# Patient Record
Sex: MALE | Race: WHITE | Employment: OTHER | URBAN - METROPOLITAN AREA
[De-identification: names, ages, dates, MRNs, and addresses within clinical notes are randomized per-mention and may not be internally consistent; named-entity substitution may affect disease eponyms.]

---

## 2017-02-08 RX ORDER — TRAMADOL HYDROCHLORIDE 50 MG/1
TABLET ORAL
Qty: 120 TABLET | Refills: 1 | OUTPATIENT
Start: 2017-02-08 | End: 2017-06-13

## 2017-02-08 NOTE — TELEPHONE ENCOUNTER
LOV:12-7  Last Filled:10-10, #120 with 1 refill  Labs:   Future Appointments  Date Time Provider Piedad Isidro   2/9/2017 12:50 PM Brendon Goodell, MD [facility] Karolyn Chin       Please Advise

## 2017-02-09 ENCOUNTER — OFFICE VISIT (OUTPATIENT)
Dept: INTERNAL MEDICINE CLINIC | Facility: CLINIC | Age: 82
End: 2017-02-09

## 2017-02-09 VITALS
TEMPERATURE: 99 F | HEART RATE: 58 BPM | SYSTOLIC BLOOD PRESSURE: 151 MMHG | DIASTOLIC BLOOD PRESSURE: 66 MMHG | HEIGHT: 66 IN | BODY MASS INDEX: 29.73 KG/M2 | WEIGHT: 185 LBS

## 2017-02-09 DIAGNOSIS — I10 ESSENTIAL HYPERTENSION WITH GOAL BLOOD PRESSURE LESS THAN 140/90: Primary | ICD-10-CM

## 2017-02-09 PROCEDURE — 99213 OFFICE O/P EST LOW 20 MIN: CPT | Performed by: INTERNAL MEDICINE

## 2017-02-09 PROCEDURE — G0463 HOSPITAL OUTPT CLINIC VISIT: HCPCS | Performed by: INTERNAL MEDICINE

## 2017-02-09 RX ORDER — AMLODIPINE BESYLATE 5 MG/1
5 TABLET ORAL DAILY
Qty: 30 TABLET | Refills: 3 | Status: SHIPPED | OUTPATIENT
Start: 2017-02-09 | End: 2017-05-16

## 2017-02-09 NOTE — PROGRESS NOTES
F/u his bps  Home bps all around 150/90      HEENT-NC/AT PEERLA, eom intact, sclerae non icteric, oral exam wnl, ears nl tms, s wax.   Neck without adenopathy thyroid non palpable   Carotids 2+ without bruits  Pulm-nl inspection and palpation  Clear to ausc

## 2017-04-11 RX ORDER — TAMSULOSIN HYDROCHLORIDE 0.4 MG/1
CAPSULE ORAL
Qty: 30 CAPSULE | Refills: 5 | Status: SHIPPED | OUTPATIENT
Start: 2017-04-11 | End: 2017-10-10

## 2017-04-14 ENCOUNTER — OFFICE VISIT (OUTPATIENT)
Dept: INTERNAL MEDICINE CLINIC | Facility: CLINIC | Age: 82
End: 2017-04-14

## 2017-04-14 VITALS
HEART RATE: 60 BPM | BODY MASS INDEX: 30.37 KG/M2 | SYSTOLIC BLOOD PRESSURE: 118 MMHG | HEIGHT: 66 IN | DIASTOLIC BLOOD PRESSURE: 67 MMHG | WEIGHT: 189 LBS

## 2017-04-14 DIAGNOSIS — I10 ESSENTIAL HYPERTENSION WITH GOAL BLOOD PRESSURE LESS THAN 140/90: Primary | ICD-10-CM

## 2017-04-14 DIAGNOSIS — L30.8 OTHER ECZEMA: ICD-10-CM

## 2017-04-14 PROCEDURE — G0463 HOSPITAL OUTPT CLINIC VISIT: HCPCS | Performed by: INTERNAL MEDICINE

## 2017-04-14 PROCEDURE — 99213 OFFICE O/P EST LOW 20 MIN: CPT | Performed by: INTERNAL MEDICINE

## 2017-04-14 RX ORDER — FLUOCINONIDE 0.5 MG/G
OINTMENT TOPICAL
Qty: 60 G | Refills: 6 | Status: SHIPPED | OUTPATIENT
Start: 2017-04-14 | End: 2018-01-09

## 2017-04-14 NOTE — PROGRESS NOTES
F/u bp  Numbers much better    HEENT-NC/AT PEERLA, eom intact, sclerae non icteric, oral exam wnl, ears nl tms, s wax.   Neck without adenopathy thyroid non palpable   Carotids 2+ without bruits  Pulm-nl inspection and palpation  Clear to auscultation and p

## 2017-04-27 ENCOUNTER — OFFICE VISIT (OUTPATIENT)
Dept: PAIN CLINIC | Facility: HOSPITAL | Age: 82
End: 2017-04-27
Attending: NURSE PRACTITIONER
Payer: MEDICARE

## 2017-04-27 VITALS — DIASTOLIC BLOOD PRESSURE: 63 MMHG | SYSTOLIC BLOOD PRESSURE: 106 MMHG | HEART RATE: 53 BPM

## 2017-04-27 PROCEDURE — 99211 OFF/OP EST MAY X REQ PHY/QHP: CPT

## 2017-04-27 NOTE — CHRONIC PAIN
Follow-up Note    HISTORY OF PRESENT ILLNESS:  Heidi Marquez is a [de-identified]year old old male returns to the clinic for follow up. Patient reports bilateral lower extremity pain. He reports a series of three injections last year for the pain.  He reports approxi As bedtime Disp: 30 tablet Rfl: 11   LEVOCETIRIZINE DIHYDROCHLORIDE 5 MG Oral Tab TAKE 1 TABLET (5 MG TOTAL) BY MOUTH NIGHTLY. Disp: 15 tablet Rfl: 1   loratadine (CLARITIN) 10 MG Oral Tab Take 10 mg by mouth daily.  Disp:  Rfl:    Multiple Vitamins-Mineral detachment 2008     \"RPE detachments in Mac\"; OU; per NG   • Dermatochalasis 2002     OU; per NG   • Visual floaters 2002     OU; per NG   • Macular degeneration      per NG       SURGICAL HISTORY:      Past Surgical History    APPENDECTOMY      Comment Extremity:  Normal    IMAGING:  No new relevant studies     [place] PHYSICIAN MONITORING PROGRAM REVIEWED  No      ASSESSMENT AND PLAN:    Benny Joy is a [de-identified]year old  male, with  history of bilateral lower extremity radicular pain secondary to lumbar DDD.

## 2017-04-27 NOTE — PROGRESS NOTES
Patient presents to [facility] ambulatory for med eval.  We have been treating him for bilateral leg pain from his thighs to his feet. His pain today is a 5/10 when walking. When he sits or lies down he has 0/10 pain.   He states the pain is a \"generalized pain

## 2017-05-16 RX ORDER — AMLODIPINE BESYLATE 5 MG/1
TABLET ORAL
Qty: 30 TABLET | Refills: 11 | Status: SHIPPED | OUTPATIENT
Start: 2017-05-16 | End: 2018-04-17

## 2017-06-13 RX ORDER — LISINOPRIL 30 MG/1
TABLET ORAL
Qty: 30 TABLET | Refills: 5 | Status: SHIPPED | OUTPATIENT
Start: 2017-06-13 | End: 2018-07-23

## 2017-06-13 RX ORDER — TRAMADOL HYDROCHLORIDE 50 MG/1
TABLET ORAL
Qty: 120 TABLET | Refills: 0 | OUTPATIENT
Start: 2017-06-13 | End: 2017-10-02

## 2017-06-14 ENCOUNTER — TELEPHONE (OUTPATIENT)
Dept: INTERNAL MEDICINE CLINIC | Facility: CLINIC | Age: 82
End: 2017-06-14

## 2017-06-14 NOTE — TELEPHONE ENCOUNTER
[facility] calling for a refill on med below. [facility] stts there has been some confusion because the pts current dr is leaving and the pts old dr. Ranjan Duval is retired.   allopurinol 100 MG Oral Tab 30 tablet 11 5/27/2016       Sig :  Take 1 tablet (100 mg total) by

## 2017-06-14 NOTE — TELEPHONE ENCOUNTER
[facility] calling and stts that they never received the script for   TRAMADOL HCL 50 MG Oral Tab, TAKE ONE TABLET BY MOUTH EVERY 6 HOURS AS NEEDED FOR PAIN, Disp: 120 tablet, Rfl: 1

## 2017-06-20 RX ORDER — ALLOPURINOL 100 MG/1
100 TABLET ORAL DAILY
Qty: 30 TABLET | Refills: 2 | Status: SHIPPED | OUTPATIENT
Start: 2017-06-20 | End: 2017-09-18

## 2017-07-18 ENCOUNTER — OFFICE VISIT (OUTPATIENT)
Dept: INTERNAL MEDICINE CLINIC | Facility: CLINIC | Age: 82
End: 2017-07-18

## 2017-07-18 ENCOUNTER — LAB ENCOUNTER (OUTPATIENT)
Dept: LAB | Age: 82
End: 2017-07-18
Attending: INTERNAL MEDICINE
Payer: MEDICARE

## 2017-07-18 VITALS
SYSTOLIC BLOOD PRESSURE: 112 MMHG | BODY MASS INDEX: 29.57 KG/M2 | WEIGHT: 184 LBS | HEIGHT: 66 IN | HEART RATE: 66 BPM | DIASTOLIC BLOOD PRESSURE: 66 MMHG

## 2017-07-18 DIAGNOSIS — I10 ESSENTIAL HYPERTENSION WITH GOAL BLOOD PRESSURE LESS THAN 140/90: Primary | ICD-10-CM

## 2017-07-18 DIAGNOSIS — N40.1 BENIGN PROSTATIC HYPERPLASIA WITH LOWER URINARY TRACT SYMPTOMS, SYMPTOM DETAILS UNSPECIFIED: ICD-10-CM

## 2017-07-18 DIAGNOSIS — D50.9 IRON DEFICIENCY ANEMIA, UNSPECIFIED IRON DEFICIENCY ANEMIA TYPE: ICD-10-CM

## 2017-07-18 LAB
BASOPHILS # BLD: 0.1 K/UL (ref 0–0.2)
BASOPHILS NFR BLD: 1 %
EOSINOPHIL # BLD: 0.3 K/UL (ref 0–0.7)
EOSINOPHIL NFR BLD: 4 %
ERYTHROCYTE [DISTWIDTH] IN BLOOD BY AUTOMATED COUNT: 13.8 % (ref 11–15)
HCT VFR BLD AUTO: 36.4 % (ref 41–52)
HGB BLD-MCNC: 12.4 G/DL (ref 13.5–17.5)
LYMPHOCYTES # BLD: 2 K/UL (ref 1–4)
LYMPHOCYTES NFR BLD: 30 %
MCH RBC QN AUTO: 33.2 PG (ref 27–32)
MCHC RBC AUTO-ENTMCNC: 34.1 G/DL (ref 32–37)
MCV RBC AUTO: 97.5 FL (ref 80–100)
MONOCYTES # BLD: 0.9 K/UL (ref 0–1)
MONOCYTES NFR BLD: 15 %
NEUTROPHILS # BLD AUTO: 3.3 K/UL (ref 1.8–7.7)
NEUTROPHILS NFR BLD: 50 %
PLATELET # BLD AUTO: 178 K/UL (ref 140–400)
PMV BLD AUTO: 8.6 FL (ref 7.4–10.3)
RBC # BLD AUTO: 3.73 M/UL (ref 4.5–5.9)
WBC # BLD AUTO: 6.5 K/UL (ref 4–11)

## 2017-07-18 PROCEDURE — 99214 OFFICE O/P EST MOD 30 MIN: CPT | Performed by: INTERNAL MEDICINE

## 2017-07-18 PROCEDURE — 36415 COLL VENOUS BLD VENIPUNCTURE: CPT

## 2017-07-18 PROCEDURE — G0463 HOSPITAL OUTPT CLINIC VISIT: HCPCS | Performed by: INTERNAL MEDICINE

## 2017-07-18 PROCEDURE — 85025 COMPLETE CBC W/AUTO DIFF WBC: CPT

## 2017-07-18 NOTE — PROGRESS NOTES
Stanley Sims is a [de-identified]year old male. HPI:   1. Essential hypertension with goal blood pressure less than 140/90    Patient has been following low salt diet and has been taking anti-hypertensive prescriptions as prescribed.  Blood pressure has been checke loratadine (CLARITIN) 10 MG Oral Tab Take 10 mg by mouth daily. Disp:  Rfl:    Multiple Vitamins-Minerals (EYE VITAMINS) Oral Cap Take  by mouth. 1 tab daily Disp:  Rfl:    cholecalciferol (D 1000) 1000 UNITS Oral Cap Take 1,000 Units by mouth daily.  Vit BS's,no masses, HSM or tenderness  EXTREMITIES: no cyanosis, clubbing . Has some edema of left lower extremities. Left knee with arthritis. ASSESSMENT AND PLAN:   1.  Essential hypertension with goal blood pressure less than 140/90    Patient instructed

## 2017-07-28 ENCOUNTER — OFFICE VISIT (OUTPATIENT)
Dept: DERMATOLOGY CLINIC | Facility: CLINIC | Age: 82
End: 2017-07-28

## 2017-07-28 DIAGNOSIS — Q80.0 ICHTHYOSIS VULGARIS: ICD-10-CM

## 2017-07-28 DIAGNOSIS — L57.0 AK (ACTINIC KERATOSIS): Primary | ICD-10-CM

## 2017-07-28 DIAGNOSIS — D23.30 BENIGN NEOPLASM OF SKIN OF FACE: ICD-10-CM

## 2017-07-28 DIAGNOSIS — D23.60 BENIGN NEOPLASM OF SKIN OF UPPER LIMB, INCLUDING SHOULDER, UNSPECIFIED LATERALITY: ICD-10-CM

## 2017-07-28 DIAGNOSIS — D23.4 BENIGN NEOPLASM OF SCALP AND SKIN OF NECK: ICD-10-CM

## 2017-07-28 DIAGNOSIS — L71.9 ROSACEA: ICD-10-CM

## 2017-07-28 DIAGNOSIS — L30.9 DERMATITIS: ICD-10-CM

## 2017-07-28 PROCEDURE — 17003 DESTRUCT PREMALG LES 2-14: CPT | Performed by: DERMATOLOGY

## 2017-07-28 PROCEDURE — 17000 DESTRUCT PREMALG LESION: CPT | Performed by: DERMATOLOGY

## 2017-07-28 PROCEDURE — 99213 OFFICE O/P EST LOW 20 MIN: CPT | Performed by: DERMATOLOGY

## 2017-07-29 ENCOUNTER — TELEPHONE (OUTPATIENT)
Dept: INTERNAL MEDICINE CLINIC | Facility: CLINIC | Age: 82
End: 2017-07-29

## 2017-07-29 NOTE — TELEPHONE ENCOUNTER
Reviewed LOV 7/18/17 notes below with pt, pt voiced understand. Per LOV notes -     3. Iron deficiency anemia, unspecified iron deficiency anemia type     Has been on iron pills. Ferritin level high. No Known bleeding.  Was offerered colonoscopy and c

## 2017-08-07 NOTE — PROGRESS NOTES
Shelton Dahl is a 81year old male. HPI:     CC:  Patient presents with:  Derm Problem: LOV in 2014 with SD. Pt presents with multiple lesions on scalp that he would like to have evaluated. Personal hx of melanoma on forehead.    Other: C/o dry skin to topically 2 (two) times daily. Apply bid Disp: 80 g Rfl: 3   allopurinol 100 MG Oral Tab Take 1 tablet (100 mg total) by mouth daily.  Disp: 30 tablet Rfl: 2   LISINOPRIL 30 MG Oral Tab TAKE 1 TABLET (30 MG TOTAL) BY MOUTH DAILY AT BEDTIME Disp: 30 tablet R Rash    Past Medical History:   Diagnosis Date   • Cataracts, bilateral 2002    OU; per NG   • Cup to disc asymmetry 2008    OU; per NG   • Dermatochalasis 2002    OU; per NG   • Hx of melanoma of skin 1979    forehead   • Macular degeneration     per NG from scratching. Worse in winter. Applying Lubiderm. Patient presents with concerns above. Patient has been in their usual state of health. History, medications, allergies reviewed as noted. ROS:  Denies any other systemic complaints.   No ne right forehead, left lateral brow, left helix. Actinic keratoses. Precancerous nature discussed. Lesions treated with cryo- . Biopsy if not resolved. x2    No recurrence prior melanoma on forehead.         Assessment plan:    Patient with history of skin

## 2017-08-28 ENCOUNTER — OFFICE VISIT (OUTPATIENT)
Dept: RHEUMATOLOGY | Facility: CLINIC | Age: 82
End: 2017-08-28

## 2017-08-28 VITALS
WEIGHT: 182.44 LBS | HEART RATE: 64 BPM | DIASTOLIC BLOOD PRESSURE: 72 MMHG | BODY MASS INDEX: 28.97 KG/M2 | HEIGHT: 66.5 IN | SYSTOLIC BLOOD PRESSURE: 116 MMHG

## 2017-08-28 DIAGNOSIS — M17.12 OSTEOARTHRITIS OF LEFT KNEE, UNSPECIFIED OSTEOARTHRITIS TYPE: Primary | ICD-10-CM

## 2017-08-28 PROCEDURE — 20610 DRAIN/INJ JOINT/BURSA W/O US: CPT | Performed by: INTERNAL MEDICINE

## 2017-08-28 PROCEDURE — 99213 OFFICE O/P EST LOW 20 MIN: CPT | Performed by: INTERNAL MEDICINE

## 2017-08-28 RX ORDER — TRIAMCINOLONE ACETONIDE 40 MG/ML
40 INJECTION, SUSPENSION INTRA-ARTICULAR; INTRAMUSCULAR ONCE
Status: COMPLETED | OUTPATIENT
Start: 2017-08-28 | End: 2017-08-28

## 2017-08-28 RX ADMIN — TRIAMCINOLONE ACETONIDE 40 MG: 40 INJECTION, SUSPENSION INTRA-ARTICULAR; INTRAMUSCULAR at 11:40:00

## 2017-08-28 NOTE — PROCEDURES
With paitent's consent, I injected pt's left  knee with 1ml lidocaine 1 % and 1 ml kenalog 40. It was done under sterile technique using iodine and alcohol swabs and ethyl chloride was used as an anaesthetic spray. Pt.  tolerated it well.

## 2017-08-28 NOTE — PROGRESS NOTES
Magdaleno Armando is a 81year old male. HPI:   Patient presents with:  Osteoarthritis: LT Back of Calf & Thigh [Pain Score: 7 \"when walking\"] Would like to have knee injection if MD agrees. I had the pleasure of seeing Randy Nieves on 6/15/2016. His left hip is boethering him again. He can sit in a hard seat. But 2 hours after sleeping he has left hip pain and can hardly walk. The shot last time worked until last weekend. He cut the grass several weeks ago.  He saw Dr. Vandana Santizo and getting xray topically 2 (two) times daily. Apply bid Disp: 80 g Rfl: 3   allopurinol 100 MG Oral Tab Take 1 tablet (100 mg total) by mouth daily.  Disp: 30 tablet Rfl: 2   LISINOPRIL 30 MG Oral Tab TAKE 1 TABLET (30 MG TOTAL) BY MOUTH DAILY AT BEDTIME Disp: 30 tablet R Rash      ROS:   All other ROS are negative.      PHYSICAL EXAM:   HEENT: Clear oropharynx, no oral ulcers, EOM intact, clear sclear, PERRLA, pleasant, no acute distress, no CAD, no neck tendnerness, good ROM,   No rashes  CVS: RRR, no murmurs  RS: CTAB, no left knee   with 1ml lidocaine 1 % and 1 ml kenalog 40. It was done under sterile technique using iodine and alcohol swabs and ethyl chloride was used as an anaesthetic spray. Pt.  tolerated it well.   Aspirated left knee 13cc   Left rochanteric bursitis -

## 2017-09-15 RX ORDER — ALLOPURINOL 100 MG/1
TABLET ORAL
Qty: 30 TABLET | Refills: 1 | OUTPATIENT
Start: 2017-09-15

## 2017-09-15 NOTE — TELEPHONE ENCOUNTER
Last uric acid level 5/2016 normal   Please advise on refill   Refill Protocol Appointment Criteria  · Appointment scheduled in the past 6 months or in the next 3 months  Recent Outpatient Visits            2 weeks ago Osteoarthritis of left knee, unspecif

## 2017-09-18 ENCOUNTER — TELEPHONE (OUTPATIENT)
Dept: OTHER | Age: 82
End: 2017-09-18

## 2017-09-18 RX ORDER — ALLOPURINOL 100 MG/1
100 TABLET ORAL DAILY
Qty: 30 TABLET | Refills: 0 | Status: SHIPPED | OUTPATIENT
Start: 2017-09-18 | End: 2017-10-10

## 2017-09-18 NOTE — TELEPHONE ENCOUNTER
Refilled per protocol    Appointment made for 10/10/17    Refill Protocol Appointment Criteria  · Appointment scheduled in the past 6 months or in the next 3 months  Recent Outpatient Visits            3 weeks ago Osteoarthritis of left knee, unspecified o

## 2017-10-02 RX ORDER — TRAMADOL HYDROCHLORIDE 50 MG/1
TABLET ORAL
Qty: 120 TABLET | Refills: 0 | OUTPATIENT
Start: 2017-10-02 | End: 2017-12-07

## 2017-10-02 NOTE — TELEPHONE ENCOUNTER
Dr Eubanks Headings to advise on pended tramdol 50 mg refill request.   CC: EM  CF LPN/CMA to phone tramadol into [place] when md approves.      Refill Protocol Appointment Criteria  · Appointment scheduled in the past 6 months or in the next 3 months  Recent Outpatient

## 2017-10-06 ENCOUNTER — PATIENT OUTREACH (OUTPATIENT)
Dept: INTERNAL MEDICINE CLINIC | Facility: CLINIC | Age: 82
End: 2017-10-06

## 2017-10-10 ENCOUNTER — OFFICE VISIT (OUTPATIENT)
Dept: INTERNAL MEDICINE CLINIC | Facility: CLINIC | Age: 82
End: 2017-10-10

## 2017-10-10 VITALS
SYSTOLIC BLOOD PRESSURE: 109 MMHG | WEIGHT: 182 LBS | HEIGHT: 66 IN | DIASTOLIC BLOOD PRESSURE: 67 MMHG | BODY MASS INDEX: 29.25 KG/M2 | HEART RATE: 71 BPM | RESPIRATION RATE: 16 BRPM

## 2017-10-10 DIAGNOSIS — N40.1 BENIGN PROSTATIC HYPERPLASIA WITH LOWER URINARY TRACT SYMPTOMS, SYMPTOM DETAILS UNSPECIFIED: ICD-10-CM

## 2017-10-10 DIAGNOSIS — I10 ESSENTIAL HYPERTENSION WITH GOAL BLOOD PRESSURE LESS THAN 140/90: Primary | ICD-10-CM

## 2017-10-10 DIAGNOSIS — D50.9 IRON DEFICIENCY ANEMIA, UNSPECIFIED IRON DEFICIENCY ANEMIA TYPE: ICD-10-CM

## 2017-10-10 PROCEDURE — 99214 OFFICE O/P EST MOD 30 MIN: CPT | Performed by: INTERNAL MEDICINE

## 2017-10-10 PROCEDURE — G0463 HOSPITAL OUTPT CLINIC VISIT: HCPCS | Performed by: INTERNAL MEDICINE

## 2017-10-10 RX ORDER — ALLOPURINOL 100 MG/1
TABLET ORAL
Qty: 30 TABLET | Refills: 0 | Status: SHIPPED | OUTPATIENT
Start: 2017-10-10 | End: 2018-04-17

## 2017-10-10 NOTE — PROGRESS NOTES
Corky Glover is a 81year old male. HPI:   1. Essential hypertension with goal blood pressure less than 140/90    Patient has been following low salt diet and has been taking anti-hypertensive prescriptions as prescribed.  Blood pressure has been checke tablet Rfl: 1   loratadine (CLARITIN) 10 MG Oral Tab Take 10 mg by mouth daily. Disp:  Rfl:    Multiple Vitamins-Minerals (EYE VITAMINS) Oral Cap Take  by mouth.  1 tab daily Disp:  Rfl:    cholecalciferol (D 1000) 1000 UNITS Oral Cap Take 1,000 Units by mo distress  SKIN: no rashes,no suspicious lesions  HEENT: atraumatic, normocephalic,ears and throat are clear  NECK: supple,no adenopathy,no bruits  LUNGS: clear to auscultation  CARDIO: RRR without murmur  GI: good BS's,no masses, HSM or tenderness  EXTREMI

## 2017-10-12 ENCOUNTER — OFFICE VISIT (OUTPATIENT)
Dept: OPHTHALMOLOGY | Facility: CLINIC | Age: 82
End: 2017-10-12

## 2017-10-12 DIAGNOSIS — H25.13 AGE-RELATED NUCLEAR CATARACT OF BOTH EYES: Primary | ICD-10-CM

## 2017-10-12 DIAGNOSIS — H43.393 VITREOUS FLOATERS OF BOTH EYES: ICD-10-CM

## 2017-10-12 DIAGNOSIS — H35.30 AGE-RELATED MACULAR DEGENERATION: ICD-10-CM

## 2017-10-12 DIAGNOSIS — H40.003 GLAUCOMA SUSPECT OF BOTH EYES: ICD-10-CM

## 2017-10-12 PROCEDURE — 92014 COMPRE OPH EXAM EST PT 1/>: CPT | Performed by: OPHTHALMOLOGY

## 2017-10-12 PROCEDURE — 92015 DETERMINE REFRACTIVE STATE: CPT | Performed by: OPHTHALMOLOGY

## 2017-10-12 NOTE — ASSESSMENT & PLAN NOTE
Discussed with patient that his eyes are suspicious for glaucoma. Diagnostic testing was ordered last year, but patient did not return for them.

## 2017-10-12 NOTE — PATIENT INSTRUCTIONS
Glaucoma suspect  Discussed with patient that his eyes are suspicious for glaucoma. Diagnostic testing was ordered last year, but patient did not return for them. Age-related macular degeneration  Stable; follow up with Dr. Erlin Mojica as directed.

## 2017-10-12 NOTE — PROGRESS NOTES
Omi Faye is a 81year old male. HPI:     HPI     EP. Patient is here for a complete eye exam.  He last saw Dr. Erlin Mojica in May of 2017 (see note). He is due to see him again in December of 2017.  Patient states that he has a decrease in his near EVERY 6 HOURS AS NEEDED FOR PAIN Disp: 120 tablet Rfl: 0   triamcinolone acetonide 0.1 % External Cream Apply topically 2 (two) times daily.  Apply bid Disp: 80 g Rfl: 3   LISINOPRIL 30 MG Oral Tab TAKE 1 TABLET (30 MG TOTAL) BY MOUTH DAILY AT BEDTIME Disp: Right Left    Dist cc 20/125 20/60    Dist ph cc NI NI    Near cc 20/80 20/50    Correction:  Glasses          Tonometry (Applanation, 3:15 PM)       Right Left    Pressure 14 15          Pachymetry (3/11/08)       Right Left    Thickness 600/-4 596/-4 glaucoma. Diagnostic testing was ordered last year, but patient did not return for them. Age-related macular degeneration  Stable; follow up with Dr. Verlena Apley as directed.      Age-related nuclear cataract of both eyes  No treatment is recommended on

## 2017-10-13 RX ORDER — TAMSULOSIN HYDROCHLORIDE 0.4 MG/1
CAPSULE ORAL
Qty: 30 CAPSULE | Refills: 2 | Status: SHIPPED | OUTPATIENT
Start: 2017-10-13 | End: 2018-01-13

## 2017-10-13 NOTE — TELEPHONE ENCOUNTER
Refilled per protocol.    Refill Protocol Appointment Criteria  · Appointment scheduled in the past 6 months or in the next 3 months  Recent Outpatient Visits            3 days ago Essential hypertension with goal blood pressure less than 140/90    Lily

## 2017-10-16 ENCOUNTER — PATIENT OUTREACH (OUTPATIENT)
Dept: INTERNAL MEDICINE CLINIC | Facility: CLINIC | Age: 82
End: 2017-10-16

## 2017-10-16 NOTE — PROGRESS NOTES
Spoke to patient in regards to our CCM program and explained how program may benefit him. Patient appreciated that phone call, but he felt that he is in good health to manage his care.   I offered to send out a letter in regards to our CCM program in case

## 2017-10-18 RX ORDER — ALLOPURINOL 100 MG/1
TABLET ORAL
Qty: 30 TABLET | Refills: 0 | Status: SHIPPED | OUTPATIENT
Start: 2017-10-18 | End: 2017-11-11

## 2017-11-13 RX ORDER — ALLOPURINOL 100 MG/1
TABLET ORAL
Qty: 30 TABLET | Refills: 0 | Status: SHIPPED | OUTPATIENT
Start: 2017-11-13 | End: 2018-01-09

## 2017-11-28 RX ORDER — METOPROLOL SUCCINATE 50 MG/1
TABLET, EXTENDED RELEASE ORAL
Qty: 90 TABLET | Refills: 0 | Status: SHIPPED | OUTPATIENT
Start: 2017-11-28 | End: 2018-02-13

## 2017-11-28 NOTE — TELEPHONE ENCOUNTER
Refilled bp med x1 per clinical judgement has follow up with kelli,. Please advise on tramadol  rx needs to be phoned in if approved.   Last refilled on 10-2-17 #120 0RF  Please respond to pool: MARÍA [facility] LPN/[facility]

## 2017-11-28 NOTE — TELEPHONE ENCOUNTER
Pharm is calling checking the status of medication also state that they requested a refill on medication TraMADol HCl 50 MG Oral Tab

## 2017-12-08 RX ORDER — TRAMADOL HYDROCHLORIDE 50 MG/1
TABLET ORAL
Qty: 120 TABLET | Refills: 0 | Status: SHIPPED | OUTPATIENT
Start: 2017-12-08 | End: 2017-12-09

## 2017-12-11 RX ORDER — TRAMADOL HYDROCHLORIDE 50 MG/1
TABLET ORAL
Qty: 120 TABLET | Refills: 0 | Status: SHIPPED | OUTPATIENT
Start: 2017-12-11 | End: 2017-12-11

## 2017-12-12 RX ORDER — TRAMADOL HYDROCHLORIDE 50 MG/1
TABLET ORAL
Qty: 120 TABLET | Refills: 0 | OUTPATIENT
Start: 2017-12-12 | End: 2018-02-13

## 2017-12-12 NOTE — TELEPHONE ENCOUNTER
Please advise on refill request.    Hypertensive Medications  Protocol Criteria:  · Appointment scheduled in the past 6 months or in the next 3 months  · BMP or CMP in the past 12 months  · Creatinine result < 2  Recent Outpatient Visits            2 month

## 2018-01-09 ENCOUNTER — OFFICE VISIT (OUTPATIENT)
Dept: INTERNAL MEDICINE CLINIC | Facility: CLINIC | Age: 83
End: 2018-01-09

## 2018-01-09 VITALS
HEIGHT: 66 IN | SYSTOLIC BLOOD PRESSURE: 134 MMHG | RESPIRATION RATE: 16 BRPM | WEIGHT: 180 LBS | BODY MASS INDEX: 28.93 KG/M2 | HEART RATE: 76 BPM | DIASTOLIC BLOOD PRESSURE: 76 MMHG

## 2018-01-09 DIAGNOSIS — I10 ESSENTIAL HYPERTENSION WITH GOAL BLOOD PRESSURE LESS THAN 140/90: Primary | ICD-10-CM

## 2018-01-09 DIAGNOSIS — N40.1 BENIGN PROSTATIC HYPERPLASIA WITH LOWER URINARY TRACT SYMPTOMS, SYMPTOM DETAILS UNSPECIFIED: ICD-10-CM

## 2018-01-09 PROCEDURE — 99214 OFFICE O/P EST MOD 30 MIN: CPT | Performed by: INTERNAL MEDICINE

## 2018-01-09 PROCEDURE — G0463 HOSPITAL OUTPT CLINIC VISIT: HCPCS | Performed by: INTERNAL MEDICINE

## 2018-01-09 NOTE — PROGRESS NOTES
Jessica Nickerson is a 81year old male. HPI:   1. Essential hypertension with goal blood pressure less than 140/90    Patient has been following low salt diet and has been taking anti-hypertensive prescriptions as prescribed.  Blood pressure has been checke Sulfate (IRON) 325 (65 FE) MG Oral Tab Take  by mouth. Take 1 tab. dly. Disp:  Rfl:    Multiple Vitamin (MULTI-VITAMINS) Oral Tab Take  by mouth.  take 1 tablet by ORAL route  every day with food Disp:  Rfl:       Past Medical History:   Diagnosis Date   • blood pressure less than 140/90    Patient instructed to take anti-hypertensive medicines exactly as prescribed and to follow a low salt diet as discussed.  Regular exercise at least 3 times weekly will help to curb one's appetite, control weight and lead t

## 2018-01-10 RX ORDER — ALLOPURINOL 100 MG/1
TABLET ORAL
Qty: 30 TABLET | Refills: 0 | Status: SHIPPED | OUTPATIENT
Start: 2018-01-10 | End: 2018-01-30

## 2018-01-15 RX ORDER — TAMSULOSIN HYDROCHLORIDE 0.4 MG/1
CAPSULE ORAL
Qty: 30 CAPSULE | Refills: 1 | Status: SHIPPED | OUTPATIENT
Start: 2018-01-15 | End: 2018-03-15

## 2018-01-18 RX ORDER — ALLOPURINOL 100 MG/1
TABLET ORAL
Qty: 30 TABLET | Refills: 0 | OUTPATIENT
Start: 2018-01-18

## 2018-01-22 RX ORDER — LISINOPRIL 30 MG/1
TABLET ORAL
Qty: 90 TABLET | Refills: 0 | Status: SHIPPED | OUTPATIENT
Start: 2018-01-22 | End: 2018-04-17

## 2018-01-22 NOTE — TELEPHONE ENCOUNTER
Please advise on refill request. Pharmacy called about their refill request sent last week. Patient is out of medication.     Hypertensive Medications  Protocol Criteria:  · Appointment scheduled in the past 6 months or in the next 3 months  · BMP or CMP in

## 2018-01-30 RX ORDER — ALLOPURINOL 100 MG/1
TABLET ORAL
Qty: 30 TABLET | Refills: 0 | Status: SHIPPED | OUTPATIENT
Start: 2018-01-30 | End: 2018-04-17

## 2018-02-02 ENCOUNTER — LAB ENCOUNTER (OUTPATIENT)
Dept: LAB | Age: 83
End: 2018-02-02
Attending: INTERNAL MEDICINE
Payer: MEDICARE

## 2018-02-02 DIAGNOSIS — I10 ESSENTIAL HYPERTENSION WITH GOAL BLOOD PRESSURE LESS THAN 140/90: ICD-10-CM

## 2018-02-02 LAB
ALBUMIN SERPL BCP-MCNC: 3.7 G/DL (ref 3.5–4.8)
ALBUMIN/GLOB SERPL: 1.1 {RATIO} (ref 1–2)
ALP SERPL-CCNC: 98 U/L (ref 32–100)
ALT SERPL-CCNC: 17 U/L (ref 17–63)
ANION GAP SERPL CALC-SCNC: 10 MMOL/L (ref 0–18)
AST SERPL-CCNC: 22 U/L (ref 15–41)
BACTERIA UR QL AUTO: NEGATIVE /HPF
BASOPHILS # BLD: 0 K/UL (ref 0–0.2)
BASOPHILS NFR BLD: 0 %
BILIRUB SERPL-MCNC: 0.7 MG/DL (ref 0.3–1.2)
BILIRUB UR QL: NEGATIVE
BUN SERPL-MCNC: 12 MG/DL (ref 8–20)
BUN/CREAT SERPL: 17.6 (ref 10–20)
CALCIUM SERPL-MCNC: 9.4 MG/DL (ref 8.5–10.5)
CHLORIDE SERPL-SCNC: 98 MMOL/L (ref 95–110)
CHOLEST SERPL-MCNC: 151 MG/DL (ref 110–200)
CLARITY UR: CLEAR
CO2 SERPL-SCNC: 28 MMOL/L (ref 22–32)
COLOR UR: YELLOW
CREAT SERPL-MCNC: 0.68 MG/DL (ref 0.5–1.5)
EOSINOPHIL # BLD: 0.2 K/UL (ref 0–0.7)
EOSINOPHIL NFR BLD: 3 %
ERYTHROCYTE [DISTWIDTH] IN BLOOD BY AUTOMATED COUNT: 13.7 % (ref 11–15)
GLOBULIN PLAS-MCNC: 3.3 G/DL (ref 2.5–3.7)
GLUCOSE SERPL-MCNC: 97 MG/DL (ref 70–99)
GLUCOSE UR-MCNC: NEGATIVE MG/DL
HCT VFR BLD AUTO: 38.4 % (ref 41–52)
HDLC SERPL-MCNC: 84 MG/DL
HGB BLD-MCNC: 12.7 G/DL (ref 13.5–17.5)
HGB UR QL STRIP.AUTO: NEGATIVE
KETONES UR-MCNC: NEGATIVE MG/DL
LDLC SERPL CALC-MCNC: 61 MG/DL (ref 0–99)
LEUKOCYTE ESTERASE UR QL STRIP.AUTO: NEGATIVE
LYMPHOCYTES # BLD: 1.6 K/UL (ref 1–4)
LYMPHOCYTES NFR BLD: 31 %
MCH RBC QN AUTO: 32.2 PG (ref 27–32)
MCHC RBC AUTO-ENTMCNC: 33 G/DL (ref 32–37)
MCV RBC AUTO: 97.5 FL (ref 80–100)
METAMYELOCYTES # BLD MANUAL: 0.05 K/UL
METAMYELOCYTES # BLD MANUAL: 0.05 K/UL
METAMYELOCYTES NFR BLD: 1 %
MONOCYTES # BLD: 0.8 K/UL (ref 0–1)
MONOCYTES NFR BLD: 15 %
MYELOCYTES NFR BLD: 1 %
NEUTROPHILS # BLD AUTO: 2.5 K/UL (ref 1.8–7.7)
NEUTROPHILS NFR BLD: 49 %
NITRITE UR QL STRIP.AUTO: NEGATIVE
NONHDLC SERPL-MCNC: 67 MG/DL
OSMOLALITY UR CALC.SUM OF ELEC: 282 MOSM/KG (ref 275–295)
PH UR: 7 [PH] (ref 5–8)
PLATELET # BLD AUTO: 187 K/UL (ref 140–400)
PMV BLD AUTO: 10 FL (ref 7.4–10.3)
POTASSIUM SERPL-SCNC: 4.8 MMOL/L (ref 3.3–5.1)
PROT SERPL-MCNC: 7 G/DL (ref 5.9–8.4)
PROT UR-MCNC: NEGATIVE MG/DL
RBC # BLD AUTO: 3.94 M/UL (ref 4.5–5.9)
RBC #/AREA URNS AUTO: 1 /HPF
SODIUM SERPL-SCNC: 136 MMOL/L (ref 136–144)
SP GR UR STRIP: 1.02 (ref 1–1.03)
TRIGL SERPL-MCNC: 28 MG/DL (ref 1–149)
TSH SERPL-ACNC: 2.18 UIU/ML (ref 0.45–5.33)
UROBILINOGEN UR STRIP-ACNC: <2
VIT C UR-MCNC: 40 MG/DL
WBC # BLD AUTO: 5.1 K/UL (ref 4–11)
WBC #/AREA URNS AUTO: 1 /HPF

## 2018-02-02 PROCEDURE — 80053 COMPREHEN METABOLIC PANEL: CPT

## 2018-02-02 PROCEDURE — 85027 COMPLETE CBC AUTOMATED: CPT

## 2018-02-02 PROCEDURE — 36415 COLL VENOUS BLD VENIPUNCTURE: CPT

## 2018-02-02 PROCEDURE — 80061 LIPID PANEL: CPT

## 2018-02-02 PROCEDURE — 84443 ASSAY THYROID STIM HORMONE: CPT

## 2018-02-02 PROCEDURE — 81003 URINALYSIS AUTO W/O SCOPE: CPT

## 2018-02-02 PROCEDURE — 85007 BL SMEAR W/DIFF WBC COUNT: CPT

## 2018-02-02 PROCEDURE — 85025 COMPLETE CBC W/AUTO DIFF WBC: CPT

## 2018-02-12 RX ORDER — ALLOPURINOL 100 MG/1
TABLET ORAL
Qty: 30 TABLET | Refills: 0 | Status: SHIPPED | OUTPATIENT
Start: 2018-02-12 | End: 2018-04-03

## 2018-02-13 RX ORDER — METOPROLOL SUCCINATE 50 MG/1
TABLET, EXTENDED RELEASE ORAL
Qty: 90 TABLET | Refills: 0 | Status: SHIPPED | OUTPATIENT
Start: 2018-02-13 | End: 2018-04-17

## 2018-02-13 RX ORDER — TRAMADOL HYDROCHLORIDE 50 MG/1
TABLET ORAL
Qty: 120 TABLET | Refills: 0 | Status: SHIPPED | OUTPATIENT
Start: 2018-02-13 | End: 2018-04-17

## 2018-02-16 ENCOUNTER — HOSPITAL ENCOUNTER (OUTPATIENT)
Dept: GENERAL RADIOLOGY | Facility: HOSPITAL | Age: 83
Discharge: HOME | End: 2018-02-16
Attending: ORTHOPAEDIC SURGERY
Payer: MEDICARE

## 2018-02-16 ENCOUNTER — OFFICE VISIT (OUTPATIENT)
Dept: ORTHOPEDICS CLINIC | Facility: CLINIC | Age: 83
End: 2018-02-16

## 2018-02-16 ENCOUNTER — HOSPITAL ENCOUNTER (OUTPATIENT)
Dept: GENERAL RADIOLOGY | Facility: HOSPITAL | Age: 83
Discharge: HOME | End: 2018-02-16
Attending: ORTHOPAEDIC SURGERY | Admitting: ORTHOPAEDIC SURGERY
Payer: MEDICARE

## 2018-02-16 DIAGNOSIS — S52.502A CLOSED TRAUMATIC NONDISPLACED FRACTURE OF DISTAL END OF LEFT RADIUS, INITIAL ENCOUNTER: Primary | ICD-10-CM

## 2018-02-16 DIAGNOSIS — M54.50 LUMBAR SPINE PAIN: ICD-10-CM

## 2018-02-16 DIAGNOSIS — M25.532 LEFT WRIST PAIN: ICD-10-CM

## 2018-02-16 DIAGNOSIS — M51.36 DDD (DEGENERATIVE DISC DISEASE), LUMBAR: ICD-10-CM

## 2018-02-16 PROCEDURE — 25600 CLTX DST RDL FX/EPHYS SEP WO: CPT | Performed by: ORTHOPAEDIC SURGERY

## 2018-02-16 PROCEDURE — 72100 X-RAY EXAM L-S SPINE 2/3 VWS: CPT | Performed by: ORTHOPAEDIC SURGERY

## 2018-02-16 PROCEDURE — 99214 OFFICE O/P EST MOD 30 MIN: CPT | Performed by: ORTHOPAEDIC SURGERY

## 2018-02-16 PROCEDURE — 73110 X-RAY EXAM OF WRIST: CPT | Performed by: ORTHOPAEDIC SURGERY

## 2018-02-16 NOTE — PROGRESS NOTES
2/16/2018  Haven Ao  7/24/1935  81year old   male  Joey Lieberman MD    HPI:   Patient presents with:  Wrist Pain: left- pt states he fell almost 5 wks ago. He never saw a dr for it, but put on a wrist splint. Back Pain (musculoskeletal):  Lo TAKE 1 TABLET (30 MG TOTAL) BY MOUTH DAILY AT BEDTIME Disp: 90 tablet Rfl: 0   TAMSULOSIN HCL 0.4 MG Oral Cap take 1 capsule by mouth one-half hour after supper daily Disp: 30 capsule Rfl: 1   ALLOPURINOL 100 MG Oral Tab TAKE ONE TABLET BY MOUTH ONE TIME D per NG  No date: KNEE ARTHROSCOPY Bilateral      Comment: per NG   Family History   Problem Relation Age of Onset   • Cancer Father      Lung cancer; per NG   • Arthritis Father      per NG   • Hypertension Mother      per NG   • Diabetes Neg    • Glaucoma traumatic nondisplaced fracture of distal end of left radius, initial encounter  (primary encounter diagnosis)  Ddd (degenerative disc disease), lumbar  Left wrist pain  Lumbar spine pain    The risks, indications, benefits, and procedures of both operativ

## 2018-02-28 ENCOUNTER — OFFICE VISIT (OUTPATIENT)
Dept: PAIN CLINIC | Facility: HOSPITAL | Age: 83
End: 2018-02-28
Attending: NURSE PRACTITIONER
Payer: MEDICARE

## 2018-02-28 VITALS — HEART RATE: 66 BPM | SYSTOLIC BLOOD PRESSURE: 112 MMHG | DIASTOLIC BLOOD PRESSURE: 64 MMHG

## 2018-02-28 PROCEDURE — 99211 OFF/OP EST MAY X REQ PHY/QHP: CPT

## 2018-02-28 NOTE — CHRONIC PAIN
Follow-up Note  HISTORY OF PRESENT ILLNESS:  Alex Mcfarland is a 81year old old male returns to the clinic for follow up. Since we last saw him he has been doing well except for a fall three weeks ago.  He reports breaking his left wrist. He reports that take 1 capsule by mouth one-half hour after supper daily Disp: 30 capsule Rfl: 1   ALLOPURINOL 100 MG Oral Tab TAKE ONE TABLET BY MOUTH ONE TIME DAILY  Disp: 30 tablet Rfl: 0   AMLODIPINE BESYLATE 5 MG Oral Tab TAKE ONE TABLET BY MOUTH DAILY Disp: 30 table 140/90     Benign prostatic hyperplasia with lower urinary tract symptoms    Past Medical History:   Diagnosis Date   • Cataracts, bilateral 2002    OU; per NG   • Cup to disc asymmetry 2008    OU; per NG   • Dermatochalasis 2002    OU; per NG   • Hx of me NAD  Eyes: anicteric; no injection  Abdomen: soft, non-tender  Gait: Antalgic;   Spine: Kyphosis      MOTOR EXAMINATION:  LOWER EXTREMITY      LEFT RIGHT   Iliopsoas 5/5 5/5   Quadriceps 5/5 5/5   Foot DF 5/5 5/5   Foot EHL 5/5 5/5   Gastrocnemius 5/5 5/5

## 2018-02-28 NOTE — PROGRESS NOTES
Patient presents to [facility] ambulatory for follow up. He has lower back pain radiating to bilateral hips. He has done injections in the past and they have helped a lot. He takes tramadol twice a day for pain which helps as well.   MARTHA Gutierrez in to see patient

## 2018-03-01 ENCOUNTER — TELEPHONE (OUTPATIENT)
Dept: PAIN CLINIC | Facility: HOSPITAL | Age: 83
End: 2018-03-01

## 2018-03-01 NOTE — TELEPHONE ENCOUNTER
3/1/18 @ 10:48am Spoke to Kendall Peterson at [facility], [phone], OQN#[id number]. She verified that patient has Medicare as primary. She also verified that no prior Virl August is required for CPT code 00515.

## 2018-03-16 ENCOUNTER — HOSPITAL ENCOUNTER (OUTPATIENT)
Dept: GENERAL RADIOLOGY | Facility: HOSPITAL | Age: 83
Discharge: HOME | End: 2018-03-16
Attending: ORTHOPAEDIC SURGERY | Admitting: ORTHOPAEDIC SURGERY
Payer: MEDICARE

## 2018-03-16 ENCOUNTER — OFFICE VISIT (OUTPATIENT)
Dept: ORTHOPEDICS CLINIC | Facility: CLINIC | Age: 83
End: 2018-03-16

## 2018-03-16 DIAGNOSIS — Z47.89 ORTHOPEDIC AFTERCARE: ICD-10-CM

## 2018-03-16 DIAGNOSIS — S52.502A CLOSED TRAUMATIC NONDISPLACED FRACTURE OF DISTAL END OF LEFT RADIUS, INITIAL ENCOUNTER: Primary | ICD-10-CM

## 2018-03-16 PROCEDURE — 99024 POSTOP FOLLOW-UP VISIT: CPT | Performed by: ORTHOPAEDIC SURGERY

## 2018-03-16 PROCEDURE — G0463 HOSPITAL OUTPT CLINIC VISIT: HCPCS | Performed by: ORTHOPAEDIC SURGERY

## 2018-03-16 PROCEDURE — 73110 X-RAY EXAM OF WRIST: CPT | Performed by: ORTHOPAEDIC SURGERY

## 2018-03-16 NOTE — PROGRESS NOTES
This is a pleasant 45-year-old male that is 9 weeks status post a left distal radius fracture that is minimally displaced. The patient reports his pain has significantly improved. The patient comes in today for repeat evaluation.     On exam, the patient

## 2018-03-17 RX ORDER — TAMSULOSIN HYDROCHLORIDE 0.4 MG/1
CAPSULE ORAL
Qty: 90 CAPSULE | Refills: 0 | Status: SHIPPED | OUTPATIENT
Start: 2018-03-17 | End: 2018-04-17

## 2018-03-17 NOTE — TELEPHONE ENCOUNTER
Signed Prescriptions Disp Refills    TAMSULOSIN HCL 0.4 MG Oral Cap 90 capsule 0      Sig: TAKE 1 CAPSULE BY MOUTH ONE-HALF HOUR AFTER SUPPER DAILY        Authorizing Provider: CAROLE Barrett        Ordering User: Dorsey Lesch           Refill appr

## 2018-04-03 RX ORDER — ALLOPURINOL 100 MG/1
TABLET ORAL
Qty: 30 TABLET | Refills: 0 | Status: SHIPPED | OUTPATIENT
Start: 2018-04-03 | End: 2018-04-17

## 2018-04-06 ENCOUNTER — TELEPHONE (OUTPATIENT)
Dept: PAIN CLINIC | Facility: HOSPITAL | Age: 83
End: 2018-04-06

## 2018-04-13 ENCOUNTER — OFFICE VISIT (OUTPATIENT)
Dept: ORTHOPEDICS CLINIC | Facility: CLINIC | Age: 83
End: 2018-04-13

## 2018-04-13 ENCOUNTER — HOSPITAL ENCOUNTER (OUTPATIENT)
Dept: GENERAL RADIOLOGY | Facility: HOSPITAL | Age: 83
Discharge: HOME | End: 2018-04-13
Attending: ORTHOPAEDIC SURGERY | Admitting: ORTHOPAEDIC SURGERY
Payer: MEDICARE

## 2018-04-13 DIAGNOSIS — Z47.89 ORTHOPEDIC AFTERCARE: ICD-10-CM

## 2018-04-13 DIAGNOSIS — M25.642 FINGER STIFFNESS, LEFT: Primary | ICD-10-CM

## 2018-04-13 DIAGNOSIS — S52.502A CLOSED TRAUMATIC NONDISPLACED FRACTURE OF DISTAL END OF LEFT RADIUS, INITIAL ENCOUNTER: ICD-10-CM

## 2018-04-13 PROCEDURE — G0463 HOSPITAL OUTPT CLINIC VISIT: HCPCS | Performed by: ORTHOPAEDIC SURGERY

## 2018-04-13 PROCEDURE — 73110 X-RAY EXAM OF WRIST: CPT | Performed by: ORTHOPAEDIC SURGERY

## 2018-04-13 PROCEDURE — 99024 POSTOP FOLLOW-UP VISIT: CPT | Performed by: ORTHOPAEDIC SURGERY

## 2018-04-16 ENCOUNTER — TELEPHONE (OUTPATIENT)
Dept: ORTHOPEDICS CLINIC | Facility: CLINIC | Age: 83
End: 2018-04-16

## 2018-04-16 NOTE — TELEPHONE ENCOUNTER
Aliyah Churchill requesting cb, pts order states he needs to do OT but he states he needs PT, pls call to clarify at [phone] ext. [phone]. Thank you.

## 2018-04-17 ENCOUNTER — OFFICE VISIT (OUTPATIENT)
Dept: INTERNAL MEDICINE CLINIC | Facility: CLINIC | Age: 83
End: 2018-04-17

## 2018-04-17 VITALS
HEIGHT: 66 IN | RESPIRATION RATE: 16 BRPM | SYSTOLIC BLOOD PRESSURE: 103 MMHG | WEIGHT: 176 LBS | HEART RATE: 71 BPM | DIASTOLIC BLOOD PRESSURE: 66 MMHG | BODY MASS INDEX: 28.28 KG/M2

## 2018-04-17 DIAGNOSIS — I10 ESSENTIAL HYPERTENSION WITH GOAL BLOOD PRESSURE LESS THAN 140/90: Primary | ICD-10-CM

## 2018-04-17 DIAGNOSIS — N40.1 BENIGN PROSTATIC HYPERPLASIA WITH LOWER URINARY TRACT SYMPTOMS, SYMPTOM DETAILS UNSPECIFIED: ICD-10-CM

## 2018-04-17 DIAGNOSIS — J43.1 PANLOBULAR EMPHYSEMA (HCC): ICD-10-CM

## 2018-04-17 PROCEDURE — G0463 HOSPITAL OUTPT CLINIC VISIT: HCPCS | Performed by: INTERNAL MEDICINE

## 2018-04-17 PROCEDURE — 99214 OFFICE O/P EST MOD 30 MIN: CPT | Performed by: INTERNAL MEDICINE

## 2018-04-17 RX ORDER — ALLOPURINOL 100 MG/1
100 TABLET ORAL
Qty: 90 TABLET | Refills: 3 | Status: SHIPPED | OUTPATIENT
Start: 2018-04-17 | End: 2019-04-22

## 2018-04-17 RX ORDER — AMLODIPINE BESYLATE 5 MG/1
5 TABLET ORAL
Qty: 90 TABLET | Refills: 3 | Status: ON HOLD | OUTPATIENT
Start: 2018-04-17 | End: 2018-07-24

## 2018-04-17 RX ORDER — TRAMADOL HYDROCHLORIDE 50 MG/1
TABLET ORAL
Qty: 120 TABLET | Refills: 0 | Status: SHIPPED | OUTPATIENT
Start: 2018-04-17 | End: 2018-04-17

## 2018-04-17 RX ORDER — AMLODIPINE BESYLATE 5 MG/1
TABLET ORAL
Qty: 30 TABLET | Refills: 10 | Status: CANCELLED | OUTPATIENT
Start: 2018-04-17

## 2018-04-17 RX ORDER — TRAMADOL HYDROCHLORIDE 50 MG/1
TABLET ORAL
Qty: 90 TABLET | Refills: 0 | Status: ON HOLD | OUTPATIENT
Start: 2018-04-17 | End: 2018-06-01

## 2018-04-17 RX ORDER — METOPROLOL SUCCINATE 50 MG/1
50 TABLET, EXTENDED RELEASE ORAL
Qty: 90 TABLET | Refills: 3 | Status: ON HOLD | OUTPATIENT
Start: 2018-04-17 | End: 2018-07-24

## 2018-04-17 RX ORDER — TAMSULOSIN HYDROCHLORIDE 0.4 MG/1
CAPSULE ORAL
Qty: 90 CAPSULE | Refills: 3 | Status: SHIPPED | OUTPATIENT
Start: 2018-04-17 | End: 2019-08-13

## 2018-04-17 RX ORDER — LISINOPRIL 30 MG/1
TABLET ORAL
Qty: 90 TABLET | Refills: 3 | Status: ON HOLD | OUTPATIENT
Start: 2018-04-17 | End: 2018-07-24

## 2018-04-17 NOTE — PROGRESS NOTES
Kalani Tay is a 81year old male. HPI:   1. Essential hypertension with goal blood pressure less than 140/90    Patient has been following low salt diet and has been taking anti-hypertensive prescriptions as prescribed.  Blood pressure has been checke Rfl:    cholecalciferol (D 1000) 1000 UNITS Oral Cap Take 1,000 Units by mouth daily. Vitamin D3 1,000 unit capsule Disp:  Rfl:    Ferrous Sulfate (IRON) 325 (65 FE) MG Oral Tab Take  by mouth. Take 1 tab. dly.  Disp:  Rfl:    Multiple Vitamin (MULTI-VITAMI clubbing . Has some edema of left lower extremities. Left knee with arthritis. ASSESSMENT AND PLAN:   1.  Essential hypertension with goal blood pressure less than 140/90    Patient instructed to take anti-hypertensive medicines exactly as prescribed and

## 2018-04-19 ENCOUNTER — TELEPHONE (OUTPATIENT)
Dept: ORTHOPEDICS CLINIC | Facility: CLINIC | Age: 83
End: 2018-04-19

## 2018-05-11 ENCOUNTER — HOSPITAL ENCOUNTER (OUTPATIENT)
Dept: GENERAL RADIOLOGY | Facility: HOSPITAL | Age: 83
Discharge: HOME | End: 2018-05-11
Attending: ORTHOPAEDIC SURGERY | Admitting: ORTHOPAEDIC SURGERY
Payer: MEDICARE

## 2018-05-11 ENCOUNTER — OFFICE VISIT (OUTPATIENT)
Dept: ORTHOPEDICS CLINIC | Facility: CLINIC | Age: 83
End: 2018-05-11

## 2018-05-11 DIAGNOSIS — M25.642 FINGER STIFFNESS, LEFT: Primary | ICD-10-CM

## 2018-05-11 DIAGNOSIS — Z47.89 ORTHOPEDIC AFTERCARE: ICD-10-CM

## 2018-05-11 DIAGNOSIS — S52.502A CLOSED TRAUMATIC NONDISPLACED FRACTURE OF DISTAL END OF LEFT RADIUS, INITIAL ENCOUNTER: ICD-10-CM

## 2018-05-11 PROCEDURE — 73110 X-RAY EXAM OF WRIST: CPT | Performed by: ORTHOPAEDIC SURGERY

## 2018-05-11 PROCEDURE — 99024 POSTOP FOLLOW-UP VISIT: CPT | Performed by: ORTHOPAEDIC SURGERY

## 2018-05-11 PROCEDURE — G0463 HOSPITAL OUTPT CLINIC VISIT: HCPCS | Performed by: ORTHOPAEDIC SURGERY

## 2018-05-11 NOTE — PROGRESS NOTES
This is a pleasant 81-year-old male that is 17 weeks status post a left minimally displaced radial styloid fracture of the wrist.  The patient also developed left finger stiffness and has been working in occupational therapy to improve this.   Patient repor

## 2018-05-27 ENCOUNTER — HOSPITAL ENCOUNTER (OUTPATIENT)
Age: 83
Discharge: OTHER INSTITUTION | DRG: 513 | End: 2018-05-27
Attending: FAMILY MEDICINE
Payer: MEDICARE

## 2018-05-27 ENCOUNTER — HOSPITAL ENCOUNTER (INPATIENT)
Facility: HOSPITAL | Age: 83
LOS: 5 days | Discharge: NURSING, REHAB OR LONG-TERM CARE | DRG: 513 | End: 2018-06-01
Attending: EMERGENCY MEDICINE | Admitting: HOSPITALIST
Payer: MEDICARE

## 2018-05-27 ENCOUNTER — APPOINTMENT (OUTPATIENT)
Dept: GENERAL RADIOLOGY | Age: 83
DRG: 513 | End: 2018-05-27
Attending: FAMILY MEDICINE
Payer: MEDICARE

## 2018-05-27 VITALS
OXYGEN SATURATION: 96 % | SYSTOLIC BLOOD PRESSURE: 119 MMHG | DIASTOLIC BLOOD PRESSURE: 55 MMHG | RESPIRATION RATE: 16 BRPM | HEART RATE: 89 BPM | HEIGHT: 66.5 IN | WEIGHT: 178 LBS | BODY MASS INDEX: 28.27 KG/M2 | TEMPERATURE: 101 F

## 2018-05-27 DIAGNOSIS — M86.9 OSTEOMYELITIS (HCC): ICD-10-CM

## 2018-05-27 DIAGNOSIS — S62.630A CLOSED DISPLACED FRACTURE OF DISTAL PHALANX OF RIGHT INDEX FINGER, INITIAL ENCOUNTER: ICD-10-CM

## 2018-05-27 DIAGNOSIS — I89.1 LYMPHANGITIS: ICD-10-CM

## 2018-05-27 DIAGNOSIS — M86.9 OSTEOMYELITIS OF RIGHT HAND, UNSPECIFIED TYPE (HCC): Primary | ICD-10-CM

## 2018-05-27 DIAGNOSIS — M86.9 OSTEOMYELITIS, UNSPECIFIED SITE, UNSPECIFIED TYPE (HCC): Primary | ICD-10-CM

## 2018-05-27 PROCEDURE — 73140 X-RAY EXAM OF FINGER(S): CPT | Performed by: FAMILY MEDICINE

## 2018-05-27 PROCEDURE — 99223 1ST HOSP IP/OBS HIGH 75: CPT | Performed by: HOSPITALIST

## 2018-05-27 PROCEDURE — 99213 OFFICE O/P EST LOW 20 MIN: CPT

## 2018-05-27 PROCEDURE — 26750 TREAT FINGER FRACTURE EACH: CPT

## 2018-05-27 RX ORDER — CETIRIZINE HYDROCHLORIDE 10 MG/1
10 TABLET ORAL DAILY
Status: DISCONTINUED | OUTPATIENT
Start: 2018-05-28 | End: 2018-06-01

## 2018-05-27 RX ORDER — HYDROMORPHONE HYDROCHLORIDE 1 MG/ML
0.2 INJECTION, SOLUTION INTRAMUSCULAR; INTRAVENOUS; SUBCUTANEOUS EVERY 2 HOUR PRN
Status: DISCONTINUED | OUTPATIENT
Start: 2018-05-27 | End: 2018-06-01

## 2018-05-27 RX ORDER — HYDROMORPHONE HYDROCHLORIDE 1 MG/ML
0.4 INJECTION, SOLUTION INTRAMUSCULAR; INTRAVENOUS; SUBCUTANEOUS EVERY 2 HOUR PRN
Status: DISCONTINUED | OUTPATIENT
Start: 2018-05-27 | End: 2018-06-01

## 2018-05-27 RX ORDER — HYDROCODONE BITARTRATE AND ACETAMINOPHEN 5; 325 MG/1; MG/1
1 TABLET ORAL ONCE
Status: COMPLETED | OUTPATIENT
Start: 2018-05-27 | End: 2018-05-27

## 2018-05-27 RX ORDER — ALFUZOSIN HYDROCHLORIDE 10 MG/1
10 TABLET, EXTENDED RELEASE ORAL
Status: DISCONTINUED | OUTPATIENT
Start: 2018-05-28 | End: 2018-06-01

## 2018-05-27 RX ORDER — METOCLOPRAMIDE HYDROCHLORIDE 5 MG/ML
10 INJECTION INTRAMUSCULAR; INTRAVENOUS EVERY 8 HOURS PRN
Status: DISCONTINUED | OUTPATIENT
Start: 2018-05-27 | End: 2018-06-01

## 2018-05-27 RX ORDER — ENOXAPARIN SODIUM 100 MG/ML
40 INJECTION SUBCUTANEOUS DAILY
Status: DISCONTINUED | OUTPATIENT
Start: 2018-05-27 | End: 2018-05-27

## 2018-05-27 RX ORDER — HYDROCODONE BITARTRATE AND ACETAMINOPHEN 5; 325 MG/1; MG/1
1 TABLET ORAL EVERY 6 HOURS PRN
Status: DISCONTINUED | OUTPATIENT
Start: 2018-05-27 | End: 2018-06-01

## 2018-05-27 RX ORDER — AMLODIPINE BESYLATE 5 MG/1
5 TABLET ORAL DAILY
Status: DISCONTINUED | OUTPATIENT
Start: 2018-05-28 | End: 2018-06-01

## 2018-05-27 RX ORDER — ONDANSETRON 2 MG/ML
4 INJECTION INTRAMUSCULAR; INTRAVENOUS EVERY 6 HOURS PRN
Status: DISCONTINUED | OUTPATIENT
Start: 2018-05-27 | End: 2018-06-01

## 2018-05-27 RX ORDER — METOPROLOL SUCCINATE 50 MG/1
50 TABLET, EXTENDED RELEASE ORAL DAILY
Status: DISCONTINUED | OUTPATIENT
Start: 2018-05-28 | End: 2018-06-01

## 2018-05-27 RX ORDER — SODIUM CHLORIDE 0.9 % (FLUSH) 0.9 %
3 SYRINGE (ML) INJECTION AS NEEDED
Status: DISCONTINUED | OUTPATIENT
Start: 2018-05-27 | End: 2018-06-01

## 2018-05-27 RX ORDER — LORAZEPAM 2 MG/ML
1 INJECTION INTRAMUSCULAR ONCE
Status: DISCONTINUED | OUTPATIENT
Start: 2018-05-27 | End: 2018-05-27

## 2018-05-27 RX ORDER — POLYETHYLENE GLYCOL 3350 17 G/17G
17 POWDER, FOR SOLUTION ORAL DAILY PRN
Status: DISCONTINUED | OUTPATIENT
Start: 2018-05-27 | End: 2018-06-01

## 2018-05-27 RX ORDER — DOCUSATE SODIUM 100 MG/1
100 CAPSULE, LIQUID FILLED ORAL 2 TIMES DAILY
Status: DISCONTINUED | OUTPATIENT
Start: 2018-05-27 | End: 2018-06-01

## 2018-05-27 RX ORDER — SODIUM CHLORIDE 9 MG/ML
INJECTION, SOLUTION INTRAVENOUS CONTINUOUS
Status: DISCONTINUED | OUTPATIENT
Start: 2018-05-27 | End: 2018-05-30

## 2018-05-27 RX ORDER — HYDROMORPHONE HYDROCHLORIDE 1 MG/ML
0.8 INJECTION, SOLUTION INTRAMUSCULAR; INTRAVENOUS; SUBCUTANEOUS EVERY 2 HOUR PRN
Status: DISCONTINUED | OUTPATIENT
Start: 2018-05-27 | End: 2018-06-01

## 2018-05-27 RX ORDER — BISACODYL 10 MG
10 SUPPOSITORY, RECTAL RECTAL
Status: DISCONTINUED | OUTPATIENT
Start: 2018-05-27 | End: 2018-06-01

## 2018-05-27 RX ORDER — ALLOPURINOL 100 MG/1
100 TABLET ORAL DAILY
Status: DISCONTINUED | OUTPATIENT
Start: 2018-05-28 | End: 2018-06-01

## 2018-05-27 RX ORDER — ACETAMINOPHEN 325 MG/1
650 TABLET ORAL EVERY 6 HOURS PRN
Status: DISCONTINUED | OUTPATIENT
Start: 2018-05-27 | End: 2018-06-01

## 2018-05-27 RX ORDER — SODIUM PHOSPHATE, DIBASIC AND SODIUM PHOSPHATE, MONOBASIC 7; 19 G/133ML; G/133ML
1 ENEMA RECTAL ONCE AS NEEDED
Status: DISCONTINUED | OUTPATIENT
Start: 2018-05-27 | End: 2018-06-01

## 2018-05-27 RX ORDER — SODIUM CHLORIDE 9 MG/ML
INJECTION, SOLUTION INTRAVENOUS
Status: COMPLETED
Start: 2018-05-27 | End: 2018-05-27

## 2018-05-27 NOTE — PROGRESS NOTES
[facility] dosing service    Initial Pharmacokinetic Consult for Vancomycin Dosing     Magdaleno Armando is a 81year old male admitted on 5/27 who is being treated for cellulitis.   Pharmacy has been asked to dose Vancomycin by Dr. Sg Sethi    He is allergic pharmacokinetics. 2.  Pharmacy ordered Vancomycin trough level(s) prior to 4th dose. Goal trough level 10-15 ug/mL. 3.  Pharmacy will need BUN/Scr daily while on Vancomycin to assess renal function.     4.  Pharmacy will follow and monitor renal fun

## 2018-05-27 NOTE — ED PROVIDER NOTES
Patient Seen in: [facility] [place]    History   CC:  No chief complaint on file.     Stated Complaint: Finger Swelling    ------------------------------  Per Rn: (paraphrase)    Pain in finger p injury few weeks ago  ------------ All other systems reviewed and negative except as noted above. PSFH elements reviewed from today and agreed except as otherwise stated in HPI.     Physical Exam   ED Triage Vitals [05/27/18 1253]  BP: 119/55  Pulse: 89  Resp: 16  Temp: (!) 100.9 °F (38  Circumferential soft tissue inflammation of the right index finger.  This may represent changes of vascular insufficiency or infection.        Xray:  +comminuted tuft fx; marked sts  pgs  ED Course as of May 27 1332  ---------------------------------------

## 2018-05-27 NOTE — ED INITIAL ASSESSMENT (HPI)
Pt with edema and redness to index finger right hand after slamming his finger in a door 2 weeks ago. Denies fever. States he noticed finger was slightly swollen yesterday but much worse today.

## 2018-05-27 NOTE — ED PROVIDER NOTES
Patient Seen in: [facility] Emergency Department     History   Patient presents with:  Cellulitis (integumentary, infectious)    Stated Complaint: Sent from [address] [place] [address] for r/o Osteo to right first finger    HPI    81year old male with complaint of pain, re TraMADol HCl 50 MG Oral Tab,  TAKE ONE TABLET BY MOUTH EVERY SIX HOURS AS NEEDED FOR PAIN   LEVOCETIRIZINE DIHYDROCHLORIDE 5 MG Oral Tab,  TAKE 1 TABLET (5 MG TOTAL) BY MOUTH NIGHTLY.   loratadine (CLARITIN) 10 MG Oral Tab,  Take 10 mg by mouth daily.    Eliza Davila Constitutional and vital signs reviewed. All other systems reviewed and negative except as noted above. PSFH elements reviewed from today and agreed except as otherwise stated in HPI.     Physical Exam   ED Triage Vitals  BP: 113/46 [05/27/18 1354] Nursing notes and Triage vitals reviewed  Labs Reviewed   BASIC METABOLIC PANEL (8) - Abnormal; Notable for the following:        Result Value    Glucose 120 (*)     Sodium 132 (*)     BUN 21 (*)     Calcium, Total 8.4 (*)     BUN/CREA Ratio 23.9 (*)     A Emergency Differential Diagnosis: Osteomyelitis of the phalanx now with secondary cellulitis and lymphangitis    Limitations of history:   able to obtain history from patient  Factors limiting our ability to obtain a history:  None    Complicating Factors: ------------------------------------------------------------    Impression:  After review and interpretation of the above emergency department workup, the patient was found to have Osteomyelitis, unspecified site, unspecified type ([facility]  (primary encounter

## 2018-05-27 NOTE — H&P
[address]  : 1935    Status: Emergency  Day #: 0    Attending: Mehran See MD  PCP: Kathryn Mena MD     Date of Encounter:  2018  Date of Admission:  2018     Chief Comp • Diabetes Neg    • Glaucoma Neg    • Macular degeneration Neg        Social History:  Smoking status: Former Smoker                                                              Packs/day: 0.00      Years: 0.00         Types: Cigarettes     Quit date: 1/ Oral Tab,  Take  by mouth. Take 1 tab. dly. Multiple Vitamin (MULTI-VITAMINS) Oral Tab,  Take  by mouth. take 1 tablet by ORAL route  every day with food     Review of Systems   Pertinent positives per HPI. Rest negative.       Physical Exam     Temp:  [9 Circumferential soft tissue inflammation of the right index finger. This may represent changes of vascular insufficiency or infection.   Dictated by (CST): Brittny Rose MD on 5/27/2018 at 13:28     Approved by (CST): Brittny Rose MD on 5/27/2018 at 13:30

## 2018-05-27 NOTE — ED INITIAL ASSESSMENT (HPI)
Pt with right index finger redness, swelling, erythema and streaking extending up the right arm. Pt sent from [address] [place] [address].

## 2018-05-28 ENCOUNTER — ANESTHESIA EVENT (OUTPATIENT)
Dept: SURGERY | Facility: HOSPITAL | Age: 83
DRG: 513 | End: 2018-05-28
Payer: MEDICARE

## 2018-05-28 ENCOUNTER — ANESTHESIA (OUTPATIENT)
Dept: SURGERY | Facility: HOSPITAL | Age: 83
DRG: 513 | End: 2018-05-28
Payer: MEDICARE

## 2018-05-28 ENCOUNTER — SURGERY (OUTPATIENT)
Age: 83
End: 2018-05-28

## 2018-05-28 PROCEDURE — 0LB70ZZ EXCISION OF RIGHT HAND TENDON, OPEN APPROACH: ICD-10-PCS | Performed by: PLASTIC SURGERY

## 2018-05-28 PROCEDURE — 0X6N0Z2 DETACHMENT AT RIGHT INDEX FINGER, MID, OPEN APPROACH: ICD-10-PCS | Performed by: PLASTIC SURGERY

## 2018-05-28 PROCEDURE — 99233 SBSQ HOSP IP/OBS HIGH 50: CPT | Performed by: HOSPITALIST

## 2018-05-28 RX ORDER — HYDROCODONE BITARTRATE AND ACETAMINOPHEN 5; 325 MG/1; MG/1
1 TABLET ORAL ONCE
Status: COMPLETED | OUTPATIENT
Start: 2018-05-28 | End: 2018-05-28

## 2018-05-28 RX ORDER — MIDAZOLAM HYDROCHLORIDE 1 MG/ML
INJECTION INTRAMUSCULAR; INTRAVENOUS AS NEEDED
Status: DISCONTINUED | OUTPATIENT
Start: 2018-05-28 | End: 2018-05-28 | Stop reason: SURG

## 2018-05-28 RX ORDER — SODIUM CHLORIDE 9 MG/ML
INJECTION, SOLUTION INTRAVENOUS
Status: DISPENSED
Start: 2018-05-28 | End: 2018-05-28

## 2018-05-28 RX ORDER — HALOPERIDOL 5 MG/ML
0.25 INJECTION INTRAMUSCULAR ONCE AS NEEDED
Status: DISCONTINUED | OUTPATIENT
Start: 2018-05-28 | End: 2018-05-28 | Stop reason: HOSPADM

## 2018-05-28 RX ORDER — SODIUM CHLORIDE, SODIUM LACTATE, POTASSIUM CHLORIDE, CALCIUM CHLORIDE 600; 310; 30; 20 MG/100ML; MG/100ML; MG/100ML; MG/100ML
INJECTION, SOLUTION INTRAVENOUS CONTINUOUS
Status: DISCONTINUED | OUTPATIENT
Start: 2018-05-28 | End: 2018-05-28 | Stop reason: HOSPADM

## 2018-05-28 RX ORDER — HYDROMORPHONE HYDROCHLORIDE 1 MG/ML
0.2 INJECTION, SOLUTION INTRAMUSCULAR; INTRAVENOUS; SUBCUTANEOUS EVERY 5 MIN PRN
Status: DISCONTINUED | OUTPATIENT
Start: 2018-05-28 | End: 2018-05-28 | Stop reason: HOSPADM

## 2018-05-28 RX ORDER — HYDROCODONE BITARTRATE AND ACETAMINOPHEN 5; 325 MG/1; MG/1
2 TABLET ORAL AS NEEDED
Status: DISCONTINUED | OUTPATIENT
Start: 2018-05-28 | End: 2018-05-28 | Stop reason: HOSPADM

## 2018-05-28 RX ORDER — BUPIVACAINE HYDROCHLORIDE 2.5 MG/ML
INJECTION, SOLUTION EPIDURAL; INFILTRATION; INTRACAUDAL AS NEEDED
Status: DISCONTINUED | OUTPATIENT
Start: 2018-05-28 | End: 2018-05-28 | Stop reason: HOSPADM

## 2018-05-28 RX ORDER — NALOXONE HYDROCHLORIDE 0.4 MG/ML
80 INJECTION, SOLUTION INTRAMUSCULAR; INTRAVENOUS; SUBCUTANEOUS AS NEEDED
Status: DISCONTINUED | OUTPATIENT
Start: 2018-05-28 | End: 2018-05-28 | Stop reason: HOSPADM

## 2018-05-28 RX ORDER — HYDROMORPHONE HYDROCHLORIDE 1 MG/ML
0.6 INJECTION, SOLUTION INTRAMUSCULAR; INTRAVENOUS; SUBCUTANEOUS EVERY 5 MIN PRN
Status: DISCONTINUED | OUTPATIENT
Start: 2018-05-28 | End: 2018-05-28 | Stop reason: HOSPADM

## 2018-05-28 RX ORDER — LIDOCAINE HYDROCHLORIDE 10 MG/ML
INJECTION, SOLUTION EPIDURAL; INFILTRATION; INTRACAUDAL; PERINEURAL AS NEEDED
Status: DISCONTINUED | OUTPATIENT
Start: 2018-05-28 | End: 2018-05-28 | Stop reason: HOSPADM

## 2018-05-28 RX ORDER — ONDANSETRON 2 MG/ML
4 INJECTION INTRAMUSCULAR; INTRAVENOUS ONCE AS NEEDED
Status: DISCONTINUED | OUTPATIENT
Start: 2018-05-28 | End: 2018-05-28 | Stop reason: HOSPADM

## 2018-05-28 RX ORDER — METOPROLOL TARTRATE 5 MG/5ML
2.5 INJECTION INTRAVENOUS ONCE
Status: DISCONTINUED | OUTPATIENT
Start: 2018-05-28 | End: 2018-05-28 | Stop reason: HOSPADM

## 2018-05-28 RX ORDER — HYDROCODONE BITARTRATE AND ACETAMINOPHEN 5; 325 MG/1; MG/1
1 TABLET ORAL AS NEEDED
Status: DISCONTINUED | OUTPATIENT
Start: 2018-05-28 | End: 2018-05-28 | Stop reason: HOSPADM

## 2018-05-28 RX ORDER — SODIUM CHLORIDE, SODIUM LACTATE, POTASSIUM CHLORIDE, CALCIUM CHLORIDE 600; 310; 30; 20 MG/100ML; MG/100ML; MG/100ML; MG/100ML
INJECTION, SOLUTION INTRAVENOUS CONTINUOUS PRN
Status: DISCONTINUED | OUTPATIENT
Start: 2018-05-28 | End: 2018-05-28 | Stop reason: SURG

## 2018-05-28 RX ORDER — HYDROMORPHONE HYDROCHLORIDE 1 MG/ML
0.4 INJECTION, SOLUTION INTRAMUSCULAR; INTRAVENOUS; SUBCUTANEOUS EVERY 5 MIN PRN
Status: DISCONTINUED | OUTPATIENT
Start: 2018-05-28 | End: 2018-05-28 | Stop reason: HOSPADM

## 2018-05-28 RX ORDER — SODIUM CHLORIDE 9 MG/ML
INJECTION, SOLUTION INTRAVENOUS
Status: COMPLETED
Start: 2018-05-28 | End: 2018-05-28

## 2018-05-28 RX ADMIN — SODIUM CHLORIDE, SODIUM LACTATE, POTASSIUM CHLORIDE, CALCIUM CHLORIDE: 600; 310; 30; 20 INJECTION, SOLUTION INTRAVENOUS at 09:25:00

## 2018-05-28 RX ADMIN — MIDAZOLAM HYDROCHLORIDE 2 MG: 1 INJECTION INTRAMUSCULAR; INTRAVENOUS at 09:30:00

## 2018-05-28 NOTE — CONSULTS
[facility] ID CONSULT NOTE    Smita Abdul Patient Status:  Inpatient    1935 MRN [id number]   Location [facility] PRE OP RECOVERY Attending Marianne Slater MD   Hosp Day # 1 PCP Kia Barragan MD \"Arthrocentesis of the right ankle joint                (2-3)\"; per NG  2005: COLONOSCOPY      Comment: per NG  2002: HIP REPLACEMENT SURGERY Right      Comment: rosanne MENDOZA  No date: KNEE ARTHROSCOPY Bilateral      Comment: per NG  Family History   Problem R (MIRALAX) powder packet 17 g, 17 g, Oral, Daily PRN  •  [MAR Hold] magnesium hydroxide (MILK OF MAGNESIA) 400 MG/5ML suspension 30 mL, 30 mL, Oral, Daily PRN  •  [MAR Hold] bisacodyl (DULCOLAX) rectal suppository 10 mg, 10 mg, Rectal, Daily PRN  •  [facility] Ho muscle, back pain, joint pain or stiffness. HEMATOLOGIC:  No anemia, bleeding or bruising. ALLERGIES:  No history of asthma, hives, eczema or rhinitis.     Physical Exam:  Vital signs: Blood pressure 122/55, pulse 74, temperature 99.7 °F (37.6 °C), temper Finger noted with erythema and active purulent drainage. Blood cx taken and pending. Started on vancomycin, one dose of azactam then switched to CTX.     # R index finger cellulitis w/lymphangitis and abscess c/b osteomyelitis of middle phalanx, h/o distal

## 2018-05-28 NOTE — PROGRESS NOTES
[facility] HOSP - [facility]    Progress Note    [facility] Patient Status:  Inpatient    1935 MRN [id number]   Location [facility] 5SW/SE Attending Dong Miles MD   Hosp Day # 1 PCP Leonce Seip, MD       Subjective:   Stuart Chandler sodium chloride       • HYDROcodone-acetaminophen  1 tablet Oral Once   • sodium chloride       • docusate sodium  100 mg Oral BID   • cefTRIAXone  1 g Intravenous Q24H   • vancomycin  1,500 mg Intravenous Q24H   • allopurinol  100 mg Oral Daily   • AmLODI N/A       Imaging/EKG:   Xr Finger(s) (min 2 Views), Right 2nd (cpt=73140)    Result Date: 5/27/2018  CONCLUSION:  1. 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   Osteomyelitis could give thi

## 2018-05-28 NOTE — BRIEF OP NOTE
Pre-Operative Diagnosis: Osteomyelitis, unspecified site, unspecified type ([facility] [M86.9]     Post-Operative Diagnosis: Osteomyelitis, unspecified site, unspecified type ([facility] [M86.9]      Procedure Performed:   Procedure(s):  INCISION AND DRAINAGE, RIGHT I

## 2018-05-28 NOTE — CONSULTS
Hand Consult Note    82 y/o right handed male presents with worsening swelling, redness, and pain of right hand and index finger. Apparently, he injured finger several weeks ago after smashing finger in drawer.  Symptoms worsened and he presented to ER, koehler 1000 UNITS Oral Cap,  Take 1,000 Units by mouth daily. Vitamin D3 1,000 unit capsule   Ferrous Sulfate (IRON) 325 (65 FE) MG Oral Tab,  Take  by mouth. Take 1 tab. dly. Multiple Vitamin (MULTI-VITAMINS) Oral Tab,  Take  by mouth.  take 1 tablet by ORAL ro

## 2018-05-28 NOTE — RESPIRATORY THERAPY NOTE
OSWALD ASSESSMENT:    Pt does not have a previous diagnosis of OSWALD. Pt does not routinely use a CPAP device at home.

## 2018-05-28 NOTE — ANESTHESIA PREPROCEDURE EVALUATION
Anesthesia PreOp Note    HPI:     Jessica Nickerson is a 81year old male who presents for preoperative consultation requested by: Keiry Cao MD    Date of Surgery: 5/27/2018 - 5/28/2018    Procedure(s):  INCISION AND DRAINAGE  Indication: Osteomy essential hypertension     per NG   • Visual floaters 2002    OU; per NG       Past Surgical History:  No date: APPENDECTOMY      Comment: per REJI  No date: ARTHROSCOPY, ANKLE, SURGICAL; Charity SALINAS* Right      Comment: \"Arthrocentesis of the right an [MAR Hold] acetaminophen (TYLENOL) tab 650 mg 650 mg Oral Q6H PRN Pily Cornell MD     [MAR Hold] HYDROmorphone HCl (DILAUDID) 1 MG/ML injection 0.2 mg 0.2 mg Intravenous Q2H PRN Pily Cornell MD     Or         [MAR Hold] HYDROmorphone HCl (DILAUDI [MAR Hold] Metoprolol Succinate ER (Toprol XL) 24 hr tab 50 mg 50 mg Oral Daily Israel Garcia MD     [MAR Hold] Alfuzosin HCl ER (UROXATRAL) 24 hr tab 10 mg 10 mg Oral Daily with breakfast Israel Garcia MD       No current Epic-ordered outpatient 05/28/2018       Lab Results  Component Value Date    (L) 05/28/2018   K 4.3 05/28/2018    05/28/2018   CO2 27 05/28/2018   BUN 11 05/28/2018   CREATSERUM 0.60 05/28/2018    (H) 05/28/2018   CA 8.3 (L) 05/28/2018          Vital Signs:   B

## 2018-05-28 NOTE — PLAN OF CARE
Problem: Patient/Family Goals  Goal: Patient/Family Long Term Goal  Patient's Long Term Goal: Return home     Interventions:  - IV anitbiotics   - surgery consult   - See additional Care Plan goals for specific interventions    Outcome: Not Progressing

## 2018-05-28 NOTE — OPERATIVE REPORT
[facility]    PATIENT'S NAME: Joan MIRZA [facility]   ATTENDING PHYSICIAN: Inez Griffiths MD   OPERATING PHYSICIAN: Cresencio Granda MD   PATIENT ACCOUNT#:   [id number]    LOCATION:  [facility] [address] PACU [address] [place] [address]  MEDICAL RECORD #:   [id number]       DATE the need to control the infection by removing of all the infected soft tissue, which included a portion of the distal phalanx and possible middle phalanx. In addition, we would have to drain the abscess cavity.   After risks and benefits were explained in revised in order to provide soft tissue covering over the remaining bone. The soft tissue, including tendon underwent excisional debridement sharply with a scalpel. Next, a flap was developed and rotated to cover the remaining bone.   This was secured wit

## 2018-05-28 NOTE — ANESTHESIA POSTPROCEDURE EVALUATION
Patient: Jesús Tolentino    Procedure Summary     Date:  05/28/18 Room / Location:  [facility] OR 02 / [facility] OR    Anesthesia Start:  3158 Anesthesia Stop:      Procedure:  INCISION AND DRAINAGE (Right ) Diagnosis:       Osteomyelitis, unspecified site, u

## 2018-05-29 ENCOUNTER — APPOINTMENT (OUTPATIENT)
Dept: CV DIAGNOSTICS | Facility: HOSPITAL | Age: 83
DRG: 513 | End: 2018-05-29
Attending: PHYSICIAN ASSISTANT
Payer: MEDICARE

## 2018-05-29 PROCEDURE — 99233 SBSQ HOSP IP/OBS HIGH 50: CPT | Performed by: HOSPITALIST

## 2018-05-29 PROCEDURE — 93306 TTE W/DOPPLER COMPLETE: CPT | Performed by: PHYSICIAN ASSISTANT

## 2018-05-29 RX ORDER — CEFAZOLIN SODIUM/WATER 2 G/20 ML
2 SYRINGE (ML) INTRAVENOUS EVERY 8 HOURS
Status: DISCONTINUED | OUTPATIENT
Start: 2018-05-29 | End: 2018-06-01

## 2018-05-29 NOTE — PROGRESS NOTES
Patient feeling better    Digit still swollen and red, no purulent drainage from wound  Lymphangitis and dorsal erythema improving  Cultures with GPC    Continue IV abx  ID input- likely will need long term abx  Dressing changes daily  Will follow

## 2018-05-29 NOTE — PROGRESS NOTES
Antibiotic Stewardship Note: Duration of Therapy  Date:  5/29/2018    Drug/Duration:   Rocephin IV started on 5/27 now D#3  Vancomycin: Started on 5/27, now D#3,  Vancomycin trough level due 5/30 at 15:30 hrs    Will likely require prolonged IV abx treatme Collected: 05/28/18 0910   Order Status: Resulted Lab Status:  In process Updated: 05/28/18 1008   Specimen: Tissue from Finger    Tissue Aerobic Culture [[id number]] (Abnormal) Collected: 05/28/18 0910   Order Status: Completed Lab Status: Preliminary resul

## 2018-05-29 NOTE — PROGRESS NOTES
[facility] ID PROGRESS NOTE    Mick Díaz Patient Status:  Inpatient    1935 MRN [id number]   Location [facility] 5SW/SE Attending Isabelle Arciniega MD   Hosp Day # 2 PCP Jesika Jules MD     Subjective:  Awake, having some finger pain which was doing okay otherwise until yesterday when he woke up and noted increased swelling and redness that quickly was advancing up to his R elbow, unable to bend R index finger.  He presented to urgent care who referred him to the ED on 5/27 for concerns with

## 2018-05-29 NOTE — PROGRESS NOTES
[facility]    Progress Note    Alex Traviss Patient Status:  Inpatient    1935 MRN [id number]   Location [facility] 5SW/SE Attending Abby Clark MD   Hosp Day # 2 PCP Jaydon Dueñas MD       Subjective:   Laila Neal Intravenous Q24H   • allopurinol  100 mg Oral Daily   • AmLODIPine Besylate  5 mg Oral Daily   • lisinopril  30 mg Oral Daily   • cetirizine  10 mg Oral Daily   • Metoprolol Succinate ER  50 mg Oral Daily   • Alfuzosin HCl ER  10 mg Oral Daily with breakfa the distal phalanx of the right index finger. Osteomyelitis could give this appearance and correlate with procedure history. Base of this bone is present and shows articulation with the middle phalanx.   Around the distal end of the residual bone there ar

## 2018-05-30 PROCEDURE — 02HV33Z INSERTION OF INFUSION DEVICE INTO SUPERIOR VENA CAVA, PERCUTANEOUS APPROACH: ICD-10-PCS | Performed by: PLASTIC SURGERY

## 2018-05-30 PROCEDURE — 99233 SBSQ HOSP IP/OBS HIGH 50: CPT | Performed by: HOSPITALIST

## 2018-05-30 RX ORDER — SODIUM CHLORIDE 0.9 % (FLUSH) 0.9 %
10 SYRINGE (ML) INJECTION AS NEEDED
Status: DISCONTINUED | OUTPATIENT
Start: 2018-05-30 | End: 2018-06-01

## 2018-05-30 RX ORDER — LIDOCAINE HYDROCHLORIDE 10 MG/ML
0.5 INJECTION, SOLUTION INFILTRATION; PERINEURAL ONCE AS NEEDED
Status: ACTIVE | OUTPATIENT
Start: 2018-05-30 | End: 2018-05-30

## 2018-05-30 RX ORDER — ENOXAPARIN SODIUM 100 MG/ML
40 INJECTION SUBCUTANEOUS NIGHTLY
Status: DISCONTINUED | OUTPATIENT
Start: 2018-05-30 | End: 2018-06-01

## 2018-05-30 NOTE — PROGRESS NOTES
Vascular Access Note  Inserted by Radha Jung Rn  Vascular Access Screening:   Allergies to Lidocaine: no  Allergies to Latex: no  Presence of Pacemaker/Defibrillator: No  Mastectomy with Lymph Node Dissection: No  AV Fistula / AV Graft: No  Dialysis Catheter:

## 2018-05-30 NOTE — PLAN OF CARE
Problem: Patient/Family Goals  Goal: Patient/Family Long Term Goal  Patient's Long Term Goal: Return home     Interventions:  - IV anitbiotics   - surgery consult   - See additional Care Plan goals for specific interventions    Outcome: Progressing    Goal appropriate  - Consider OT/PT consult to assist with strengthening/mobility  - Encourage toileting schedule   Outcome: Progressing      Problem: DISCHARGE PLANNING  Goal: Discharge to home or other facility with appropriate resources  INTERVENTIONS:  - Jeanmarie Gonzales

## 2018-05-30 NOTE — PLAN OF CARE
Problem: Patient Centered Care  Goal: Patient preferences are identified and integrated in the patient's plan of care  Interventions:  - What would you like us to know as we care for you?  My brother-in-law Dipika Griffin is my POA  - Provide timely, complete, and a Monitor WBC  - Administer growth factors as ordered  - Implement neutropenic guidelines   Outcome: Progressing  Afebrile    Problem: SAFETY ADULT - FALL  Goal: Free from fall injury  INTERVENTIONS:  - Assess pt frequently for physical needs  - Identify cog monitor.

## 2018-05-30 NOTE — CM/SW NOTE
SW self-referred to meet w/ pt due to case finding and diagnosis. SW met w/ pt to discuss eventual discharge needs. Pt lives at home alone in [place]. [address] lives in a 2 level house w/ a full flight of stairs to the second level.  Pt reports to be independent

## 2018-05-30 NOTE — PROGRESS NOTES
[facility]    Progress Note    [facility] Patient Status:  Inpatient    1935 MRN [id number]   Location [facility] 5SW/SE Attending Robb Frankel MD   Hosp Day # 3 PCP Vik Peters MD       Subjective:     Pain protocol and ativan     dvt proph:   lovenox    Code status:   Full    >35 minutes spent     Nathan Lyn MD  5/30/2018

## 2018-05-30 NOTE — PROGRESS NOTES
[facility] ID PROGRESS NOTE    Jesús Tolentino Patient Status:  Inpatient    1935 MRN [id number]   Location [facility] 5SW/SE Attending Devika Cadena MD   Hosp Day # 3 PCP Gary Fonseca MD     Subjective:  Awake, afebrile. Pain improving.  [address] was doing okay otherwise until yesterday when he woke up and noted increased swelling and redness that quickly was advancing up to his R elbow, unable to bend R index finger.  He presented to urgent care who referred him to the ED on 5/27 for concerns with

## 2018-05-31 PROCEDURE — 99233 SBSQ HOSP IP/OBS HIGH 50: CPT | Performed by: HOSPITALIST

## 2018-05-31 RX ORDER — POTASSIUM CHLORIDE 20 MEQ/1
40 TABLET, EXTENDED RELEASE ORAL EVERY 4 HOURS
Status: COMPLETED | OUTPATIENT
Start: 2018-05-31 | End: 2018-05-31

## 2018-05-31 RX ORDER — CEFAZOLIN SODIUM/WATER 2 G/20 ML
2 SYRINGE (ML) INTRAVENOUS EVERY 8 HOURS
Qty: 1500 ML | Refills: 0 | Status: SHIPPED | OUTPATIENT
Start: 2018-05-31 | End: 2018-06-25

## 2018-05-31 NOTE — PLAN OF CARE
Problem: Patient Centered Care  Goal: Patient preferences are identified and integrated in the patient's plan of care  Interventions:  - What would you like us to know as we care for you?   - Provide timely, complete, and accurate information to patient/fa ordered  - Implement neutropenic guidelines   Outcome: Progressing      Problem: SAFETY ADULT - FALL  Goal: Free from fall injury  INTERVENTIONS:  - Assess pt frequently for physical needs  - Identify cognitive and physical deficits and behaviors that affe

## 2018-05-31 NOTE — PHYSICAL THERAPY NOTE
PHYSICAL THERAPY EVALUATION - INPATIENT     Room Number: 535/535-A  Evaluation Date: 5/31/2018  Type of Evaluation: Initial   Physician Order: PT Eval and Treat    Presenting Problem: osteomyelitis, right index finger I&D  Reason for Therapy: Mobility Dys OU; per NG       Past Surgical History  Past Surgical History:  No date: APPENDECTOMY      Comment: rosanne MENDOZA  No date: ARTHROSCOPY, ANKLE, SURGICAL; Manjinder Gomes ARTHJANE* Right      Comment: \"Arthrocentesis of the right ankle joint                (2-3)\"; pe bed (including adjusting bedclothes, sheets and blankets)?: None   -   Sitting down on and standing up from a chair with arms (e.g., wheelchair, bedside commode, etc.): A Little   -   Moving from lying on back to sitting on the side of the bed?: A Little independence with home activity/exercise instructions provided to patient in preparation for discharge.    Goal #5   Current Status    Goal #6    Goal #6  Current Status

## 2018-05-31 NOTE — PLAN OF CARE
Problem: Patient Centered Care  Goal: Patient preferences are identified and integrated in the patient's plan of care  Interventions:  - What would you like us to know as we care for you?  Change my dressing daily.   - Provide timely, complete, and accurate

## 2018-05-31 NOTE — OCCUPATIONAL THERAPY NOTE
OCCUPATIONAL THERAPY EVALUATION - INPATIENT     Room Number: 535/535-A  Evaluation Date: 5/31/2018  Type of Evaluation: Initial  Presenting Problem: R index finger amputation    Physician Order: IP Consult to Occupational Therapy  Reason for Therapy: ADL/I • RPE (retinal pigment epithelium) detachment 2008    \"RPE detachments in Mac\"; OU; per NG   • Unspecified essential hypertension     per NG   • Visual floaters 2002    OU; per NG       Past Surgical History  Past Surgical History:  No date: APPENDECTO using toilet, bedpan or urinal? : A Little  -   Putting on and taking off regular upper body clothing?: A Little  -   Taking care of personal grooming such as brushing teeth?: None  -   Eating meals?: None    AM-PAC Score:  Score: 20  Approx Degree of Impa

## 2018-05-31 NOTE — PROGRESS NOTES
[facility] ID PROGRESS NOTE    Haven Ao Patient Status:  Inpatient    1935 MRN [id number]   Location [facility] 5SW/SE Attending Artie Mcdonough MD   Hosp Day # 4 PCP Kai Dahl MD     Subjective:  Awake, afebrile.  Eating breakfast. Pa index finger 3 weeks ago when he smashed it in a drawer, he was doing okay otherwise until yesterday when he woke up and noted increased swelling and redness that quickly was advancing up to his R elbow, unable to bend R index finger.  He presented to [facility]

## 2018-05-31 NOTE — PROGRESS NOTES
[facility]    Progress Note    Heidi Marquez Patient Status:  Inpatient    1935 MRN [id number]   Location [facility] 5SW/SE Attending Jaden Rader MD   Hosp Day # 4 PCP Kathryn Mena MD       Subjective:     Pain lovenox     Code status:   Full     >35 minutes spent     Nathan Lyn MD  5/31/2018

## 2018-06-01 ENCOUNTER — SNF VISIT (OUTPATIENT)
Dept: INTERNAL MEDICINE CLINIC | Facility: SKILLED NURSING FACILITY | Age: 83
End: 2018-06-01

## 2018-06-01 VITALS
BODY MASS INDEX: 28.61 KG/M2 | HEIGHT: 66 IN | HEART RATE: 83 BPM | RESPIRATION RATE: 18 BRPM | WEIGHT: 178 LBS | OXYGEN SATURATION: 94 % | SYSTOLIC BLOOD PRESSURE: 123 MMHG | TEMPERATURE: 99 F | DIASTOLIC BLOOD PRESSURE: 83 MMHG

## 2018-06-01 DIAGNOSIS — M86.9 OSTEOMYELITIS, UNSPECIFIED SITE, UNSPECIFIED TYPE (HCC): ICD-10-CM

## 2018-06-01 DIAGNOSIS — R53.1 WEAKNESS: ICD-10-CM

## 2018-06-01 DIAGNOSIS — S68.119D AMPUTATION OF FINGER, SUBSEQUENT ENCOUNTER: ICD-10-CM

## 2018-06-01 PROCEDURE — 99239 HOSP IP/OBS DSCHRG MGMT >30: CPT | Performed by: HOSPITALIST

## 2018-06-01 PROCEDURE — 99310 SBSQ NF CARE HIGH MDM 45: CPT | Performed by: NURSE PRACTITIONER

## 2018-06-01 RX ORDER — ACETAMINOPHEN 325 MG/1
650 TABLET ORAL EVERY 6 HOURS PRN
Qty: 30 TABLET | Refills: 0 | Status: ON HOLD | OUTPATIENT
Start: 2018-06-01 | End: 2018-07-16

## 2018-06-01 RX ORDER — HYDROCODONE BITARTRATE AND ACETAMINOPHEN 5; 325 MG/1; MG/1
1 TABLET ORAL EVERY 6 HOURS PRN
Qty: 20 TABLET | Refills: 0 | Status: SHIPPED | OUTPATIENT
Start: 2018-06-01 | End: 2018-07-10

## 2018-06-01 NOTE — PROGRESS NOTES
[facility] ID PROGRESS NOTE    Young Yap Patient Status:  Inpatient    1935 MRN [id number]   Location [facility] 5SW/SE Attending Padmini Yadav MD   Hosp Day # 5 PCP Sergio Patterson MD     Subjective:  Awake, awaiting rehab placement.  Hughie Primrose year-old with a history of R index distal phalanx amputation.  Patient injured his R index finger 3 weeks ago when he smashed it in a drawer, he was doing okay otherwise until yesterday when he woke up and noted increased swelling and redness that quickly w

## 2018-06-01 NOTE — DISCHARGE SUMMARY
[facility]    Discharge Summary    Stanley Sims Patient Status:  Inpatient    1935 MRN [id number]   Location [facility] 5SW/SE Attending No att. providers found   [facility] Day # 5 PCP Susan Blandon MD     Date of Admi his R index finger in a kitchen drawer 3 weeks ago at which time his fingernail fell off but bleeding was controlled. Yesterday he noted increasing swelling, redness in R hand and tenderness to R index finger. No F/C at home.  He woke today with quickly adv HYDROcodone-acetaminophen 5-325 MG Tabs  Commonly known as:  NORCO      Take 1 tablet by mouth every 6 (six) hours as needed.    Quantity:  20 tablet  Refills:  0        CONTINUE taking these medications      Instructions Prescription details   allopurino nurse    Bring a paper prescription for each of these medications  · ceFAZolin sodium 2 GM/20ML Sosy  · HYDROcodone-acetaminophen 5-325 MG Tabs         Follow up Visits: Follow-up Information     Sergio Villanueva MD In 1 week.     Specialty:  Anya Saima

## 2018-06-01 NOTE — CM/SW NOTE
RN/Michaela informed SW that anticipated discharge today, pending discharge orders from MD. Lee Rodrigues requested RN to paged MD regarding pt's discharge.  WILLI spoke w/ Yenny Aaron from [facility] who stated they will be ready for pt at 1pm.     SW met w/ pt and updated him

## 2018-06-01 NOTE — PROGRESS NOTES
Patient left via wheelchair with personal belongings accompanied by friend with no distress noted.      Zahida Walker RN

## 2018-06-01 NOTE — PROGRESS NOTES
HPI: Silver Vega is an 80 yo male who injured his R index finger 3 weeks ago when he smashed it in a drawer, he was doing okay but started to notice increased swelling and redness that quickly was advancing up to his R elbow, unable to bend R index fin • Macular degeneration Neg      Smoking status: Former Smoker                                                              Packs/day: 0.00      Years: 0.00         Types: Cigarettes     Quit date: 1/1/1985  Smokeless tobacco: Never Used antibiotics  ID and wound care consult in house  F/U DR. Gianna Mccormick for suture removal in 2 weeks nursing to schedule appt  Reviewed records, patient reports that apart from this amputation he has had no other history of R index distal phalanx amputation as rene

## 2018-06-01 NOTE — PLAN OF CARE
Problem: Patient Centered Care  Goal: Patient preferences are identified and integrated in the patient's plan of care  Interventions:  - What would you like us to know as we care for you?   - Provide timely, complete, and accurate information to patient/fa anticipated neutropenic period  INTERVENTIONS  - Monitor WBC  - Administer growth factors as ordered  - Implement neutropenic guidelines   Outcome: Progressing  vss  antibiotics    Problem: SAFETY ADULT - FALL  Goal: Free from fall injury  INTERVENTIONS:

## 2018-06-01 NOTE — PROGRESS NOTES
Hand    Doing okay    Finger swelling improving, erythema decreasing  No lymphangitis    cx: staph aureus and strep intermedius    s/p Drainage of abscess with underlying deep soft tissue/osteomyeltis  Plan for long term IV abx  Continue dry wrap, okay to

## 2018-06-01 NOTE — PROGRESS NOTES
Patient in bed, awake, alert and orientated X4 and with no distress noted. Right index finger dressing dry and intact. Left PICC in place functioning well. Antibiotic administered.  RN reviewed with patient discharge instructions to [facility] for rehab and

## 2018-06-04 PROCEDURE — 99305 1ST NF CARE MODERATE MDM 35: CPT | Performed by: INTERNAL MEDICINE

## 2018-06-08 ENCOUNTER — SNF VISIT (OUTPATIENT)
Dept: INTERNAL MEDICINE CLINIC | Facility: SKILLED NURSING FACILITY | Age: 83
End: 2018-06-08

## 2018-06-08 DIAGNOSIS — M86.9 OSTEOMYELITIS OF RIGHT HAND, UNSPECIFIED TYPE (HCC): ICD-10-CM

## 2018-06-08 DIAGNOSIS — R53.1 WEAKNESS: ICD-10-CM

## 2018-06-08 PROCEDURE — 99309 SBSQ NF CARE MODERATE MDM 30: CPT | Performed by: NURSE PRACTITIONER

## 2018-06-08 NOTE — PROGRESS NOTES
HPI: Xiomara Carlos is an 80 yo male who injured his R index finger 3 weeks ago when he smashed it in a drawer, he was doing okay but started to notice increased swelling and redness that quickly was advancing up to his R elbow, unable to bend R index fin cristiano. knees  Celecoxib                   Comment:Other reaction(s): Rash  Demerol  [Meperidin*        Comment:Other reaction(s): Unknown  Hydrochlorothiazide         Comment:Other reaction(s): Rash  Ibuprofen                   Comment:Other reaction(s): Jori following in rehab   Weekly cbc cmp esr crp while on IV antibiotics  ID and wound care consult in house following            R index finger cellulitis w/lymphangitis and abscess, underlying deep soft tissue/osteomyeltis  - s/p incision and drainage and rev

## 2018-06-11 PROCEDURE — 99308 SBSQ NF CARE LOW MDM 20: CPT | Performed by: INTERNAL MEDICINE

## 2018-06-12 ENCOUNTER — SNF VISIT (OUTPATIENT)
Dept: INTERNAL MEDICINE CLINIC | Facility: SKILLED NURSING FACILITY | Age: 83
End: 2018-06-12

## 2018-06-12 DIAGNOSIS — M86.9 OSTEOMYELITIS OF RIGHT HAND, UNSPECIFIED TYPE (HCC): ICD-10-CM

## 2018-06-12 DIAGNOSIS — R53.1 WEAKNESS: ICD-10-CM

## 2018-06-12 PROCEDURE — 99308 SBSQ NF CARE LOW MDM 20: CPT | Performed by: NURSE PRACTITIONER

## 2018-06-12 NOTE — PROGRESS NOTES
HPI: Patrick Guajardo an 82 yo male who injured his R index finger 3 weeks ago when he smashed it in a drawer, he was doing okay but started to notice increased swelling and redness that quickly was advancing up to his R elbow, unable to bend R index fin cristiano. knees  Celecoxib                   Comment:Other reaction(s): Rash  Demerol  [Meperidin*        Comment:Other reaction(s): Unknown  Hydrochlorothiazide         Comment:Other reaction(s): Rash  Ibuprofen                   Comment:Other reaction(s): Jori cont ancef EOT 6/25/18  - ID and wound care on consult in rehab   - [address] [facility]  Prn for pain control  - blood cx 1/2 bottles from 5/27 +staph not aureus.   F/u repeat blood cultures - NGTD  - echo negative for endocarditis, grade 1 diastolic dysfunction  - F/U

## 2018-06-13 PROCEDURE — 99308 SBSQ NF CARE LOW MDM 20: CPT | Performed by: INTERNAL MEDICINE

## 2018-06-20 PROCEDURE — 99308 SBSQ NF CARE LOW MDM 20: CPT | Performed by: INTERNAL MEDICINE

## 2018-06-25 ENCOUNTER — SNF VISIT (OUTPATIENT)
Dept: INTERNAL MEDICINE CLINIC | Facility: SKILLED NURSING FACILITY | Age: 83
End: 2018-06-25

## 2018-06-25 DIAGNOSIS — I10 ESSENTIAL HYPERTENSION: ICD-10-CM

## 2018-06-25 DIAGNOSIS — M86.9 OSTEOMYELITIS OF RIGHT HAND, UNSPECIFIED TYPE (HCC): ICD-10-CM

## 2018-06-25 PROCEDURE — 99309 SBSQ NF CARE MODERATE MDM 30: CPT | Performed by: NURSE PRACTITIONER

## 2018-06-25 NOTE — PROGRESS NOTES
HPI: Vincent Erickson an 82 yo male who injured his R index finger 3 weeks ago when he smashed it in a drawer, he was doing okay but started to notice increased swelling and redness that quickly was advancing up to his R elbow, unable to bend R index fin        Comment:Other reaction(s): Unknown  Hydrochlorothiazide         Comment:Other reaction(s): Rash  Ibuprofen                   Comment:Other reaction(s): Rash  Penicillins                 Comment:Other reaction(s): Rash  Pseudoephedrine Hcl         Co w/lymphangitis and abscess, underlying deep soft tissue/osteomyeltis  - s/p incision and drainage and revision amputation of the right index finger   - cx MSSA, strep intermedius, peptostreptococcus  - cont ancef EOT 6/25/18  - ID and wound care on consult

## 2018-06-27 ENCOUNTER — PATIENT OUTREACH (OUTPATIENT)
Dept: CASE MANAGEMENT | Age: 83
End: 2018-06-27

## 2018-06-27 NOTE — PROGRESS NOTES
Jackie Barragan [phone] for post hospital follow up, [facility] contact information provided. TCM book by date 7/10/2018.

## 2018-06-28 NOTE — PROGRESS NOTES
Romero Mendez [phone] for post hospital follow up, [facility] contact information provided. TCM book by date 7/10/2018.

## 2018-06-29 ENCOUNTER — SNF/IP PROF CHARGE ONLY (OUTPATIENT)
Dept: INTERNAL MEDICINE CLINIC | Facility: CLINIC | Age: 83
End: 2018-06-29

## 2018-06-29 DIAGNOSIS — M86.9 OSTEOMYELITIS, UNSPECIFIED SITE, UNSPECIFIED TYPE (HCC): ICD-10-CM

## 2018-06-29 DIAGNOSIS — I10 ESSENTIAL HYPERTENSION WITH GOAL BLOOD PRESSURE LESS THAN 140/90: ICD-10-CM

## 2018-06-29 DIAGNOSIS — J43.1 PANLOBULAR EMPHYSEMA (HCC): ICD-10-CM

## 2018-06-29 DIAGNOSIS — H25.13 AGE-RELATED NUCLEAR CATARACT OF BOTH EYES: ICD-10-CM

## 2018-06-29 DIAGNOSIS — H40.003 GLAUCOMA SUSPECT OF BOTH EYES: ICD-10-CM

## 2018-06-29 DIAGNOSIS — M86.9 OSTEOMYELITIS OF RIGHT HAND, UNSPECIFIED TYPE (HCC): ICD-10-CM

## 2018-06-29 DIAGNOSIS — D50.9 IRON DEFICIENCY ANEMIA, UNSPECIFIED IRON DEFICIENCY ANEMIA TYPE: ICD-10-CM

## 2018-06-29 DIAGNOSIS — I10 ESSENTIAL HYPERTENSION: ICD-10-CM

## 2018-06-29 DIAGNOSIS — N40.1 BENIGN PROSTATIC HYPERPLASIA WITH LOWER URINARY TRACT SYMPTOMS, SYMPTOM DETAILS UNSPECIFIED: ICD-10-CM

## 2018-06-29 DIAGNOSIS — I89.1 LYMPHANGITIS: ICD-10-CM

## 2018-06-29 DIAGNOSIS — Z96.651 HISTORY OF KNEE REPLACEMENT PROCEDURE OF RIGHT KNEE: ICD-10-CM

## 2018-06-29 DIAGNOSIS — Z96.641 HISTORY OF HIP REPLACEMENT, TOTAL, RIGHT: ICD-10-CM

## 2018-06-30 ENCOUNTER — TELEPHONE (OUTPATIENT)
Dept: INTERNAL MEDICINE CLINIC | Facility: CLINIC | Age: 83
End: 2018-06-30

## 2018-07-01 NOTE — TELEPHONE ENCOUNTER
LOV: 4-17-18 Last Rx: 4-17-18     Please advise in regards to refill request. Thank You    Please respond to pool: MARÍA Beebe 63 LPN/CMA

## 2018-07-02 RX ORDER — TRAMADOL HYDROCHLORIDE 50 MG/1
TABLET ORAL
Qty: 90 TABLET | Refills: 0 | Status: ON HOLD
Start: 2018-07-02 | End: 2018-07-24

## 2018-07-06 RX ORDER — TRAMADOL HYDROCHLORIDE 50 MG/1
TABLET ORAL
Qty: 90 TABLET | Refills: 0 | OUTPATIENT
Start: 2018-07-06

## 2018-07-09 ENCOUNTER — MED REC SCAN ONLY (OUTPATIENT)
Dept: INTERNAL MEDICINE CLINIC | Facility: CLINIC | Age: 83
End: 2018-07-09

## 2018-07-09 NOTE — TELEPHONE ENCOUNTER
Pharmacy   [id number], [address], [phone]   Medication Detail    Disp Refills Start End    TRAMADOL HCL 50 MG Oral Tab 90 tablet 0 7/2/2018     Sig: TAKE ONE TABLET BY MOUTH EVERY SIX HOURS AS NEEDED FOR PAIN

## 2018-07-10 ENCOUNTER — OFFICE VISIT (OUTPATIENT)
Dept: INTERNAL MEDICINE CLINIC | Facility: CLINIC | Age: 83
End: 2018-07-10

## 2018-07-10 VITALS
WEIGHT: 176 LBS | DIASTOLIC BLOOD PRESSURE: 70 MMHG | BODY MASS INDEX: 28.28 KG/M2 | HEART RATE: 76 BPM | RESPIRATION RATE: 16 BRPM | HEIGHT: 66 IN | SYSTOLIC BLOOD PRESSURE: 136 MMHG

## 2018-07-10 DIAGNOSIS — M86.9 OSTEOMYELITIS, UNSPECIFIED SITE, UNSPECIFIED TYPE (HCC): Primary | ICD-10-CM

## 2018-07-10 PROCEDURE — 1111F DSCHRG MED/CURRENT MED MERGE: CPT | Performed by: INTERNAL MEDICINE

## 2018-07-10 PROCEDURE — 99495 TRANSJ CARE MGMT MOD F2F 14D: CPT | Performed by: INTERNAL MEDICINE

## 2018-07-10 NOTE — PROGRESS NOTES
HPI:    Heidi Marquez is a 81year old male here today for hospital follow up.    [facility]  [facility] living   Admit Date: 6/1/2018  Discharge Date: 6/26/2018  Hospital Discharge Diagnosis: Amador Antis File Prior to Visit:  TRAMADOL HCL 50 MG Oral Tab TAKE ONE TABLET BY MOUTH EVERY SIX HOURS AS NEEDED FOR PAIN   acetaminophen 325 MG Oral Tab Take 2 tablets (650 mg total) by mouth every 6 (six) hours as needed.    tamsulosin HCl 0.4 MG Oral Cap TAKE 1 CAPS does not use drugs.      ROS:   GENERAL: weight stable, energy stable, no sweating  SKIN: denies any unusual skin lesions  EYES: denies blurred vision or double vision  HEENT: denies nasal congestion, sinus pain or ST  LUNGS: denies shortness of breath with requested or ordered in this encounter    Imaging & Consults:  None      Transitional Care Management Certification:  I certify that the following are true:  Communication with the patient was made within 2 business days of discharge on date above     [facility] FOR CANCER AND ALLIED DISEASES

## 2018-07-10 NOTE — PROGRESS NOTES
Several attempts made to reach the patient with no return call. Patient completed HFU on 7/10. Closing encounter.

## 2018-07-15 ENCOUNTER — APPOINTMENT (OUTPATIENT)
Dept: CT IMAGING | Facility: HOSPITAL | Age: 83
DRG: 493 | End: 2018-07-15
Attending: EMERGENCY MEDICINE
Payer: MEDICARE

## 2018-07-15 ENCOUNTER — APPOINTMENT (OUTPATIENT)
Dept: GENERAL RADIOLOGY | Facility: HOSPITAL | Age: 83
DRG: 493 | End: 2018-07-15
Attending: EMERGENCY MEDICINE
Payer: MEDICARE

## 2018-07-15 ENCOUNTER — HOSPITAL ENCOUNTER (INPATIENT)
Facility: HOSPITAL | Age: 83
LOS: 8 days | Discharge: NURSING, REHAB OR LONG-TERM CARE | DRG: 493 | End: 2018-07-24
Attending: EMERGENCY MEDICINE | Admitting: HOSPITALIST
Payer: MEDICARE

## 2018-07-15 DIAGNOSIS — S42.401D CLOSED FRACTURE OF RIGHT ELBOW WITH ROUTINE HEALING, SUBSEQUENT ENCOUNTER: ICD-10-CM

## 2018-07-15 DIAGNOSIS — S42.201A CLOSED FRACTURE OF PROXIMAL END OF RIGHT HUMERUS, UNSPECIFIED FRACTURE MORPHOLOGY, INITIAL ENCOUNTER: Primary | ICD-10-CM
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
## 2018-07-15 LAB
ANION GAP SERPL CALC-SCNC: 13 MMOL/L (ref 0–18)
BASOPHILS # BLD: 0 K/UL (ref 0–0.2)
BASOPHILS NFR BLD: 1 %
BUN SERPL-MCNC: 25 MG/DL (ref 8–20)
BUN/CREAT SERPL: 25.5 (ref 10–20)
CALCIUM SERPL-MCNC: 8.7 MG/DL (ref 8.5–10.5)
CHLORIDE SERPL-SCNC: 101 MMOL/L (ref 95–110)
CO2 SERPL-SCNC: 20 MMOL/L (ref 22–32)
CREAT SERPL-MCNC: 0.98 MG/DL (ref 0.5–1.5)
EOSINOPHIL # BLD: 0.2 K/UL (ref 0–0.7)
EOSINOPHIL NFR BLD: 2 %
ERYTHROCYTE [DISTWIDTH] IN BLOOD BY AUTOMATED COUNT: 13.9 % (ref 11–15)
GLUCOSE SERPL-MCNC: 120 MG/DL (ref 70–99)
HCT VFR BLD AUTO: 32.2 % (ref 41–52)
HGB BLD-MCNC: 10.8 G/DL (ref 13.5–17.5)
LYMPHOCYTES # BLD: 1.7 K/UL (ref 1–4)
LYMPHOCYTES NFR BLD: 24 %
MAGNESIUM SERPL-MCNC: 1.7 MG/DL (ref 1.8–2.5)
MCH RBC QN AUTO: 32.4 PG (ref 27–32)
MCHC RBC AUTO-ENTMCNC: 33.6 G/DL (ref 32–37)
MCV RBC AUTO: 96.2 FL (ref 80–100)
MONOCYTES # BLD: 0.6 K/UL (ref 0–1)
MONOCYTES NFR BLD: 8 %
NEUTROPHILS # BLD AUTO: 4.6 K/UL (ref 1.8–7.7)
NEUTROPHILS NFR BLD: 65 %
OSMOLALITY UR CALC.SUM OF ELEC: 284 MOSM/KG (ref 275–295)
PLATELET # BLD AUTO: 174 K/UL (ref 140–400)
PMV BLD AUTO: 8.2 FL (ref 7.4–10.3)
POTASSIUM SERPL-SCNC: 4.1 MMOL/L (ref 3.3–5.1)
RBC # BLD AUTO: 3.34 M/UL (ref 4.5–5.9)
SODIUM SERPL-SCNC: 134 MMOL/L (ref 136–144)
WBC # BLD AUTO: 7.2 K/UL (ref 4–11)

## 2018-07-15 PROCEDURE — 73060 X-RAY EXAM OF HUMERUS: CPT | Performed by: EMERGENCY MEDICINE

## 2018-07-15 PROCEDURE — 72125 CT NECK SPINE W/O DYE: CPT | Performed by: EMERGENCY MEDICINE

## 2018-07-15 PROCEDURE — 70450 CT HEAD/BRAIN W/O DYE: CPT | Performed by: EMERGENCY MEDICINE

## 2018-07-15 RX ORDER — SODIUM CHLORIDE 9 MG/ML
INJECTION, SOLUTION INTRAVENOUS ONCE
Status: COMPLETED | OUTPATIENT
Start: 2018-07-15 | End: 2018-07-16

## 2018-07-15 RX ORDER — ZIPRASIDONE MESYLATE 20 MG/ML
10 INJECTION, POWDER, LYOPHILIZED, FOR SOLUTION INTRAMUSCULAR ONCE
Status: COMPLETED | OUTPATIENT
Start: 2018-07-15 | End: 2018-07-15

## 2018-07-15 RX ORDER — MORPHINE SULFATE 4 MG/ML
4 INJECTION, SOLUTION INTRAMUSCULAR; INTRAVENOUS ONCE
Status: COMPLETED | OUTPATIENT
Start: 2018-07-15 | End: 2018-07-15

## 2018-07-16 ENCOUNTER — APPOINTMENT (OUTPATIENT)
Dept: CT IMAGING | Facility: HOSPITAL | Age: 83
DRG: 493 | End: 2018-07-16
Attending: ORTHOPAEDIC SURGERY
Payer: MEDICARE

## 2018-07-16 PROBLEM — S42.201A CLOSED FRACTURE OF PROXIMAL END OF RIGHT HUMERUS, UNSPECIFIED FRACTURE MORPHOLOGY, INITIAL ENCOUNTER: Status: ACTIVE | Noted: 2018-07-16
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
## 2018-07-16 LAB
ANION GAP SERPL CALC-SCNC: 8 MMOL/L (ref 0–18)
ANTIBODY SCREEN: NEGATIVE
BACTERIA UR QL AUTO: NEGATIVE /HPF
BASOPHILS # BLD: 0 K/UL (ref 0–0.2)
BASOPHILS NFR BLD: 1 %
BILIRUB UR QL: NEGATIVE
BUN SERPL-MCNC: 22 MG/DL (ref 8–20)
BUN/CREAT SERPL: 32.4 (ref 10–20)
CALCIUM SERPL-MCNC: 8 MG/DL (ref 8.5–10.5)
CHLORIDE SERPL-SCNC: 105 MMOL/L (ref 95–110)
CLARITY UR: CLEAR
CO2 SERPL-SCNC: 20 MMOL/L (ref 22–32)
COLOR UR: YELLOW
CREAT SERPL-MCNC: 0.68 MG/DL (ref 0.5–1.5)
EOSINOPHIL # BLD: 0 K/UL (ref 0–0.7)
EOSINOPHIL NFR BLD: 0 %
ERYTHROCYTE [DISTWIDTH] IN BLOOD BY AUTOMATED COUNT: 14.2 % (ref 11–15)
ETHANOL SERPL-MCNC: 2 MG/DL
GLUCOSE SERPL-MCNC: 122 MG/DL (ref 70–99)
GLUCOSE UR-MCNC: NEGATIVE MG/DL
HCT VFR BLD AUTO: 26.8 % (ref 41–52)
HGB BLD-MCNC: 9 G/DL (ref 13.5–17.5)
KETONES UR-MCNC: 20 MG/DL
LEUKOCYTE ESTERASE UR QL STRIP.AUTO: NEGATIVE
LYMPHOCYTES # BLD: 0.7 K/UL (ref 1–4)
LYMPHOCYTES NFR BLD: 7 %
MAGNESIUM SERPL-MCNC: 1.6 MG/DL (ref 1.8–2.5)
MCH RBC QN AUTO: 31.8 PG (ref 27–32)
MCHC RBC AUTO-ENTMCNC: 33.7 G/DL (ref 32–37)
MCV RBC AUTO: 94.6 FL (ref 80–100)
MONOCYTES # BLD: 0.5 K/UL (ref 0–1)
MONOCYTES NFR BLD: 6 %
MRSA DNA SPEC QL NAA+PROBE: POSITIVE
NEUTROPHILS # BLD AUTO: 7.7 K/UL (ref 1.8–7.7)
NEUTROPHILS NFR BLD: 86 %
NITRITE UR QL STRIP.AUTO: NEGATIVE
OSMOLALITY UR CALC.SUM OF ELEC: 281 MOSM/KG (ref 275–295)
PH UR: 5 [PH] (ref 5–8)
PLATELET # BLD AUTO: 129 K/UL (ref 140–400)
PMV BLD AUTO: 8.9 FL (ref 7.4–10.3)
POTASSIUM SERPL-SCNC: 4.9 MMOL/L (ref 3.3–5.1)
PROT UR-MCNC: NEGATIVE MG/DL
RBC # BLD AUTO: 2.83 M/UL (ref 4.5–5.9)
RBC #/AREA URNS AUTO: 9 /HPF
RH BLOOD TYPE: POSITIVE
SODIUM SERPL-SCNC: 133 MMOL/L (ref 136–144)
SP GR UR STRIP: 1.02 (ref 1–1.03)
UROBILINOGEN UR STRIP-ACNC: <2
VIT C UR-MCNC: 40 MG/DL
WBC # BLD AUTO: 8.9 K/UL (ref 4–11)
WBC #/AREA URNS AUTO: 1 /HPF

## 2018-07-16 PROCEDURE — 99223 1ST HOSP IP/OBS HIGH 75: CPT | Performed by: HOSPITALIST

## 2018-07-16 PROCEDURE — 73200 CT UPPER EXTREMITY W/O DYE: CPT | Performed by: ORTHOPAEDIC SURGERY

## 2018-07-16 RX ORDER — DIPHENOXYLATE HYDROCHLORIDE AND ATROPINE SULFATE 2.5; .025 MG/1; MG/1
1 TABLET ORAL NIGHTLY
Status: DISCONTINUED | OUTPATIENT
Start: 2018-07-16 | End: 2018-07-24

## 2018-07-16 RX ORDER — METOPROLOL SUCCINATE 50 MG/1
50 TABLET, EXTENDED RELEASE ORAL
Status: DISCONTINUED | OUTPATIENT
Start: 2018-07-16 | End: 2018-07-24

## 2018-07-16 RX ORDER — HEPARIN SODIUM 5000 [USP'U]/ML
5000 INJECTION, SOLUTION INTRAVENOUS; SUBCUTANEOUS EVERY 12 HOURS SCHEDULED
Status: DISCONTINUED | OUTPATIENT
Start: 2018-07-16 | End: 2018-07-17

## 2018-07-16 RX ORDER — ALFUZOSIN HYDROCHLORIDE 10 MG/1
10 TABLET, EXTENDED RELEASE ORAL
Status: DISCONTINUED | OUTPATIENT
Start: 2018-07-16 | End: 2018-07-24

## 2018-07-16 RX ORDER — SODIUM CHLORIDE 9 MG/ML
INJECTION, SOLUTION INTRAVENOUS
Status: DISPENSED
Start: 2018-07-16 | End: 2018-07-16

## 2018-07-16 RX ORDER — VITS A,C,E/LUTEIN/MINERALS 300MCG-200
1 TABLET ORAL 2 TIMES DAILY
Status: DISCONTINUED | OUTPATIENT
Start: 2018-07-16 | End: 2018-07-24

## 2018-07-16 RX ORDER — SODIUM CHLORIDE 0.9 % (FLUSH) 0.9 %
3 SYRINGE (ML) INJECTION AS NEEDED
Status: DISCONTINUED | OUTPATIENT
Start: 2018-07-16 | End: 2018-07-24

## 2018-07-16 RX ORDER — ALLOPURINOL 100 MG/1
100 TABLET ORAL
Status: DISCONTINUED | OUTPATIENT
Start: 2018-07-16 | End: 2018-07-24

## 2018-07-16 RX ORDER — SODIUM CHLORIDE 9 MG/ML
INJECTION, SOLUTION INTRAVENOUS CONTINUOUS
Status: DISCONTINUED | OUTPATIENT
Start: 2018-07-16 | End: 2018-07-22

## 2018-07-16 RX ORDER — ONDANSETRON 2 MG/ML
4 INJECTION INTRAMUSCULAR; INTRAVENOUS EVERY 6 HOURS PRN
Status: DISCONTINUED | OUTPATIENT
Start: 2018-07-16 | End: 2018-07-21

## 2018-07-16 RX ORDER — CETIRIZINE HYDROCHLORIDE 10 MG/1
10 TABLET ORAL DAILY
Status: DISCONTINUED | OUTPATIENT
Start: 2018-07-16 | End: 2018-07-24

## 2018-07-16 RX ORDER — HYDROMORPHONE HYDROCHLORIDE 1 MG/ML
0.5 INJECTION, SOLUTION INTRAMUSCULAR; INTRAVENOUS; SUBCUTANEOUS EVERY 4 HOURS PRN
Status: DISCONTINUED | OUTPATIENT
Start: 2018-07-16 | End: 2018-07-21

## 2018-07-16 RX ORDER — METOCLOPRAMIDE HYDROCHLORIDE 5 MG/ML
10 INJECTION INTRAMUSCULAR; INTRAVENOUS EVERY 8 HOURS PRN
Status: DISCONTINUED | OUTPATIENT
Start: 2018-07-16 | End: 2018-07-24

## 2018-07-16 RX ORDER — MAGNESIUM OXIDE 400 MG (241.3 MG MAGNESIUM) TABLET
400 TABLET ONCE
Status: COMPLETED | OUTPATIENT
Start: 2018-07-16 | End: 2018-07-16

## 2018-07-16 RX ORDER — MAGNESIUM SULFATE HEPTAHYDRATE 40 MG/ML
2 INJECTION, SOLUTION INTRAVENOUS ONCE
Status: COMPLETED | OUTPATIENT
Start: 2018-07-16 | End: 2018-07-16

## 2018-07-16 RX ORDER — HYDROMORPHONE HYDROCHLORIDE 1 MG/ML
0.2 INJECTION, SOLUTION INTRAMUSCULAR; INTRAVENOUS; SUBCUTANEOUS ONCE
Status: COMPLETED | OUTPATIENT
Start: 2018-07-16 | End: 2018-07-16

## 2018-07-16 RX ORDER — MORPHINE SULFATE 4 MG/ML
4 INJECTION, SOLUTION INTRAMUSCULAR; INTRAVENOUS ONCE
Status: COMPLETED | OUTPATIENT
Start: 2018-07-16 | End: 2018-07-16

## 2018-07-16 RX ORDER — HYDROCODONE BITARTRATE AND ACETAMINOPHEN 5; 325 MG/1; MG/1
1 TABLET ORAL EVERY 6 HOURS PRN
Status: DISCONTINUED | OUTPATIENT
Start: 2018-07-16 | End: 2018-07-21

## 2018-07-16 RX ORDER — MAGNESIUM OXIDE 400 MG (241.3 MG MAGNESIUM) TABLET
400 TABLET ONCE
Status: DISCONTINUED | OUTPATIENT
Start: 2018-07-16 | End: 2018-07-24

## 2018-07-16 RX ORDER — HYDROMORPHONE HYDROCHLORIDE 1 MG/ML
0.3 INJECTION, SOLUTION INTRAMUSCULAR; INTRAVENOUS; SUBCUTANEOUS EVERY 4 HOURS PRN
Status: DISCONTINUED | OUTPATIENT
Start: 2018-07-16 | End: 2018-07-21

## 2018-07-16 RX ORDER — CETIRIZINE HYDROCHLORIDE 10 MG/1
10 TABLET ORAL DAILY
Status: DISCONTINUED | OUTPATIENT
Start: 2018-07-16 | End: 2018-07-16

## 2018-07-16 NOTE — OCCUPATIONAL THERAPY NOTE
OCCUPATIONAL THERAPY EVALUATION - INPATIENT      Room Number: 430/430-A  Evaluation Date: 7/16/2018  Type of Evaluation: Initial  Presenting Problem:  (Right humerus fx)    Physician Order: IP Consult to Occupational Therapy  Reason for Therapy: ADL/IADL D THERAPY MEDICAL/SOCIAL HISTORY     Problem List   Principal Problem:    Closed fracture of proximal end of right humerus, unspecified fracture morphology, initial encounter  Active Problems:    Closed fracture of right proximal humerus    Fall, initial enc Extremity: Non-Weight Bearing           PAIN ASSESSMENT  Ratin  Location:  (right upper extremity)  Management Techniques: Activity promotion; Body mechanics;Repositioning    ACTIVITY TOLERANCE  Room air    COGNITION  Overall Cognitive Status:  WFL - wi rehab     Patient will complete LE/UE dressing with min a  Comment:     Patient will complete toilet transfer with min a  Comment:     Patient will complete self care task at sink level with SBA   Comment:           Goals  on:   Frequency: 3x a w

## 2018-07-16 NOTE — PHYSICAL THERAPY NOTE
PHYSICAL THERAPY EVALUATION - INPATIENT     Room Number: 430/430-A  Evaluation Date: 7/16/2018  Type of Evaluation: Initial   Physician Order: See Comment for Specific Order (RUE fracture)    Presenting Problem: p/w [address], dx R humeral fx  Reason for Ther pt lives alone and now requires MOD A-MAX A x 2 for all mobility, PT recommendation sub-acute rehab upon discharge.  This is the pt's 2nd fall at home in the last 6 months; to prevent future injury, long-term would recommend 24 hr caregiver or assisted buddy Right      Comment: per NG  No date: KNEE ARTHROSCOPY Bilateral      Comment: per NG    HOME SITUATION  Type of Home: House   Home Layout: Two level                Lives With: Alone  Drives: No  Patient Owned Equipment: Rolling walker  Patient Regularly Us the patient currently have. ..  -   Turning over in bed (including adjusting bedclothes, sheets and blankets)?: A Lot   -   Sitting down on and standing up from a chair with arms (e.g., wheelchair, bedside commode, etc.): A Lot   -   Moving from lying on ba with least restrictive assistive device     Goal #2  Current Status    Goal #3 Patient is able to ambulate 30 feet with assist device: least restrictive assistive device at assistance level: minimum assistance   Goal #3   Current Status    Goal #4 Stair go

## 2018-07-16 NOTE — ED INITIAL ASSESSMENT (HPI)
Pt presents via EMS from home s/p mechanical fall with R arm deformity. Pt in a C collar by EMS. Reports 10/10 pain to R arm. Denies n/v, thinner use. States that he fell on his face.

## 2018-07-16 NOTE — ED NOTES
Pt resting on cart, requesting water and pain medications. MD notified, order obtained for morphine. Pt informed that he is not allowed to have any water at this time.

## 2018-07-16 NOTE — CM/SW NOTE
SW received MD order for discharge planning. SW met with pt at bedside. Pt lives in house with 12 stairs to access the 2nd floor bedrooms. Pt lives alone but has large support network from [place]. Pt has a CHAIM who lives in [address]

## 2018-07-16 NOTE — ED PROVIDER NOTES
Patient Seen in: [facility] AND [facility] Emergency Department    History   Patient presents with:  Fall (musculoskeletal, neurologic)  Upper Extremity Injury (musculoskeletal)    Stated Complaint: fall with arm deformity     HPI    80 yo M with PMH HTN presentin Oral Tab,  Take 1 tablet (100 mg total) by mouth once daily. loratadine (CLARITIN) 10 MG Oral Tab,  Take 10 mg by mouth daily. Multiple Vitamins-Minerals (EYE VITAMINS) Oral Cap,  Take  by mouth.  1 tab daily   cholecalciferol (D 1000) 1000 UNITS Oral C 100%   BMI 28.25 kg/m²         Physical Exam   Constitutional: No distress. HEENT: MMM. Head: Normocephalic. Atraumatic. Eyes: No injection. Pupils midrange and equally reactive. Neck: In c-collar. No midline c-spine tenderness/stepoff/deformity.    Ca CBC W/ DIFFERENTIAL[[id number]]          Abnormal            Final result                 Please view results for these tests on the individual orders.    BASIC METABOLIC PANEL (8)   MAGNESIUM   CBC WITH DIFFERENTIAL WITH PLATELET    Narrative: left sternocleidomastoid muscle      Case discussed with CINTHYA Pride at 11:40PM Adama Keenan M.D. This report has been electronically signed and verified by the Radiologist whose name is printed above.     DD:  07/15/2018/DT:  07/16/2018     This repor destroy any copies or printouts. Procedure:    A right coaptation splint was applied. After application of the splint I returned and re-examined the patient.   The splint was adequately immobilizing the joint and distal to the splint the patient's circu

## 2018-07-16 NOTE — PROGRESS NOTES
Patient seen and examined this morning  States pain is uncontrolled  No surgery planned for today  Initiate diet, and add Norco for pain control  Will continue to monitor. Hypomagnesemia - will replete.

## 2018-07-16 NOTE — PHYSICAL THERAPY NOTE
Chart reviewed, pt presents after [address] injury at home, dx R humeral fracture. Ortho consult note pending, pt currently on bedrest with bathroom privileges.  PT discussed case with RN, will HOLD PT evaluation until ortho note placed and pt clinical course c

## 2018-07-16 NOTE — H&P
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 When I saw the patient, he was still complaining of 7/10 pain in his right arm. The patient reports daily alcohol intake of 2 to 3 ounces of vodka with dinner.   He was noted and did have vodka prior to his fall. He is a . His wife  about 5 years ago. REVIEW OF SYSTEMS:  Twelve systems were reviewed. The patient reports that he has chronic rhinorrhea and postnasal drip. He denies any recent fever or chills.   Sai Escoto bacteria. EKG revealed a sinus rhythm with sinus arrhythmia but no acute ST segment changes. ASSESSMENT AND PLAN:    1. Mechanical fall. The patient denies any dizziness, lightheadedness prior to the fall, no palpitations.   Paramedics were prese remote telemetry and monitor. Currently there are no signs of agitation, but he did receive Geodon prior to my evaluation. I have discussed with daytime hospitalist as well. 11.    The patient's current clinical status and proposed treatment plan were di

## 2018-07-17 LAB
ANION GAP SERPL CALC-SCNC: 4 MMOL/L (ref 0–18)
BASOPHILS # BLD: 0 K/UL (ref 0–0.2)
BASOPHILS NFR BLD: 1 %
BUN SERPL-MCNC: 12 MG/DL (ref 8–20)
BUN/CREAT SERPL: 20.3 (ref 10–20)
CALCIUM SERPL-MCNC: 8 MG/DL (ref 8.5–10.5)
CHLORIDE SERPL-SCNC: 103 MMOL/L (ref 95–110)
CO2 SERPL-SCNC: 24 MMOL/L (ref 22–32)
CREAT SERPL-MCNC: 0.59 MG/DL (ref 0.5–1.5)
EOSINOPHIL # BLD: 0.2 K/UL (ref 0–0.7)
EOSINOPHIL NFR BLD: 3 %
ERYTHROCYTE [DISTWIDTH] IN BLOOD BY AUTOMATED COUNT: 14 % (ref 11–15)
GLUCOSE SERPL-MCNC: 110 MG/DL (ref 70–99)
HBA1C MFR BLD: 4.8 % (ref 4–6)
HCT VFR BLD AUTO: 20.3 % (ref 41–52)
HCT VFR BLD AUTO: 21.3 % (ref 41–52)
HCT VFR BLD AUTO: 24.8 % (ref 41–52)
HGB BLD-MCNC: 6.8 G/DL (ref 13.5–17.5)
HGB BLD-MCNC: 7.3 G/DL (ref 13.5–17.5)
HGB BLD-MCNC: 8.5 G/DL (ref 13.5–17.5)
IRON SATN MFR SERPL: 17 % (ref 20–55)
IRON SERPL-MCNC: 23 MCG/DL (ref 45–182)
LYMPHOCYTES # BLD: 1 K/UL (ref 1–4)
LYMPHOCYTES NFR BLD: 17 %
MAGNESIUM SERPL-MCNC: 1.9 MG/DL (ref 1.8–2.5)
MCH RBC QN AUTO: 32.4 PG (ref 27–32)
MCHC RBC AUTO-ENTMCNC: 34.1 G/DL (ref 32–37)
MCV RBC AUTO: 95 FL (ref 80–100)
MONOCYTES # BLD: 0.5 K/UL (ref 0–1)
MONOCYTES NFR BLD: 9 %
NEUTROPHILS # BLD AUTO: 4.1 K/UL (ref 1.8–7.7)
NEUTROPHILS NFR BLD: 70 %
OSMOLALITY UR CALC.SUM OF ELEC: 272 MOSM/KG (ref 275–295)
PHOSPHATE SERPL-MCNC: 2.9 MG/DL (ref 2.4–4.7)
PLATELET # BLD AUTO: 118 K/UL (ref 140–400)
PMV BLD AUTO: 8.4 FL (ref 7.4–10.3)
POTASSIUM SERPL-SCNC: 4.4 MMOL/L (ref 3.3–5.1)
RBC # BLD AUTO: 2.24 M/UL (ref 4.5–5.9)
SODIUM SERPL-SCNC: 131 MMOL/L (ref 136–144)
TIBC SERPL-MCNC: 136 MCG/DL (ref 228–428)
TRANSFERRIN SERPL-MCNC: 103 MG/DL (ref 180–329)
WBC # BLD AUTO: 5.9 K/UL (ref 4–11)

## 2018-07-17 PROCEDURE — 99233 SBSQ HOSP IP/OBS HIGH 50: CPT | Performed by: HOSPITALIST

## 2018-07-17 PROCEDURE — 30233R1 TRANSFUSION OF NONAUTOLOGOUS PLATELETS INTO PERIPHERAL VEIN, PERCUTANEOUS APPROACH: ICD-10-PCS | Performed by: HOSPITALIST

## 2018-07-17 RX ORDER — SODIUM CHLORIDE 9 MG/ML
INJECTION, SOLUTION INTRAVENOUS ONCE
Status: COMPLETED | OUTPATIENT
Start: 2018-07-17 | End: 2018-07-17

## 2018-07-17 RX ORDER — FERROUS GLUCONATE 324(37.5)
1 TABLET ORAL 3 TIMES DAILY
Status: DISCONTINUED | OUTPATIENT
Start: 2018-07-17 | End: 2018-07-24

## 2018-07-17 RX ORDER — SODIUM CHLORIDE 0.9 % (FLUSH) 0.9 %
10 SYRINGE (ML) INJECTION AS NEEDED
Status: DISCONTINUED | OUTPATIENT
Start: 2018-07-17 | End: 2018-07-24

## 2018-07-17 NOTE — CONSULTS
IP consult to Orthopedic Surgery Once  Consult performed by: Refugio Kaur ordered by: Ngozi Smith, 111  [facility] SHARON Jolly Patient Status:  Inpatient    1935 MRN [id number]   Location [facility] • Diabetes Neg    • Glaucoma Neg    • Macular degeneration Neg       reports that he quit smoking about 33 years ago. His smoking use included Cigarettes. He has never used smokeless tobacco. He reports that he drinks about 3.5 oz of alcohol per week .  H 7/14/2018   cholecalciferol (D 1000) 1000 UNITS Oral Cap Take 1,000 Units by mouth nightly. Vitamin D3 1,000 unit capsule  Disp:  Rfl:  7/14/2018   Multiple Vitamin (MULTI-VITAMINS) Oral Tab Take 1 tablet by mouth nightly.  take 1 tablet by ORAL route  ever Follow up as outpatient Dr. Mike Burden in 1-2 weeks      Taransilvino Louise.  Evelia Roblero  7/16/2018  10:34 PM

## 2018-07-17 NOTE — PROGRESS NOTES
[facility]    Progress Note    Nivia Ramirez Patient Status:  Inpatient    1935 MRN [id number]   Location [facility] 4W/SW/SE Attending Bita Baker MD   Hosp Day # 1 PCP Kai Dahl MD     Subjective:    Gooden medications. DVT ppx: patient was on heparin subq, will hold in the setting of anemia    Greater than 30 min spent with >50% time spent counseling patient re: treatment and plan.       Results:     Lab Results  Component Value Date   WBC 5.9 07/17/2018 at 6:09     Approved by (CST): Carol Shah MD on 7/16/2018 at 6:11          Ct Spine Cervical (cpt=72125)    Result Date: 7/16/2018  CONCLUSION:  1. No acute fracture of the cervical spine is demonstrated.   2. Anterolisthesis of C3 on C4, most likely d Initial Certification    Patient will require inpatient services that will reasonably be expected to span two midnight's based on the clinical documentation in H+P.    Based on patients current state of illness, I anticipate that, after discharge, patient w

## 2018-07-17 NOTE — PHYSICAL THERAPY NOTE
Patient strongly refused therapy session Per RN he is having a lot of pain. Will try to see him later.

## 2018-07-17 NOTE — CONSULTS
7/15/2018  Smita Abdul  7/24/1935  81year old   male  No name on file. HPI:   Patient presents with:  Fall (musculoskeletal, neurologic)  Upper Extremity Injury (musculoskeletal)      The patient is right-handed.   Date of injury/ onset of symptoms: Comment: per NG   Family History   Problem Relation Age of Onset   • Cancer Father      Lung cancer; per NG   • Arthritis Father      per NG   • Hypertension Mother      per NG   • Diabetes Neg    • Glaucoma Neg    • Macular degeneration Neg       Smoking extends into his greater tuberosity.       Lab Results  Component Value Date   WBC 5.9 07/17/2018   HGB 7.3 07/17/2018   HCT 21.3 07/17/2018    07/17/2018   CREATSERUM 0.59 07/17/2018   BUN 12 07/17/2018    07/17/2018   K 4.4 07/17/2018   CL 10

## 2018-07-18 LAB
ANION GAP SERPL CALC-SCNC: 3 MMOL/L (ref 0–18)
BASOPHILS # BLD: 0 K/UL (ref 0–0.2)
BASOPHILS NFR BLD: 1 %
BUN SERPL-MCNC: 10 MG/DL (ref 8–20)
BUN/CREAT SERPL: 19.2 (ref 10–20)
CALCIUM SERPL-MCNC: 8.1 MG/DL (ref 8.5–10.5)
CHLORIDE SERPL-SCNC: 101 MMOL/L (ref 95–110)
CO2 SERPL-SCNC: 26 MMOL/L (ref 22–32)
CREAT SERPL-MCNC: 0.52 MG/DL (ref 0.5–1.5)
EOSINOPHIL # BLD: 0.3 K/UL (ref 0–0.7)
EOSINOPHIL NFR BLD: 5 %
ERYTHROCYTE [DISTWIDTH] IN BLOOD BY AUTOMATED COUNT: 14.7 % (ref 11–15)
GLUCOSE SERPL-MCNC: 113 MG/DL (ref 70–99)
HCT VFR BLD AUTO: 23.5 % (ref 41–52)
HGB BLD-MCNC: 7.9 G/DL (ref 13.5–17.5)
HGB BLD-MCNC: 8.5 G/DL (ref 13.5–17.5)
LYMPHOCYTES # BLD: 1.3 K/UL (ref 1–4)
LYMPHOCYTES NFR BLD: 21 %
MAGNESIUM SERPL-MCNC: 1.6 MG/DL (ref 1.8–2.5)
MCH RBC QN AUTO: 31.6 PG (ref 27–32)
MCHC RBC AUTO-ENTMCNC: 33.6 G/DL (ref 32–37)
MCV RBC AUTO: 94 FL (ref 80–100)
MONOCYTES # BLD: 0.8 K/UL (ref 0–1)
MONOCYTES NFR BLD: 13 %
NEUTROPHILS # BLD AUTO: 3.9 K/UL (ref 1.8–7.7)
NEUTROPHILS NFR BLD: 62 %
OSMOLALITY UR CALC.SUM OF ELEC: 270 MOSM/KG (ref 275–295)
PHOSPHATE SERPL-MCNC: 3.5 MG/DL (ref 2.4–4.7)
PLATELET # BLD AUTO: 116 K/UL (ref 140–400)
PMV BLD AUTO: 8.7 FL (ref 7.4–10.3)
POTASSIUM SERPL-SCNC: 4.1 MMOL/L (ref 3.3–5.1)
RBC # BLD AUTO: 2.5 M/UL (ref 4.5–5.9)
SODIUM SERPL-SCNC: 130 MMOL/L (ref 136–144)
WBC # BLD AUTO: 6.3 K/UL (ref 4–11)

## 2018-07-18 PROCEDURE — 99233 SBSQ HOSP IP/OBS HIGH 50: CPT | Performed by: HOSPITALIST

## 2018-07-18 RX ORDER — MAGNESIUM OXIDE 400 MG (241.3 MG MAGNESIUM) TABLET
400 TABLET ONCE
Status: COMPLETED | OUTPATIENT
Start: 2018-07-18 | End: 2018-07-18

## 2018-07-18 NOTE — PROGRESS NOTES
[facility]    Progress Note    Dalton Patel Patient Status:  Inpatient    1935 MRN [id number]   Location [facility] 4W/SW/SE Attending Jovan Reynoso MD   Hosp Day # 2 PCP Oz Roth MD     Subjective:    Gooden continue to monitor. HTN  - currently stable  - will contineu home medications. DVT ppx: patient was on heparin subq, will hold in the setting of anemia    Dispo:   - will appreciate Ortho recs, patient may be bleeding into the joint.    - recheck h Lon Ivy MD  7/18/2018

## 2018-07-18 NOTE — CM/SW NOTE
CTL rounds: met with patient in room. Araceli Nunez is BPCI eligible under . [employer] program including 90 day phone call follow up reviewed with patient who verbalize understanding. [employer] provided.

## 2018-07-18 NOTE — OCCUPATIONAL THERAPY NOTE
OCCUPATIONAL THERAPY TREATMENT NOTE - INPATIENT    Room Number: 430/430-A         Presenting Problem: fall with right humeral fx      Problem List  Principal Problem:    Closed fracture of proximal end of right humerus, unspecified fracture morphology, ini demonstrates 4/5 strength in wrist extension and flexion and elbow flexion/extension. Pt has chronic limitations in his left shoulder flexion range of motion.  He demonstrates adequate strength and ROM to bring hand to mouth to feed himself with his left ha ASSESSMENT  Supine to Sit : Maximum assistance (Max A X2)  Sit to Stand:  Moderate assistance (Mod A x2)  Toilet Transfer: NT  Shower Transfer: NT  Chair Transfer: Max A x2  Car Transfer: NT    Bedroom Mobility: Mod A x2 with left hand held assist to take s

## 2018-07-18 NOTE — PHYSICAL THERAPY NOTE
PHYSICAL THERAPY TREATMENT NOTE - INPATIENT     Room Number: 430/430-A       Presenting Problem: p/w [address], dx R humeral fx    Problem List  Principal Problem:    Closed fracture of proximal end of right humerus, unspecified fracture morphology, initial en assisted living placement post-rehab stay. Patient will benefit from continued IP PT services to address these deficits in preparation for discharge.       DISCHARGE RECOMMENDATIONS  PT Discharge Recommendations: 24 hour care/supervision;Sub-acute rehab tested  Comment : pt performed x 2 sit to stand transfers at MOD A x 2; transferred to close bedside chair taking a few shuffled lateral steps at MOD A    Additional information:     THERAPEUTIC EXERCISES  Lower Extremity Alternating marching  Ankle pumps

## 2018-07-19 LAB
ANION GAP SERPL CALC-SCNC: 6 MMOL/L (ref 0–18)
BASOPHILS # BLD: 0.1 K/UL (ref 0–0.2)
BASOPHILS NFR BLD: 1 %
BLOOD TYPE BARCODE: 5100
BUN SERPL-MCNC: 10 MG/DL (ref 8–20)
BUN/CREAT SERPL: 17.5 (ref 10–20)
CALCIUM SERPL-MCNC: 8.5 MG/DL (ref 8.5–10.5)
CHLORIDE SERPL-SCNC: 99 MMOL/L (ref 95–110)
CO2 SERPL-SCNC: 27 MMOL/L (ref 22–32)
CREAT SERPL-MCNC: 0.57 MG/DL (ref 0.5–1.5)
EOSINOPHIL # BLD: 0.4 K/UL (ref 0–0.7)
EOSINOPHIL NFR BLD: 6 %
ERYTHROCYTE [DISTWIDTH] IN BLOOD BY AUTOMATED COUNT: 14.7 % (ref 11–15)
GLUCOSE SERPL-MCNC: 105 MG/DL (ref 70–99)
HCT VFR BLD AUTO: 24.7 % (ref 41–52)
HGB BLD-MCNC: 8.2 G/DL (ref 13.5–17.5)
LYMPHOCYTES # BLD: 1.4 K/UL (ref 1–4)
LYMPHOCYTES NFR BLD: 24 %
MAGNESIUM SERPL-MCNC: 1.8 MG/DL (ref 1.8–2.5)
MCH RBC QN AUTO: 31.6 PG (ref 27–32)
MCHC RBC AUTO-ENTMCNC: 33.4 G/DL (ref 32–37)
MCV RBC AUTO: 94.7 FL (ref 80–100)
MONOCYTES # BLD: 0.9 K/UL (ref 0–1)
MONOCYTES NFR BLD: 14 %
NEUTROPHILS # BLD AUTO: 3.3 K/UL (ref 1.8–7.7)
NEUTROPHILS NFR BLD: 55 %
OSMOLALITY UR CALC.SUM OF ELEC: 273 MOSM/KG (ref 275–295)
PHOSPHATE SERPL-MCNC: 4 MG/DL (ref 2.4–4.7)
PLATELET # BLD AUTO: 130 K/UL (ref 140–400)
PMV BLD AUTO: 8.5 FL (ref 7.4–10.3)
POTASSIUM SERPL-SCNC: 4.6 MMOL/L (ref 3.3–5.1)
RBC # BLD AUTO: 2.61 M/UL (ref 4.5–5.9)
SODIUM SERPL-SCNC: 132 MMOL/L (ref 136–144)
WBC # BLD AUTO: 6 K/UL (ref 4–11)

## 2018-07-19 PROCEDURE — 99233 SBSQ HOSP IP/OBS HIGH 50: CPT | Performed by: HOSPITALIST

## 2018-07-19 RX ORDER — MAGNESIUM OXIDE 400 MG (241.3 MG MAGNESIUM) TABLET
400 TABLET ONCE
Status: COMPLETED | OUTPATIENT
Start: 2018-07-19 | End: 2018-07-19

## 2018-07-19 NOTE — OCCUPATIONAL THERAPY NOTE
OCCUPATIONAL THERAPY TREATMENT NOTE - INPATIENT    Room Number: 430/430-A  Presenting Problem: fall with right humeral fx      Problem List  Principal Problem:    Closed fracture of proximal end of right humerus, unspecified fracture morphology, initial en clothing?: A Lot  -   Bathing (including washing, rinsing, drying)?: A Lot  -   Toileting, which includes using toilet, bedpan or urinal? : A Lot  -   Putting on and taking off regular upper body clothing?: A Lot  -   Taking care of personal grooming such

## 2018-07-19 NOTE — PROGRESS NOTES
[facility]    Progress Note    Odinnick Kana Patient Status:  Inpatient    1935 MRN [id number]   Location [facility] 4W/SW/SE Attending Lashell Willson MD   Hosp Day # 3 PCP Norah Avilez MD     Subjective:    Gooden ppx: patient was on heparin subq, will hold in the setting of anemia    Dispo:   - will appreciate Ortho recs,   - plan to be seen by ortho for surgical options  - continue IV analgesics  - will continue to monitor.        Greater than 30 min spent with >50

## 2018-07-19 NOTE — PHYSICAL THERAPY NOTE
PHYSICAL THERAPY TREATMENT NOTE - INPATIENT     Room Number: 430/430-A       Presenting Problem: p/w [address], dx R humeral fx    Problem List  Principal Problem:    Closed fracture of proximal end of right humerus, unspecified fracture morphology, initial en is the pt's 2nd fall at home in the last 6 months; to prevent future injury, long-term would recommend 24 hr caregiver or assisted living placement post-rehab stay.      Patient will benefit from continued IP PT services to address these deficits in prepara Device: None (HHA)  Pattern: Shuffle;R Foot drag;L Foot drag;R Foot flat;L Foot flat  Stoop/Curb Assistance: Not tested  Comment : WBOS unsteady gait    Additional information:     THERAPEUTIC EXERCISES  Lower Extremity Alternating marching  Ankle pumps  K

## 2018-07-19 NOTE — PROGRESS NOTES
7/15/2018  Benny Joy  7/24/1935  81year old   male  No name on file. HPI:   Patient presents with:  Fall (musculoskeletal, neurologic)  Upper Extremity Injury (musculoskeletal)      The patient complains of pain located right shoulder.   The pain Father      Lung cancer; per NG   • Arthritis Father      per NG   • Hypertension Mother      per NG   • Diabetes Neg    • Glaucoma Neg    • Macular degeneration Neg       Smoking status: Former Smoker infection, neurovascular injury, failure of the procedure, stiffness, and potential need for additional surgery as well as anesthetic complications including death.   Risks of fracture care include malunion, nonunion, delayed union, and post-traumatic arthr

## 2018-07-19 NOTE — CM/SW NOTE
MD order received regarding POLST. WILLI met with the pt. At bedside to discuss the POLST. The pt. Stated he does not want to complete a POLST form. The pt.  Stated he has a living will and [facility] and doesn't want to complete anything additional.      The elliot

## 2018-07-19 NOTE — OCCUPATIONAL THERAPY NOTE
OT SPLINT EVALUATION    Room Number: 430  Evaluation Date: 07/19/18  Type of Evaluation: Splint    Orders: Christianson splint          SUBJECTIVE  \"This is the most pain I've had since I got in here. \"  Patient did not quantify    Onset Date: 07/15/18  [facility]

## 2018-07-20 LAB
ANION GAP SERPL CALC-SCNC: 6 MMOL/L (ref 0–18)
ANTIBODY SCREEN: NEGATIVE
BASOPHILS # BLD: 0.1 K/UL (ref 0–0.2)
BASOPHILS NFR BLD: 1 %
BUN SERPL-MCNC: 14 MG/DL (ref 8–20)
BUN/CREAT SERPL: 21.2 (ref 10–20)
CALCIUM SERPL-MCNC: 8.6 MG/DL (ref 8.5–10.5)
CHLORIDE SERPL-SCNC: 100 MMOL/L (ref 95–110)
CO2 SERPL-SCNC: 28 MMOL/L (ref 22–32)
CREAT SERPL-MCNC: 0.66 MG/DL (ref 0.5–1.5)
EOSINOPHIL # BLD: 0.5 K/UL (ref 0–0.7)
EOSINOPHIL NFR BLD: 8 %
ERYTHROCYTE [DISTWIDTH] IN BLOOD BY AUTOMATED COUNT: 14.8 % (ref 11–15)
GLUCOSE SERPL-MCNC: 101 MG/DL (ref 70–99)
HCT VFR BLD AUTO: 24.3 % (ref 41–52)
HGB BLD-MCNC: 8.2 G/DL (ref 13.5–17.5)
LYMPHOCYTES # BLD: 1.5 K/UL (ref 1–4)
LYMPHOCYTES NFR BLD: 24 %
MAGNESIUM SERPL-MCNC: 1.8 MG/DL (ref 1.8–2.5)
MCH RBC QN AUTO: 31.9 PG (ref 27–32)
MCHC RBC AUTO-ENTMCNC: 33.8 G/DL (ref 32–37)
MCV RBC AUTO: 94.5 FL (ref 80–100)
MONOCYTES # BLD: 0.1 K/UL (ref 0–1)
MONOCYTES NFR BLD: 1 %
NEUTROPHILS # BLD AUTO: 4.1 K/UL (ref 1.8–7.7)
NEUTROPHILS NFR BLD: 62 %
NEUTS BAND NFR BLD: 4 %
OSMOLALITY UR CALC.SUM OF ELEC: 279 MOSM/KG (ref 275–295)
PHOSPHATE SERPL-MCNC: 4.2 MG/DL (ref 2.4–4.7)
PLATELET # BLD AUTO: 162 K/UL (ref 140–400)
PMV BLD AUTO: 8.3 FL (ref 7.4–10.3)
POTASSIUM SERPL-SCNC: 4.8 MMOL/L (ref 3.3–5.1)
RBC # BLD AUTO: 2.57 M/UL (ref 4.5–5.9)
RH BLOOD TYPE: POSITIVE
SODIUM SERPL-SCNC: 134 MMOL/L (ref 136–144)
WBC # BLD AUTO: 6.2 K/UL (ref 4–11)

## 2018-07-20 PROCEDURE — 99233 SBSQ HOSP IP/OBS HIGH 50: CPT | Performed by: HOSPITALIST

## 2018-07-20 RX ORDER — FAMOTIDINE 20 MG/1
20 TABLET ORAL ONCE
Status: DISCONTINUED | OUTPATIENT
Start: 2018-07-20 | End: 2018-07-20

## 2018-07-20 RX ORDER — METOCLOPRAMIDE 10 MG/1
10 TABLET ORAL ONCE
Status: DISCONTINUED | OUTPATIENT
Start: 2018-07-20 | End: 2018-07-20

## 2018-07-20 RX ORDER — METOCLOPRAMIDE 10 MG/1
10 TABLET ORAL ONCE
Status: DISCONTINUED | OUTPATIENT
Start: 2018-07-21 | End: 2018-07-21

## 2018-07-20 RX ORDER — SODIUM CHLORIDE, SODIUM LACTATE, POTASSIUM CHLORIDE, CALCIUM CHLORIDE 600; 310; 30; 20 MG/100ML; MG/100ML; MG/100ML; MG/100ML
INJECTION, SOLUTION INTRAVENOUS
Status: COMPLETED
Start: 2018-07-20 | End: 2018-07-20

## 2018-07-20 RX ORDER — MAGNESIUM OXIDE 400 MG (241.3 MG MAGNESIUM) TABLET
400 TABLET ONCE
Status: COMPLETED | OUTPATIENT
Start: 2018-07-20 | End: 2018-07-20

## 2018-07-20 RX ORDER — SODIUM CHLORIDE, SODIUM LACTATE, POTASSIUM CHLORIDE, CALCIUM CHLORIDE 600; 310; 30; 20 MG/100ML; MG/100ML; MG/100ML; MG/100ML
INJECTION, SOLUTION INTRAVENOUS CONTINUOUS
Status: DISCONTINUED | OUTPATIENT
Start: 2018-07-20 | End: 2018-07-22

## 2018-07-20 RX ORDER — FAMOTIDINE 20 MG/1
20 TABLET ORAL ONCE
Status: DISCONTINUED | OUTPATIENT
Start: 2018-07-21 | End: 2018-07-21

## 2018-07-20 RX ORDER — ACETAMINOPHEN 500 MG
1000 TABLET ORAL ONCE
Status: COMPLETED | OUTPATIENT
Start: 2018-07-21 | End: 2018-07-21

## 2018-07-20 RX ORDER — SODIUM CHLORIDE 9 MG/ML
INJECTION, SOLUTION INTRAVENOUS
Status: DISCONTINUED
Start: 2018-07-20 | End: 2018-07-20 | Stop reason: WASHOUT

## 2018-07-20 NOTE — PHYSICAL THERAPY NOTE
PHYSICAL THERAPY TREATMENT NOTE - INPATIENT     Room Number: 430/430-A       Presenting Problem: p/w [address], dx R humeral fx    Problem List  Principal Problem:    Closed fracture of proximal end of right humerus, unspecified fracture morphology, initial en is the pt's 2nd fall at home in the last 6 months; to prevent future injury, long-term would recommend 24 hr caregiver or assisted living placement post-rehab stay.      Patient will benefit from continued IP PT services to address these deficits in prepara Gait Assistance:  Moderate assistance  Distance (ft): 6  Assistive Device: None ( HHA x 2)  Pattern: Shuffle;R Foot drag;L Foot drag;R Foot flat;L Foot flat  Stoop/Curb Assistance: Not tested  Comment : anatalgic unsteady gait    Additional information:

## 2018-07-20 NOTE — PROGRESS NOTES
[facility]    Progress Note    Heriberto Lagunas Patient Status:  Inpatient    1935 MRN [id number]   Location [facility] 4W/SW/SE Attending Angela Mott MD   Hosp Day # 4 PCP David London MD     Subjective:    Gooden setting of anemia    Dispo:   - will appreciate Ortho recs,   - plan for surgery tomorrow. - continue IV analgesics  - will continue to monitor. Greater than 30 min spent with >50% time spent counseling patient re: treatment and plan.       Results:

## 2018-07-20 NOTE — OCCUPATIONAL THERAPY NOTE
OCCUPATIONAL THERAPY TREATMENT NOTE - INPATIENT    Room Number: 430/430-A      Presenting Problem: fall with right humeral fx      Problem List  Principal Problem:    Closed fracture of proximal end of right humerus, unspecified fracture morphology, initia rehabilitation       PLAN  OT Treatment Plan: Balance activities; Energy conservation/work simplification techniques;ADL training;Functional transfer training; Endurance training;Patient/Family education;Patient/Family training    SUBJECTIVE  \"It hurts even light within reach;RN aware of session/findings; All patient questions and concerns addressed    OT Goals:       Patient will complete LE/UE dressing with min a  Comment: Pt currently requires max a    Patient will complete toilet transfer with min a  Comme

## 2018-07-21 ENCOUNTER — SURGERY (OUTPATIENT)
Age: 83
End: 2018-07-21

## 2018-07-21 ENCOUNTER — ANESTHESIA (OUTPATIENT)
Dept: SURGERY | Facility: HOSPITAL | Age: 83
DRG: 493 | End: 2018-07-21
Payer: MEDICARE

## 2018-07-21 ENCOUNTER — APPOINTMENT (OUTPATIENT)
Dept: GENERAL RADIOLOGY | Facility: HOSPITAL | Age: 83
DRG: 493 | End: 2018-07-21
Attending: ORTHOPAEDIC SURGERY
Payer: MEDICARE

## 2018-07-21 ENCOUNTER — ANESTHESIA EVENT (OUTPATIENT)
Dept: SURGERY | Facility: HOSPITAL | Age: 83
DRG: 493 | End: 2018-07-21
Payer: MEDICARE

## 2018-07-21 LAB
ANION GAP SERPL CALC-SCNC: 6 MMOL/L (ref 0–18)
BASOPHILS # BLD: 0 K/UL (ref 0–0.2)
BASOPHILS NFR BLD: 1 %
BUN SERPL-MCNC: 18 MG/DL (ref 8–20)
BUN/CREAT SERPL: 32.1 (ref 10–20)
CALCIUM SERPL-MCNC: 8.5 MG/DL (ref 8.5–10.5)
CHLORIDE SERPL-SCNC: 101 MMOL/L (ref 95–110)
CO2 SERPL-SCNC: 26 MMOL/L (ref 22–32)
CREAT SERPL-MCNC: 0.56 MG/DL (ref 0.5–1.5)
EOSINOPHIL # BLD: 0.3 K/UL (ref 0–0.7)
EOSINOPHIL NFR BLD: 5 %
ERYTHROCYTE [DISTWIDTH] IN BLOOD BY AUTOMATED COUNT: 14.3 % (ref 11–15)
GLUCOSE SERPL-MCNC: 104 MG/DL (ref 70–99)
HCT VFR BLD AUTO: 23.6 % (ref 41–52)
HGB BLD-MCNC: 7.9 G/DL (ref 13.5–17.5)
HGB BLD-MCNC: 8 G/DL (ref 13.5–17.5)
LYMPHOCYTES # BLD: 1.4 K/UL (ref 1–4)
LYMPHOCYTES NFR BLD: 22 %
MAGNESIUM SERPL-MCNC: 1.8 MG/DL (ref 1.8–2.5)
MCH RBC QN AUTO: 31.9 PG (ref 27–32)
MCHC RBC AUTO-ENTMCNC: 33.9 G/DL (ref 32–37)
MCV RBC AUTO: 94.1 FL (ref 80–100)
MONOCYTES # BLD: 0.9 K/UL (ref 0–1)
MONOCYTES NFR BLD: 13 %
NEUTROPHILS # BLD AUTO: 3.8 K/UL (ref 1.8–7.7)
NEUTROPHILS NFR BLD: 59 %
OSMOLALITY UR CALC.SUM OF ELEC: 278 MOSM/KG (ref 275–295)
PHOSPHATE SERPL-MCNC: 4.1 MG/DL (ref 2.4–4.7)
PLATELET # BLD AUTO: 184 K/UL (ref 140–400)
PMV BLD AUTO: 8.2 FL (ref 7.4–10.3)
POTASSIUM SERPL-SCNC: 4.5 MMOL/L (ref 3.3–5.1)
RBC # BLD AUTO: 2.51 M/UL (ref 4.5–5.9)
SODIUM SERPL-SCNC: 133 MMOL/L (ref 136–144)
WBC # BLD AUTO: 6.5 K/UL (ref 4–11)

## 2018-07-21 PROCEDURE — 76001 XR C-ARM FLUORO >1 HOUR  (CPT=76001): CPT | Performed by: ORTHOPAEDIC SURGERY

## 2018-07-21 PROCEDURE — 99233 SBSQ HOSP IP/OBS HIGH 50: CPT | Performed by: HOSPITALIST

## 2018-07-21 PROCEDURE — 73060 X-RAY EXAM OF HUMERUS: CPT | Performed by: ORTHOPAEDIC SURGERY

## 2018-07-21 PROCEDURE — 3E0T3BZ INTRODUCTION OF ANESTHETIC AGENT INTO PERIPHERAL NERVES AND PLEXI, PERCUTANEOUS APPROACH: ICD-10-PCS | Performed by: ANESTHESIOLOGY

## 2018-07-21 PROCEDURE — 0LS30ZZ REPOSITION RIGHT UPPER ARM TENDON, OPEN APPROACH: ICD-10-PCS | Performed by: ORTHOPAEDIC SURGERY

## 2018-07-21 PROCEDURE — 0PSC04Z REPOSITION RIGHT HUMERAL HEAD WITH INTERNAL FIXATION DEVICE, OPEN APPROACH: ICD-10-PCS | Performed by: ORTHOPAEDIC SURGERY

## 2018-07-21 DEVICE — SCREW LOCKING 3.5X30 212.111: Type: IMPLANTABLE DEVICE | Site: HUMERUS | Status: FUNCTIONAL

## 2018-07-21 DEVICE — IMPLANTABLE DEVICE: Type: IMPLANTABLE DEVICE | Site: HUMERUS | Status: FUNCTIONAL

## 2018-07-21 DEVICE — SCREW LOCKING 3.5X28 212.110: Type: IMPLANTABLE DEVICE | Site: HUMERUS | Status: FUNCTIONAL

## 2018-07-21 DEVICE — SCREW LCK 3.5X46MM ST W/STRDRV: Type: IMPLANTABLE DEVICE | Site: HUMERUS | Status: FUNCTIONAL

## 2018-07-21 DEVICE — SCREW LOCKING 3.5X50 212.121: Type: IMPLANTABLE DEVICE | Site: HUMERUS | Status: FUNCTIONAL

## 2018-07-21 DEVICE — SCREW BN 3.5MM 48MM LCP SS: Type: IMPLANTABLE DEVICE | Site: HUMERUS | Status: FUNCTIONAL

## 2018-07-21 RX ORDER — OXYCODONE HYDROCHLORIDE 5 MG/1
5 TABLET ORAL EVERY 4 HOURS PRN
Status: DISPENSED | OUTPATIENT
Start: 2018-07-21 | End: 2018-07-22

## 2018-07-21 RX ORDER — ONDANSETRON 2 MG/ML
4 INJECTION INTRAMUSCULAR; INTRAVENOUS ONCE AS NEEDED
Status: DISCONTINUED | OUTPATIENT
Start: 2018-07-21 | End: 2018-07-21 | Stop reason: HOSPADM

## 2018-07-21 RX ORDER — HYDROCODONE BITARTRATE AND ACETAMINOPHEN 5; 325 MG/1; MG/1
1 TABLET ORAL EVERY 4 HOURS PRN
Status: DISCONTINUED | OUTPATIENT
Start: 2018-07-21 | End: 2018-07-24

## 2018-07-21 RX ORDER — HYDROCODONE BITARTRATE AND ACETAMINOPHEN 5; 325 MG/1; MG/1
2 TABLET ORAL AS NEEDED
Status: DISCONTINUED | OUTPATIENT
Start: 2018-07-21 | End: 2018-07-21 | Stop reason: HOSPADM

## 2018-07-21 RX ORDER — HYDROMORPHONE HYDROCHLORIDE 1 MG/ML
0.2 INJECTION, SOLUTION INTRAMUSCULAR; INTRAVENOUS; SUBCUTANEOUS EVERY 2 HOUR PRN
Status: DISCONTINUED | OUTPATIENT
Start: 2018-07-21 | End: 2018-07-24

## 2018-07-21 RX ORDER — NALOXONE HYDROCHLORIDE 0.4 MG/ML
80 INJECTION, SOLUTION INTRAMUSCULAR; INTRAVENOUS; SUBCUTANEOUS AS NEEDED
Status: DISCONTINUED | OUTPATIENT
Start: 2018-07-21 | End: 2018-07-21 | Stop reason: HOSPADM

## 2018-07-21 RX ORDER — ACETAMINOPHEN 325 MG/1
650 TABLET ORAL EVERY 4 HOURS PRN
Status: DISCONTINUED | OUTPATIENT
Start: 2018-07-21 | End: 2018-07-24

## 2018-07-21 RX ORDER — HYDROMORPHONE HYDROCHLORIDE 1 MG/ML
0.4 INJECTION, SOLUTION INTRAMUSCULAR; INTRAVENOUS; SUBCUTANEOUS EVERY 5 MIN PRN
Status: DISCONTINUED | OUTPATIENT
Start: 2018-07-21 | End: 2018-07-21 | Stop reason: HOSPADM

## 2018-07-21 RX ORDER — PHENYLEPHRINE HCL 10 MG/ML
VIAL (ML) INJECTION AS NEEDED
Status: DISCONTINUED | OUTPATIENT
Start: 2018-07-21 | End: 2018-07-21 | Stop reason: SURG

## 2018-07-21 RX ORDER — MIDAZOLAM HYDROCHLORIDE 1 MG/ML
INJECTION INTRAMUSCULAR; INTRAVENOUS AS NEEDED
Status: DISCONTINUED | OUTPATIENT
Start: 2018-07-21 | End: 2018-07-21 | Stop reason: SURG

## 2018-07-21 RX ORDER — HYDROCODONE BITARTRATE AND ACETAMINOPHEN 5; 325 MG/1; MG/1
1 TABLET ORAL AS NEEDED
Status: DISCONTINUED | OUTPATIENT
Start: 2018-07-21 | End: 2018-07-21 | Stop reason: HOSPADM

## 2018-07-21 RX ORDER — MORPHINE SULFATE 15 MG/1
15 TABLET ORAL EVERY 4 HOURS PRN
Status: ACTIVE | OUTPATIENT
Start: 2018-07-21 | End: 2018-07-22

## 2018-07-21 RX ORDER — EPHEDRINE SULFATE 50 MG/ML
INJECTION, SOLUTION INTRAVENOUS AS NEEDED
Status: DISCONTINUED | OUTPATIENT
Start: 2018-07-21 | End: 2018-07-21 | Stop reason: SURG

## 2018-07-21 RX ORDER — DEXAMETHASONE SODIUM PHOSPHATE 10 MG/ML
INJECTION, SOLUTION INTRAMUSCULAR; INTRAVENOUS AS NEEDED
Status: DISCONTINUED | OUTPATIENT
Start: 2018-07-21 | End: 2018-07-21 | Stop reason: SURG

## 2018-07-21 RX ORDER — HYDROMORPHONE HYDROCHLORIDE 1 MG/ML
0.4 INJECTION, SOLUTION INTRAMUSCULAR; INTRAVENOUS; SUBCUTANEOUS EVERY 2 HOUR PRN
Status: DISCONTINUED | OUTPATIENT
Start: 2018-07-21 | End: 2018-07-24

## 2018-07-21 RX ORDER — ACETAMINOPHEN 500 MG
1000 TABLET ORAL EVERY 8 HOURS
Status: DISPENSED | OUTPATIENT
Start: 2018-07-21 | End: 2018-07-22

## 2018-07-21 RX ORDER — HYDROMORPHONE HYDROCHLORIDE 1 MG/ML
0.2 INJECTION, SOLUTION INTRAMUSCULAR; INTRAVENOUS; SUBCUTANEOUS EVERY 5 MIN PRN
Status: DISCONTINUED | OUTPATIENT
Start: 2018-07-21 | End: 2018-07-21 | Stop reason: HOSPADM

## 2018-07-21 RX ORDER — DIPHENHYDRAMINE HCL 25 MG
25 CAPSULE ORAL NIGHTLY PRN
Status: DISCONTINUED | OUTPATIENT
Start: 2018-07-21 | End: 2018-07-24

## 2018-07-21 RX ORDER — HYDROMORPHONE HYDROCHLORIDE 1 MG/ML
0.6 INJECTION, SOLUTION INTRAMUSCULAR; INTRAVENOUS; SUBCUTANEOUS EVERY 5 MIN PRN
Status: DISCONTINUED | OUTPATIENT
Start: 2018-07-21 | End: 2018-07-21 | Stop reason: HOSPADM

## 2018-07-21 RX ORDER — HYDROCODONE BITARTRATE AND ACETAMINOPHEN 5; 325 MG/1; MG/1
2 TABLET ORAL EVERY 4 HOURS PRN
Status: DISCONTINUED | OUTPATIENT
Start: 2018-07-21 | End: 2018-07-24

## 2018-07-21 RX ORDER — ROPIVACAINE HYDROCHLORIDE 5 MG/ML
INJECTION, SOLUTION EPIDURAL; INFILTRATION; PERINEURAL AS NEEDED
Status: DISCONTINUED | OUTPATIENT
Start: 2018-07-21 | End: 2018-07-21 | Stop reason: SURG

## 2018-07-21 RX ORDER — HYDROCODONE BITARTRATE AND ACETAMINOPHEN 5; 325 MG/1; MG/1
1-2 TABLET ORAL EVERY 6 HOURS PRN
Qty: 30 TABLET | Refills: 0 | Status: SHIPPED | OUTPATIENT
Start: 2018-07-21 | End: 2018-12-10

## 2018-07-21 RX ORDER — ONDANSETRON 2 MG/ML
4 INJECTION INTRAMUSCULAR; INTRAVENOUS EVERY 6 HOURS PRN
Status: DISCONTINUED | OUTPATIENT
Start: 2018-07-21 | End: 2018-07-24

## 2018-07-21 RX ORDER — HALOPERIDOL 5 MG/ML
0.25 INJECTION INTRAMUSCULAR ONCE AS NEEDED
Status: DISCONTINUED | OUTPATIENT
Start: 2018-07-21 | End: 2018-07-21 | Stop reason: HOSPADM

## 2018-07-21 RX ORDER — SODIUM CHLORIDE, SODIUM LACTATE, POTASSIUM CHLORIDE, CALCIUM CHLORIDE 600; 310; 30; 20 MG/100ML; MG/100ML; MG/100ML; MG/100ML
INJECTION, SOLUTION INTRAVENOUS CONTINUOUS
Status: DISCONTINUED | OUTPATIENT
Start: 2018-07-21 | End: 2018-07-21 | Stop reason: HOSPADM

## 2018-07-21 RX ORDER — LIDOCAINE HYDROCHLORIDE 10 MG/ML
INJECTION, SOLUTION EPIDURAL; INFILTRATION; INTRACAUDAL; PERINEURAL AS NEEDED
Status: DISCONTINUED | OUTPATIENT
Start: 2018-07-21 | End: 2018-07-21 | Stop reason: SURG

## 2018-07-21 RX ORDER — OXYCODONE HYDROCHLORIDE 5 MG/1
10 TABLET ORAL EVERY 4 HOURS PRN
Status: ACTIVE | OUTPATIENT
Start: 2018-07-21 | End: 2018-07-22

## 2018-07-21 RX ADMIN — PHENYLEPHRINE HCL 100 MCG: 10 MG/ML VIAL (ML) INJECTION at 08:46:00

## 2018-07-21 RX ADMIN — LIDOCAINE HYDROCHLORIDE 5 ML: 10 INJECTION, SOLUTION EPIDURAL; INFILTRATION; INTRACAUDAL; PERINEURAL at 07:42:00

## 2018-07-21 RX ADMIN — SODIUM CHLORIDE, SODIUM LACTATE, POTASSIUM CHLORIDE, CALCIUM CHLORIDE: 600; 310; 30; 20 INJECTION, SOLUTION INTRAVENOUS at 07:32:00

## 2018-07-21 RX ADMIN — PHENYLEPHRINE HCL 100 MCG: 10 MG/ML VIAL (ML) INJECTION at 08:55:00

## 2018-07-21 RX ADMIN — SODIUM CHLORIDE, SODIUM LACTATE, POTASSIUM CHLORIDE, CALCIUM CHLORIDE: 600; 310; 30; 20 INJECTION, SOLUTION INTRAVENOUS at 10:21:00

## 2018-07-21 RX ADMIN — PHENYLEPHRINE HCL 100 MCG: 10 MG/ML VIAL (ML) INJECTION at 08:36:00

## 2018-07-21 RX ADMIN — EPHEDRINE SULFATE 5 MG: 50 INJECTION, SOLUTION INTRAVENOUS at 08:36:00

## 2018-07-21 RX ADMIN — PHENYLEPHRINE HCL 100 MCG: 10 MG/ML VIAL (ML) INJECTION at 08:26:00

## 2018-07-21 RX ADMIN — MIDAZOLAM HYDROCHLORIDE 2 MG: 1 INJECTION INTRAMUSCULAR; INTRAVENOUS at 07:35:00

## 2018-07-21 RX ADMIN — ONDANSETRON 4 MG: 2 INJECTION INTRAMUSCULAR; INTRAVENOUS at 10:10:00

## 2018-07-21 RX ADMIN — PHENYLEPHRINE HCL 100 MCG: 10 MG/ML VIAL (ML) INJECTION at 08:23:00

## 2018-07-21 RX ADMIN — ROPIVACAINE HYDROCHLORIDE 30 ML: 5 INJECTION, SOLUTION EPIDURAL; INFILTRATION; PERINEURAL at 07:52:00

## 2018-07-21 RX ADMIN — PHENYLEPHRINE HCL 50 MCG: 10 MG/ML VIAL (ML) INJECTION at 08:50:00

## 2018-07-21 RX ADMIN — DEXAMETHASONE SODIUM PHOSPHATE 5 MG: 10 INJECTION, SOLUTION INTRAMUSCULAR; INTRAVENOUS at 07:52:00

## 2018-07-21 RX ADMIN — PHENYLEPHRINE HCL 100 MCG: 10 MG/ML VIAL (ML) INJECTION at 08:19:00

## 2018-07-21 RX ADMIN — SODIUM CHLORIDE, SODIUM LACTATE, POTASSIUM CHLORIDE, CALCIUM CHLORIDE: 600; 310; 30; 20 INJECTION, SOLUTION INTRAVENOUS at 10:20:00

## 2018-07-21 RX ADMIN — PHENYLEPHRINE HCL 100 MCG: 10 MG/ML VIAL (ML) INJECTION at 09:01:00

## 2018-07-21 RX ADMIN — EPHEDRINE SULFATE 5 MG: 50 INJECTION, SOLUTION INTRAVENOUS at 08:46:00

## 2018-07-21 NOTE — ANESTHESIA PROCEDURE NOTES
Airway  Date/Time: 7/21/2018 8:12 AM  Urgency: elective    Airway not difficult    General Information and Staff    Patient location during procedure: OR  Anesthesiologist: PIPO ACOSTA  Performed: anesthesiologist     Indications and Patient Condition  Ind

## 2018-07-21 NOTE — ANESTHESIA PREPROCEDURE EVALUATION
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  take 1 tablet by ORAL route  every day with food  Disp:  Rfl:  7/14/2018       Current Facility-Administered Medications Ordered in Epic:  Raz (UROXATRAL) 24 hr tab 10 mg 10 mg Oral Daily with breakfast Mary Amato MD 10 mg at 07/20/18 0936   cetirizine (ZYRTEC) tab 10 mg 10 mg Oral Daily Mary Amato MD 10 mg at 07/20/18 3552   magnesium oxide (MAG-OX) tab 400 mg 400 mg Oral O 2.51 (L) 07/21/2018   HGB 8.0 (L) 07/21/2018   HCT 23.6 (L) 07/21/2018   MCV 94.1 07/21/2018   MCH 31.9 07/21/2018   MCHC 33.9 07/21/2018   RDW 14.3 07/21/2018    07/21/2018   MPV 8.2 07/21/2018       Lab Results  Component Value Date    (L) 0 Attestation (if preop done by other):  GA/block,PONV/dental damages etc      I have informed Jaja Arias and/or legal guardian or family member of the nature of the anesthetic plan, benefits, risks including possible dental damage if relevant, major co

## 2018-07-21 NOTE — BRIEF OP NOTE
Pre-Operative Diagnosis: Closed fracture of right elbow with routine healing, subsequent encounter [S42.401D]     Post-Operative Diagnosis: Closed fracture of right elbow with routine healing, subsequent encounter [S42.401D]      Procedure Performed:   Pro

## 2018-07-21 NOTE — PHYSICAL THERAPY NOTE
Chart reviewed; pt currently in surgery for HUMERUS OPEN REDUCTION INTERNAL FIXATION/ PINNING. Awaiting room assignment. Pt will be required new orders/precautions for ongoing skilled PT services.

## 2018-07-21 NOTE — ANESTHESIA POSTPROCEDURE EVALUATION
Patient: Shravan Lundberg    Procedure Summary     Date:  07/21/18 Room / Location:  [address] [place] [address] MAIN OR 05 / [address] [place] [address] MAIN OR    Anesthesia Start:  0802 Anesthesia Stop:      Procedure:  HUMERUS OPEN REDUCTION INTERNAL FIXATION/ PINNING (Right ) Diagnosis:       Closed

## 2018-07-21 NOTE — PLAN OF CARE
DISCHARGE PLANNING    • Discharge to home or other facility with appropriate resources Progressing        HEMATOLOGIC - ADULT    • Maintains hematologic stability Progressing    • Free from bleeding injury Progressing        Impaired Activities of Daily Rinda Eth dilaudid for breakthrough pain. Ice packs given prn. Fracture brace and sling in place to RUE. No s/s of infection noted. Bed is locked and in the lowest position with side rails up x2. Nonskid socks and fall risk band in place.  Call light and personal bel

## 2018-07-21 NOTE — ANESTHESIA PROCEDURE NOTES
Peripheral Block    Anesthesiologist:  Sav Haskins  Performed by:   Anesthesiologist  Patient Location:  PACU  Start Time:  7/21/2018 7:32 AM  End Time:  7/21/2018 7:59 AM  Site Identification: ultrasound guided, real time ultrasound guided, static ultrasou

## 2018-07-21 NOTE — PROGRESS NOTES
[facility]  Progress Note     Riya Roach  : 1935    Status: Inpatient  Day #: 5    Attending: Amy Mcguire MD  PCP: Jonelle Schmidt MD      Assessment and Plan     Closed fracture of proximal end of right humerus  - s/p [facility]              470 ml         Recent Labs   Lab  07/19/18   0428  07/20/18   0516  07/21/18   0442  07/21/18   1133   WBC  6.0  6.2  6.5   --    HGB  8.2*  8.2*  8.0*  7.9*   HCT  24.7*  24.3*  23.6*   --    PLT  130*  162  184   --    RBC  2.61*  2.57 • [MAR Hold] OCUVITE-LUTEIN  1 tablet Oral BID   • [MAR Hold] Alfuzosin HCl ER  10 mg Oral Daily with breakfast   • [MAR Hold] cetirizine  10 mg Oral Daily   • [MAR Hold] magnesium oxide  400 mg Oral Once      PRN Meds: oxyCODONE HCl **OR** oxyCODONE HCl

## 2018-07-22 LAB
ANION GAP SERPL CALC-SCNC: 7 MMOL/L (ref 0–18)
BASOPHILS # BLD: 0 K/UL (ref 0–0.2)
BASOPHILS NFR BLD: 0 %
BUN SERPL-MCNC: 14 MG/DL (ref 8–20)
BUN/CREAT SERPL: 20.6 (ref 10–20)
CALCIUM SERPL-MCNC: 8.1 MG/DL (ref 8.5–10.5)
CHLORIDE SERPL-SCNC: 102 MMOL/L (ref 95–110)
CO2 SERPL-SCNC: 26 MMOL/L (ref 22–32)
CREAT SERPL-MCNC: 0.68 MG/DL (ref 0.5–1.5)
EOSINOPHIL # BLD: 0 K/UL (ref 0–0.7)
EOSINOPHIL NFR BLD: 0 %
ERYTHROCYTE [DISTWIDTH] IN BLOOD BY AUTOMATED COUNT: 14.5 % (ref 11–15)
GLUCOSE SERPL-MCNC: 124 MG/DL (ref 70–99)
HCT VFR BLD AUTO: 20.6 % (ref 41–52)
HGB BLD-MCNC: 6.8 G/DL (ref 13.5–17.5)
HGB BLD-MCNC: 7.8 G/DL (ref 13.5–17.5)
LYMPHOCYTES # BLD: 0.7 K/UL (ref 1–4)
LYMPHOCYTES NFR BLD: 10 %
MCH RBC QN AUTO: 31.7 PG (ref 27–32)
MCHC RBC AUTO-ENTMCNC: 33.2 G/DL (ref 32–37)
MCV RBC AUTO: 95.6 FL (ref 80–100)
MONOCYTES # BLD: 0.8 K/UL (ref 0–1)
MONOCYTES NFR BLD: 11 %
NEUTROPHILS # BLD AUTO: 5.9 K/UL (ref 1.8–7.7)
NEUTROPHILS NFR BLD: 79 %
OSMOLALITY UR CALC.SUM OF ELEC: 282 MOSM/KG (ref 275–295)
PLATELET # BLD AUTO: 200 K/UL (ref 140–400)
PMV BLD AUTO: 8 FL (ref 7.4–10.3)
POTASSIUM SERPL-SCNC: 4.2 MMOL/L (ref 3.3–5.1)
RBC # BLD AUTO: 2.16 M/UL (ref 4.5–5.9)
SODIUM SERPL-SCNC: 135 MMOL/L (ref 136–144)
WBC # BLD AUTO: 7.4 K/UL (ref 4–11)

## 2018-07-22 PROCEDURE — 99233 SBSQ HOSP IP/OBS HIGH 50: CPT | Performed by: HOSPITALIST

## 2018-07-22 RX ORDER — SODIUM CHLORIDE 9 MG/ML
INJECTION, SOLUTION INTRAVENOUS
Status: COMPLETED
Start: 2018-07-22 | End: 2018-07-22

## 2018-07-22 RX ORDER — 0.9 % SODIUM CHLORIDE 0.9 %
VIAL (ML) INJECTION
Status: COMPLETED
Start: 2018-07-22 | End: 2018-07-22

## 2018-07-22 RX ORDER — SODIUM CHLORIDE 9 MG/ML
INJECTION, SOLUTION INTRAVENOUS ONCE
Status: COMPLETED | OUTPATIENT
Start: 2018-07-22 | End: 2018-07-22

## 2018-07-22 RX ORDER — SODIUM CHLORIDE 0.9 % (FLUSH) 0.9 %
10 SYRINGE (ML) INJECTION AS NEEDED
Status: DISCONTINUED | OUTPATIENT
Start: 2018-07-22 | End: 2018-07-24

## 2018-07-22 NOTE — PROGRESS NOTES
[facility]  Progress Note     Srinivas Khan  : 1935    Status: Inpatient  Day #: 6    Attending: Yadira Holloway MD  PCP: Luz Gutierrez MD      Assessment and Plan     Closed fracture of proximal end of right humerus  - s/p [facility] --   95.6   MCH  31.9  31.9   --   31.7   MCHC  33.8  33.9   --   33.2   RDW  14.8  14.3   --   14.5     Recent Labs   Lab  07/16/18   0218   07/19/18   0428  07/20/18   0516  07/21/18   0442  07/22/18   0410   BUN   --    < >  10  14  18  14   QUE HYDROcodone-acetaminophen **OR** HYDROcodone-acetaminophen, HYDROmorphone HCl **OR** HYDROmorphone HCl, diphenhydrAMINE, Normal Saline Flush, Normal Saline Flush, Metoclopramide HCl      Spent <35 minutes, <50% of the time counseling coordinating care.   [facility]

## 2018-07-22 NOTE — PHYSICAL THERAPY NOTE
PHYSICAL THERAPY EVALUATION - INPATIENT     Room Number: 430/430-A  Evaluation Date: 7/22/2018  Type of Evaluation: Initial   Physician Order: PT Eval and Treat    Presenting Problem: right humeral fracture s/p orif  Reason for Therapy: Mobility Dysfuncti Hx of melanoma of skin 1979    forehead   • Macular degeneration     per NG   • RPE (retinal pigment epithelium) detachment 2008    \"RPE detachments in Mac\"; OU; per NG   • Unspecified essential hypertension     per NG   • Visual floaters 2002    OU; per 100%  Room air  Heart Rate: 739    AM-PAC '6-Clicks' INPATIENT SHORT FORM - BASIC MOBILITY  How much difficulty does the patient currently have. ..  -   Turning over in bed (including adjusting bedclothes, sheets and blankets)?: A Lot   -   Sitting down on Goal #5 Patient to demonstrate independence with home activity/exercise instructions provided to patient in preparation for discharge.    Goal #5   Current Status    Goal #6    Goal #6  Current Status

## 2018-07-22 NOTE — OPERATIVE REPORT
[facility]    PATIENT'S NAME: Liz Latoya   ATTENDING PHYSICIAN: Mindi Lipscomb MD   OPERATING PHYSICIAN: Marimar Dixon MD   PATIENT ACCOUNT#:   [id number]    LOCATION:  [address] [place] [address] #:   [id number]       DATE OF BIRTH:  [birth year] plan.    PROCEDURE:  On 07/21/2018, the patient was seen and evaluated preoperatively. His right shoulder was identified as the correct operative site. My initials were placed.   He was transferred to the operating room and transferred to the operating ta and reduced. It was noted to have a large rotator cuff tear involving the supraspinatus and infraspinatus tendon. Subscapularis and teres minor tendons were intact. The patient's humeral head was reduced with the plate, and locking screws were placed.

## 2018-07-22 NOTE — PLAN OF CARE
DISCHARGE PLANNING    • Discharge to home or other facility with appropriate resources Progressing        HEMATOLOGIC - ADULT    • Maintains hematologic stability Progressing    • Free from bleeding injury Progressing        Impaired Activities of Daily Ron Maldonado APPROPRIATELY. INCISION TO RIGHT SHOULDER COVERED WITH MEDIPORE DRESSING, SMALL SEROSANGUINOUS DRAINAGE. NO OTHER SKIN ISSUES NOTED. V/S STABLE, TELE #55 PRESENTING WITH NSR, ON RA. IV SALINE LOCKED. RECEIVED REGLAN FOR UPSET STOMACH THIS EVENING.

## 2018-07-22 NOTE — OCCUPATIONAL THERAPY NOTE
OCCUPATIONAL THERAPY EVALUATION - INPATIENT      Room Number: 430/430-A  Evaluation Date: 7/22/2018  Type of Evaluation: Re-evaluation  Presenting Problem: s/p AYO PETERSON    Physician Order: IP Consult to Occupational Therapy  Reason for Therapy: ADL/IADL D technique education       OCCUPATIONAL THERAPY MEDICAL/SOCIAL HISTORY     Problem List   Principal Problem:    Closed fracture of proximal end of right humerus, unspecified fracture morphology, initial encounter  Active Problems:    Closed fracture of righ risk     WEIGHT BEARING RESTRICTION  Weight Bearing Restriction: R upper extremity  R Upper Extremity: Non-Weight Bearing     PAIN ASSESSMENT  Ratin  Location:  (right upper extremity)  Management Techniques: Activity promotion; Body mechanics;Repositio addressed     OT Goals  Patient self-stated goal is: to d/c back to rehab      Patient will complete LE/UE dressing with min a  Comment:     Patient will complete toilet transfer with min a  Comment:     Patient will complete self care task at sink level w

## 2018-07-22 NOTE — CHRONIC PAIN
[facility]   Acute Pain Rounds Note  2018    Patient name: Milton Mike 81year old male  : 1935  MRN: [id number]    Diagnosis: [unfilled]    S/P: ORIF right proximal humerus status post interscalene block with good results    P

## 2018-07-22 NOTE — PROGRESS NOTES
7/15/2018  Yuan Broderick  7/24/1935  81year old   male      HPI:   Patient presents with:  Fall (musculoskeletal, neurologic)  Upper Extremity Injury (musculoskeletal)      The patient complains of pain located right shoulder.   The pain is about the janice

## 2018-07-23 LAB
BASOPHILS # BLD: 0 K/UL (ref 0–0.2)
BASOPHILS NFR BLD: 0 %
BLOOD TYPE BARCODE: 5100
EOSINOPHIL # BLD: 0 K/UL (ref 0–0.7)
EOSINOPHIL NFR BLD: 0 %
ERYTHROCYTE [DISTWIDTH] IN BLOOD BY AUTOMATED COUNT: 17 % (ref 11–15)
HCT VFR BLD AUTO: 23 % (ref 41–52)
HGB BLD-MCNC: 7.7 G/DL (ref 13.5–17.5)
LYMPHOCYTES # BLD: 1.6 K/UL (ref 1–4)
LYMPHOCYTES NFR BLD: 17 %
MCH RBC QN AUTO: 31.3 PG (ref 27–32)
MCHC RBC AUTO-ENTMCNC: 33.4 G/DL (ref 32–37)
MCV RBC AUTO: 93.7 FL (ref 80–100)
MONOCYTES # BLD: 0.9 K/UL (ref 0–1)
MONOCYTES NFR BLD: 9 %
NEUTROPHILS # BLD AUTO: 7.1 K/UL (ref 1.8–7.7)
NEUTROPHILS NFR BLD: 71 %
NEUTS BAND NFR BLD: 3 %
PLATELET # BLD AUTO: 247 K/UL (ref 140–400)
PMV BLD AUTO: 8.5 FL (ref 7.4–10.3)
RBC # BLD AUTO: 2.46 M/UL (ref 4.5–5.9)
WBC # BLD AUTO: 9.6 K/UL (ref 4–11)

## 2018-07-23 PROCEDURE — 99233 SBSQ HOSP IP/OBS HIGH 50: CPT | Performed by: HOSPITALIST

## 2018-07-23 RX ORDER — HEPARIN SODIUM 5000 [USP'U]/ML
5000 INJECTION, SOLUTION INTRAVENOUS; SUBCUTANEOUS EVERY 12 HOURS
Status: DISCONTINUED | OUTPATIENT
Start: 2018-07-23 | End: 2018-07-24

## 2018-07-23 RX ORDER — LISINOPRIL 30 MG/1
TABLET ORAL
Qty: 90 TABLET | Refills: 1 | Status: SHIPPED | OUTPATIENT
Start: 2018-07-23 | End: 2018-07-24

## 2018-07-23 NOTE — PLAN OF CARE
DISCHARGE PLANNING    • Discharge to home or other facility with appropriate resources Progressing        HEMATOLOGIC - ADULT    • Maintains hematologic stability Progressing    • Free from bleeding injury Progressing        Impaired Activities of Daily Tony Patel NICHOLAS. PAIN MANAGED WITH PRN NORCO (2 TAB), AND ICE PACKS. BED LOCKED AND IN LOWEST POSITION, USES CALL LIGHT APPROPRIATELY. INCISION TO RIGHT SHOULDER COVERED WITH MEDIPORE DRESSING, D/I WITH OLD SEROSANGUINOUS DRAINAGE. NO OTHER SKIN ISSUES NOTED.  V/S STAB

## 2018-07-23 NOTE — PLAN OF CARE
DISCHARGE PLANNING    • Discharge to home or other facility with appropriate resources Progressing        HEMATOLOGIC - ADULT    • Maintains hematologic stability Progressing    • Free from bleeding injury Progressing        Impaired Activities of Daily Jacobo Rinaldi

## 2018-07-23 NOTE — PROGRESS NOTES
[facility]    Progress Note    Reba Patient Patient Status:  Inpatient    1935 MRN [id number]   Location [facility] 4W/SW/SE Attending Clovis Fleming MD   Hosp Day # 7 PCP Luanne Dumont MD       Subjective:     Ar Full    >35 minutes spent     Nathan Lyn MD  7/23/2018

## 2018-07-23 NOTE — PROGRESS NOTES
7/15/2018  Jessica Nickerson  7/24/1935  81year old   male  No name on file. HPI:   Patient presents with:  Fall (musculoskeletal, neurologic)  Upper Extremity Injury (musculoskeletal)      The patient complains of pain located right shoulder.   The pain

## 2018-07-23 NOTE — PHYSICAL THERAPY NOTE
PHYSICAL THERAPY TREATMENT NOTE - INPATIENT     Room Number: 430/430-A       Presenting Problem: right humeral fracture s/p orif    Problem List  Principal Problem:    Closed fracture of proximal end of right humerus, unspecified fracture morphology, initi A Lot   -   Climbing 3-5 steps with a railing?: Total     AM-PAC Score:  Raw Score: 11   PT Approx Degree of Impairment Score: 72.57%   Standardized Score (AM-PAC Scale): 33.86   CMS Modifier (G-Code): CL    FUNCTIONAL ABILITY STATUS  Gait Assessment   Cherie Venegas

## 2018-07-23 NOTE — PLAN OF CARE
DISCHARGE PLANNING    • Discharge to home or other facility with appropriate resources Progressing        HEMATOLOGIC - ADULT    • Maintains hematologic stability Progressing    • Free from bleeding injury Progressing        Impaired Activities of Daily Carlos Borja

## 2018-07-24 ENCOUNTER — SNF VISIT (OUTPATIENT)
Dept: INTERNAL MEDICINE CLINIC | Facility: SKILLED NURSING FACILITY | Age: 83
End: 2018-07-24

## 2018-07-24 VITALS
HEART RATE: 76 BPM | RESPIRATION RATE: 20 BRPM | BODY MASS INDEX: 28.13 KG/M2 | WEIGHT: 175 LBS | TEMPERATURE: 99 F | HEIGHT: 66 IN | OXYGEN SATURATION: 98 % | DIASTOLIC BLOOD PRESSURE: 43 MMHG | SYSTOLIC BLOOD PRESSURE: 98 MMHG

## 2018-07-24 DIAGNOSIS — S42.201D CLOSED FRACTURE OF PROXIMAL END OF RIGHT HUMERUS WITH ROUTINE HEALING, UNSPECIFIED FRACTURE MORPHOLOGY, SUBSEQUENT ENCOUNTER: ICD-10-CM

## 2018-07-24 DIAGNOSIS — R60.0 EDEMA OF UPPER EXTREMITY: ICD-10-CM

## 2018-07-24 DIAGNOSIS — R53.1 WEAKNESS: ICD-10-CM

## 2018-07-24 LAB
ANION GAP SERPL CALC-SCNC: 5 MMOL/L (ref 0–18)
BASOPHILS # BLD: 0 K/UL (ref 0–0.2)
BASOPHILS NFR BLD: 1 %
BUN SERPL-MCNC: 14 MG/DL (ref 8–20)
BUN/CREAT SERPL: 22.6 (ref 10–20)
CALCIUM SERPL-MCNC: 8.4 MG/DL (ref 8.5–10.5)
CHLORIDE SERPL-SCNC: 101 MMOL/L (ref 95–110)
CO2 SERPL-SCNC: 29 MMOL/L (ref 22–32)
CREAT SERPL-MCNC: 0.62 MG/DL (ref 0.5–1.5)
EOSINOPHIL # BLD: 0.1 K/UL (ref 0–0.7)
EOSINOPHIL NFR BLD: 2 %
ERYTHROCYTE [DISTWIDTH] IN BLOOD BY AUTOMATED COUNT: 16.3 % (ref 11–15)
GLUCOSE SERPL-MCNC: 100 MG/DL (ref 70–99)
HCT VFR BLD AUTO: 23.4 % (ref 41–52)
HGB BLD-MCNC: 7.7 G/DL (ref 13.5–17.5)
LYMPHOCYTES # BLD: 1.5 K/UL (ref 1–4)
LYMPHOCYTES NFR BLD: 22 %
MCH RBC QN AUTO: 30.8 PG (ref 27–32)
MCHC RBC AUTO-ENTMCNC: 33.1 G/DL (ref 32–37)
MCV RBC AUTO: 93.1 FL (ref 80–100)
MONOCYTES # BLD: 0.9 K/UL (ref 0–1)
MONOCYTES NFR BLD: 12 %
NEUTROPHILS # BLD AUTO: 4.4 K/UL (ref 1.8–7.7)
NEUTROPHILS NFR BLD: 64 %
OSMOLALITY UR CALC.SUM OF ELEC: 281 MOSM/KG (ref 275–295)
PLATELET # BLD AUTO: 266 K/UL (ref 140–400)
PMV BLD AUTO: 7.7 FL (ref 7.4–10.3)
POTASSIUM SERPL-SCNC: 4.2 MMOL/L (ref 3.3–5.1)
RBC # BLD AUTO: 2.51 M/UL (ref 4.5–5.9)
SODIUM SERPL-SCNC: 135 MMOL/L (ref 136–144)
WBC # BLD AUTO: 6.9 K/UL (ref 4–11)

## 2018-07-24 PROCEDURE — 99239 HOSP IP/OBS DSCHRG MGMT >30: CPT | Performed by: HOSPITALIST

## 2018-07-24 PROCEDURE — 99310 SBSQ NF CARE HIGH MDM 45: CPT | Performed by: NURSE PRACTITIONER

## 2018-07-24 RX ORDER — METOPROLOL SUCCINATE 50 MG/1
25 TABLET, EXTENDED RELEASE ORAL
Qty: 30 TABLET | Refills: 3 | Status: SHIPPED | OUTPATIENT
Start: 2018-07-24 | End: 2018-11-26

## 2018-07-24 RX ORDER — FERROUS GLUCONATE 324(37.5)
1 TABLET ORAL 2 TIMES DAILY
Qty: 60 TABLET | Refills: 0 | Status: SHIPPED | OUTPATIENT
Start: 2018-07-24

## 2018-07-24 RX ORDER — HEPARIN SODIUM 5000 [USP'U]/ML
5000 INJECTION, SOLUTION INTRAVENOUS; SUBCUTANEOUS EVERY 12 HOURS
Qty: 28 ML | Refills: 0 | Status: SHIPPED | OUTPATIENT
Start: 2018-07-24 | End: 2018-08-07

## 2018-07-24 NOTE — PROGRESS NOTES
7/15/2018  Dave Neal  7/24/1935  81year old   male  No name on file. HPI:   Patient presents with:  Fall (musculoskeletal, neurologic)  Upper Extremity Injury (musculoskeletal)      The patient complains of pain located right shoulder.   The pain

## 2018-07-24 NOTE — PROGRESS NOTES
HPI: Dave Neal  Is an 81year old male who was admitted to [address] [place] [address] s/p fall sustaining a comminuted displaced fracture of the proximal humerus and nondisplaced fracture of the greater tuberosity.  Ortho was consulted, s/p ORIF of right proximal humerus by [Meperidin*        Comment:Other reaction(s): Unknown  Hydrochlorothiazide         Comment:Other reaction(s): Rash  Ibuprofen                   Comment:Other reaction(s): Rash  Penicillins                 Comment:Other reaction(s): Rash  Pseudoephedrine Hc [facility]  -iron bid   - hgb 7.7 7/24     HTN  BP was low in hospital  Toprol dose cut down to 25 mg qd. Off amlodipine and lisinopril    Recent R index finger cellulitis w/lymphangitis and abscess, underlying deep soft tissue/osteomyeltis. Resolved.   - s/p inc

## 2018-07-24 NOTE — PLAN OF CARE
DISCHARGE PLANNING    • Discharge to home or other facility with appropriate resources Progressing        HEMATOLOGIC - ADULT    • Maintains hematologic stability Progressing    • Free from bleeding injury Progressing        Impaired Activities of Daily Yaw Call HGB 7.7, NO S/S ANEMIA. AMBULATES WITH 1 ASST, SLING TO RUE. PAIN MANAGED WITH PRN NORCO (2 TAB), AND ICE PACKS. USES CALL LIGHT APPROPRIATELY. INCISION TO RIGHT SHOULDER COVERED WITH AQUACELL DRESSINGS CHANGED BY  [address] [place] [address].  LARGE AMOUNT OF Marie Nicks

## 2018-07-24 NOTE — PHYSICAL THERAPY NOTE
PHYSICAL THERAPY TREATMENT NOTE - INPATIENT     Room Number: 430/430-A       Presenting Problem: right humeral fracture s/p orif    Problem List  Principal Problem:    Closed fracture of proximal end of right humerus, unspecified fracture morphology, initi with a railing?: Total     AM-PAC Score:  Raw Score: 11   PT Approx Degree of Impairment Score: 72.57%   Standardized Score (AM-PAC Scale): 33.86   CMS Modifier (G-Code): CL    FUNCTIONAL ABILITY STATUS  Gait Assessment   Gait Assistance:  Moderate assistan

## 2018-07-24 NOTE — DISCHARGE SUMMARY
[facility] - [facility]    Discharge Summary    Corky Glover Patient Status:  Inpatient    1935 MRN [id number]   Location [facility] 4W/SW/SE Attending No att. providers found   Hosp Day # 8 PCP Kevin Georges MD     Date of Ad hand.  Neuro:   nonfocal      Reason for Admission:    Fall with pain in right arm.     HISTORY OF PRESENT ILLNESS:  The patient is a very pleasant 69-year-old gentleman who lives alone. He said that he fell Sunday night getting ready to go to bed.   He ap and anxious and did receive a dose of Geodon 1 time. He was quite calm when I saw him.        Hospital Course:     Pt was admitted and treated as below:    Closed fracture of proximal end of right humerus  - s/p mechanical fall.   - s/p ORIF 7/22  - norco Refills:  0     EYE VITAMINS Caps      Take 1 tablet by mouth 2 (two) times daily. 1 tab daily   Refills:  0     loratadine 10 MG Tabs  Commonly known as:  CLARITIN      Take 10 mg by mouth nightly.    Refills:  0     tamsulosin HCl 0.4 MG Caps  Commonly kn

## 2018-07-24 NOTE — CM/SW NOTE
The pt. Has been approved to discharge to [facility] today 7/24 at 2p, via [address] [facility]. The pt. Is aware and agreeable to discharge and medicar costs.       Report [address] [facility], [address] [place] [address]

## 2018-07-24 NOTE — OCCUPATIONAL THERAPY NOTE
OCCUPATIONAL THERAPY TREATMENT NOTE - INPATIENT    Room Number: 430/430-A         Presenting Problem: s/p ORIF  RUE     Problem List  Principal Problem:    Closed fracture of proximal end of right humerus, unspecified fracture morphology, initial encounter up\"    OBJECTIVE  Precautions: Limb alert - right    WEIGHT BEARING RESTRICTION  Weight Bearing Restriction: R upper extremity  R Upper Extremity: Non-Weight Bearing             PAIN ASSESSMENT  Rating: Unable to rate  Location: R shoulder  Management Francisco dressing with min a  Comment: requiring Max A at this time     Patient will complete toilet transfer with min a  Comment:  T/F with Mod A; hand held    Patient will complete self care task at sink level with SBA   Comment: currently completing grooming in

## 2018-07-27 ENCOUNTER — SNF VISIT (OUTPATIENT)
Dept: INTERNAL MEDICINE CLINIC | Facility: SKILLED NURSING FACILITY | Age: 83
End: 2018-07-27

## 2018-07-27 DIAGNOSIS — D64.9 ANEMIA, UNSPECIFIED TYPE: ICD-10-CM

## 2018-07-27 DIAGNOSIS — S42.201A CLOSED FRACTURE OF PROXIMAL END OF RIGHT HUMERUS, UNSPECIFIED FRACTURE MORPHOLOGY, INITIAL ENCOUNTER: ICD-10-CM

## 2018-07-27 DIAGNOSIS — R53.1 WEAKNESS: ICD-10-CM

## 2018-07-27 PROCEDURE — 99308 SBSQ NF CARE LOW MDM 20: CPT | Performed by: NURSE PRACTITIONER

## 2018-07-31 ENCOUNTER — SNF VISIT (OUTPATIENT)
Dept: INTERNAL MEDICINE CLINIC | Facility: SKILLED NURSING FACILITY | Age: 83
End: 2018-07-31

## 2018-07-31 DIAGNOSIS — R53.1 WEAKNESS: ICD-10-CM

## 2018-07-31 DIAGNOSIS — S42.201D CLOSED FRACTURE OF PROXIMAL END OF RIGHT HUMERUS WITH ROUTINE HEALING, UNSPECIFIED FRACTURE MORPHOLOGY, SUBSEQUENT ENCOUNTER: ICD-10-CM

## 2018-07-31 PROCEDURE — 99308 SBSQ NF CARE LOW MDM 20: CPT | Performed by: NURSE PRACTITIONER

## 2018-07-31 NOTE — PROGRESS NOTES
HPI: Real Janetchance an 81year old male who was admitted to [facility] s/p fall sustaining a comminuted displaced fracture of the proximal humerus and nondisplaced fracture of the greater tuberosity.  Ortho was consulted, s/p ORIF of right proximal humerus by LABS/Imaging: Reviewed     REVIEW OF SYSTEMS: Doing well. Edema of RUE improved. He had increased BLE edema yesterday and improved with leg elevation. He declines diuretic. Denies sob/cough/CP/palpitations. Denies hematuria/dysuria/frequency.  Reports nor tamsulosin

## 2018-08-03 ENCOUNTER — OFFICE VISIT (OUTPATIENT)
Dept: RHEUMATOLOGY | Facility: CLINIC | Age: 83
End: 2018-08-03
Payer: MEDICARE

## 2018-08-03 VITALS — SYSTOLIC BLOOD PRESSURE: 114 MMHG | HEART RATE: 63 BPM | HEIGHT: 66 IN | DIASTOLIC BLOOD PRESSURE: 67 MMHG

## 2018-08-03 DIAGNOSIS — M17.12 PRIMARY OSTEOARTHRITIS OF LEFT KNEE: Primary | ICD-10-CM

## 2018-08-03 PROCEDURE — 99213 OFFICE O/P EST LOW 20 MIN: CPT | Performed by: INTERNAL MEDICINE

## 2018-08-03 PROCEDURE — G0463 HOSPITAL OUTPT CLINIC VISIT: HCPCS | Performed by: INTERNAL MEDICINE

## 2018-08-03 PROCEDURE — 20610 DRAIN/INJ JOINT/BURSA W/O US: CPT | Performed by: INTERNAL MEDICINE

## 2018-08-03 RX ORDER — TRIAMCINOLONE ACETONIDE 40 MG/ML
40 INJECTION, SUSPENSION INTRA-ARTICULAR; INTRAMUSCULAR ONCE
Status: COMPLETED | OUTPATIENT
Start: 2018-08-03 | End: 2018-08-03

## 2018-08-03 RX ORDER — POLYETHYLENE GLYCOL 3350 17 G/17G
17 POWDER, FOR SOLUTION ORAL AS NEEDED
COMMUNITY
End: 2018-10-18

## 2018-08-03 RX ORDER — DOCUSATE SODIUM 100 MG/1
100 CAPSULE, LIQUID FILLED ORAL 2 TIMES DAILY
COMMUNITY
End: 2018-10-18

## 2018-08-03 RX ORDER — ACETAMINOPHEN 325 MG/1
325 TABLET ORAL EVERY 6 HOURS PRN
COMMUNITY
End: 2018-10-18

## 2018-08-03 NOTE — PROGRESS NOTES
Lola Kim is a 81year old male. HPI:   Patient presents with:  Osteoarthritis      I had the pleasure of seeing Bety Cid on 8/3/2018. I had last seen in 8/2017 and  6/15/2016. I had last seen him on 10/28/2015.  I had last seen him on  Aug. 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    OU; per NG   • Dermatochalasis 2002    OU; per NG   • Hx of melanoma of skin 1979    forehead   • Macular degeneration Rash  Demerol  [Meperidin*        Comment:Other reaction(s): Unknown  Hydrochlorothiazide         Comment:Other reaction(s): Rash  Ibuprofen                   Comment:Other reaction(s): Rash  Penicillins                 Comment:Other reaction(s): Rash  Pse 134 (L)   Potassium      3.3 - 5.1 mmol/L 4.0   Chloride      95 - 110 mmol/L 100   Carbon Dioxide, Total      22 - 32 mmol/L 29   BUN      8 - 20 mg/dL 11   CREATININE      0.50 - 1.50 mg/dL 0.58   CALCIUM      8.5 - 10.5 mg/dL 8.6   ALT (SGPT)      17 - pain - on allopurinol 100mg a day, coldhicine didn't help him   Watching diet. Summary:  1. Rest left knee  x 3 days  2. Return to clinic as needed - 6 months.      Graciela Adams MD  8/3/2018   11:07 AM  - Reviewed [place]- information  through [address] [place] [address] Rd

## 2018-08-09 ENCOUNTER — HOSPITAL ENCOUNTER (OUTPATIENT)
Dept: GENERAL RADIOLOGY | Facility: HOSPITAL | Age: 83
Discharge: HOME | End: 2018-08-09
Attending: ORTHOPAEDIC SURGERY | Admitting: ORTHOPAEDIC SURGERY
Payer: MEDICARE

## 2018-08-09 ENCOUNTER — OFFICE VISIT (OUTPATIENT)
Dept: ORTHOPEDICS CLINIC | Facility: CLINIC | Age: 83
End: 2018-08-09
Payer: MEDICARE

## 2018-08-09 ENCOUNTER — TELEPHONE (OUTPATIENT)
Dept: RHEUMATOLOGY | Facility: CLINIC | Age: 83
End: 2018-08-09

## 2018-08-09 DIAGNOSIS — Z47.89 ORTHOPEDIC AFTERCARE: ICD-10-CM

## 2018-08-09 DIAGNOSIS — Z47.89 ORTHOPEDIC AFTERCARE: Primary | ICD-10-CM

## 2018-08-09 DIAGNOSIS — S42.291D OTHER CLOSED DISPLACED FRACTURE OF PROXIMAL END OF RIGHT HUMERUS WITH ROUTINE HEALING, SUBSEQUENT ENCOUNTER: ICD-10-CM

## 2018-08-09 PROBLEM — S42.201D CLOSED FRACTURE OF PROXIMAL END OF RIGHT HUMERUS WITH ROUTINE HEALING: Status: ACTIVE | Noted: 2018-08-09

## 2018-08-09 PROCEDURE — 73030 X-RAY EXAM OF SHOULDER: CPT | Performed by: ORTHOPAEDIC SURGERY

## 2018-08-09 PROCEDURE — 99024 POSTOP FOLLOW-UP VISIT: CPT | Performed by: ORTHOPAEDIC SURGERY

## 2018-08-09 PROCEDURE — G0463 HOSPITAL OUTPT CLINIC VISIT: HCPCS | Performed by: ORTHOPAEDIC SURGERY

## 2018-08-09 NOTE — TELEPHONE ENCOUNTER
Called pt. - he never got relief with the steroid injection - it's the back and front of the knee. It's more painful in the hamstrings.  The norco helps the knee -   D/w him and he will take norco before his PT -

## 2018-08-09 NOTE — TELEPHONE ENCOUNTER
Pt is calling state that he got a injection in left knee pt state that it still pain there.  requesting to speak with a RN

## 2018-08-09 NOTE — TELEPHONE ENCOUNTER
Pt reports he did not get relief from the steroid injection on 8/3. Pain is in the back of his knee. Denies swelling. He is taking Norco at bedtime for broken arm due to fall. It provides relief to sleep at night.  Advised to try ice or warm compress to kne

## 2018-08-09 NOTE — PROGRESS NOTES
8/9/2018  Jaja Arias  7/24/1935  81year old   male  Aleja Martinez MD    HPI:   Patient presents with:  Post-Op:  Right proximal humerus ORIF- pt rates his pain 4/10. The patient complains of pain located right shoulder.   The pain is consiste defined types were placed in this encounter.

## 2018-08-15 ENCOUNTER — TELEPHONE (OUTPATIENT)
Dept: INTERNAL MEDICINE CLINIC | Facility: CLINIC | Age: 83
End: 2018-08-15

## 2018-08-15 NOTE — TELEPHONE ENCOUNTER
Antoni Valdes calling to confirm if Dr. Teresa Alexander will sign orders for Pt for [address].     [facility] did advise Dr. Teresa Alexander is out of office and will return on 8/20

## 2018-08-17 ENCOUNTER — TELEPHONE (OUTPATIENT)
Dept: INTERNAL MEDICINE CLINIC | Facility: CLINIC | Age: 83
End: 2018-08-17

## 2018-08-17 ENCOUNTER — PATIENT OUTREACH (OUTPATIENT)
Dept: CASE MANAGEMENT | Age: 83
End: 2018-08-17

## 2018-08-17 NOTE — TELEPHONE ENCOUNTER
HHN states pt has been admitting home health services for OT & PT     currently at at [facility] for next 2 weeks    States will use med list provided by facility

## 2018-08-17 NOTE — PROGRESS NOTES
Attempted to reach pt at home #. Lady answered and stated there was no Darryle Bidding at that #. Called mobile # listed and [id number] for TCM.

## 2018-08-17 NOTE — PROGRESS NOTES
Spoke to Tamia at [facility]. She states pt was not d/c from SNF- continued admission for respite. She states they do not have a d/c date at this time and pt is currently being followed by Dr. Noah Browning. Pt to f/u with PCP once d/c. Encounter closing.

## 2018-09-07 ENCOUNTER — TELEPHONE (OUTPATIENT)
Dept: ORTHOPEDICS CLINIC | Facility: CLINIC | Age: 83
End: 2018-09-07

## 2018-09-07 NOTE — TELEPHONE ENCOUNTER
Home health occupational therapist called.   Caller has question, can pt remove the sling when ambulating with the therapist?  Call to advise

## 2018-09-07 NOTE — TELEPHONE ENCOUNTER
Yes -- may remove sling to use walker  Should see in follow-up next week to advance motion and weight bearing status

## 2018-09-07 NOTE — TELEPHONE ENCOUNTER
Spoke to OTBret, and informed her of JEL message. Gwen asking how can pt be NWB on right arm using the walker? States pt has been using small base quad cane on left side.

## 2018-09-10 NOTE — TELEPHONE ENCOUNTER
Will advance to WBAT after his office visit and follow-up this week -- may continue to use cane for now

## 2018-09-18 ENCOUNTER — OFFICE VISIT (OUTPATIENT)
Dept: ORTHOPEDICS CLINIC | Facility: CLINIC | Age: 83
End: 2018-09-18
Payer: MEDICARE

## 2018-09-18 ENCOUNTER — HOSPITAL ENCOUNTER (OUTPATIENT)
Dept: GENERAL RADIOLOGY | Facility: HOSPITAL | Age: 83
Discharge: HOME | End: 2018-09-18
Attending: ORTHOPAEDIC SURGERY | Admitting: ORTHOPAEDIC SURGERY
Payer: MEDICARE

## 2018-09-18 DIAGNOSIS — Z47.89 ORTHOPEDIC AFTERCARE: ICD-10-CM

## 2018-09-18 DIAGNOSIS — S42.291D OTHER CLOSED DISPLACED FRACTURE OF PROXIMAL END OF RIGHT HUMERUS WITH ROUTINE HEALING, SUBSEQUENT ENCOUNTER: ICD-10-CM

## 2018-09-18 DIAGNOSIS — M25.611 POST-TRAUMATIC STIFFNESS OF RIGHT SHOULDER JOINT: ICD-10-CM

## 2018-09-18 DIAGNOSIS — Z47.89 ORTHOPEDIC AFTERCARE: Primary | ICD-10-CM

## 2018-09-18 PROCEDURE — G0463 HOSPITAL OUTPT CLINIC VISIT: HCPCS | Performed by: ORTHOPAEDIC SURGERY

## 2018-09-18 PROCEDURE — 99024 POSTOP FOLLOW-UP VISIT: CPT | Performed by: ORTHOPAEDIC SURGERY

## 2018-09-18 PROCEDURE — 73030 X-RAY EXAM OF SHOULDER: CPT | Performed by: ORTHOPAEDIC SURGERY

## 2018-10-17 ENCOUNTER — OFFICE VISIT (OUTPATIENT)
Dept: OPHTHALMOLOGY | Facility: CLINIC | Age: 83
End: 2018-10-17
Payer: MEDICARE

## 2018-10-17 DIAGNOSIS — H43.393 VITREOUS FLOATERS OF BOTH EYES: ICD-10-CM

## 2018-10-17 DIAGNOSIS — H25.13 AGE-RELATED NUCLEAR CATARACT OF BOTH EYES: ICD-10-CM

## 2018-10-17 DIAGNOSIS — H35.30 AGE-RELATED MACULAR DEGENERATION: ICD-10-CM

## 2018-10-17 DIAGNOSIS — H40.003 GLAUCOMA SUSPECT OF BOTH EYES: Primary | ICD-10-CM

## 2018-10-17 PROCEDURE — 92014 COMPRE OPH EXAM EST PT 1/>: CPT | Performed by: OPHTHALMOLOGY

## 2018-10-17 PROCEDURE — 92250 FUNDUS PHOTOGRAPHY W/I&R: CPT | Performed by: OPHTHALMOLOGY

## 2018-10-17 NOTE — PROGRESS NOTES
Josefina Granda is a 81year old male. HPI:     HPI     Patient is here for a complete exam and photos. Patient states that his vision is stable. He saw Dr. Lazarus Bloom on 7/03/18, he has a follow-up appointment in January 2019.       Last edited by Jimmy Harmon times daily. Disp:  Rfl:    Polyethylene Glycol 3350 (MIRALAX) Oral Powder Take 17 g by mouth as needed. Disp:  Rfl:    Ferrous Gluconate 324 (37.5 Fe) MG Oral Tab Take 1 tablet (324 mg total) by mouth 2 (two) times daily.  Disp: 60 tablet Rfl: 0   Metoprol Pachymetry (3/11/08)       Right Left    Thickness 600/-4 596/-4          Pupils    Miotic and barely reactive, OU           Visual Fields       Left Right     Full Full          Extraocular Movement       Right Left     Full, Ortho Full, Ortho both eyes  No treatment.        Orders Placed This Encounter      Fundus Photos - OU - Both Eyes      Pete Visual Field - OU - Both Eyes      OCT, Optic Nerve - OU - Both Eyes      Meds This Visit:  Requested Prescriptions      No prescriptions requeste

## 2018-10-17 NOTE — PATIENT INSTRUCTIONS
Glaucoma suspect  Discussed with patient that he is a glaucoma suspect based on increased cupping of the optic nerves in both eyes. Retinal photos taken today to document optic nerves. Glaucoma diagnostic testing ordered.   Will not start medication, but

## 2018-10-18 ENCOUNTER — TELEPHONE (OUTPATIENT)
Dept: ORTHOPEDICS CLINIC | Facility: CLINIC | Age: 83
End: 2018-10-18

## 2018-10-18 ENCOUNTER — HOSPITAL ENCOUNTER (OUTPATIENT)
Dept: GENERAL RADIOLOGY | Facility: HOSPITAL | Age: 83
Discharge: HOME | End: 2018-10-18
Attending: ORTHOPAEDIC SURGERY | Admitting: ORTHOPAEDIC SURGERY
Payer: MEDICARE

## 2018-10-18 ENCOUNTER — OFFICE VISIT (OUTPATIENT)
Dept: ORTHOPEDICS CLINIC | Facility: CLINIC | Age: 83
End: 2018-10-18
Payer: MEDICARE

## 2018-10-18 DIAGNOSIS — Z47.89 ORTHOPEDIC AFTERCARE: ICD-10-CM

## 2018-10-18 DIAGNOSIS — S42.291D OTHER CLOSED DISPLACED FRACTURE OF PROXIMAL END OF RIGHT HUMERUS WITH ROUTINE HEALING, SUBSEQUENT ENCOUNTER: ICD-10-CM

## 2018-10-18 DIAGNOSIS — Z47.89 ORTHOPEDIC AFTERCARE: Primary | ICD-10-CM

## 2018-10-18 PROCEDURE — G0463 HOSPITAL OUTPT CLINIC VISIT: HCPCS | Performed by: ORTHOPAEDIC SURGERY

## 2018-10-18 PROCEDURE — 99024 POSTOP FOLLOW-UP VISIT: CPT | Performed by: ORTHOPAEDIC SURGERY

## 2018-10-18 PROCEDURE — 73030 X-RAY EXAM OF SHOULDER: CPT | Performed by: ORTHOPAEDIC SURGERY

## 2018-10-18 NOTE — PROGRESS NOTES
10/18/2018  Lola Julio  7/24/1935  81year old   male  Laura Rivera MD    HPI:   Patient presents with: Follow - Up: Here to follow up on his right shoulder surgery. Notices pain in the right shoulder after doing home exercises.   Stts he has tin views. No orders of the defined types were placed in this encounter.

## 2018-10-18 NOTE — TELEPHONE ENCOUNTER
siva from [facility] physical therapy called. Pt called to schedule and advised the office was faxing over the order. Have not received.   Please fax to [phone]

## 2018-10-18 NOTE — TELEPHONE ENCOUNTER
Faxed physical therapy order to number below and rec'd confirmation. Also, I had faxed the order to [facility] per patient to [phone], and rec'd confirmation as well.

## 2018-10-25 ENCOUNTER — TELEPHONE (OUTPATIENT)
Dept: ORTHOPEDICS CLINIC | Facility: CLINIC | Age: 83
End: 2018-10-25

## 2018-10-25 NOTE — TELEPHONE ENCOUNTER
Tony Burgess called back from [facility]. Notified that pulleys are ok for Justin Pierce.  Verbalizes understanding

## 2018-10-25 NOTE — TELEPHONE ENCOUNTER
Tyrone Morrow from [facility] physical therapy calling to ask if pt can now start doing over head pullys. She states that it does not specify in pt's orders. Please advise.

## 2018-11-03 NOTE — TELEPHONE ENCOUNTER
No Protocol on this med.      Requested Prescriptions     Pending Prescriptions Disp Refills   • TRAMADOL HCL 50 MG Oral Tab [Pharmacy Med Name: TraMADol HCl Oral Tablet 50 MG] 90 tablet 0     Sig: TAKE ONE TABLET BY MOUTH EVERY SIX HOURS AS NEEDED FOR PAIN

## 2018-11-05 RX ORDER — TRAMADOL HYDROCHLORIDE 50 MG/1
TABLET ORAL
Qty: 90 TABLET | Refills: 0 | OUTPATIENT
Start: 2018-11-05 | End: 2019-01-03

## 2018-11-13 ENCOUNTER — TELEPHONE (OUTPATIENT)
Dept: OPHTHALMOLOGY | Facility: CLINIC | Age: 83
End: 2018-11-13

## 2018-11-13 NOTE — TELEPHONE ENCOUNTER
Spoke with patient. He says he cancelled his appointment for a VF, OCT with no MD because he has a super busy physical therapy schedule. He cannot reschedule now.   He will call back to reschedule/kp

## 2018-11-20 ENCOUNTER — HOSPITAL ENCOUNTER (OUTPATIENT)
Dept: GENERAL RADIOLOGY | Facility: HOSPITAL | Age: 83
Discharge: HOME | End: 2018-11-20
Attending: ORTHOPAEDIC SURGERY | Admitting: ORTHOPAEDIC SURGERY
Payer: MEDICARE

## 2018-11-20 ENCOUNTER — OFFICE VISIT (OUTPATIENT)
Dept: ORTHOPEDICS CLINIC | Facility: CLINIC | Age: 83
End: 2018-11-20
Payer: MEDICARE

## 2018-11-20 VITALS — SYSTOLIC BLOOD PRESSURE: 112 MMHG | HEART RATE: 60 BPM | DIASTOLIC BLOOD PRESSURE: 60 MMHG | RESPIRATION RATE: 16 BRPM

## 2018-11-20 DIAGNOSIS — M25.562 CHRONIC PAIN OF BOTH KNEES: ICD-10-CM

## 2018-11-20 DIAGNOSIS — M25.561 CHRONIC PAIN OF BOTH KNEES: ICD-10-CM

## 2018-11-20 DIAGNOSIS — G89.29 CHRONIC PAIN OF BOTH KNEES: ICD-10-CM

## 2018-11-20 DIAGNOSIS — M17.12 PRIMARY OSTEOARTHRITIS OF LEFT KNEE: Primary | ICD-10-CM

## 2018-11-20 PROCEDURE — 73564 X-RAY EXAM KNEE 4 OR MORE: CPT | Performed by: ORTHOPAEDIC SURGERY

## 2018-11-20 PROCEDURE — 99214 OFFICE O/P EST MOD 30 MIN: CPT | Performed by: ORTHOPAEDIC SURGERY

## 2018-11-20 PROCEDURE — 20610 DRAIN/INJ JOINT/BURSA W/O US: CPT | Performed by: ORTHOPAEDIC SURGERY

## 2018-11-20 NOTE — PROGRESS NOTES
Per verbal order from [facility], draw up 4ml of 1% lidocaine and 2ml of Kenalog 10 for cortisone injection to left knee Josi Iniguez RN

## 2018-11-20 NOTE — PROGRESS NOTES
This is a pleasant 25-year-old male with a chief complaint of left knee pain. The patient is status post a right total knee arthroplasty. The patient reports he has no complaints of the knee.   He did recently undergo surgery for a right proximal humerus

## 2018-12-04 ENCOUNTER — OFFICE VISIT (OUTPATIENT)
Dept: ORTHOPEDICS CLINIC | Facility: CLINIC | Age: 83
End: 2018-12-04
Payer: MEDICARE

## 2018-12-04 ENCOUNTER — HOSPITAL ENCOUNTER (OUTPATIENT)
Dept: GENERAL RADIOLOGY | Facility: HOSPITAL | Age: 83
Discharge: HOME | End: 2018-12-04
Attending: ORTHOPAEDIC SURGERY | Admitting: ORTHOPAEDIC SURGERY
Payer: MEDICARE

## 2018-12-04 DIAGNOSIS — Z47.89 ORTHOPEDIC AFTERCARE: Primary | ICD-10-CM

## 2018-12-04 DIAGNOSIS — Z47.89 ORTHOPEDIC AFTERCARE: ICD-10-CM

## 2018-12-04 DIAGNOSIS — M25.611 SHOULDER STIFFNESS, RIGHT: ICD-10-CM

## 2018-12-04 DIAGNOSIS — S42.291D OTHER CLOSED DISPLACED FRACTURE OF PROXIMAL END OF RIGHT HUMERUS WITH ROUTINE HEALING, SUBSEQUENT ENCOUNTER: ICD-10-CM

## 2018-12-04 PROCEDURE — 99213 OFFICE O/P EST LOW 20 MIN: CPT | Performed by: ORTHOPAEDIC SURGERY

## 2018-12-04 PROCEDURE — G0463 HOSPITAL OUTPT CLINIC VISIT: HCPCS | Performed by: ORTHOPAEDIC SURGERY

## 2018-12-04 PROCEDURE — 73030 X-RAY EXAM OF SHOULDER: CPT | Performed by: ORTHOPAEDIC SURGERY

## 2018-12-04 NOTE — PROGRESS NOTES
12/4/2018  Dalton Patel  7/24/1935  81year old   male  Nena Villarreal MD    HPI:   Patient presents with:  Shoulder Pain: s/p R shoulder ORIF 19 weeks f/u - had sx on 7/21/18 - states he is better - states he is back about 60-75% with his movements - patient. He will continue therapy and try a prednisone pack    All of their questions were answered and they are in agreement with the treatment plan.     The patient will return to clinic in 6 weeks for further clinical and radiographic evaluation with

## 2018-12-06 ENCOUNTER — TELEPHONE (OUTPATIENT)
Dept: ORTHOPEDICS CLINIC | Facility: CLINIC | Age: 83
End: 2018-12-06

## 2018-12-06 RX ORDER — PREDNISONE 20 MG/1
20 TABLET ORAL DAILY
Qty: 5 TABLET | Refills: 0 | Status: SHIPPED | OUTPATIENT
Start: 2018-12-06 | End: 2018-12-11

## 2018-12-06 NOTE — TELEPHONE ENCOUNTER
Requesting to move start of care to 12/11 per pt - asking if order is specific for [facility]

## 2018-12-06 NOTE — TELEPHONE ENCOUNTER
Patient states Christiane Gladys was supposed to send medication to his pharm from [address] [place] [address]. states medication was never received. Please call once sent. Thank you.

## 2018-12-06 NOTE — TELEPHONE ENCOUNTER
Spoke to pt and informed pt that prednisone was sent to his [address] [place] [address] Street in [address] [facility] for the delay. Pt verbalized understanding.

## 2018-12-07 NOTE — TELEPHONE ENCOUNTER
S/w amelia from [facility] and he states pt is still doing outpt PT as of today, but he will no longer get rides to PT after today that is why he requested [facility]. Grace Perez states his first visit w/ [facility] will be on 12/11/18.  shaylee

## 2018-12-07 NOTE — TELEPHONE ENCOUNTER
Call to [facility]. No answer.  Left voice message REquesting call back

## 2018-12-10 ENCOUNTER — OFFICE VISIT (OUTPATIENT)
Dept: INTERNAL MEDICINE CLINIC | Facility: CLINIC | Age: 83
End: 2018-12-10
Payer: MEDICARE

## 2018-12-10 VITALS
BODY MASS INDEX: 29.09 KG/M2 | SYSTOLIC BLOOD PRESSURE: 116 MMHG | HEART RATE: 76 BPM | DIASTOLIC BLOOD PRESSURE: 77 MMHG | RESPIRATION RATE: 16 BRPM | WEIGHT: 181 LBS | HEIGHT: 66 IN

## 2018-12-10 DIAGNOSIS — J43.1 PANLOBULAR EMPHYSEMA (HCC): ICD-10-CM

## 2018-12-10 DIAGNOSIS — I10 ESSENTIAL HYPERTENSION WITH GOAL BLOOD PRESSURE LESS THAN 140/90: Primary | ICD-10-CM

## 2018-12-10 DIAGNOSIS — S42.201D CLOSED FRACTURE OF PROXIMAL END OF RIGHT HUMERUS WITH ROUTINE HEALING, UNSPECIFIED FRACTURE MORPHOLOGY, SUBSEQUENT ENCOUNTER: ICD-10-CM

## 2018-12-10 DIAGNOSIS — R35.1 BENIGN PROSTATIC HYPERPLASIA WITH NOCTURIA: ICD-10-CM

## 2018-12-10 DIAGNOSIS — N40.1 BENIGN PROSTATIC HYPERPLASIA WITH NOCTURIA: ICD-10-CM

## 2018-12-10 PROCEDURE — 99214 OFFICE O/P EST MOD 30 MIN: CPT | Performed by: INTERNAL MEDICINE

## 2018-12-10 PROCEDURE — G0463 HOSPITAL OUTPT CLINIC VISIT: HCPCS | Performed by: INTERNAL MEDICINE

## 2018-12-10 RX ORDER — HYDROCODONE BITARTRATE AND ACETAMINOPHEN 5; 325 MG/1; MG/1
1-2 TABLET ORAL EVERY 6 HOURS PRN
Qty: 30 TABLET | Refills: 0 | Status: SHIPPED | OUTPATIENT
Start: 2018-12-10 | End: 2019-08-15

## 2018-12-10 NOTE — PROGRESS NOTES
Elsa Rizvi is a 81year old male. HPI:   1. Essential hypertension with goal blood pressure less than 140/90    Patient has been following low salt diet and has been taking anti-hypertensive prescriptions as prescribed.  Blood pressure has been checke Vitamins-Minerals (EYE VITAMINS) Oral Cap Take 1 tablet by mouth 2 (two) times daily. 1 tab daily  Disp:  Rfl:    cholecalciferol (D 1000) 1000 UNITS Oral Cap Take 1,000 Units by mouth nightly.  Vitamin D3 1,000 unit capsule  Disp:  Rfl:    Multiple Vitamin throat are clear  NECK: supple,no adenopathy,no bruits  LUNGS: clear to auscultation  CARDIO: RRR without murmur  GI: good BS's,no masses, HSM or tenderness  EXTREMITIES: no cyanosis, clubbing . Has some edema of left lower extremities.  Left knee with arth

## 2018-12-12 ENCOUNTER — TELEPHONE (OUTPATIENT)
Dept: ORTHOPEDICS CLINIC | Facility: CLINIC | Age: 83
End: 2018-12-12

## 2018-12-12 NOTE — TELEPHONE ENCOUNTER
Pt is being seen 2 x a week for 4 weeks - asking if Dr will be signing orders and if it is ok to get orders from PCP as well

## 2018-12-27 ENCOUNTER — TELEPHONE (OUTPATIENT)
Dept: ORTHOPEDICS CLINIC | Facility: CLINIC | Age: 83
End: 2018-12-27

## 2018-12-27 NOTE — TELEPHONE ENCOUNTER
Vega Lozada from [facility] would like to know if [facility] for a couple more weeks. Please call. Thank you.

## 2018-12-27 NOTE — TELEPHONE ENCOUNTER
Spoke with Chaz Laws from [facility] informed PT order through 1/15/19. Chaz Laws will update her records.

## 2018-12-31 ENCOUNTER — NURSE TRIAGE (OUTPATIENT)
Dept: OTHER | Age: 83
End: 2018-12-31

## 2018-12-31 NOTE — TELEPHONE ENCOUNTER
Action Requested: Summary for Provider     []  Critical Lab, Recommendations Needed  [] Need Additional Advice  []   FYI    []   Need Orders  [] Need Medications Sent to Pharmacy  []  Other     SUMMARY:   Appointment made for January 3,2018    The patien

## 2019-01-03 ENCOUNTER — OFFICE VISIT (OUTPATIENT)
Dept: INTERNAL MEDICINE CLINIC | Facility: CLINIC | Age: 84
End: 2019-01-03
Payer: MEDICARE

## 2019-01-03 VITALS
DIASTOLIC BLOOD PRESSURE: 81 MMHG | RESPIRATION RATE: 16 BRPM | HEART RATE: 76 BPM | WEIGHT: 173 LBS | SYSTOLIC BLOOD PRESSURE: 120 MMHG | BODY MASS INDEX: 27.8 KG/M2 | HEIGHT: 66 IN

## 2019-01-03 DIAGNOSIS — R33.8 BENIGN PROSTATIC HYPERPLASIA WITH URINARY RETENTION: ICD-10-CM

## 2019-01-03 DIAGNOSIS — J43.1 PANLOBULAR EMPHYSEMA (HCC): ICD-10-CM

## 2019-01-03 DIAGNOSIS — N40.1 BENIGN PROSTATIC HYPERPLASIA WITH URINARY RETENTION: ICD-10-CM

## 2019-01-03 DIAGNOSIS — I10 ESSENTIAL HYPERTENSION WITH GOAL BLOOD PRESSURE LESS THAN 140/90: Primary | ICD-10-CM

## 2019-01-03 PROCEDURE — G0463 HOSPITAL OUTPT CLINIC VISIT: HCPCS | Performed by: INTERNAL MEDICINE

## 2019-01-03 PROCEDURE — 99214 OFFICE O/P EST MOD 30 MIN: CPT | Performed by: INTERNAL MEDICINE

## 2019-01-03 RX ORDER — TRAMADOL HYDROCHLORIDE 50 MG/1
TABLET ORAL
Qty: 60 TABLET | Refills: 0 | Status: SHIPPED | OUTPATIENT
Start: 2019-01-03 | End: 2019-01-30

## 2019-01-03 RX ORDER — AMLODIPINE BESYLATE 5 MG/1
5 TABLET ORAL DAILY
COMMUNITY
End: 2019-07-02

## 2019-01-03 NOTE — PROGRESS NOTES
Smita Abdul is a 81year old male. HPI:   1. Essential hypertension with goal blood pressure less than 140/90    Patient has been following low salt diet and has been taking anti-hypertensive prescriptions as prescribed.  Blood pressure has been checke Rfl:    Multiple Vitamins-Minerals (EYE VITAMINS) Oral Cap Take 1 tablet by mouth 2 (two) times daily. 1 tab daily  Disp:  Rfl:    cholecalciferol (D 1000) 1000 UNITS Oral Cap Take 1,000 Units by mouth nightly.  Vitamin D3 1,000 unit capsule  Disp:  Rfl: extremities. Left knee with arthritis. ASSESSMENT AND PLAN:   1. Essential hypertension with goal blood pressure less than 140/90    Patient instructed to take anti-hypertensive medicines exactly as prescribed and to follow a low salt diet as discussed.

## 2019-01-04 ENCOUNTER — TELEPHONE (OUTPATIENT)
Dept: OTHER | Age: 84
End: 2019-01-04

## 2019-01-04 DIAGNOSIS — I10 ESSENTIAL HYPERTENSION WITH GOAL BLOOD PRESSURE LESS THAN 140/90: Primary | ICD-10-CM

## 2019-01-04 RX ORDER — METOPROLOL SUCCINATE 25 MG/1
25 TABLET, EXTENDED RELEASE ORAL DAILY
Qty: 30 TABLET | Refills: 2 | Status: SHIPPED | OUTPATIENT
Start: 2019-01-04 | End: 2019-01-04

## 2019-01-04 RX ORDER — METOPROLOL SUCCINATE 25 MG/1
25 TABLET, EXTENDED RELEASE ORAL DAILY
Qty: 30 TABLET | Refills: 2 | Status: SHIPPED | OUTPATIENT
Start: 2019-01-04 | End: 2019-04-04

## 2019-01-04 RX ORDER — METOPROLOL SUCCINATE 50 MG/1
25 TABLET, EXTENDED RELEASE ORAL
Qty: 30 TABLET | Refills: 2 | Status: SHIPPED | OUTPATIENT
Start: 2019-01-04 | End: 2019-01-04 | Stop reason: DRUGHIGH

## 2019-01-04 NOTE — TELEPHONE ENCOUNTER
Dr Hair Gardiner,    I spoke with [facility] for early release on metoprolol succinate er 50 mg script. She recommended dispensing 25 mg dose daily to decrease pts' confusion on dose. Medication in stock and approved for 30 tablets with 2 refills.    Hadley

## 2019-01-04 NOTE — TELEPHONE ENCOUNTER
Dr Jaswant Stone,    The metoprolol succ. script was previously pended. Please advise on this refill. See message below.    Please reply to pool: MARÍA Amaya

## 2019-01-04 NOTE — TELEPHONE ENCOUNTER
rx resent- pt advised               Prudence MD Cy   You; Em Im [facility] Lpn/Cma 41 minutes ago (4:36 PM)      I signed off but I’m not sure prescription went through call pharmacy and make sure he gets the dose suggested    Routing Comment

## 2019-01-04 NOTE — TELEPHONE ENCOUNTER
Received a call from [employer] therapist with [facility]  Stated she was visiting Pt -Pt stated he  have been taking a whole tablet of RX Metoprolol Succinate ER 50 Mg instead of 1/2 tablet as directed   Pt have been out of med x 10 days and failed to tel

## 2019-01-08 ENCOUNTER — HOSPITAL ENCOUNTER (OUTPATIENT)
Dept: GENERAL RADIOLOGY | Facility: HOSPITAL | Age: 84
Discharge: HOME | End: 2019-01-08
Attending: ORTHOPAEDIC SURGERY
Payer: MEDICARE

## 2019-01-08 ENCOUNTER — OFFICE VISIT (OUTPATIENT)
Dept: ORTHOPEDICS CLINIC | Facility: CLINIC | Age: 84
End: 2019-01-08
Payer: MEDICARE

## 2019-01-08 DIAGNOSIS — S42.291D OTHER CLOSED DISPLACED FRACTURE OF PROXIMAL END OF RIGHT HUMERUS WITH ROUTINE HEALING, SUBSEQUENT ENCOUNTER: ICD-10-CM

## 2019-01-08 DIAGNOSIS — Z47.89 ORTHOPEDIC AFTERCARE: ICD-10-CM

## 2019-01-08 DIAGNOSIS — Z47.89 ORTHOPEDIC AFTERCARE: Primary | ICD-10-CM

## 2019-01-08 PROCEDURE — G0463 HOSPITAL OUTPT CLINIC VISIT: HCPCS | Performed by: ORTHOPAEDIC SURGERY

## 2019-01-08 PROCEDURE — 73030 X-RAY EXAM OF SHOULDER: CPT | Performed by: ORTHOPAEDIC SURGERY

## 2019-01-08 PROCEDURE — 99213 OFFICE O/P EST LOW 20 MIN: CPT | Performed by: ORTHOPAEDIC SURGERY

## 2019-01-08 NOTE — PROGRESS NOTES
1/8/2019  Josefina Granda  7/24/1935  81year old   male  April West MD    HPI:   Patient presents with:  Shoulder Pain: s/p R shoulder ORIF from 7/21/18 f/u - states he is better, still dose not have full ROM and he would like a continuation for PT 6 mo s/p ORIF    The risks, indications, benefits, and procedures of both operative and non-operative treatment were discussed with the patient. He will advance therapy.      All of their questions were answered and they are in agreement with the treatme

## 2019-01-28 ENCOUNTER — TELEPHONE (OUTPATIENT)
Dept: ORTHOPEDICS CLINIC | Facility: CLINIC | Age: 84
End: 2019-01-28

## 2019-01-28 NOTE — TELEPHONE ENCOUNTER
Alicia Juan from [facility] calling to inform office that pt will be discharge from home health this week. If pt needs to continue PT it would have to be out-pt. Please advise.

## 2019-01-30 NOTE — TELEPHONE ENCOUNTER
Controlled medication pending for review. If approved needs to be called in or faxed by on-site staff.     Last Rx: 1/3/19 #60    Recent Outpatient Visits            3 weeks ago Orthopedic aftercare    [facility], [address], [facility]

## 2019-02-01 ENCOUNTER — TELEPHONE (OUTPATIENT)
Dept: ORTHOPEDICS CLINIC | Facility: CLINIC | Age: 84
End: 2019-02-01

## 2019-02-01 DIAGNOSIS — Z47.89 ORTHOPEDIC AFTERCARE: ICD-10-CM

## 2019-02-01 DIAGNOSIS — S42.291D OTHER CLOSED DISPLACED FRACTURE OF PROXIMAL END OF RIGHT HUMERUS WITH ROUTINE HEALING, SUBSEQUENT ENCOUNTER: Primary | ICD-10-CM

## 2019-02-01 RX ORDER — TRAMADOL HYDROCHLORIDE 50 MG/1
TABLET ORAL
Qty: 60 TABLET | Refills: 2 | OUTPATIENT
Start: 2019-02-01 | End: 2019-02-02

## 2019-02-01 NOTE — TELEPHONE ENCOUNTER
S/w pt and he states PT tried to d/c him but he still doesn't have good ROM and he wants more PT. He would like order faxed to [facility]. Order faxed.

## 2019-02-01 NOTE — TELEPHONE ENCOUNTER
Was [facility] PT [facility] OT [facility]  [facility]  Same Rx

## 2019-02-04 ENCOUNTER — TELEPHONE (OUTPATIENT)
Dept: ORTHOPEDICS CLINIC | Facility: CLINIC | Age: 84
End: 2019-02-04

## 2019-02-04 RX ORDER — TRAMADOL HYDROCHLORIDE 50 MG/1
TABLET ORAL
Qty: 60 TABLET | Refills: 0 | OUTPATIENT
Start: 2019-02-04 | End: 2019-02-27

## 2019-02-04 NOTE — TELEPHONE ENCOUNTER
S/w Milagros Mendoza and informed her that pt was d/c from [facility] on 1/29/19 and then pt called and states he still needs more HH therapy.  She states she will look into and maybe get pt re-eval, but if he doesn't meet the requirements he will need to take the order as a

## 2019-02-06 ENCOUNTER — TELEPHONE (OUTPATIENT)
Dept: ORTHOPEDICS CLINIC | Facility: CLINIC | Age: 84
End: 2019-02-06

## 2019-02-13 ENCOUNTER — TELEPHONE (OUTPATIENT)
Dept: ORTHOPEDICS CLINIC | Facility: CLINIC | Age: 84
End: 2019-02-13

## 2019-02-13 DIAGNOSIS — Z47.89 ORTHOPEDIC AFTERCARE: ICD-10-CM

## 2019-02-13 DIAGNOSIS — S42.291D: Primary | ICD-10-CM

## 2019-02-13 NOTE — TELEPHONE ENCOUNTER
[facility] RN asking for verbal order to add OT  For pt to start next week. Pls advise thank you.

## 2019-02-21 ENCOUNTER — TELEPHONE (OUTPATIENT)
Dept: ORTHOPEDICS CLINIC | Facility: CLINIC | Age: 84
End: 2019-02-21

## 2019-02-22 ENCOUNTER — TELEPHONE (OUTPATIENT)
Dept: ORTHOPEDICS CLINIC | Facility: CLINIC | Age: 84
End: 2019-02-22

## 2019-02-22 NOTE — TELEPHONE ENCOUNTER
S/w pt and he states he tripped and fell over his rug on 2/18, but did not hit his head or hurt anything. He states the paramedics assessed him and said he looked fine. He has some bruising on posterior shoulder, but he denies any pain anywhere.  He has no

## 2019-02-22 NOTE — TELEPHONE ENCOUNTER
Call back to [facility]. Discussed an order dated 1-10-19. No order generated from us /Dr. Mannie Swenson on 1-10-19  [facility] will fax order to me so I may review.

## 2019-02-22 NOTE — TELEPHONE ENCOUNTER
Faxed order received. Order for therapy - not for a brace. Call back to [facility] and notified to disregard order .

## 2019-02-22 NOTE — TELEPHONE ENCOUNTER
ERICA -  RN calling to inform that the Pt. Had a fall on Mon. Morning, as he tripped on the rug. Paramedics did come, but pt has no injuries, so was not sent to the hosp.

## 2019-02-25 ENCOUNTER — TELEPHONE (OUTPATIENT)
Dept: ORTHOPEDICS CLINIC | Facility: CLINIC | Age: 84
End: 2019-02-25

## 2019-02-25 ENCOUNTER — MED REC SCAN ONLY (OUTPATIENT)
Dept: INTERNAL MEDICINE CLINIC | Facility: CLINIC | Age: 84
End: 2019-02-25

## 2019-02-25 NOTE — TELEPHONE ENCOUNTER
ernie from [facility] home health called. Need verbal order to move occupational therapy evaluation to this week. Do have a therapist that can see pt on 2-27-19.   Call

## 2019-02-28 NOTE — TELEPHONE ENCOUNTER
No Protocol on this med. Controlled medication pending for review. If approved needs to be called in or faxed by on-site staff.       Requested Prescriptions     Pending Prescriptions Disp Refills   • TRAMADOL HCL 50 MG Oral Tab [Pharmacy Med Name: tra

## 2019-02-28 NOTE — TELEPHONE ENCOUNTER
Call from [facility] Did not recall the patient she had called about the day before - answering a return call from [address]\    Per Ezekiel Jaimes call was about Brit Jenkins. Called [facility] today about original call. No answer. Left voice message.

## 2019-03-01 RX ORDER — TRAMADOL HYDROCHLORIDE 50 MG/1
TABLET ORAL
Qty: 60 TABLET | Refills: 0 | OUTPATIENT
Start: 2019-03-01 | End: 2019-03-27

## 2019-03-01 NOTE — TELEPHONE ENCOUNTER
Please call in RX for patient and call patient to notify them of rx refill.  Please respond to pool: MARÍA Beebe 63 LPN/CMA

## 2019-03-06 RX ORDER — ALLOPURINOL 100 MG/1
TABLET ORAL
Qty: 30 TABLET | Refills: 2 | OUTPATIENT
Start: 2019-03-06

## 2019-03-06 RX ORDER — TRAMADOL HYDROCHLORIDE 50 MG/1
TABLET ORAL
Qty: 120 TABLET | Refills: 0 | OUTPATIENT
Start: 2019-03-06

## 2019-03-19 ENCOUNTER — TELEPHONE (OUTPATIENT)
Dept: ORTHOPEDICS CLINIC | Facility: CLINIC | Age: 84
End: 2019-03-19

## 2019-03-19 NOTE — TELEPHONE ENCOUNTER
Pt has dental appt in 2 weeks states he needs medication to take for the appt and he is to get it from Dr Jaylene Norris - pls advise

## 2019-03-19 NOTE — TELEPHONE ENCOUNTER
Call to [facility]. Informed of no antibiotic coverage needed from Dr. Rocky Taylor.  Xuan Coulter understanding

## 2019-03-19 NOTE — TELEPHONE ENCOUNTER
No antibiotics from Dr. Bridget Lucas; To Dr. Sanjay Little  Please read- Does pt need antibiotic coverage for dental work?     Please advise

## 2019-03-26 ENCOUNTER — TELEPHONE (OUTPATIENT)
Dept: ORTHOPEDICS CLINIC | Facility: CLINIC | Age: 84
End: 2019-03-26

## 2019-03-26 NOTE — TELEPHONE ENCOUNTER
Asking for [facility] OT order to be extended for 2 x a week for the next 2 weeks and then one time one week after that - pls call with VO - he is seeing pt today

## 2019-03-26 NOTE — TELEPHONE ENCOUNTER
[facility], Bassem Hipps, and informed that [place], who is covering for [address], approved request as written below. Bassem Hipps verbalized understanding.

## 2019-03-27 NOTE — TELEPHONE ENCOUNTER
No Protocol on this med. Controlled medication pending for review. If approved needs to be called in or faxed by on-site staff.         Requested Prescriptions     Pending Prescriptions Disp Refills   • traMADol HCl 50 MG Oral Tab [Pharmacy Med Name: T

## 2019-03-29 RX ORDER — TRAMADOL HYDROCHLORIDE 50 MG/1
TABLET ORAL
Qty: 60 TABLET | Refills: 0 | OUTPATIENT
Start: 2019-03-29 | End: 2019-05-08

## 2019-03-29 NOTE — TELEPHONE ENCOUNTER
Please call in RX for patient and call patient to notify them of rx refill.  Please respond to pool: EM [facility] LPN/CMA

## 2019-04-04 ENCOUNTER — TELEPHONE (OUTPATIENT)
Dept: ORTHOPEDICS CLINIC | Facility: CLINIC | Age: 84
End: 2019-04-04

## 2019-04-04 DIAGNOSIS — S42.291D OTHER CLOSED DISPLACED FRACTURE OF PROXIMAL END OF RIGHT HUMERUS WITH ROUTINE HEALING, SUBSEQUENT ENCOUNTER: Primary | ICD-10-CM

## 2019-04-04 NOTE — TELEPHONE ENCOUNTER
Ok  FREQUENCY: 2x/week x 6weeks  Gait and balance training/ fall risk prevention  AROM, AAROM, PROM in elevation, external rotation, internal rotation and cross-body adduction with the elbow straight, avoid impingement position  No pulleys  Advance to slee

## 2019-04-04 NOTE — TELEPHONE ENCOUNTER
Pt is being discharged today from [facility] pt can go to outpt PT for more shoulder work

## 2019-04-08 NOTE — TELEPHONE ENCOUNTER
Pt states he cannot get physical therapy at [place] [address] and asking for order to be put in to be done at [facility] [employer] [facility].

## 2019-04-08 NOTE — TELEPHONE ENCOUNTER
S/w pt and informed him his PT order from 4/5 is good anywhere he wants to have PT done. He has phone # to [facility] and will call to schedule.

## 2019-04-12 ENCOUNTER — APPOINTMENT (OUTPATIENT)
Dept: LAB | Facility: HOSPITAL | Age: 84
End: 2019-04-12
Attending: INTERNAL MEDICINE
Payer: MEDICARE

## 2019-04-12 ENCOUNTER — TELEPHONE (OUTPATIENT)
Dept: RHEUMATOLOGY | Facility: CLINIC | Age: 84
End: 2019-04-12

## 2019-04-12 ENCOUNTER — OFFICE VISIT (OUTPATIENT)
Dept: RHEUMATOLOGY | Facility: CLINIC | Age: 84
End: 2019-04-12
Payer: MEDICARE

## 2019-04-12 VITALS
HEIGHT: 66 IN | SYSTOLIC BLOOD PRESSURE: 116 MMHG | WEIGHT: 173 LBS | DIASTOLIC BLOOD PRESSURE: 73 MMHG | BODY MASS INDEX: 27.8 KG/M2 | HEART RATE: 64 BPM

## 2019-04-12 DIAGNOSIS — D64.9 ANEMIA, UNSPECIFIED TYPE: ICD-10-CM

## 2019-04-12 DIAGNOSIS — M17.12 PRIMARY OSTEOARTHRITIS OF LEFT KNEE: ICD-10-CM

## 2019-04-12 DIAGNOSIS — M17.12 PRIMARY OSTEOARTHRITIS OF LEFT KNEE: Primary | ICD-10-CM

## 2019-04-12 DIAGNOSIS — M15.9 OSTEOARTHRITIS OF MULTIPLE JOINTS, UNSPECIFIED OSTEOARTHRITIS TYPE: ICD-10-CM

## 2019-04-12 PROCEDURE — 80053 COMPREHEN METABOLIC PANEL: CPT

## 2019-04-12 PROCEDURE — 85027 COMPLETE CBC AUTOMATED: CPT

## 2019-04-12 PROCEDURE — G0463 HOSPITAL OUTPT CLINIC VISIT: HCPCS | Performed by: INTERNAL MEDICINE

## 2019-04-12 PROCEDURE — 99213 OFFICE O/P EST LOW 20 MIN: CPT | Performed by: INTERNAL MEDICINE

## 2019-04-12 PROCEDURE — 36415 COLL VENOUS BLD VENIPUNCTURE: CPT

## 2019-04-12 RX ORDER — ACETAMINOPHEN AND CODEINE PHOSPHATE 300; 30 MG/1; MG/1
1 TABLET ORAL EVERY 6 HOURS PRN
Refills: 0 | COMMUNITY
Start: 2019-03-30 | End: 2019-08-15

## 2019-04-12 NOTE — PATIENT INSTRUCTIONS
1. Check labs   2. Check tramadol 50mg every 8 hours as needed   2. Return to clinic as needed - -6 months.

## 2019-04-12 NOTE — PROGRESS NOTES
Milton Mike is a 81year old male. HPI:   Patient presents with:  Osteoarthritis  Finger Pain: knuckle pain, pain when he tries to clench his fingers      I had the pleasure of seeing Lis Hernandez on 8/3/2018.  I had last seen in 8/2017 and  6/15/20 He recovered after the the right knee replacement. 4/12/2019  He still has his cat. He is trying PT for his left shoulder after the recovery of his fracture. His knee didn't get better with his left knee injection.    He doesn't want a knee replac 1 tablet by mouth 2 (two) times daily. 1 tab daily  Disp:  Rfl:    cholecalciferol (D 1000) 1000 UNITS Oral Cap Take 1,000 Units by mouth nightly.  Vitamin D3 1,000 unit capsule  Disp:  Rfl:    Multiple Vitamin (MULTI-VITAMINS) Oral Tab Take 1 tablet by hanane %      % 63    Lymphocytes %      % 21    Monocytes %      % 10    Eosinophils %      % 2    Basophils %      % 3    MYELOCYTE %      % 1    Neutrophils Absolute      1.8 - 7.7 K/UL 3.8    Lymphocytes Absolute      1.0 - 4.0 K/UL 1.3    Monocytes Absolute in the pasts -    -  Stay on  tramadol  50 mg every 6 hours prn   2. Allergies to discharge on this or Celebrex vs. voltarenGEL   3.  ankle pain-following with podiatry, orthotics. 4.  History right hip replacement   5.  Left ankle pain / left foot pain

## 2019-04-17 NOTE — TELEPHONE ENCOUNTER
Talked to pt.  - told him about alkphos - no abd pain - no gb issues - will follow for now   - all other labs normal

## 2019-04-18 ENCOUNTER — OFFICE VISIT (OUTPATIENT)
Dept: PHYSICAL THERAPY | Facility: HOSPITAL | Age: 84
End: 2019-04-18
Attending: ORTHOPAEDIC SURGERY
Payer: MEDICARE

## 2019-04-18 DIAGNOSIS — S42.291D OTHER CLOSED DISPLACED FRACTURE OF PROXIMAL END OF RIGHT HUMERUS WITH ROUTINE HEALING, SUBSEQUENT ENCOUNTER: ICD-10-CM

## 2019-04-18 PROCEDURE — 97110 THERAPEUTIC EXERCISES: CPT

## 2019-04-18 PROCEDURE — 97162 PT EVAL MOD COMPLEX 30 MIN: CPT

## 2019-04-18 NOTE — PROGRESS NOTES
UPPER EXTREMITY EVALUATION:   Referring Physician: Dr. Yoel Cruz  Date of Onset: 7/21/18 Date of Service: 4/18/2019   Diagnosis: Other closed displaced fracture of proximal end of right humerus with routine healing, subsequent encounter (B35.034T)    [facility] Therapy to address the above impairments to allow for return to functional mobility.      Precautions: Fall Risk     OBJECTIVE:   Observation/Posture: rounded shoulders, kyphotic    AROM:  L shoulder: flex 100, abd 80, IR to L iliac crest, ER 25  R shoulder instruction    Education or treatment limitation: None  Rehab Potential: good        Patient was advised of these findings, precautions, and treatment options and has agreed to actively participate in planning and for this course of care.     Thank you for

## 2019-04-25 ENCOUNTER — APPOINTMENT (OUTPATIENT)
Dept: PHYSICAL THERAPY | Facility: HOSPITAL | Age: 84
End: 2019-04-25
Attending: ORTHOPAEDIC SURGERY
Payer: MEDICARE

## 2019-04-25 RX ORDER — AMLODIPINE BESYLATE 5 MG/1
TABLET ORAL
Qty: 90 TABLET | Refills: 2 | Status: SHIPPED | OUTPATIENT
Start: 2019-04-25 | End: 2020-01-20

## 2019-04-25 RX ORDER — ALLOPURINOL 100 MG/1
TABLET ORAL
Qty: 90 TABLET | Refills: 2 | Status: SHIPPED | OUTPATIENT
Start: 2019-04-25 | End: 2020-01-17

## 2019-04-25 NOTE — TELEPHONE ENCOUNTER
Dr Beata Santiago, Please advise on refill request. Amlodipine as external reported. Called patient, he verified is taking 5 mg daily.     Hypertensive Medications  Protocol Criteria:  · Appointment scheduled in the past 6 months or in the next 3 months  · BMP Component Value Date    GLU 82 04/12/2019    BUN 19 (H) 04/12/2019    CREATSERUM 0.65 (L) 04/12/2019    BUNCREA 29.2 (H) 04/12/2019    GFRNAA 91 04/12/2019    GFRAA 105 04/12/2019    CA 8.6 04/12/2019    ALKPHOS 106 (H) 11/11/2014    AST 19 04/12/2019

## 2019-04-29 ENCOUNTER — OFFICE VISIT (OUTPATIENT)
Dept: PHYSICAL THERAPY | Facility: HOSPITAL | Age: 84
End: 2019-04-29
Attending: ORTHOPAEDIC SURGERY
Payer: MEDICARE

## 2019-04-29 PROCEDURE — 97110 THERAPEUTIC EXERCISES: CPT

## 2019-04-29 NOTE — PROGRESS NOTES
Diagnosis: Other closed displaced fracture of proximal end of right humerus with routine healing, subsequent encounter (R02.801D)     Authorized # of Visits:  12   (MEDICARE SAL WILSON PPO)      Next MD visit: none scheduled  Fall Risk: standard

## 2019-05-02 ENCOUNTER — OFFICE VISIT (OUTPATIENT)
Dept: PHYSICAL THERAPY | Facility: HOSPITAL | Age: 84
End: 2019-05-02
Attending: ORTHOPAEDIC SURGERY
Payer: MEDICARE

## 2019-05-02 PROCEDURE — 97110 THERAPEUTIC EXERCISES: CPT

## 2019-05-02 NOTE — PROGRESS NOTES
Diagnosis: Other closed displaced fracture of proximal end of right humerus with routine healing, subsequent encounter (I91.118W)     Authorized # of Visits:  12   (MEDICARE SETVE, SAL PPO)      Next MD visit: none scheduled  Fall Risk: standard 80, ER 25 to be able to don a jacket and reach into a low cabinet. 3. Patient will demo R shoulder strength at least 3+/5 to be able to lift a small bag of groceries  4.  Patient will demo sufficient R shoulder mobility to be able to perform light cleaning

## 2019-05-06 ENCOUNTER — OFFICE VISIT (OUTPATIENT)
Dept: PHYSICAL THERAPY | Facility: HOSPITAL | Age: 84
End: 2019-05-06
Attending: ORTHOPAEDIC SURGERY
Payer: MEDICARE

## 2019-05-06 PROCEDURE — 97110 THERAPEUTIC EXERCISES: CPT

## 2019-05-06 NOTE — PROGRESS NOTES
Diagnosis: Other closed displaced fracture of proximal end of right humerus with routine healing, subsequent encounter (S77.631X)     Authorized # of Visits:  12   (MEDICARE STEVE, SAL PPO)      Next MD visit: none scheduled  Fall Risk: standard exercises        Assessment: Patient continues to have difficulty with UE elevation against gravity. Required rest between exercises, c/o very tired and fatigued no c/o increased pain. Goals:   1.  Patient will be independent with HEP and it's progressi

## 2019-05-07 ENCOUNTER — OFFICE VISIT (OUTPATIENT)
Dept: INTERNAL MEDICINE CLINIC | Facility: CLINIC | Age: 84
End: 2019-05-07
Payer: MEDICARE

## 2019-05-07 ENCOUNTER — NURSE TRIAGE (OUTPATIENT)
Dept: OTHER | Age: 84
End: 2019-05-07

## 2019-05-07 VITALS
OXYGEN SATURATION: 97 % | RESPIRATION RATE: 18 BRPM | HEART RATE: 80 BPM | BODY MASS INDEX: 28.28 KG/M2 | HEIGHT: 66 IN | SYSTOLIC BLOOD PRESSURE: 121 MMHG | WEIGHT: 176 LBS | DIASTOLIC BLOOD PRESSURE: 75 MMHG | TEMPERATURE: 98 F

## 2019-05-07 DIAGNOSIS — L98.9 BUMPS ON SKIN: Primary | ICD-10-CM

## 2019-05-07 PROCEDURE — G0463 HOSPITAL OUTPT CLINIC VISIT: HCPCS | Performed by: INTERNAL MEDICINE

## 2019-05-07 PROCEDURE — 99213 OFFICE O/P EST LOW 20 MIN: CPT | Performed by: INTERNAL MEDICINE

## 2019-05-07 NOTE — PROGRESS NOTES
Patient ID: Heidi Marquez is a 81year old male. Patient presents with:  Lump: has a lump on bilateral palms, feel like blisters, but they are not draining nor feel like they will. Denies pain.         HISTORY OF PRESENT ILLNESS:   HPI  Patient presents Performed by Ivonne Sage MD at [address] [place] [address] MAIN OR   • KNEE ARTHROSCOPY Bilateral     per NG   • OTHER Right 07/15/2018    proximal humerus ORIF         Current Outpatient Medications:   •  AMLODIPINE BESYLATE 5 MG Oral Tab, TAKE ONE TABLET BY MOUTH ONE Comment:Other reaction(s): Rash  Pseudoephedrine Hcl         Comment:Other reaction(s): Rash             Pt states he is not allergic to this  Triamterene                 Comment:Other reaction(s): Rash    Social History    Socioeconomic History      Mar Reaction to local anesthetic: No    Social History Narrative      Not on file          PHYSICAL EXAM:      05/07/19  1437   BP: 121/75   Pulse: 80   Resp: 18   Temp: 97.8 °F (36.6 °C)   TempSrc: Oral   SpO2: 97%   Weight: 176 lb (79.8 kg)   Height: 5

## 2019-05-07 NOTE — TELEPHONE ENCOUNTER
Action Requested: Summary for Provider     []  Critical Lab, Recommendations Needed  [] Need Additional Advice  []   FYI    []   Need Orders  [] Need Medications Sent to Pharmacy  []  Other     SUMMARY: Appointment made with Dr Rosa Harman today 5/7/19 at 2:45

## 2019-05-09 ENCOUNTER — OFFICE VISIT (OUTPATIENT)
Dept: PHYSICAL THERAPY | Facility: HOSPITAL | Age: 84
End: 2019-05-09
Attending: ORTHOPAEDIC SURGERY
Payer: MEDICARE

## 2019-05-09 PROCEDURE — 97110 THERAPEUTIC EXERCISES: CPT

## 2019-05-09 NOTE — PROGRESS NOTES
Diagnosis: Other closed displaced fracture of proximal end of right humerus with routine healing, subsequent encounter (U22.643S)     Authorized # of Visits:  12   (MEDICARE STEVE, SAL PPO)      Next MD visit: none scheduled  Fall Risk: standard Assessment: PROM improving however no change in AROM against gravity. Fatigues quickly with RTC strengthening. Goals:   1. Patient will be independent with HEP and it's progression  2.  Patient will demo R shoulder AROM at least: flex/abd 80, ER 2

## 2019-05-10 NOTE — TELEPHONE ENCOUNTER
Instructions to send to on call as PVR out of office     Controlled medication pending for review. If approved needs to be called in or faxed by on-site staff.     Requested Prescriptions     Pending Prescriptions Disp Refills   • TRAMADOL HCL 50 MG Oral T

## 2019-05-13 ENCOUNTER — OFFICE VISIT (OUTPATIENT)
Dept: PHYSICAL THERAPY | Facility: HOSPITAL | Age: 84
End: 2019-05-13
Attending: ORTHOPAEDIC SURGERY
Payer: MEDICARE

## 2019-05-13 PROCEDURE — 97110 THERAPEUTIC EXERCISES: CPT

## 2019-05-13 RX ORDER — TRAMADOL HYDROCHLORIDE 50 MG/1
TABLET ORAL
Qty: 60 TABLET | Refills: 0 | Status: SHIPPED
Start: 2019-05-13 | End: 2019-06-05

## 2019-05-13 NOTE — PROGRESS NOTES
Diagnosis: Other closed displaced fracture of proximal end of right humerus with routine healing, subsequent encounter (E79.010U)     Authorized # of Visits:  12   (MEDICARE SAL WILSON PPO)      Next MD visit: none scheduled  Fall Risk: standard Assessment: Fatigues quickly with RTC strengthening. Patient c/o pain with exercises today, required frequent rest with exercises. Goals:   1. Patient will be independent with HEP and it's progression  2.  Patient will demo R shoulder AROM at leas

## 2019-05-16 ENCOUNTER — OFFICE VISIT (OUTPATIENT)
Dept: PHYSICAL THERAPY | Facility: HOSPITAL | Age: 84
End: 2019-05-16
Attending: ORTHOPAEDIC SURGERY
Payer: MEDICARE

## 2019-05-16 PROCEDURE — 97110 THERAPEUTIC EXERCISES: CPT

## 2019-05-16 NOTE — PROGRESS NOTES
Diagnosis: Other closed displaced fracture of proximal end of right humerus with routine healing, subsequent encounter (X10.381C)     Authorized # of Visits:  12   (MEDICARE SAL WILSON PPO)      Next MD visit: none scheduled  Fall Risk: standard shoulder AROM at least: flex/abd 80, ER 25 to be able to don a jacket and reach into a low cabinet. 3. Patient will demo R shoulder strength at least 3+/5 to be able to lift a small bag of groceries  4.  Patient will demo sufficient R shoulder mobility to

## 2019-05-21 ENCOUNTER — OFFICE VISIT (OUTPATIENT)
Dept: PHYSICAL THERAPY | Facility: HOSPITAL | Age: 84
End: 2019-05-21
Attending: ORTHOPAEDIC SURGERY
Payer: MEDICARE

## 2019-05-21 PROCEDURE — 97110 THERAPEUTIC EXERCISES: CPT

## 2019-05-21 NOTE — PROGRESS NOTES
Diagnosis: Other closed displaced fracture of proximal end of right humerus with routine healing, subsequent encounter (G11.137D)     Authorized # of Visits:  12   (MEDICARE Boone Oman PPO)      Next MD visit: July 2019  Fall Risk: standard         Pre LUE pain. Improved activation of external rotators. Goals:   1. Patient will be independent with HEP and it's progression  2. Patient will demo R shoulder AROM at least: flex/abd 80, ER 25 to be able to don a jacket and reach into a low cabinet.   3. Pa

## 2019-06-03 ENCOUNTER — OFFICE VISIT (OUTPATIENT)
Dept: PHYSICAL THERAPY | Facility: HOSPITAL | Age: 84
End: 2019-06-03
Attending: ORTHOPAEDIC SURGERY
Payer: MEDICARE

## 2019-06-03 PROCEDURE — 97110 THERAPEUTIC EXERCISES: CPT

## 2019-06-03 NOTE — PROGRESS NOTES
Diagnosis: Other closed displaced fracture of proximal end of right humerus with routine healing, subsequent encounter (N14.710Y)     Authorized # of Visits:  12   (MEDICARE Ruddy Chahal PPO)      Next MD visit: July 2019  Fall Risk: standard         Pre RUE after treatment. Improved activation of external rotators. Goals:   1. Patient will be independent with HEP and it's progression  2.  Patient will demo R shoulder AROM at least: flex/abd 80, ER 25 to be able to don a jacket and reach into a low cabi

## 2019-06-05 NOTE — TELEPHONE ENCOUNTER
Controlled medication pending for review. If approved needs to be called in or faxed by on-site staff.     Last Rx: 5/13/19  LOV: 1/3/19    Requested Prescriptions   Pending Prescriptions Disp Refills   • TRAMADOL HCL 50 MG Oral Tab [Pharmacy Med Name: Rick Bhardwaj

## 2019-06-06 ENCOUNTER — OFFICE VISIT (OUTPATIENT)
Dept: PHYSICAL THERAPY | Facility: HOSPITAL | Age: 84
End: 2019-06-06
Attending: ORTHOPAEDIC SURGERY
Payer: MEDICARE

## 2019-06-06 PROCEDURE — 97110 THERAPEUTIC EXERCISES: CPT

## 2019-06-06 RX ORDER — TRAMADOL HYDROCHLORIDE 50 MG/1
TABLET ORAL
Qty: 60 TABLET | Refills: 0 | OUTPATIENT
Start: 2019-06-06 | End: 2019-07-04

## 2019-06-06 NOTE — TELEPHONE ENCOUNTER
Clinical site staff please call in Rx for patient.  Please respond to pool: EM [facility] LPN/CMA

## 2019-06-06 NOTE — PROGRESS NOTES
Diagnosis: Other closed displaced fracture of proximal end of right humerus with routine healing, subsequent encounter (P22.861Q)     Authorized # of Visits:  12   (MEDICARE Merla Hoyer PPO)      Next MD visit: July 2019  Fall Risk: standard         Pre c/o right shoulder pain after treatment, c/o tired and fatigued RUE after treatment. UE elevation against gravity remains limited. Improved left shoulder ROM. Per patient I am able to move my right arm slightly better. Goals:   1.  Patient will be indep

## 2019-06-10 ENCOUNTER — TELEPHONE (OUTPATIENT)
Dept: INTERNAL MEDICINE CLINIC | Facility: CLINIC | Age: 84
End: 2019-06-10

## 2019-06-10 NOTE — TELEPHONE ENCOUNTER
Spoke to patient and he states he will make appointment to discuss refill issues. I asked him if he wanted me to assist him with an appointment and he stated no cause he has a busy schedule.  He stated that if he cant get more refills he will discuss other

## 2019-06-10 NOTE — TELEPHONE ENCOUNTER
Have patient see me in next month and we can redo refill on tramadol in p[erson. 6 refills is too many on controlled substance.

## 2019-06-11 ENCOUNTER — OFFICE VISIT (OUTPATIENT)
Dept: PHYSICAL THERAPY | Facility: HOSPITAL | Age: 84
End: 2019-06-11
Attending: ORTHOPAEDIC SURGERY
Payer: MEDICARE

## 2019-06-11 PROCEDURE — 97110 THERAPEUTIC EXERCISES: CPT

## 2019-06-11 NOTE — PROGRESS NOTES
PROGRESS SUMMARY  Diagnosis: Other closed displaced fracture of proximal end of right humerus with routine healing, subsequent encounter (B15.981C)     Authorized # of Visits:  12   (MEDICARE Tharon Never PPO)      Next MD visit: July 2019  Fall Risk: st 6/03/19  Tx#: 9/12 Date: 6/06/19  Tx#: 10/12 Date: 6/11/19  Tx#: 11/12   EX:     PROM R shoulder all planes x 10 min  ER isometrics 20x5\" ea AAS, 45 and 90 deg abd in supine  Supine R shoulder flexion AAROM with therapist assist 2x10  Supine chest press w

## 2019-06-13 ENCOUNTER — OFFICE VISIT (OUTPATIENT)
Dept: PHYSICAL THERAPY | Facility: HOSPITAL | Age: 84
End: 2019-06-13
Attending: ORTHOPAEDIC SURGERY
Payer: MEDICARE

## 2019-06-13 PROCEDURE — 97110 THERAPEUTIC EXERCISES: CPT

## 2019-06-13 NOTE — PROGRESS NOTES
Diagnosis: Other closed displaced fracture of proximal end of right humerus with routine healing, subsequent encounter (B45.044Q)     Authorized # of Visits:  12   (MEDICARE Rozann Pfeiffer PPO)      Next MD visit: July 2019  Fall Risk: standard         Pre row/sh ext 20x ea with RTB               Assessment: Patient continues to have significant difficulty with UE elevation against gravity with shrug sign present upon initiation of movement.  Patient easily tired and fatigued with exercises required rest.

## 2019-06-18 ENCOUNTER — OFFICE VISIT (OUTPATIENT)
Dept: PHYSICAL THERAPY | Facility: HOSPITAL | Age: 84
End: 2019-06-18
Attending: ORTHOPAEDIC SURGERY
Payer: MEDICARE

## 2019-06-18 PROCEDURE — 97110 THERAPEUTIC EXERCISES: CPT

## 2019-06-18 NOTE — PROGRESS NOTES
Diagnosis: Other closed displaced fracture of proximal end of right humerus with routine healing, subsequent encounter (G87.436Y)     Authorized # of Visits:  12   (MEDICARE Lonnie Gamino PPO)      Next MD visit: July 2019  Fall Risk: standard         Pre to have significant difficulty with UE elevation against gravity with shrug sign present upon initiation of movement. Improved right shoulder PROM with AROM patient UE elevation.   Patient easily tired and fatigued with exercises required rest.     Goals:

## 2019-06-20 ENCOUNTER — OFFICE VISIT (OUTPATIENT)
Dept: PHYSICAL THERAPY | Facility: HOSPITAL | Age: 84
End: 2019-06-20
Attending: ORTHOPAEDIC SURGERY
Payer: MEDICARE

## 2019-06-20 PROCEDURE — 97110 THERAPEUTIC EXERCISES: CPT

## 2019-06-20 NOTE — PROGRESS NOTES
Diagnosis: Other closed displaced fracture of proximal end of right humerus with routine healing, subsequent encounter (L87.043S)     Authorized # of Visits:  20   (MEDICARE Boone Oman PPO)      Next MD visit: July 2019  Fall Risk: standard         Pre jacket. Goals:   1. Patient will be independent with HEP and it's progression  2. Patient will demo R shoulder AROM at least: flex/abd 80, ER 25 to be able to don a jacket and reach into a low cabinet.   3. Patient will demo R shoulder strength at least

## 2019-06-22 RX ORDER — METOPROLOL SUCCINATE 25 MG/1
TABLET, EXTENDED RELEASE ORAL
Qty: 90 TABLET | Refills: 1 | Status: SHIPPED | OUTPATIENT
Start: 2019-06-22 | End: 2020-01-03

## 2019-06-22 NOTE — TELEPHONE ENCOUNTER
Refill passed per [facility] protocol.   Hypertensive Medications  Protocol Criteria:  · Appointment scheduled in the past 6 months or in the next 3 months  · BMP or CMP in the past 12 months  · Creatinine result < 2  Recent Outpatient Visits

## 2019-07-02 ENCOUNTER — OFFICE VISIT (OUTPATIENT)
Dept: INTERNAL MEDICINE CLINIC | Facility: CLINIC | Age: 84
End: 2019-07-02
Payer: MEDICARE

## 2019-07-02 VITALS
SYSTOLIC BLOOD PRESSURE: 131 MMHG | RESPIRATION RATE: 16 BRPM | HEIGHT: 66 IN | HEART RATE: 65 BPM | WEIGHT: 175 LBS | DIASTOLIC BLOOD PRESSURE: 73 MMHG | BODY MASS INDEX: 28.13 KG/M2

## 2019-07-02 DIAGNOSIS — N40.1 BENIGN PROSTATIC HYPERPLASIA WITH URINARY FREQUENCY: ICD-10-CM

## 2019-07-02 DIAGNOSIS — J43.1 PANLOBULAR EMPHYSEMA (HCC): ICD-10-CM

## 2019-07-02 DIAGNOSIS — I10 ESSENTIAL HYPERTENSION WITH GOAL BLOOD PRESSURE LESS THAN 140/90: Primary | ICD-10-CM

## 2019-07-02 DIAGNOSIS — R35.0 BENIGN PROSTATIC HYPERPLASIA WITH URINARY FREQUENCY: ICD-10-CM

## 2019-07-02 PROCEDURE — 99214 OFFICE O/P EST MOD 30 MIN: CPT | Performed by: INTERNAL MEDICINE

## 2019-07-02 PROCEDURE — G0463 HOSPITAL OUTPT CLINIC VISIT: HCPCS | Performed by: INTERNAL MEDICINE

## 2019-07-02 NOTE — PROGRESS NOTES
Omi Faye is a 81year old male. 1. Essential hypertension with goal blood pressure less than 140/90    Patient has been following low salt diet and has been taking anti-hypertensive prescriptions as prescribed.  Blood pressure has been checked and i Multiple Vitamins-Minerals (EYE VITAMINS) Oral Cap Take 1 tablet by mouth 2 (two) times daily. 1 tab daily  Disp:  Rfl:    cholecalciferol (D 1000) 1000 UNITS Oral Cap Take 1,000 Units by mouth nightly.  Vitamin D3 1,000 unit capsule  Disp:  Rfl:    Multi adenopathy,no bruits  LUNGS: clear to auscultation  CARDIO: RRR without murmur  GI: good BS's,no masses, HSM or tenderness  EXTREMITIES: no cyanosis, clubbing . Has some edema of left lower extremities. Left knee with arthritis.     ASSESSMENT AND PLAN:

## 2019-07-04 NOTE — TELEPHONE ENCOUNTER
Controlled medication pending for review. If approved needs to be called in or faxed by on-site staff.     Requested Prescriptions     Pending Prescriptions Disp Refills   • TRAMADOL HCL 50 MG Oral Tab [Pharmacy Med Name: Tramadol Hcl 50 Mg Tab Clair] 60 ta

## 2019-07-05 RX ORDER — TRAMADOL HYDROCHLORIDE 50 MG/1
TABLET ORAL
Qty: 60 TABLET | Refills: 0 | Status: SHIPPED
Start: 2019-07-05 | End: 2019-07-25

## 2019-07-08 ENCOUNTER — APPOINTMENT (OUTPATIENT)
Dept: PHYSICAL THERAPY | Facility: HOSPITAL | Age: 84
End: 2019-07-08
Attending: ORTHOPAEDIC SURGERY
Payer: MEDICARE

## 2019-07-08 PROCEDURE — 97110 THERAPEUTIC EXERCISES: CPT

## 2019-07-08 RX ORDER — TRAMADOL HYDROCHLORIDE 50 MG/1
TABLET ORAL
Qty: 60 TABLET | Refills: 0 | OUTPATIENT
Start: 2019-07-08

## 2019-07-08 NOTE — TELEPHONE ENCOUNTER
Medication called into [address] [place] [address] and spoke with pharmacist: Anastasia Aguirre.     Medication Quantity Refills Start End   TRAMADOL HCL 50 MG Oral Tab 60 tablet 0 7/5/2019    Sig:   TAKE ONE TABLET BY MOUTH EVERY EIGHT HOURS AS NEEDED FOR PAIN      Route:   (none

## 2019-07-08 NOTE — PROGRESS NOTES
Diagnosis: Other closed displaced fracture of proximal end of right humerus with routine healing, subsequent encounter (V11.273K)     Authorized # of Visits:  20   (MEDICARE Fern Passy PPO)      Next MD visit: July 2019  Fall Risk: standard         Pre 3#               Assessment: Patient reporting functional improvements with increased ease with donning his shirt and improved daily activity. Goals:   1. Patient will be independent with HEP and it's progression  2.  Patient will demo R shoulder AROM a

## 2019-07-11 ENCOUNTER — APPOINTMENT (OUTPATIENT)
Dept: PHYSICAL THERAPY | Facility: HOSPITAL | Age: 84
End: 2019-07-11
Attending: ORTHOPAEDIC SURGERY
Payer: MEDICARE

## 2019-07-11 PROCEDURE — 97110 THERAPEUTIC EXERCISES: CPT

## 2019-07-11 NOTE — PROGRESS NOTES
Diagnosis: Other closed displaced fracture of proximal end of right humerus with routine healing, subsequent encounter (O58.531J)     Authorized # of Visits:  20   (MEDICARE Coretta Berke PPO)      Next MD visit: July 2019  Fall Risk: standard         Pre Seated B ER 2x10  Seated ER with RTB 2x10  Seated narrow row/sh ext 20x 2ea with BTB                  Assessment: Patient reporting functional improvements with increased ease with donning his shirt and improved daily activity. Goals:   1.  Patient wi

## 2019-07-16 ENCOUNTER — OFFICE VISIT (OUTPATIENT)
Dept: PHYSICAL THERAPY | Facility: HOSPITAL | Age: 84
End: 2019-07-16
Attending: ORTHOPAEDIC SURGERY
Payer: MEDICARE

## 2019-07-16 PROCEDURE — 97110 THERAPEUTIC EXERCISES: CPT

## 2019-07-16 NOTE — PROGRESS NOTES
Diagnosis: Other closed displaced fracture of proximal end of right humerus with routine healing, subsequent encounter (N76.216K)     Authorized # of Visits:  20   (MEDICARE Boone [place] PPO)      Next MD visit: July 2019  Fall Risk: standard         Pre press with cane x 20  -Supine ER with RTB 2 x10    -Seated scap retractions 20x5\"   -Seated B ER 2x10  -Seated ER with RTB 2x10  -Seated narrow row/sh ext 20x 2ea with BTB               Assessment: Patient slowly increased functional activity, continued c

## 2019-07-22 ENCOUNTER — OFFICE VISIT (OUTPATIENT)
Dept: PHYSICAL THERAPY | Facility: HOSPITAL | Age: 84
End: 2019-07-22
Attending: ORTHOPAEDIC SURGERY
Payer: MEDICARE

## 2019-07-22 PROCEDURE — 97110 THERAPEUTIC EXERCISES: CPT

## 2019-07-22 NOTE — PROGRESS NOTES
Diagnosis: Other closed displaced fracture of proximal end of right humerus with routine healing, subsequent encounter (Y24.220E)     Authorized # of Visits:  20   (MEDICARE Jeb Fisherman PPO)      Next MD visit: July 2019  Fall Risk: standard         Pre x10    -Seated scap retractions 20x5\"   -Seated B ER 2x10  -Seated ER with RTB 2x10  -Seated narrow row/sh ext 20x 2 ea with RTB                  Assessment: Patient slowly increased functional activity, continued c/o left shoulder pain with exercises, no

## 2019-07-25 ENCOUNTER — OFFICE VISIT (OUTPATIENT)
Dept: PHYSICAL THERAPY | Facility: HOSPITAL | Age: 84
End: 2019-07-25
Attending: ORTHOPAEDIC SURGERY
Payer: MEDICARE

## 2019-07-25 PROCEDURE — 97110 THERAPEUTIC EXERCISES: CPT

## 2019-07-25 RX ORDER — TRAMADOL HYDROCHLORIDE 50 MG/1
TABLET ORAL
Qty: 60 TABLET | Refills: 1 | OUTPATIENT
Start: 2019-07-25 | End: 2020-02-07

## 2019-07-25 NOTE — PROGRESS NOTES
Progress/Discharge Summary  Pt has attended 19 visits in Physical Therapy.      Diagnosis: Other closed displaced fracture of proximal end of right humerus with routine healing, subsequent encounter (O10.618C)     Authorized # of Visits:  20   (MEDICARE R independently. - MET      Plan: Recommend discharge to HEP      Patient was advised of these findings, precautions, and treatment options and has agreed to actively participate in planning and for this course of care.     Thank you for your referral. If you min

## 2019-07-29 ENCOUNTER — APPOINTMENT (OUTPATIENT)
Dept: GENERAL RADIOLOGY | Facility: HOSPITAL | Age: 84
End: 2019-07-29
Attending: EMERGENCY MEDICINE
Payer: MEDICARE

## 2019-07-29 ENCOUNTER — HOSPITAL ENCOUNTER (EMERGENCY)
Facility: HOSPITAL | Age: 84
Discharge: HOME | End: 2019-07-29
Attending: EMERGENCY MEDICINE
Payer: MEDICARE

## 2019-07-29 ENCOUNTER — APPOINTMENT (OUTPATIENT)
Dept: CT IMAGING | Facility: HOSPITAL | Age: 84
End: 2019-07-29
Attending: EMERGENCY MEDICINE
Payer: MEDICARE

## 2019-07-29 VITALS
SYSTOLIC BLOOD PRESSURE: 142 MMHG | HEART RATE: 82 BPM | DIASTOLIC BLOOD PRESSURE: 78 MMHG | HEIGHT: 66 IN | WEIGHT: 175 LBS | OXYGEN SATURATION: 96 % | BODY MASS INDEX: 28.13 KG/M2 | TEMPERATURE: 98 F | RESPIRATION RATE: 18 BRPM

## 2019-07-29 DIAGNOSIS — S01.01XA SCALP LACERATION, INITIAL ENCOUNTER: ICD-10-CM

## 2019-07-29 DIAGNOSIS — S09.90XA INJURY OF HEAD, INITIAL ENCOUNTER: ICD-10-CM

## 2019-07-29 DIAGNOSIS — S42.032A CLOSED DISPLACED FRACTURE OF ACROMIAL END OF LEFT CLAVICLE, INITIAL ENCOUNTER: ICD-10-CM
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
## 2019-07-29 PROCEDURE — 73030 X-RAY EXAM OF SHOULDER: CPT | Performed by: EMERGENCY MEDICINE

## 2019-07-29 PROCEDURE — 23500 CLTX CLAVICULAR FX W/O MNPJ: CPT

## 2019-07-29 PROCEDURE — 12013 RPR F/E/E/N/L/M 2.6-5.0 CM: CPT

## 2019-07-29 PROCEDURE — 70450 CT HEAD/BRAIN W/O DYE: CPT | Performed by: EMERGENCY MEDICINE

## 2019-07-29 PROCEDURE — 99284 EMERGENCY DEPT VISIT MOD MDM: CPT

## 2019-07-29 RX ORDER — ACETAMINOPHEN 500 MG
1000 TABLET ORAL ONCE
Status: COMPLETED | OUTPATIENT
Start: 2019-07-29 | End: 2019-07-29

## 2019-07-29 NOTE — ED NOTES
Jamar with vision, xray of left shoulder-- lateral clavicle fracture, possible humeral neck- appears irregular with osteopenia, degenerative vs. Impaction fracture (nondisplaced)- follow up CT if clinically indicated.

## 2019-07-29 NOTE — CM/SW NOTE
Spoke with Rocky Gutierrez she is going to arrange [facility] for patient for discharge transportation. PCS form completed, original given to Rocky Gutierrez, copy given to registration to scan, second copy placed in [facility] completed PCS file.

## 2019-07-29 NOTE — ED NOTES
Pt states he is having some pain in his shoulder. 1,000mg of tylenol ordered per Dr. Betty Gonzalez.

## 2019-07-29 NOTE — ED NOTES
Pt presents to ED for a head lac. Per pt he rolled out of bed and hit his head. Bleeding uncontrolled upon ED arrival. Pt denies LOC. Dr. Rik Alvarez at bedside stitching pt. 10 stiches in place. Pt A&Ox4. Pt also complaining of L shoulder pain.

## 2019-07-29 NOTE — ED NOTES
[facility] at pt bedside. Discharge instructions reviewed with pt. Pt denies any further questions at this time.

## 2019-07-29 NOTE — ED INITIAL ASSESSMENT (HPI)
Pt arrives via EMS for c/o fall with head injury, laceration to forehead that continues to bleed despite pressure dressing via EMS. Patient lives alone, denies LOC, reports L shoulder pain from hitting the floor.  Janet Dang RN at bedside, holding pressure on l

## 2019-07-29 NOTE — ED NOTES
Per Dr. Celestino De La Fuente ok to call for a medicar. [facility] called for medicar, ETA is 1 hour. PT aware that Jessica Ballard will be $39. Pt requesting to be billed.

## 2019-07-30 NOTE — ED PROVIDER NOTES
Patient Seen in: [facility] AND [facility] Emergency Department    History   Patient presents with:  Fall (musculoskeletal, neurologic)  Laceration Abrasion (integumentary)  Head Neck Injury (neurologic, musculoskeletal)      HPI    Presents to the ED complainin History      Marital status:        Spouse name: Not on file      Number of children: Not on file      Years of education: Not on file      Highest education level: Not on file    Tobacco Use      Smoking status: Former Smoker        Types: Cigarette the left shoulder. Neurological: He is alert and oriented to person, place, and time. Skin: Skin is warm and dry. Psychiatric: He has a normal mood and affect. His behavior is normal.   Nursing note and vitals reviewed.       ED Course      Labs Revie 84 82   Resp: 18  18 18   Temp: 98.2 °F (36.8 °C)      TempSrc: Oral      SpO2: 92% 94% 95% 96%   Weight: 79.4 kg      Height: 167.6 cm (5' 6\")        *I personally reviewed and interpreted all ED vitals.     Pulse Ox: 96%, Room air, Normal     Monitor Int [address]  [phone]    Schedule an appointment as soon as possible for a visit in 1 week  For suture removal      Medications Prescribed:  Discharge Medication List as of 7/29/2019  6:35 AM

## 2019-08-01 ENCOUNTER — OFFICE VISIT (OUTPATIENT)
Dept: ORTHOPEDICS CLINIC | Facility: CLINIC | Age: 84
End: 2019-08-01
Payer: MEDICARE

## 2019-08-01 ENCOUNTER — TELEPHONE (OUTPATIENT)
Dept: INTERNAL MEDICINE CLINIC | Facility: CLINIC | Age: 84
End: 2019-08-01

## 2019-08-01 DIAGNOSIS — S42.035A CLOSED NONDISPLACED FRACTURE OF ACROMIAL END OF LEFT CLAVICLE, INITIAL ENCOUNTER: Primary | ICD-10-CM

## 2019-08-01 PROCEDURE — 99214 OFFICE O/P EST MOD 30 MIN: CPT | Performed by: ORTHOPAEDIC SURGERY

## 2019-08-01 PROCEDURE — 23500 CLTX CLAVICULAR FX W/O MNPJ: CPT | Performed by: ORTHOPAEDIC SURGERY

## 2019-08-01 PROCEDURE — G0463 HOSPITAL OUTPT CLINIC VISIT: HCPCS | Performed by: ORTHOPAEDIC SURGERY

## 2019-08-01 NOTE — TELEPHONE ENCOUNTER
Mr. Zhanna Ascencio called to inform and discuss the followin. Pt had sustained a FALL on , visited [facility] and suffered a Fx of Collarbone and some bleeding [No need to speak to Triage since in touch with Ortho office]    2. Tanmay Kelly has concerns because in the last 18 MOS pt have had 3 other previous bone injuries. [address] [place] [address] is in touch with the Orthopedic surgeon's office and per their suggestion is calling PCP Nitin Andres to [facility] Situation of the patient. Please Call Tanmay Kelly (he is both on THOMAS form and has Power of [employer]akes) for the next 2 weeks he can only be reached on his mobile number.

## 2019-08-01 NOTE — PROGRESS NOTES
8/1/2019  Kalani Tay  7/24/1935  81year old   male  Asia Dudley MD    HPI:   Patient presents with:   Injury: Left clavicle - onset 7/29/19 when he fell off his bed in the night - wass in ER and has x-rays in the system - was told he has a fx - h hours as needed. Disp: 30 tablet Rfl: 0   Ferrous Gluconate 324 (37.5 Fe) MG Oral Tab Take 1 tablet (324 mg total) by mouth 2 (two) times daily.  Disp: 60 tablet Rfl: 0   tamsulosin HCl 0.4 MG Oral Cap TAKE 1 CAPSULE BY MOUTH ONE-HALF HOUR AFTER SUPPER ALYSHA Mother         per NG   • Diabetes Neg    • Glaucoma Neg    • Macular degeneration Neg       Social History    Tobacco Use      Smoking status: Former Smoker        Types: Cigarettes        Quit date: 1985        Years since quittin.6      Smokele non-operative treatment were discussed with the patient. He will rest in a sling and start early active motion as tolerated     All of their questions were answered and they are in agreement with the treatment plan.     The patient will return to clinic

## 2019-08-07 ENCOUNTER — OFFICE VISIT (OUTPATIENT)
Dept: INTERNAL MEDICINE CLINIC | Facility: CLINIC | Age: 84
End: 2019-08-07
Payer: MEDICARE

## 2019-08-07 VITALS
DIASTOLIC BLOOD PRESSURE: 80 MMHG | BODY MASS INDEX: 28.61 KG/M2 | SYSTOLIC BLOOD PRESSURE: 121 MMHG | HEIGHT: 66 IN | WEIGHT: 178 LBS | RESPIRATION RATE: 16 BRPM

## 2019-08-07 DIAGNOSIS — J43.1 PANLOBULAR EMPHYSEMA (HCC): ICD-10-CM
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
## 2019-08-07 DIAGNOSIS — I10 ESSENTIAL HYPERTENSION WITH GOAL BLOOD PRESSURE LESS THAN 140/90: ICD-10-CM

## 2019-08-07 PROCEDURE — G0463 HOSPITAL OUTPT CLINIC VISIT: HCPCS | Performed by: INTERNAL MEDICINE

## 2019-08-07 PROCEDURE — 99214 OFFICE O/P EST MOD 30 MIN: CPT | Performed by: INTERNAL MEDICINE

## 2019-08-13 NOTE — TELEPHONE ENCOUNTER
Shani Herndon from [facility] call pt need a refill on medication was getting from old  and he told Pharmacy to contact new Dr. Jose Martin Raya.     tamsulosin HCl 0.4 MG Oral Cap    Please advise

## 2019-08-14 RX ORDER — TAMSULOSIN HYDROCHLORIDE 0.4 MG/1
CAPSULE ORAL
Qty: 90 CAPSULE | Refills: 1 | Status: SHIPPED | OUTPATIENT
Start: 2019-08-14 | End: 2020-02-10

## 2019-08-14 NOTE — TELEPHONE ENCOUNTER
Review pended refill request as it does not fall under a protocol.   Requested Prescriptions     Pending Prescriptions Disp Refills   • tamsulosin HCl 0.4 MG Oral Cap 90 capsule 3     Sig: TAKE 1 CAPSULE BY MOUTH ONE-HALF HOUR AFTER SUPPER DAILY         Rec

## 2019-08-15 ENCOUNTER — OFFICE VISIT (OUTPATIENT)
Dept: ORTHOPEDICS CLINIC | Facility: CLINIC | Age: 84
End: 2019-08-15
Payer: MEDICARE

## 2019-08-15 ENCOUNTER — HOSPITAL ENCOUNTER (OUTPATIENT)
Dept: GENERAL RADIOLOGY | Facility: HOSPITAL | Age: 84
Discharge: HOME | End: 2019-08-15
Attending: ORTHOPAEDIC SURGERY | Admitting: ORTHOPAEDIC SURGERY
Payer: MEDICARE

## 2019-08-15 DIAGNOSIS — S42.035D CLOSED NONDISPLACED FRACTURE OF ACROMIAL END OF LEFT CLAVICLE WITH ROUTINE HEALING, SUBSEQUENT ENCOUNTER: ICD-10-CM

## 2019-08-15 DIAGNOSIS — Z47.89 ORTHOPEDIC AFTERCARE: Primary | ICD-10-CM

## 2019-08-15 DIAGNOSIS — Z47.89 ORTHOPEDIC AFTERCARE: ICD-10-CM

## 2019-08-15 PROCEDURE — G0463 HOSPITAL OUTPT CLINIC VISIT: HCPCS | Performed by: ORTHOPAEDIC SURGERY

## 2019-08-15 PROCEDURE — 73000 X-RAY EXAM OF COLLAR BONE: CPT | Performed by: ORTHOPAEDIC SURGERY

## 2019-08-15 PROCEDURE — 99024 POSTOP FOLLOW-UP VISIT: CPT | Performed by: ORTHOPAEDIC SURGERY

## 2019-08-15 NOTE — PROGRESS NOTES
8/15/2019  [facility]  7/24/1935  81year old   male  Indra Marinelli MD    HPI:   Patient presents with:  Fracture: left clavicle -- Rates pain 3-4/10 with stretching and movement. Right handed.        The patient complains of pain located left shoul clinical and radiographic evaluation with 2 views of his left clavicle. No orders of the defined types were placed in this encounter.

## 2019-09-05 ENCOUNTER — MED REC SCAN ONLY (OUTPATIENT)
Dept: INTERNAL MEDICINE CLINIC | Facility: CLINIC | Age: 84
End: 2019-09-05

## 2019-10-10 ENCOUNTER — HOSPITAL ENCOUNTER (OUTPATIENT)
Dept: GENERAL RADIOLOGY | Facility: HOSPITAL | Age: 84
Discharge: HOME | End: 2019-10-10
Attending: ORTHOPAEDIC SURGERY | Admitting: ORTHOPAEDIC SURGERY
Payer: MEDICARE

## 2019-10-10 ENCOUNTER — OFFICE VISIT (OUTPATIENT)
Dept: ORTHOPEDICS CLINIC | Facility: CLINIC | Age: 84
End: 2019-10-10
Payer: MEDICARE

## 2019-10-10 VITALS
SYSTOLIC BLOOD PRESSURE: 115 MMHG | BODY MASS INDEX: 28.77 KG/M2 | HEART RATE: 67 BPM | DIASTOLIC BLOOD PRESSURE: 60 MMHG | WEIGHT: 179 LBS | HEIGHT: 66 IN

## 2019-10-10 DIAGNOSIS — M25.562 LEFT KNEE PAIN, UNSPECIFIED CHRONICITY: ICD-10-CM

## 2019-10-10 DIAGNOSIS — M17.12 PRIMARY OSTEOARTHRITIS OF LEFT KNEE: Primary | ICD-10-CM

## 2019-10-10 PROCEDURE — G0463 HOSPITAL OUTPT CLINIC VISIT: HCPCS | Performed by: ORTHOPAEDIC SURGERY

## 2019-10-10 PROCEDURE — 73564 X-RAY EXAM KNEE 4 OR MORE: CPT | Performed by: ORTHOPAEDIC SURGERY

## 2019-10-10 PROCEDURE — 20610 DRAIN/INJ JOINT/BURSA W/O US: CPT | Performed by: ORTHOPAEDIC SURGERY

## 2019-10-10 PROCEDURE — 99213 OFFICE O/P EST LOW 20 MIN: CPT | Performed by: ORTHOPAEDIC SURGERY

## 2019-10-10 RX ORDER — TRIAMCINOLONE ACETONIDE 40 MG/ML
40 INJECTION, SUSPENSION INTRA-ARTICULAR; INTRAMUSCULAR ONCE
Status: COMPLETED | OUTPATIENT
Start: 2019-10-10 | End: 2019-10-10

## 2019-10-10 NOTE — PROGRESS NOTES
NURSING INTAKE COMMENTS: Patient presents with:  Consult: C/o on/off pain on the back of the left knee 4-5 years. Recalls no injuries. Stts he also has noticed instability in the left knee. Denies any swelling, numbness or tingling.   Pain occurs when wa HCl 0.4 MG Oral Cap TAKE 1 CAPSULE BY MOUTH ONE-HALF HOUR AFTER SUPPER DAILY Disp: 90 capsule Rfl: 1   TRAMADOL HCL 50 MG Oral Tab TAKE ONE TABLET BY MOUTH EVERY EIGHT HOURS AS NEEDED FOR PAIN  Disp: 60 tablet Rfl: 1   METOPROLOL SUCCINATE ER 25 MG Oral Ta History      Not on file    Tobacco Use      Smoking status: Former Smoker        Types: Cigarettes        Quit date: 1985        Years since quittin.7      Smokeless tobacco: Never Used    Substance and Sexual Activity      Alcohol use:  Yes maneuvers produce mild use discomfort. No pain with passive range motion of the hip. Neurological: Left foot is neurologically intact light at sensory motor strength testing.     Imaging:   Xr Clavicle, Complete, Left (cpt=73000)    Result Date: 9/14/2019

## 2019-10-10 NOTE — PROGRESS NOTES
Verbal order given to draw up 3 cc 0.5% Marcaine, 2 cc 1% lidocaine, and 1 cc kenalog 40 for a left knee injection.

## 2019-10-22 ENCOUNTER — OFFICE VISIT (OUTPATIENT)
Dept: OPHTHALMOLOGY | Facility: CLINIC | Age: 84
End: 2019-10-22
Payer: MEDICARE

## 2019-10-22 DIAGNOSIS — H43.393 VITREOUS FLOATERS OF BOTH EYES: ICD-10-CM

## 2019-10-22 DIAGNOSIS — H40.003 GLAUCOMA SUSPECT OF BOTH EYES: Primary | ICD-10-CM

## 2019-10-22 DIAGNOSIS — H25.13 AGE-RELATED NUCLEAR CATARACT OF BOTH EYES: ICD-10-CM

## 2019-10-22 DIAGNOSIS — H35.30 AGE-RELATED MACULAR DEGENERATION: ICD-10-CM

## 2019-10-22 PROCEDURE — 92014 COMPRE OPH EXAM EST PT 1/>: CPT | Performed by: OPHTHALMOLOGY

## 2019-10-22 NOTE — PATIENT INSTRUCTIONS
Age-related macular degeneration  Stable; follow up with Dr. Meron Sesay as directed. Age-related nuclear cataract of both eyes  No treatment is recommended on cataracts at this time.       Glaucoma suspect  Discussed with patient that he is a glaucoma tete

## 2019-10-22 NOTE — PROGRESS NOTES
Mick Díaz is a 81year old male. HPI:     HPI     Pt is here for a complete exam. Pt states vision is stable. Pt was last seen by Dr. Crystal Echevarria on 8/27/19 and is due to see him again in 2/20.      Last edited by Spencer Logan OT on 10/22/2019  2:2 tablet, Rfl: 1  METOPROLOL SUCCINATE ER 25 MG Oral Tablet 24 Hr, TAKE 1 TABLET BY MOUTH ONE TIME DAILY, Disp: 90 tablet, Rfl: 1  AMLODIPINE BESYLATE 5 MG Oral Tab, TAKE ONE TABLET BY MOUTH ONE TIME DAILY , Disp: 90 tablet, Rfl: 2  ALLOPURINOL 100 MG Oral T Extraocular Movement       Right Left     Full, Ortho Full, Ortho          Dilation     Both eyes:  1.0% Mydriacyl and 2.5% Edwin Synephrine @ 2:38 PM            Slit Lamp and Fundus Exam     Slit Lamp Exam       Right Left    Lids/Lashes Dermatocha Orders Placed This Encounter      *FUTURE OCT  - OU - Both Eyes      *FUTURE VF- OU - Both Eyes      Meds This Visit:  Requested Prescriptions      No prescriptions requested or ordered in this encounter        Follow up instructions:  Return for Nex

## 2019-11-04 ENCOUNTER — TELEPHONE (OUTPATIENT)
Dept: INTERNAL MEDICINE CLINIC | Facility: CLINIC | Age: 84
End: 2019-11-04

## 2019-11-04 ENCOUNTER — OFFICE VISIT (OUTPATIENT)
Dept: INTERNAL MEDICINE CLINIC | Facility: CLINIC | Age: 84
End: 2019-11-04
Payer: MEDICARE

## 2019-11-04 VITALS
WEIGHT: 178 LBS | HEIGHT: 66 IN | DIASTOLIC BLOOD PRESSURE: 75 MMHG | HEART RATE: 65 BPM | RESPIRATION RATE: 16 BRPM | BODY MASS INDEX: 28.61 KG/M2 | SYSTOLIC BLOOD PRESSURE: 129 MMHG

## 2019-11-04 DIAGNOSIS — N39.43 BENIGN PROSTATIC HYPERPLASIA WITH POST-VOID DRIBBLING: ICD-10-CM

## 2019-11-04 DIAGNOSIS — I10 ESSENTIAL HYPERTENSION WITH GOAL BLOOD PRESSURE LESS THAN 140/90: Primary | ICD-10-CM

## 2019-11-04 DIAGNOSIS — J43.1 PANLOBULAR EMPHYSEMA (HCC): ICD-10-CM

## 2019-11-04 DIAGNOSIS — N40.1 BENIGN PROSTATIC HYPERPLASIA WITH POST-VOID DRIBBLING: ICD-10-CM

## 2019-11-04 PROCEDURE — 99214 OFFICE O/P EST MOD 30 MIN: CPT | Performed by: INTERNAL MEDICINE

## 2019-11-04 PROCEDURE — G0463 HOSPITAL OUTPT CLINIC VISIT: HCPCS | Performed by: INTERNAL MEDICINE

## 2019-11-04 NOTE — TELEPHONE ENCOUNTER
Patient called in said he was in today to see Dr. Chasity Adan and he stated  did not tell him what the problems was with pain in arm.     Patient would like call back from Dr. Chasity Adan  Patient did not want to be transferred to triage    Patient said to call him tomorrow

## 2019-11-04 NOTE — PROGRESS NOTES
Srinivas Khan is a 81year old male. 1. Essential hypertension with goal blood pressure less than 140/90    Patient has been following low salt diet and has been taking anti-hypertensive prescriptions as prescribed.  Blood pressure has been checked and (MULTI-VITAMINS) Oral Tab Take 1 tablet by mouth nightly.  take 1 tablet by ORAL route  every day with food      • TRAMADOL HCL 50 MG Oral Tab TAKE ONE TABLET BY MOUTH EVERY EIGHT HOURS AS NEEDED FOR PAIN  60 tablet 1      Past Medical History:   Diagnosis blood pressure less than 140/90    Patient instructed to take anti-hypertensive medicines exactly as prescribed and to follow a low salt diet as discussed.  Regular exercise at least 3 times weekly will help to curb one's appetite, control weight and lead t

## 2019-11-21 ENCOUNTER — NURSE ONLY (OUTPATIENT)
Dept: OPHTHALMOLOGY | Facility: CLINIC | Age: 84
End: 2019-11-21
Payer: MEDICARE

## 2019-11-21 DIAGNOSIS — H40.003 GLAUCOMA SUSPECT OF BOTH EYES: ICD-10-CM

## 2019-11-21 PROCEDURE — 92133 CPTRZD OPH DX IMG PST SGM ON: CPT | Performed by: OPHTHALMOLOGY

## 2019-11-21 PROCEDURE — 92083 EXTENDED VISUAL FIELD XM: CPT | Performed by: OPHTHALMOLOGY

## 2019-11-21 NOTE — PROGRESS NOTES
Yuan Broderick is a 81year old male.     HPI:     HPI     Pt is here for a VF and OCT with no MD.     Last edited by Shey Murillo OT on 11/21/2019 10:55 AM. (History)        Patient History:  Past Medical History:   Diagnosis Date   • Cataracts, bilater Hr TAKE 1 TABLET BY MOUTH ONE TIME DAILY 90 tablet 1   • AMLODIPINE BESYLATE 5 MG Oral Tab TAKE ONE TABLET BY MOUTH ONE TIME DAILY  90 tablet 2   • ALLOPURINOL 100 MG Oral Tab TAKE ONE TABLET BY MOUTH ONE TIME DAILY  90 tablet 2   • Ferrous Gluconate 324 (

## 2019-12-06 ENCOUNTER — OFFICE VISIT (OUTPATIENT)
Dept: RHEUMATOLOGY | Facility: CLINIC | Age: 84
End: 2019-12-06
Payer: MEDICARE

## 2019-12-06 ENCOUNTER — TELEPHONE (OUTPATIENT)
Dept: RHEUMATOLOGY | Facility: CLINIC | Age: 84
End: 2019-12-06

## 2019-12-06 VITALS
BODY MASS INDEX: 29.09 KG/M2 | WEIGHT: 181 LBS | HEIGHT: 66 IN | DIASTOLIC BLOOD PRESSURE: 80 MMHG | SYSTOLIC BLOOD PRESSURE: 117 MMHG | HEART RATE: 73 BPM | RESPIRATION RATE: 18 BRPM

## 2019-12-06 DIAGNOSIS — M17.12 PRIMARY OSTEOARTHRITIS OF LEFT KNEE: Primary | ICD-10-CM

## 2019-12-06 PROCEDURE — 99213 OFFICE O/P EST LOW 20 MIN: CPT | Performed by: INTERNAL MEDICINE

## 2019-12-06 PROCEDURE — G0463 HOSPITAL OUTPT CLINIC VISIT: HCPCS | Performed by: INTERNAL MEDICINE

## 2019-12-06 RX ORDER — TRAMADOL HYDROCHLORIDE 50 MG/1
50 TABLET ORAL EVERY 8 HOURS PRN
Qty: 90 TABLET | Refills: 0 | Status: SHIPPED | OUTPATIENT
Start: 2019-12-06 | End: 2019-12-30

## 2019-12-06 NOTE — PATIENT INSTRUCTIONS
You were seen for arthritis in the L knee  Lets restart the tramadol, take it every 8 hrs as needed  Follow with Dr. Bhavin Howard in 3 months

## 2019-12-06 NOTE — TELEPHONE ENCOUNTER
Per Dr. Cherie Martínez request Rx Tramadol was faxed to [address] [place] [address] today. Confirmation received.

## 2019-12-06 NOTE — PROGRESS NOTES
Reba Patient is a 81year old male. HPI:   Patient presents with:  Osteoarthritis      I had the pleasure of seeing Ronna Ron on 8/3/2018. I had last seen in 8/2017 and  6/15/2016. I had last seen him on 10/28/2015.  I had last seen him on  Aug. 1 still has his cat. He is trying PT for his left shoulder after the recovery of his fracture. His knee didn't get better with his left knee injection. He doesn't want a knee replacement. He is walking with his left knee.  The change in weather bothers BESYLATE 5 MG Oral Tab TAKE ONE TABLET BY MOUTH ONE TIME DAILY  90 tablet 2   • ALLOPURINOL 100 MG Oral Tab TAKE ONE TABLET BY MOUTH ONE TIME DAILY  90 tablet 2   • Ferrous Gluconate 324 (37.5 Fe) MG Oral Tab Take 1 tablet (324 mg total) by mouth 2 (two) t Hematocrit      41.0 - 52.0 % 26.3 (L)    MCV      80.0 - 100.0 fL 92.3    MCH      27.0 - 32.0 pg 30.9    MCHC      32.0 - 37.0 g/dl 33.5    RDW      11.0 - 15.0 % 16.7 (H)    Platelet Count      084 - 400 K/    MEAN PLATELET VOLUME      7.4 - 10. Herminio Purdy in 3 mos    Elizabeth Thayer MD  12/6/2019   11:00 AM

## 2019-12-30 ENCOUNTER — OFFICE VISIT (OUTPATIENT)
Dept: INTERNAL MEDICINE CLINIC | Facility: CLINIC | Age: 84
End: 2019-12-30
Payer: MEDICARE

## 2019-12-30 VITALS
SYSTOLIC BLOOD PRESSURE: 112 MMHG | DIASTOLIC BLOOD PRESSURE: 73 MMHG | WEIGHT: 178 LBS | HEART RATE: 91 BPM | RESPIRATION RATE: 16 BRPM | BODY MASS INDEX: 28.61 KG/M2 | HEIGHT: 66 IN

## 2019-12-30 DIAGNOSIS — K57.92 DIVERTICULITIS: Primary | ICD-10-CM

## 2019-12-30 DIAGNOSIS — I10 ESSENTIAL HYPERTENSION WITH GOAL BLOOD PRESSURE LESS THAN 140/90: ICD-10-CM

## 2019-12-30 DIAGNOSIS — J43.1 PANLOBULAR EMPHYSEMA (HCC): ICD-10-CM

## 2019-12-30 PROCEDURE — 99214 OFFICE O/P EST MOD 30 MIN: CPT | Performed by: INTERNAL MEDICINE

## 2019-12-30 PROCEDURE — G0463 HOSPITAL OUTPT CLINIC VISIT: HCPCS | Performed by: INTERNAL MEDICINE

## 2019-12-30 RX ORDER — METRONIDAZOLE 500 MG/1
500 TABLET ORAL 3 TIMES DAILY
COMMUNITY
End: 2021-04-08

## 2019-12-30 RX ORDER — CEFUROXIME AXETIL 500 MG/1
TABLET ORAL 2 TIMES DAILY
COMMUNITY
End: 2021-04-08

## 2019-12-30 NOTE — PROGRESS NOTES
Jose Alfredo Díaz is a 81year old male. 1. Diverticulitis    Recently seen in ER with diverticulitis and started on Flagyl and ceftin 500 mg BID. HAS BEEN ON MORE CLEAR LIQUIds and soft food and has been feeling better.     2. Essential hypertension wit Multiple Vitamin (MULTI-VITAMINS) Oral Tab Take 1 tablet by mouth nightly.  take 1 tablet by ORAL route  every day with food         Past Medical History:   Diagnosis Date   • Cataracts, bilateral 2002    OU; per NG   • Cup to disc asymmetry 2008    OU; per Avoid alcohol while taking flagyl. Call if pain increases, vomiting occurs or fever develops. Try not to exert yourself during this time with any strenuous exercise. If pain becomes severe go to the Emergency Room for evaluation.     2. Essential hypertensio

## 2020-01-03 RX ORDER — METOPROLOL SUCCINATE 25 MG/1
TABLET, EXTENDED RELEASE ORAL
Qty: 90 TABLET | Refills: 1 | Status: SHIPPED | OUTPATIENT
Start: 2020-01-03 | End: 2020-04-09

## 2020-01-17 RX ORDER — ALLOPURINOL 100 MG/1
TABLET ORAL
Qty: 90 TABLET | Refills: 1 | Status: SHIPPED | OUTPATIENT
Start: 2020-01-17 | End: 2020-07-08

## 2020-01-17 NOTE — TELEPHONE ENCOUNTER
Sent to [facility]    Review pended refill request as it does not fall under a protocol.     Last Rx: 4/2019  LOV: 2 weeks ago

## 2020-01-20 RX ORDER — AMLODIPINE BESYLATE 5 MG/1
TABLET ORAL
Qty: 90 TABLET | Refills: 1 | Status: SHIPPED | OUTPATIENT
Start: 2020-01-20 | End: 2020-07-16

## 2020-01-21 NOTE — TELEPHONE ENCOUNTER
Hypertensive Medications . REFILLED PER PROTOCOL.     Protocol Criteria:  · Appointment scheduled in the past 6 months or in the next 3 months  · BMP or CMP in the past 12 months  · Creatinine result < 2  Recent Outpatient Visits            3 weeks ago Dive

## 2020-02-07 RX ORDER — TRAMADOL HYDROCHLORIDE 50 MG/1
TABLET ORAL
Qty: 90 TABLET | Refills: 1 | Status: SHIPPED
Start: 2020-02-07 | End: 2020-02-17

## 2020-02-10 RX ORDER — TAMSULOSIN HYDROCHLORIDE 0.4 MG/1
CAPSULE ORAL
Qty: 90 CAPSULE | Refills: 0 | Status: SHIPPED | OUTPATIENT
Start: 2020-02-10 | End: 2020-05-19

## 2020-02-10 NOTE — TELEPHONE ENCOUNTER
Review pended refill request as it does not fall under a protocol.     Last Rx: 08/14/19  LOV: 12/30/19

## 2020-02-17 RX ORDER — TRAMADOL HYDROCHLORIDE 50 MG/1
50 TABLET ORAL EVERY 8 HOURS PRN
Qty: 90 TABLET | Refills: 3 | Status: SHIPPED | OUTPATIENT
Start: 2020-02-17 | End: 2020-08-19

## 2020-02-17 RX ORDER — TRAMADOL HYDROCHLORIDE 50 MG/1
TABLET ORAL
Qty: 90 TABLET | Refills: 3 | Status: SHIPPED
Start: 2020-02-17 | End: 2020-02-17

## 2020-02-17 NOTE — TELEPHONE ENCOUNTER
Requested Prescriptions     Pending Prescriptions Disp Refills   • TRAMADOL HCL 50 MG Oral Tab [Pharmacy Med Name: Tramadol Hcl 50 Mg Tab Clair] 90 tablet 0     Sig: TAKE ONE TABLET BY MOUTH EVERY EIGHT HOURS AS NEEDED FOR PAIN     Last filled: 2/7/20 #90 t

## 2020-02-20 ENCOUNTER — OFFICE VISIT (OUTPATIENT)
Dept: ORTHOPEDICS CLINIC | Facility: CLINIC | Age: 85
End: 2020-02-20
Payer: MEDICARE

## 2020-02-20 VITALS — WEIGHT: 178 LBS | HEIGHT: 66 IN | BODY MASS INDEX: 28.61 KG/M2

## 2020-02-20 DIAGNOSIS — M17.12 PRIMARY OSTEOARTHRITIS OF LEFT KNEE: Primary | ICD-10-CM

## 2020-02-20 DIAGNOSIS — M25.562 LEFT KNEE PAIN, UNSPECIFIED CHRONICITY: ICD-10-CM

## 2020-02-20 PROCEDURE — G0463 HOSPITAL OUTPT CLINIC VISIT: HCPCS | Performed by: ORTHOPAEDIC SURGERY

## 2020-02-20 PROCEDURE — 99213 OFFICE O/P EST LOW 20 MIN: CPT | Performed by: ORTHOPAEDIC SURGERY

## 2020-02-20 NOTE — PROGRESS NOTES
NURSING INTAKE COMMENTS: Patient presents with:  Knee Pain: F/u on left knee pain. Rec'd cortisone injection on 10/10/19. Stts that injection didn't help at all.   Pain occurs when walking      HPI: This 81year old male presents today with complaints of 90 tablet 1   • ALLOPURINOL 100 MG Oral Tab TAKE ONE TABLET BY MOUTH ONE TIME DAILY  90 tablet 1   • METOPROLOL SUCCINATE ER 25 MG Oral Tablet 24 Hr TAKE 1 TABLET BY MOUTH ONE TIME DAILY 90 tablet 1   • Cefuroxime Axetil 500 MG Oral Tab Take by mouth 2 (tw Sexual Activity      Alcohol use:  Yes        Alcohol/week: 5.8 standard drinks        Types: 7 Glasses of wine per week        Comment: 3-4 ounces /day      Drug use: No      Sexual activity: Not on file       Review of Systems:  GENERAL: denies fevers, ch severe degenerative changes lateral compartment with complete joint space narrowing and some osteophyte formation. There are moderate to severe degenerative changes patellofemoral joint. Mild degenerative change medial compartment.     Labs:  Lab Results

## 2020-02-26 ENCOUNTER — MED REC SCAN ONLY (OUTPATIENT)
Dept: INTERNAL MEDICINE CLINIC | Facility: CLINIC | Age: 85
End: 2020-02-26

## 2020-03-02 ENCOUNTER — OFFICE VISIT (OUTPATIENT)
Dept: RHEUMATOLOGY | Facility: CLINIC | Age: 85
End: 2020-03-02
Payer: MEDICARE

## 2020-03-02 VITALS
BODY MASS INDEX: 29.41 KG/M2 | SYSTOLIC BLOOD PRESSURE: 125 MMHG | WEIGHT: 183 LBS | HEART RATE: 75 BPM | RESPIRATION RATE: 18 BRPM | DIASTOLIC BLOOD PRESSURE: 75 MMHG | HEIGHT: 66 IN

## 2020-03-02 DIAGNOSIS — M19.012 OSTEOARTHRITIS OF LEFT SHOULDER, UNSPECIFIED OSTEOARTHRITIS TYPE: ICD-10-CM

## 2020-03-02 DIAGNOSIS — M15.9 OSTEOARTHRITIS OF MULTIPLE JOINTS, UNSPECIFIED OSTEOARTHRITIS TYPE: Primary | ICD-10-CM

## 2020-03-02 PROCEDURE — 99213 OFFICE O/P EST LOW 20 MIN: CPT | Performed by: INTERNAL MEDICINE

## 2020-03-02 PROCEDURE — G0463 HOSPITAL OUTPT CLINIC VISIT: HCPCS | Performed by: INTERNAL MEDICINE

## 2020-03-02 PROCEDURE — 20610 DRAIN/INJ JOINT/BURSA W/O US: CPT | Performed by: INTERNAL MEDICINE

## 2020-03-02 RX ORDER — TRIAMCINOLONE ACETONIDE 40 MG/ML
40 INJECTION, SUSPENSION INTRA-ARTICULAR; INTRAMUSCULAR ONCE
Status: COMPLETED | OUTPATIENT
Start: 2020-03-02 | End: 2020-03-02

## 2020-03-02 RX ADMIN — TRIAMCINOLONE ACETONIDE 40 MG: 40 INJECTION, SUSPENSION INTRA-ARTICULAR; INTRAMUSCULAR at 11:30:00

## 2020-03-02 NOTE — PROCEDURES
With paitent's consent, I injected pt's left gh joint  with 1ml lidocaine 1 % and 1 ml kenalog 40. It was done under sterile technique using iodine and alcohol swabs and ethyl chloride was used as an anaesthetic spray. Pt.  tolerated it well.

## 2020-03-02 NOTE — PATIENT INSTRUCTIONS
1. Rest left shoulder x 3 days -   2. Check tramadol 50mg every 8 -12 hours as needed   2. Return to clinic as needed - -6 months.

## 2020-03-02 NOTE — PROGRESS NOTES
Heidi Marquez is a 81year old male. HPI:   Patient presents with:  Osteoarthritis  Medication Follow-Up  Knee Pain  Arm Pain: Left      I had the pleasure of seeing Ruby Mendoza on 3/2/2020.   As you recall, he is a pleasant 52-year-old who's been ha walking with his left knee. The change in weather bothers his knee. It doesn't hurt all the time. He has 5/10 pain in his hands. - he can't close hands compeltely anymore. He's taking trmaodl for the pain.  He is taking tramadol mostly twice a day - occ daily.     • Ferrous Gluconate 324 (37.5 Fe) MG Oral Tab Take 1 tablet (324 mg total) by mouth 2 (two) times daily. 60 tablet 0   • loratadine (CLARITIN) 10 MG Oral Tab Take 10 mg by mouth nightly.        • Multiple Vitamins-Minerals (EYE VITAMINS) Oral Cap 15.0 % 16.7 (H)    Platelet Count      733 - 400 K/    MEAN PLATELET VOLUME      7.4 - 10.3 fL 8.2    Neutrophils %      % 63    Lymphocytes %      % 21    Monocytes %      % 10    Eosinophils %      % 2    Basophils %      % 3    MYELOCYTE %      % replacement  - bad reaction to synvisc in the pasts -    -  Stay on  tramadol  50 mg every 6 hours prn   2. Allergies to discharge on this or Celebrex vs. voltarenGEL   3.  ankle pain-following with podiatry, orthotics.    4.  History right hip replacement

## 2020-04-09 RX ORDER — METOPROLOL SUCCINATE 25 MG/1
TABLET, EXTENDED RELEASE ORAL
Qty: 90 TABLET | Refills: 0 | Status: SHIPPED | OUTPATIENT
Start: 2020-04-09 | End: 2020-10-06

## 2020-05-19 RX ORDER — TAMSULOSIN HYDROCHLORIDE 0.4 MG/1
CAPSULE ORAL
Qty: 90 CAPSULE | Refills: 0 | Status: SHIPPED | OUTPATIENT
Start: 2020-05-19 | End: 2020-08-27

## 2020-07-08 RX ORDER — ALLOPURINOL 100 MG/1
TABLET ORAL
Qty: 90 TABLET | Refills: 0 | Status: SHIPPED | OUTPATIENT
Start: 2020-07-08 | End: 2020-10-06

## 2020-07-16 RX ORDER — AMLODIPINE BESYLATE 5 MG/1
TABLET ORAL
Qty: 90 TABLET | Refills: 0 | Status: SHIPPED | OUTPATIENT
Start: 2020-07-16 | End: 2020-10-06

## 2020-08-19 RX ORDER — TRAMADOL HYDROCHLORIDE 50 MG/1
50 TABLET ORAL EVERY 8 HOURS PRN
Qty: 90 TABLET | Refills: 3 | Status: SHIPPED | OUTPATIENT
Start: 2020-08-19 | End: 2021-02-16

## 2020-08-19 NOTE — TELEPHONE ENCOUNTER
Last filled: 2/17/20 #90 tab with 3 refills   LOV: 3/2/2020  No future appointments. Labs:   Summary:  1. Rest left shoulder x 3 days -   2. Check tramadol 50mg every 8 -12 hours as needed   2.  Return to clinic as needed - -6 months.      Barrera Wing

## 2020-08-25 ENCOUNTER — MED REC SCAN ONLY (OUTPATIENT)
Dept: INTERNAL MEDICINE CLINIC | Facility: CLINIC | Age: 85
End: 2020-08-25

## 2020-08-27 RX ORDER — TAMSULOSIN HYDROCHLORIDE 0.4 MG/1
CAPSULE ORAL
Qty: 90 CAPSULE | Refills: 0 | Status: SHIPPED | OUTPATIENT
Start: 2020-08-27 | End: 2020-11-17

## 2020-10-06 RX ORDER — AMLODIPINE BESYLATE 5 MG/1
TABLET ORAL
Qty: 90 TABLET | Refills: 0 | Status: SHIPPED | OUTPATIENT
Start: 2020-10-06 | End: 2020-12-30

## 2020-10-06 RX ORDER — METOPROLOL SUCCINATE 25 MG/1
TABLET, EXTENDED RELEASE ORAL
Qty: 90 TABLET | Refills: 0 | Status: SHIPPED | OUTPATIENT
Start: 2020-10-06 | End: 2020-12-30

## 2020-10-06 RX ORDER — ALLOPURINOL 100 MG/1
TABLET ORAL
Qty: 90 TABLET | Refills: 0 | Status: SHIPPED | OUTPATIENT
Start: 2020-10-06 | End: 2021-01-11

## 2020-10-20 ENCOUNTER — TELEPHONE (OUTPATIENT)
Dept: INTERNAL MEDICINE CLINIC | Facility: CLINIC | Age: 85
End: 2020-10-20

## 2020-11-17 RX ORDER — TAMSULOSIN HYDROCHLORIDE 0.4 MG/1
CAPSULE ORAL
Qty: 90 CAPSULE | Refills: 0 | Status: SHIPPED | OUTPATIENT
Start: 2020-11-17 | End: 2021-01-04

## 2020-11-19 ENCOUNTER — OFFICE VISIT (OUTPATIENT)
Dept: INTERNAL MEDICINE CLINIC | Facility: CLINIC | Age: 85
End: 2020-11-19
Payer: MEDICARE

## 2020-11-19 VITALS
HEART RATE: 74 BPM | SYSTOLIC BLOOD PRESSURE: 128 MMHG | BODY MASS INDEX: 28.61 KG/M2 | DIASTOLIC BLOOD PRESSURE: 79 MMHG | RESPIRATION RATE: 16 BRPM | WEIGHT: 178 LBS | HEIGHT: 66 IN

## 2020-11-19 DIAGNOSIS — M19.072 PRIMARY OSTEOARTHRITIS OF LEFT FOOT: ICD-10-CM

## 2020-11-19 DIAGNOSIS — Z96.651 HISTORY OF KNEE REPLACEMENT PROCEDURE OF RIGHT KNEE: ICD-10-CM

## 2020-11-19 DIAGNOSIS — Z00.00 PHYSICAL EXAM: Primary | ICD-10-CM

## 2020-11-19 DIAGNOSIS — Z96.641 HISTORY OF HIP REPLACEMENT, TOTAL, RIGHT: ICD-10-CM

## 2020-11-19 DIAGNOSIS — N39.43 BENIGN PROSTATIC HYPERPLASIA WITH POST-VOID DRIBBLING: ICD-10-CM

## 2020-11-19 DIAGNOSIS — H43.393 VITREOUS FLOATERS OF BOTH EYES: ICD-10-CM

## 2020-11-19 DIAGNOSIS — D50.9 IRON DEFICIENCY ANEMIA, UNSPECIFIED IRON DEFICIENCY ANEMIA TYPE: ICD-10-CM

## 2020-11-19 DIAGNOSIS — J43.1 PANLOBULAR EMPHYSEMA (HCC): ICD-10-CM

## 2020-11-19 DIAGNOSIS — H40.003 GLAUCOMA SUSPECT OF BOTH EYES: ICD-10-CM

## 2020-11-19 DIAGNOSIS — H35.30 AGE-RELATED MACULAR DEGENERATION: ICD-10-CM

## 2020-11-19 DIAGNOSIS — H25.13 AGE-RELATED NUCLEAR CATARACT OF BOTH EYES: ICD-10-CM

## 2020-11-19 DIAGNOSIS — N40.1 BENIGN PROSTATIC HYPERPLASIA WITH POST-VOID DRIBBLING: ICD-10-CM

## 2020-11-19 DIAGNOSIS — I10 ESSENTIAL HYPERTENSION WITH GOAL BLOOD PRESSURE LESS THAN 140/90: ICD-10-CM

## 2020-11-19 PROCEDURE — G0439 PPPS, SUBSEQ VISIT: HCPCS | Performed by: INTERNAL MEDICINE

## 2020-11-23 PROBLEM — M86.9 OSTEOMYELITIS, UNSPECIFIED SITE, UNSPECIFIED TYPE (HCC): Status: RESOLVED | Noted: 2018-05-27 | Resolved: 2020-11-23
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
## 2020-11-23 PROBLEM — K57.92 DIVERTICULITIS: Status: RESOLVED | Noted: 2019-12-30 | Resolved: 2020-11-23

## 2020-11-23 PROBLEM — I89.1 LYMPHANGITIS: Status: RESOLVED | Noted: 2018-05-27 | Resolved: 2020-11-23

## 2020-11-23 PROBLEM — S42.035A CLOSED NONDISPLACED FRACTURE OF LATERAL END OF LEFT CLAVICLE: Status: RESOLVED | Noted: 2019-08-01 | Resolved: 2020-11-23

## 2020-11-23 PROBLEM — S42.201D CLOSED FRACTURE OF PROXIMAL END OF RIGHT HUMERUS WITH ROUTINE HEALING: Status: RESOLVED | Noted: 2018-08-09 | Resolved: 2020-11-23

## 2020-11-23 PROBLEM — S42.201A CLOSED FRACTURE OF RIGHT PROXIMAL HUMERUS: Status: RESOLVED | Noted: 2018-07-15 | Resolved: 2020-11-23

## 2020-11-23 PROBLEM — S42.201A CLOSED FRACTURE OF PROXIMAL END OF RIGHT HUMERUS, UNSPECIFIED FRACTURE MORPHOLOGY, INITIAL ENCOUNTER: Status: RESOLVED | Noted: 2018-07-16 | Resolved: 2020-11-23
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
## 2020-11-23 NOTE — PROGRESS NOTES
Magdaleno Armando is a 81year old male who presents for a Medicare Annual Wellness visit.       Patient Care Team: Patient Care Team:  Buck Dickson MD as PCP - General (Internal Medicine)  Manolo Crowley MD as PCP - Nahum Ross MD (two) times daily. • metRONIDAZOLE 500 MG Oral Tab Take 500 mg by mouth 3 (three) times daily. • Ferrous Gluconate 324 (37.5 Fe) MG Oral Tab Take 1 tablet (324 mg total) by mouth 2 (two) times daily.  60 tablet 0   • loratadine (CLARITIN) 10 MG Oral does the patient maintain a good energy level?: Other  How would you describe your daily physical activity?: Light  How would you describe your current health state?: Fair  How do you maintain positive mental well-being?: Social Interaction  If you are a m Disease Screening    LDL Annually LDL Cholesterol (mg/dL)   Date Value   02/02/2018 61   11/11/2014 66       EKG - w/ Initial Preventative Physical Exam only, or if medically necessary    Colorectal Cancer Screening     Colonoscopy Screen every 10 years Th ARTHROSCOPY, ANKLE, SURGICAL; W/ANKLE ARTHRODESIS Right     \"Arthrocentesis of the right ankle joint (2-3)\"; per NG   • COLONOSCOPY  2005    per NG   • HIP REPLACEMENT SURGERY Right 2002    per NG   • HUMERUS OPEN REDUCTION INTERNAL FIXATION/ PINNING Rig Size: large)   Pulse 74   Resp 16   Ht 5' 6\" (1.676 m)   Wt 178 lb (80.7 kg)   BMI 28.73 kg/m²    > BP Readings from Last 3 Encounters:  11/19/20 : 128/79  03/02/20 : 125/75  12/30/19 : 112/73    GENERAL: well developed, well nourished, in no apparent dis LIPID PANEL; Future    3. Panlobular emphysema ([facility] [address]    Has continued Chronic Obstructive Pulmonary Disease. (COPD). Coughs in AM most days. Has been using inhalers as directed and has been getting relief. Advised to rinse mouth after using any inhalers wit issues and agrees to the plan.     The patient is asked to return in 3 months for office visit  Diet counseling perfomed  Exercise counseling perfomed    SUGGESTED VACCINATIONS - Influenza, Pneumococcal, Zoster, Tetanus   Influenza: Influenza Vaccine(1) due

## 2020-11-27 ENCOUNTER — TELEPHONE (OUTPATIENT)
Dept: INTERNAL MEDICINE CLINIC | Facility: CLINIC | Age: 85
End: 2020-11-27

## 2020-11-27 NOTE — TELEPHONE ENCOUNTER
Patient called to inform office he received high-dose flu shot at [facility] in [employer] [facility], spoke to pharmacist.

## 2020-12-18 NOTE — TELEPHONE ENCOUNTER
Have called pharmacies multiple times to inquire about when patient received vaccine.     Have talked to multiple pharmacy staff who verified that patient has not received flu vaccine at any [facility] location

## 2020-12-29 ENCOUNTER — OFFICE VISIT (OUTPATIENT)
Dept: OPHTHALMOLOGY | Facility: CLINIC | Age: 85
End: 2020-12-29
Payer: MEDICARE

## 2020-12-29 DIAGNOSIS — H40.003 GLAUCOMA SUSPECT OF BOTH EYES: Primary | ICD-10-CM

## 2020-12-29 DIAGNOSIS — H35.30 AGE-RELATED MACULAR DEGENERATION: ICD-10-CM

## 2020-12-29 DIAGNOSIS — H25.13 AGE-RELATED NUCLEAR CATARACT OF BOTH EYES: ICD-10-CM

## 2020-12-29 DIAGNOSIS — H43.393 VITREOUS FLOATERS OF BOTH EYES: ICD-10-CM

## 2020-12-29 PROCEDURE — 92014 COMPRE OPH EXAM EST PT 1/>: CPT | Performed by: OPHTHALMOLOGY

## 2020-12-29 PROCEDURE — 92250 FUNDUS PHOTOGRAPHY W/I&R: CPT | Performed by: OPHTHALMOLOGY

## 2020-12-29 NOTE — PATIENT INSTRUCTIONS
Age-related macular degeneration  Stable; follow up with Dr. Norah Garza every 6 months as directed. Age-related nuclear cataract of both eyes  No treatment is recommended on moderate cataracts at this time.       Vitreous floaters of both eyes  No treatme

## 2020-12-29 NOTE — PROGRESS NOTES
Magdaleno Armando is a 81year old male. HPI:     HPI     Pt in today for a complete eye exam.  Pt last saw Dr. Nano Gaston on 8/25/2020 and has an appointment to see him in February 2021. Pt complains of decreased vision in OD.      Last edited by Neelima, 90 capsule 0   • METOPROLOL SUCCINATE ER 25 MG Oral Tablet 24 Hr TAKE ONE TABLET BY MOUTH ONE TIME DAILY  90 tablet 0   • ALLOPURINOL 100 MG Oral Tab TAKE ONE TABLET BY MOUTH ONE TIME DAILY  90 tablet 0   • AMLODIPINE BESYLATE 5 MG Oral Tab TAKE ONE TABLET Pressure 18 17          Pachymetry (3/11/08)       Right Left    Thickness 600/-4 596/-4          Pupils    Miotic and barely reactive OU            Visual Fields       Left Right     Full Full          Extraocular Movement       Right Left     Full, Ortho Photos were taken today to document optic nerves.          Orders Placed This Encounter      Fundus Photos - OU - Both Eyes      Meds This Visit:  Requested Prescriptions      No prescriptions requested or ordered in this encounter        Follow up instru

## 2020-12-30 RX ORDER — AMLODIPINE BESYLATE 5 MG/1
TABLET ORAL
Qty: 90 TABLET | Refills: 0 | Status: SHIPPED | OUTPATIENT
Start: 2020-12-30 | End: 2021-03-29

## 2020-12-30 RX ORDER — METOPROLOL SUCCINATE 25 MG/1
TABLET, EXTENDED RELEASE ORAL
Qty: 90 TABLET | Refills: 0 | Status: SHIPPED | OUTPATIENT
Start: 2020-12-30 | End: 2021-03-29

## 2021-01-04 RX ORDER — TAMSULOSIN HYDROCHLORIDE 0.4 MG/1
CAPSULE ORAL
Qty: 90 CAPSULE | Refills: 0 | Status: SHIPPED | OUTPATIENT
Start: 2021-01-04 | End: 2021-05-19

## 2021-01-11 RX ORDER — ALLOPURINOL 100 MG/1
TABLET ORAL
Qty: 90 TABLET | Refills: 0 | Status: SHIPPED | OUTPATIENT
Start: 2021-01-11 | End: 2021-04-26

## 2021-01-29 ENCOUNTER — TELEPHONE (OUTPATIENT)
Dept: INTERNAL MEDICINE CLINIC | Facility: CLINIC | Age: 86
End: 2021-01-29

## 2021-02-04 ENCOUNTER — OFFICE VISIT (OUTPATIENT)
Dept: ORTHOPEDICS CLINIC | Facility: CLINIC | Age: 86
End: 2021-02-04
Payer: MEDICARE

## 2021-02-04 ENCOUNTER — HOSPITAL ENCOUNTER (OUTPATIENT)
Dept: GENERAL RADIOLOGY | Facility: HOSPITAL | Age: 86
Discharge: HOME | End: 2021-02-04
Attending: ORTHOPAEDIC SURGERY
Payer: MEDICARE

## 2021-02-04 DIAGNOSIS — M25.551 RIGHT HIP PAIN: ICD-10-CM

## 2021-02-04 DIAGNOSIS — M48.061 SPINAL STENOSIS OF LUMBAR REGION WITHOUT NEUROGENIC CLAUDICATION: Primary | ICD-10-CM

## 2021-02-04 PROCEDURE — 73502 X-RAY EXAM HIP UNI 2-3 VIEWS: CPT | Performed by: ORTHOPAEDIC SURGERY

## 2021-02-04 PROCEDURE — 99214 OFFICE O/P EST MOD 30 MIN: CPT | Performed by: ORTHOPAEDIC SURGERY

## 2021-02-04 NOTE — PROGRESS NOTES
NURSING INTAKE COMMENTS: Patient presents with: Follow - Up: Patient states that over the weekend his right hip was hurting him, but it has resolved. Patient states that he did not fall or cause any trauma.        HPI: This 81year old male presents today TAKE 1 CAPSULE BY MOUTH ONE-HALF HOUR AFTER SUPPER DAILY  90 capsule 0   • AMLODIPINE BESYLATE 5 MG Oral Tab TAKE ONE TABLET BY MOUTH ONE TIME DAILY  90 tablet 0   • METOPROLOL SUCCINATE ER 25 MG Oral Tablet 24 Hr TAKE ONE TABLET BY MOUTH ONE TIME DAILY  9 file    Tobacco Use      Smoking status: Former Smoker        Types: Cigarettes        Quit date: 1985        Years since quittin.1      Smokeless tobacco: Never Used    Substance and Sexual Activity      Alcohol use:  Yes        Alcohol/week: 5.8 extension of the right knee. Passive straight leg raising negative. Neurological: Light touch and pinprick sensation intact throughout the lower extremities.   Ankle dorsiflexion plantarflexion EHL knee extension and hip flexion strength are 5 out of 5 bi

## 2021-02-05 ENCOUNTER — TELEPHONE (OUTPATIENT)
Dept: ORTHOPEDICS CLINIC | Facility: CLINIC | Age: 86
End: 2021-02-05

## 2021-02-05 DIAGNOSIS — M48.061 SPINAL STENOSIS OF LUMBAR REGION WITHOUT NEUROGENIC CLAUDICATION: Primary | ICD-10-CM

## 2021-02-05 NOTE — TELEPHONE ENCOUNTER
Pt still has back pain , asking for order for back injection to be faxed to the pain clinic - Fax [phone]

## 2021-02-05 NOTE — TELEPHONE ENCOUNTER
Dr. Macias Bly recommended no further treatment. But we can refer the patient to pain management for evaluation and treatment. Thanks.

## 2021-02-05 NOTE — TELEPHONE ENCOUNTER
HENNY Vizcaino, please see pt's request. Were you going to order an CARA? Pt was seen in the office by you yesterday.

## 2021-02-05 NOTE — TELEPHONE ENCOUNTER
Spoke to pt and informed him of Lynette Bansal, message and instructions. Order placed for Pain Clinic. Gave pt phone # to pain clinic to call to schedule an appt. Pt verbalized understanding.

## 2021-02-09 ENCOUNTER — HOSPITAL ENCOUNTER (OUTPATIENT)
Dept: GENERAL RADIOLOGY | Facility: HOSPITAL | Age: 86
Discharge: HOME | End: 2021-02-09
Attending: ANESTHESIOLOGY
Payer: MEDICARE

## 2021-02-09 ENCOUNTER — OFFICE VISIT (OUTPATIENT)
Dept: PAIN CLINIC | Facility: HOSPITAL | Age: 86
End: 2021-02-09
Attending: ORTHOPAEDIC SURGERY
Payer: MEDICARE

## 2021-02-09 VITALS
RESPIRATION RATE: 18 BRPM | OXYGEN SATURATION: 98 % | BODY MASS INDEX: 28.61 KG/M2 | DIASTOLIC BLOOD PRESSURE: 68 MMHG | SYSTOLIC BLOOD PRESSURE: 135 MMHG | HEART RATE: 69 BPM | HEIGHT: 66 IN | WEIGHT: 178 LBS

## 2021-02-09 DIAGNOSIS — M54.40 ACUTE BILATERAL LOW BACK PAIN WITH SCIATICA, SCIATICA LATERALITY UNSPECIFIED: ICD-10-CM

## 2021-02-09 DIAGNOSIS — M54.40 ACUTE BILATERAL LOW BACK PAIN WITH SCIATICA, SCIATICA LATERALITY UNSPECIFIED: Primary | ICD-10-CM

## 2021-02-09 PROCEDURE — 99211 OFF/OP EST MAY X REQ PHY/QHP: CPT

## 2021-02-09 PROCEDURE — 72100 X-RAY EXAM L-S SPINE 2/3 VWS: CPT | Performed by: ANESTHESIOLOGY

## 2021-02-09 NOTE — PROGRESS NOTES
02/09/2021-presents ambulatory with his walker; NEW CONSULT C/O BILAT. HIP PAIN RADIATING DOWN THE BILAT. THIGHS; Pt reports 6mo hx  Rating his pain 6/10; past hx with [facility]  Had 3 LESI since 2017 with excellent results;   Pt seen by Dr. José Grewal who referred
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
## 2021-02-10 ENCOUNTER — DOCUMENTATION ONLY (OUTPATIENT)
Dept: PAIN CLINIC | Facility: HOSPITAL | Age: 86
End: 2021-02-10

## 2021-02-10 NOTE — PROGRESS NOTES
Procedure code [id number]--75/15/2021 APPROVED    Medicare--- No PA needed per pt's insurance  Order form faxed to the surgery center, confirmation rcv'd

## 2021-02-15 ENCOUNTER — SURGERY CENTER DOCUMENTATION (OUTPATIENT)
Dept: SURGERY | Age: 86
End: 2021-02-15

## 2021-02-15 NOTE — PROCEDURES
[facility] Procedure Note     Date of service: 02/15/2021    : 1935     Visit ID# [id number]    Surgeon: David Salomon DO       PREOPERATIVE DIAGNOSIS: Spinal canal stenosis with neurogenic claudication; DDD       POSTOPERATIVE DIAGNOSIS: Spinal canal the needle depth. While advancing the needle bony resistance was encountered, Needle was repositioned but was unable to advance the needle into the final position due to severity of the disease.  Decision was made to attempt caudal epidural approach with th

## 2021-02-16 RX ORDER — TRAMADOL HYDROCHLORIDE 50 MG/1
50 TABLET ORAL EVERY 8 HOURS PRN
Qty: 90 TABLET | Refills: 0 | Status: SHIPPED | OUTPATIENT
Start: 2021-02-16 | End: 2021-03-10

## 2021-02-16 NOTE — TELEPHONE ENCOUNTER
Requested Prescriptions     Pending Prescriptions Disp Refills   • TRAMADOL HCL 50 MG Oral Tab [Pharmacy Med Name: Tramadol Hcl 50 Mg Tab Sunp] 90 tablet 0     Sig: Take 1 tablet (50 mg total) by mouth every 8 (eight) hours as needed for Pain     LF: 8/19/

## 2021-02-16 NOTE — TELEPHONE ENCOUNTER
Called and left a VM to patient, informed per Dr. De Jesus Overall he will be due for yearly follow up in March. Please call our office to help you schedule. CSS, please assist patient on scheduling appointment in March. Thanks.

## 2021-02-23 ENCOUNTER — TELEPHONE (OUTPATIENT)
Dept: PAIN CLINIC | Facility: HOSPITAL | Age: 86
End: 2021-02-23

## 2021-02-23 ENCOUNTER — MED REC SCAN ONLY (OUTPATIENT)
Dept: INTERNAL MEDICINE CLINIC | Facility: CLINIC | Age: 86
End: 2021-02-23

## 2021-03-01 ENCOUNTER — OFFICE VISIT (OUTPATIENT)
Dept: PAIN CLINIC | Facility: HOSPITAL | Age: 86
End: 2021-03-01
Attending: NURSE PRACTITIONER
Payer: MEDICARE

## 2021-03-01 VITALS — OXYGEN SATURATION: 98 % | HEART RATE: 64 BPM | DIASTOLIC BLOOD PRESSURE: 56 MMHG | SYSTOLIC BLOOD PRESSURE: 129 MMHG

## 2021-03-01 DIAGNOSIS — M54.40 ACUTE BILATERAL LOW BACK PAIN WITH SCIATICA, SCIATICA LATERALITY UNSPECIFIED: Primary | ICD-10-CM

## 2021-03-01 PROCEDURE — 99211 OFF/OP EST MAY X REQ PHY/QHP: CPT

## 2021-03-01 NOTE — CHRONIC PAIN
Follow-up Note  CC: injection follow up   HISTORY OF PRESENT ILLNESS:  Heriberto Lagunas is a 81year old old male, originally referred to the pain clinic by Dr. Wu ref.  provider found, with history of Acute bilateral low back pain with sciatica, sciatica la ONE-HALF HOUR AFTER SUPPER DAILY  90 capsule 0   • AMLODIPINE BESYLATE 5 MG Oral Tab TAKE ONE TABLET BY MOUTH ONE TIME DAILY  90 tablet 0   • METOPROLOL SUCCINATE ER 25 MG Oral Tablet 24 Hr TAKE ONE TABLET BY MOUTH ONE TIME DAILY  90 tablet 0   • Cefuroxim blood pressure less than 140/90     Benign prostatic hyperplasia with lower urinary tract symptoms     Panlobular emphysema (HCC)     Osteomyelitis (HCC)     Physical exam    Past Medical History:   Diagnosis Date   • Cataracts, bilateral 2002    OU; per N Transportation needs        Medical: Not on file        Non-medical: Not on file    Tobacco Use      Smoking status: Former Smoker        Types: Cigarettes        Quit date: 1985        Years since quittin.1      Smokeless tobacco: Never Used    S supple, trachea midline, no obvious JVD  Gait: Antalgic;   With walker  Spine: Kyphosis  MOTOR EXAMINATION:  LOWER EXTREMITY      LEFT RIGHT   Iliopsoas 5/5 5/5   Quadriceps 5/5 5/5   Foot DF 5/5 5/5   Foot EHL 5/5 5/5   Gastrocnemius 5/5 5/5   Temperature:

## 2021-03-01 NOTE — PROGRESS NOTES
PT presents to the [facility] with use of a front wheel walker. PT states 50% improvement MARTHA Gutierrez saw PT for LESI F/U. See notes for POC.

## 2021-03-03 DIAGNOSIS — Z23 NEED FOR VACCINATION: ICD-10-CM

## 2021-03-10 ENCOUNTER — OFFICE VISIT (OUTPATIENT)
Dept: RHEUMATOLOGY | Facility: CLINIC | Age: 86
End: 2021-03-10
Payer: MEDICARE

## 2021-03-10 VITALS
HEART RATE: 65 BPM | DIASTOLIC BLOOD PRESSURE: 83 MMHG | HEIGHT: 66 IN | RESPIRATION RATE: 18 BRPM | BODY MASS INDEX: 27.97 KG/M2 | SYSTOLIC BLOOD PRESSURE: 150 MMHG | WEIGHT: 174 LBS

## 2021-03-10 DIAGNOSIS — M15.9 OSTEOARTHRITIS OF MULTIPLE JOINTS, UNSPECIFIED OSTEOARTHRITIS TYPE: Primary | ICD-10-CM

## 2021-03-10 PROCEDURE — 99213 OFFICE O/P EST LOW 20 MIN: CPT | Performed by: INTERNAL MEDICINE

## 2021-03-10 RX ORDER — TRAMADOL HYDROCHLORIDE 50 MG/1
50 TABLET ORAL EVERY 8 HOURS PRN
Qty: 90 TABLET | Refills: 5 | Status: SHIPPED | OUTPATIENT
Start: 2021-03-10 | End: 2021-09-27

## 2021-03-10 NOTE — PROGRESS NOTES
Dave Neal is a 81year old male. HPI:   Patient presents with:  Osteoarthritis  Medication Follow-Up  Back Pain  Leg Pain      I had the pleasure of seeing Amadou Hough on 3/10/2021.   As you recall, he is a pleasant 67-year-old who's been having walking with his left knee. The change in weather bothers his knee. It doesn't hurt all the time. He has 5/10 pain in his hands. - he can't close hands compeltely anymore. He's taking trmaodl for the pain.  He is taking tramadol mostly twice a day - occ Refill   • TRAMADOL HCL 50 MG Oral Tab Take 1 tablet (50 mg total) by mouth every 8 (eight) hours as needed for Pain  90 tablet 0   • ALLOPURINOL 100 MG Oral Tab TAKE ONE TABLET BY MOUTH ONE TIME DAILY  90 tablet 0   • tamsulosin (FLOMAX) cap TAKE 1 CAPSUL good ROM,   No rashes  CVS: RRR, no murmurs  RS: CTAB, no crackles, no rhonchi  ABD: Soft Non tender,   Joint exam:  left knee +1swelling and etnder. Left hip trochanteric bursitis - not tender.    Tender in hands and feet - oa changes -   Lower back ten American      >=60  >60   GFR, -American      >=60  >60     5/4/2016 left foot xray   1. Little change from January 22, 2007. 2. Degenerative arthritis more pronounced involving the tarsus. 3. Calcaneal  enthesophytes. 4. Soft tissue swelling.   5

## 2021-03-11 ENCOUNTER — HOSPITAL ENCOUNTER (OUTPATIENT)
Dept: CT IMAGING | Facility: HOSPITAL | Age: 86
Discharge: HOME | End: 2021-03-11
Attending: NURSE PRACTITIONER
Payer: MEDICARE

## 2021-03-11 DIAGNOSIS — M54.40 ACUTE BILATERAL LOW BACK PAIN WITH SCIATICA, SCIATICA LATERALITY UNSPECIFIED: ICD-10-CM

## 2021-03-11 PROCEDURE — 72131 CT LUMBAR SPINE W/O DYE: CPT | Performed by: NURSE PRACTITIONER

## 2021-03-12 ENCOUNTER — TELEPHONE (OUTPATIENT)
Dept: PAIN CLINIC | Facility: HOSPITAL | Age: 86
End: 2021-03-12

## 2021-03-12 NOTE — TELEPHONE ENCOUNTER
Returned patients call regarding CT results. Discussed with patient. Continues to have pain in BLE that radiates to the calf.  Will plan for bilateral L4/5 TFESI

## 2021-03-29 RX ORDER — AMLODIPINE BESYLATE 5 MG/1
TABLET ORAL
Qty: 90 TABLET | Refills: 0 | Status: SHIPPED | OUTPATIENT
Start: 2021-03-29 | End: 2021-07-13

## 2021-03-29 RX ORDER — METOPROLOL SUCCINATE 25 MG/1
TABLET, EXTENDED RELEASE ORAL
Qty: 90 TABLET | Refills: 0 | Status: SHIPPED | OUTPATIENT
Start: 2021-03-29 | End: 2021-07-07

## 2021-04-06 RX ORDER — METOPROLOL SUCCINATE 25 MG/1
TABLET, EXTENDED RELEASE ORAL
Qty: 90 TABLET | Refills: 0 | OUTPATIENT
Start: 2021-04-06

## 2021-04-09 ENCOUNTER — HOSPITAL ENCOUNTER (OUTPATIENT)
Facility: HOSPITAL | Age: 86
Setting detail: HOSPITAL OUTPATIENT SURGERY
Discharge: HOME | End: 2021-04-09
Attending: STUDENT IN AN ORGANIZED HEALTH CARE EDUCATION/TRAINING PROGRAM | Admitting: STUDENT IN AN ORGANIZED HEALTH CARE EDUCATION/TRAINING PROGRAM
Payer: MEDICARE

## 2021-04-09 ENCOUNTER — APPOINTMENT (OUTPATIENT)
Dept: GENERAL RADIOLOGY | Facility: HOSPITAL | Age: 86
End: 2021-04-09
Attending: STUDENT IN AN ORGANIZED HEALTH CARE EDUCATION/TRAINING PROGRAM
Payer: MEDICARE

## 2021-04-09 VITALS
DIASTOLIC BLOOD PRESSURE: 68 MMHG | HEIGHT: 66.5 IN | SYSTOLIC BLOOD PRESSURE: 139 MMHG | BODY MASS INDEX: 27.32 KG/M2 | RESPIRATION RATE: 16 BRPM | TEMPERATURE: 98 F | WEIGHT: 172 LBS | HEART RATE: 79 BPM | OXYGEN SATURATION: 95 %

## 2021-04-09 PROCEDURE — 3E0R3KZ INTRODUCTION OF OTHER DIAGNOSTIC SUBSTANCE INTO SPINAL CANAL, PERCUTANEOUS APPROACH: ICD-10-PCS | Performed by: STUDENT IN AN ORGANIZED HEALTH CARE EDUCATION/TRAINING PROGRAM

## 2021-04-09 PROCEDURE — 3E0R33Z INTRODUCTION OF ANTI-INFLAMMATORY INTO SPINAL CANAL, PERCUTANEOUS APPROACH: ICD-10-PCS | Performed by: STUDENT IN AN ORGANIZED HEALTH CARE EDUCATION/TRAINING PROGRAM

## 2021-04-09 RX ORDER — LIDOCAINE HYDROCHLORIDE 10 MG/ML
INJECTION, SOLUTION EPIDURAL; INFILTRATION; INTRACAUDAL; PERINEURAL AS NEEDED
Status: DISCONTINUED | OUTPATIENT
Start: 2021-04-09 | End: 2021-04-09 | Stop reason: HOSPADM

## 2021-04-09 RX ORDER — METHYLPREDNISOLONE ACETATE 80 MG/ML
INJECTION, SUSPENSION INTRA-ARTICULAR; INTRALESIONAL; INTRAMUSCULAR; SOFT TISSUE AS NEEDED
Status: DISCONTINUED | OUTPATIENT
Start: 2021-04-09 | End: 2021-04-09 | Stop reason: HOSPADM

## 2021-04-09 NOTE — PROCEDURES
[facility] [address].            Patient: Jaja Arias  MR #: [id number]  : 1935        Operative Report        Date of procedure: 2021    Preoperative diagnosis: No diagnosis found. Postop diagnosis:No diagnosis found. was instilled into the skin and subcutaneous tissue over the right L4/L5 level at which point a 22-gauge, 5-1/2 inch spinal needle was introduced and passed under direct fluoroscopic guidance to the bone of the pedicle at the 6 o'clock position.   The needl

## 2021-04-09 NOTE — H&P
History & Physical Examination    Patient Name: Stanley Sims  MRN: [id number]  CSN: [id number]  YOB: 1935    Diagnosis: Lumbar Radiculopathy    Present Illness: Lumbar Radiculopathy    AMLODIPINE BESYLATE 5 MG Oral Tab, TAKE ONE TABLET BY RASH    Comment:Other reaction(s): Rash  Hydrochlorothiazide     RASH    Comment:Other reaction(s): Rash  Triamterene             RASH    Past Medical History:   Diagnosis Date   • Cataract    • Cataracts, bilateral 2002    OU; per NG   • Collar bone ounces /day      SYSTEM Check if Review is Normal Check if Physical Exam is Normal If not normal, please explain:   HEENT [x ] [x ]    NECK & BACK [x ] [x ]    HEART [x ] [x ]    LUNGS [x ] [x ]    ABDOMEN [x ] [x ]    UROGENITAL [x ] [x ]    EXTREMITIES [

## 2021-04-23 ENCOUNTER — OFFICE VISIT (OUTPATIENT)
Dept: PAIN CLINIC | Facility: HOSPITAL | Age: 86
End: 2021-04-23
Attending: NURSE PRACTITIONER
Payer: MEDICARE

## 2021-04-23 VITALS — OXYGEN SATURATION: 99 % | SYSTOLIC BLOOD PRESSURE: 128 MMHG | DIASTOLIC BLOOD PRESSURE: 59 MMHG | HEART RATE: 56 BPM

## 2021-04-23 DIAGNOSIS — M54.40 ACUTE BILATERAL LOW BACK PAIN WITH SCIATICA, SCIATICA LATERALITY UNSPECIFIED: Primary | ICD-10-CM

## 2021-04-23 PROCEDURE — 99211 OFF/OP EST MAY X REQ PHY/QHP: CPT

## 2021-04-23 NOTE — CHRONIC PAIN
Follow-up Note  CC: injection follow up   HISTORY OF PRESENT ILLNESS:  Xiomara Carlos is a 81year old old male, originally referred to the pain clinic by Dr. Wu ref.  provider found, with history of Acute bilateral low back pain with sciatica, sciatica la Ferrous Gluconate 324 (37.5 Fe) MG Oral Tab Take 1 tablet (324 mg total) by mouth 2 (two) times daily. 60 tablet 0   • loratadine (CLARITIN) 10 MG Oral Tab Take 10 mg by mouth nightly.        • Multiple Vitamins-Minerals (EYE VITAMINS) Oral Cap Take 1 table High blood pressure    • High cholesterol    • History of blood transfusion     no reactions   • Hx of melanoma of skin 1979    forehead   • Macular degeneration     per NG   • Osteoarthritis    • RPE (retinal pigment epithelium) detachment 2008    \"RPE d Pt has a pacemaker: No        Pt has a defibrillator: Not Asked        Reaction to local anesthetic: No    Social History Narrative      Not on file    Social Determinants of Health  Financial Resource Strain:       Difficulty of Paying Living Expenses: Normal  Left Lower Extremity:  Normal  Edema - Absent  SLR: + right LE  SIJ tenderness:  negative   Yaakov's test:  negative right   Skin - normal     IMAGING:  No new relevant studies     [place] PHYSICIAN MONITORING PROGRAM REVIEWED  yes    ASSESSMENT AND PLAN

## 2021-04-23 NOTE — PROGRESS NOTES
PT presents to the [facility] with use of the front wheel walker. PT reports about 50% improvement from injection. MARTHA Gutierrez saw PT for LESI. F/U. See notes for POC.

## 2021-04-26 RX ORDER — ALLOPURINOL 100 MG/1
100 TABLET ORAL DAILY
Qty: 90 TABLET | Refills: 0 | Status: SHIPPED | OUTPATIENT
Start: 2021-04-26 | End: 2021-08-03

## 2021-05-19 RX ORDER — TAMSULOSIN HYDROCHLORIDE 0.4 MG/1
CAPSULE ORAL
Qty: 90 CAPSULE | Refills: 0 | Status: SHIPPED | OUTPATIENT
Start: 2021-05-19 | End: 2021-08-03

## 2021-06-08 ENCOUNTER — OFFICE VISIT (OUTPATIENT)
Dept: PAIN CLINIC | Facility: HOSPITAL | Age: 86
End: 2021-06-08
Attending: ORTHOPAEDIC SURGERY
Payer: MEDICARE

## 2021-06-08 VITALS
DIASTOLIC BLOOD PRESSURE: 88 MMHG | OXYGEN SATURATION: 98 % | RESPIRATION RATE: 18 BRPM | SYSTOLIC BLOOD PRESSURE: 132 MMHG | HEART RATE: 88 BPM

## 2021-06-08 DIAGNOSIS — M54.40 ACUTE BILATERAL LOW BACK PAIN WITH SCIATICA, SCIATICA LATERALITY UNSPECIFIED: Primary | ICD-10-CM

## 2021-06-08 PROCEDURE — 99211 OFF/OP EST MAY X REQ PHY/QHP: CPT

## 2021-06-08 NOTE — PROGRESS NOTES
Pt presents to [facility] ambulatory for increased low back pain. Pt states pain is the worst first thing in the morning. Once pt is up and moving pain is less. Pt states having trouble when walking for a long period of time.  MARTHA Chandler to see pt see not

## 2021-06-08 NOTE — CHRONIC PAIN
Follow-up Note  CC: low back pain   HISTORY OF PRESENT ILLNESS:  Tani Alas is a 81year old old male, originally referred to the pain clinic by Dr. Magno Mims, with history of Acute bilateral low back pain with sciatica, sciatica laterality unspecified tablet 0   • traMADol HCl 50 MG Oral Tab Take 1 tablet (50 mg total) by mouth every 8 (eight) hours as needed for Pain. 90 tablet 5   • Ferrous Gluconate 324 (37.5 Fe) MG Oral Tab Take 1 tablet (324 mg total) by mouth 2 (two) times daily.  60 tablet 0   • l per NG   • Collar bone fracture 06/2018    Left   • Cup to disc asymmetry 2008    OU; per NG   • Dermatochalasis 2002    OU; per NG   • High blood pressure    • High cholesterol    • History of blood transfusion     no reactions   • Hx of melanoma of skin Topics      Concerns:        Grew up on a farm: Not Asked        History of tanning: Not Asked        Outdoor occupation: Not Asked        Pt has a pacemaker: No        Pt has a defibrillator: Not Asked        Reaction to local anesthetic: No    Social His Temperature:  normal to touch bilateral upper and lower extremities  Sensation (light touch/pinprick/temperature):   Right Lower Extremity:  Normal  Left Lower Extremity:  Normal  Edema - Absent  SIJ tenderness:  negative   Skin - normal     IMAGING:  No

## 2021-06-11 ENCOUNTER — TELEPHONE (OUTPATIENT)
Dept: PAIN CLINIC | Facility: HOSPITAL | Age: 86
End: 2021-06-11

## 2021-06-17 ENCOUNTER — DOCUMENTATION ONLY (OUTPATIENT)
Dept: PAIN CLINIC | Facility: HOSPITAL | Age: 86
End: 2021-06-17

## 2021-06-17 NOTE — PROGRESS NOTES
Procedure yuri 52393--3306/25/2021 APPROVED  Follow up appt 07/12/2021 AT 12PM      Medicare--- No PA needed per pt's insurance   Order form faxed to the surgery center, confirmation rcv'd

## 2021-06-21 ENCOUNTER — OFFICE VISIT (OUTPATIENT)
Dept: RHEUMATOLOGY | Facility: CLINIC | Age: 86
End: 2021-06-21
Payer: MEDICARE

## 2021-06-21 VITALS
SYSTOLIC BLOOD PRESSURE: 121 MMHG | WEIGHT: 177 LBS | HEART RATE: 73 BPM | DIASTOLIC BLOOD PRESSURE: 75 MMHG | BODY MASS INDEX: 28 KG/M2

## 2021-06-21 DIAGNOSIS — M15.9 OSTEOARTHRITIS OF MULTIPLE JOINTS, UNSPECIFIED OSTEOARTHRITIS TYPE: Primary | ICD-10-CM

## 2021-06-21 DIAGNOSIS — G89.29 CHRONIC PAIN OF LEFT KNEE: ICD-10-CM

## 2021-06-21 DIAGNOSIS — M25.562 CHRONIC PAIN OF LEFT KNEE: ICD-10-CM

## 2021-06-21 PROCEDURE — 20610 DRAIN/INJ JOINT/BURSA W/O US: CPT | Performed by: INTERNAL MEDICINE

## 2021-06-21 PROCEDURE — 99213 OFFICE O/P EST LOW 20 MIN: CPT | Performed by: INTERNAL MEDICINE

## 2021-06-21 RX ORDER — TRIAMCINOLONE ACETONIDE 40 MG/ML
40 INJECTION, SUSPENSION INTRA-ARTICULAR; INTRAMUSCULAR ONCE
Status: COMPLETED | OUTPATIENT
Start: 2021-06-21 | End: 2021-06-21

## 2021-06-21 RX ADMIN — TRIAMCINOLONE ACETONIDE 40 MG: 40 INJECTION, SUSPENSION INTRA-ARTICULAR; INTRAMUSCULAR at 11:30:00

## 2021-06-21 NOTE — PROGRESS NOTES
Riya Roach is a 81year old male. HPI:   Patient presents with:  Osteoarthritis  Pain: Knee (2/10), and back       I had the pleasure of seeing Vannesagulshan Serrano.  As you recall, he is a pleasant 81-year-old who's been having ongoing osteoarthritis of t change in weather bothers his knee. It doesn't hurt all the time. He has 5/10 pain in his hands. - he can't close hands compeltely anymore. He's taking trmaodl for the pain. He is taking tramadol mostly twice a day - occl three times.    He does take it bilateral 2002    OU; per NG   • Collar bone fracture 06/2018    Left   • Cup to disc asymmetry 2008    OU; per NG   • Dermatochalasis 2002    OU; per NG   • High blood pressure    • High cholesterol    • History of blood transfusion     no reactions   • H BREATH  Synvisc [Hylan G-F *    SWELLING    Comment:Swelling to cristiano. knees  Celecoxib               RASH    Comment:Other reaction(s): Rash  Hydrochlorothiazide     RASH    Comment:Other reaction(s): Rash  Triamterene             RASH      ROS:   All other ALKALINE PHOSPHATASE      32 - 100 U/L  112 (H)   Total Bilirubin      0.3 - 1.2 mg/dL  0.7   TOTAL PROTEIN      5.9 - 8.4 g/dL  5.9   Albumin      3.5 - 4.8 g/dL  2.6 (L)   Globulin      2.5 - 3.7 g/dL  3.3   A/G Ratio      1.0 - 2.0  0.8 (L)   ANION GA needed - -6 months.    3. Rest left knee x 3 days     Juan Faria MD  6/21/2021   11:01 AM  - Reviewed [place]- information  through Epic

## 2021-06-30 NOTE — PATIENT INSTRUCTIONS
1. Check tramadol 50mg every 8 -12 hours as needed   2. Return to clinic as needed - -6 months.    3. Rest left knee x 3 days

## 2021-07-06 ENCOUNTER — OFFICE VISIT (OUTPATIENT)
Dept: INTERNAL MEDICINE CLINIC | Facility: CLINIC | Age: 86
End: 2021-07-06
Payer: MEDICARE

## 2021-07-06 VITALS
SYSTOLIC BLOOD PRESSURE: 136 MMHG | WEIGHT: 174.19 LBS | HEIGHT: 65 IN | DIASTOLIC BLOOD PRESSURE: 62 MMHG | BODY MASS INDEX: 29.02 KG/M2 | HEART RATE: 60 BPM

## 2021-07-06 DIAGNOSIS — L72.3 SEBACEOUS CYST: ICD-10-CM

## 2021-07-06 DIAGNOSIS — I10 ESSENTIAL HYPERTENSION: Primary | ICD-10-CM

## 2021-07-06 PROCEDURE — 99214 OFFICE O/P EST MOD 30 MIN: CPT | Performed by: INTERNAL MEDICINE

## 2021-07-06 NOTE — PATIENT INSTRUCTIONS
You may apply warm compresses 2-3 times daily to the bump on the back of your left knee. Your blood pressure today was good at 136/62. Please obtain blood tests ordered last November soon. Continue current medications.   Schedule a Medicare physical in N

## 2021-07-06 NOTE — PROGRESS NOTES
Silver Vega is a 81year old male. Patient presents with:  Hypertension: establish care  Bump: in back of left knee    HPI:   Mr. Moisés Frias presents this afternoon for his initial visit with me to establish ongoing primary care.   Most recent PCP Dr. Hailey Vargas BY MOUTH ONE TIME DAILY  90 tablet 0   • traMADol HCl 50 MG Oral Tab Take 1 tablet (50 mg total) by mouth every 8 (eight) hours as needed for Pain.  90 tablet 5   • Ferrous Gluconate 324 (37.5 Fe) MG Oral Tab Take 1 tablet (324 mg total) by mouth 2 (two) ti Surgical History:   Procedure Laterality Date   • APPENDECTOMY      per NG   • ARTHROSCOPY OF JOINT UNLISTED     • ARTHROSCOPY, ANKLE, SURGICAL; W/ANKLE ARTHRODESIS Right     \"Arthrocentesis of the right ankle joint (2-3)\"; per NG   • COLONOSCOPY  2005 cyst.  Reassure and observe. Discussed that he may apply warm compresses if he wishes. The patient indicates understanding of these issues and agrees to the plan. The patient is asked to return in November.     Hubert Issa MD  7/6/2021  1:23 PM

## 2021-07-07 RX ORDER — METOPROLOL SUCCINATE 25 MG/1
25 TABLET, EXTENDED RELEASE ORAL DAILY
Qty: 90 TABLET | Refills: 0 | Status: SHIPPED | OUTPATIENT
Start: 2021-07-07 | End: 2021-07-20

## 2021-07-07 NOTE — TELEPHONE ENCOUNTER
Current Outpatient Medications:   •  METOPROLOL SUCCINATE ER 25 MG Oral Tablet 24 Hr, TAKE ONE TABLET BY MOUTH ONE TIME DAILY , Disp: 90 tablet, Rfl: 0

## 2021-07-07 NOTE — TELEPHONE ENCOUNTER
Approved. Patient should establish care in the near future with a new primary care physician in the practice as Dr. Jesus Bledsoe has retired.

## 2021-07-08 ENCOUNTER — LAB ENCOUNTER (OUTPATIENT)
Dept: LAB | Age: 86
End: 2021-07-08
Attending: INTERNAL MEDICINE
Payer: MEDICARE

## 2021-07-08 DIAGNOSIS — I10 ESSENTIAL HYPERTENSION WITH GOAL BLOOD PRESSURE LESS THAN 140/90: ICD-10-CM

## 2021-07-08 LAB
ALBUMIN SERPL-MCNC: 3.3 G/DL (ref 3.4–5)
ALBUMIN/GLOB SERPL: 0.8 {RATIO} (ref 1–2)
ALP LIVER SERPL-CCNC: 119 U/L
ALT SERPL-CCNC: 31 U/L
ANION GAP SERPL CALC-SCNC: 5 MMOL/L (ref 0–18)
AST SERPL-CCNC: 19 U/L (ref 15–37)
BASOPHILS # BLD AUTO: 0.11 X10(3) UL (ref 0–0.2)
BASOPHILS NFR BLD AUTO: 1.5 %
BILIRUB SERPL-MCNC: 0.6 MG/DL (ref 0.1–2)
BILIRUB UR QL: NEGATIVE
BUN BLD-MCNC: 17 MG/DL (ref 7–18)
BUN/CREAT SERPL: 25 (ref 10–20)
CALCIUM BLD-MCNC: 8.9 MG/DL (ref 8.5–10.1)
CHLORIDE SERPL-SCNC: 102 MMOL/L (ref 98–112)
CHOLEST SMN-MCNC: 177 MG/DL (ref ?–200)
CO2 SERPL-SCNC: 33 MMOL/L (ref 21–32)
COLOR UR: YELLOW
CREAT BLD-MCNC: 0.68 MG/DL
DEPRECATED RDW RBC AUTO: 50.3 FL (ref 35.1–46.3)
EOSINOPHIL # BLD AUTO: 0.11 X10(3) UL (ref 0–0.7)
EOSINOPHIL NFR BLD AUTO: 1.5 %
ERYTHROCYTE [DISTWIDTH] IN BLOOD BY AUTOMATED COUNT: 14.1 % (ref 11–15)
GLOBULIN PLAS-MCNC: 4.2 G/DL (ref 2.8–4.4)
GLUCOSE BLD-MCNC: 81 MG/DL (ref 70–99)
GLUCOSE UR-MCNC: NEGATIVE MG/DL
HCT VFR BLD AUTO: 40.1 %
HDLC SERPL-MCNC: 100 MG/DL (ref 40–59)
HGB BLD-MCNC: 13.6 G/DL
HGB UR QL STRIP.AUTO: NEGATIVE
IMM GRANULOCYTES # BLD AUTO: 0.22 X10(3) UL (ref 0–1)
IMM GRANULOCYTES NFR BLD: 3 %
KETONES UR-MCNC: NEGATIVE MG/DL
LDLC SERPL CALC-MCNC: 69 MG/DL (ref ?–100)
LEUKOCYTE ESTERASE UR QL STRIP.AUTO: NEGATIVE
LYMPHOCYTES # BLD AUTO: 2.13 X10(3) UL (ref 1–4)
LYMPHOCYTES NFR BLD AUTO: 28.7 %
M PROTEIN MFR SERPL ELPH: 7.5 G/DL (ref 6.4–8.2)
MCH RBC QN AUTO: 32.8 PG (ref 26–34)
MCHC RBC AUTO-ENTMCNC: 33.9 G/DL (ref 31–37)
MCV RBC AUTO: 96.6 FL
MONOCYTES # BLD AUTO: 0.79 X10(3) UL (ref 0.1–1)
MONOCYTES NFR BLD AUTO: 10.6 %
NEUTROPHILS # BLD AUTO: 4.07 X10 (3) UL (ref 1.5–7.7)
NEUTROPHILS # BLD AUTO: 4.07 X10(3) UL (ref 1.5–7.7)
NEUTROPHILS NFR BLD AUTO: 54.7 %
NITRITE UR QL STRIP.AUTO: NEGATIVE
NONHDLC SERPL-MCNC: 77 MG/DL (ref ?–130)
OSMOLALITY SERPL CALC.SUM OF ELEC: 291 MOSM/KG (ref 275–295)
PATIENT FASTING Y/N/NP: NO
PATIENT FASTING Y/N/NP: NO
PH UR: 7 [PH] (ref 5–8)
PLATELET # BLD AUTO: 181 10(3)UL (ref 150–450)
POTASSIUM SERPL-SCNC: 4 MMOL/L (ref 3.5–5.1)
PROT UR-MCNC: 30 MG/DL
RBC # BLD AUTO: 4.15 X10(6)UL
SODIUM SERPL-SCNC: 140 MMOL/L (ref 136–145)
SP GR UR STRIP: 1.02 (ref 1–1.03)
TRIGL SERPL-MCNC: 35 MG/DL (ref 30–149)
TSI SER-ACNC: 1.57 MIU/ML (ref 0.36–3.74)
UROBILINOGEN UR STRIP-ACNC: <2
VLDLC SERPL CALC-MCNC: 5 MG/DL (ref 0–30)
WBC # BLD AUTO: 7.4 X10(3) UL (ref 4–11)

## 2021-07-08 PROCEDURE — 84443 ASSAY THYROID STIM HORMONE: CPT | Performed by: INTERNAL MEDICINE

## 2021-07-08 PROCEDURE — 80053 COMPREHEN METABOLIC PANEL: CPT

## 2021-07-08 PROCEDURE — 81001 URINALYSIS AUTO W/SCOPE: CPT

## 2021-07-08 PROCEDURE — 36415 COLL VENOUS BLD VENIPUNCTURE: CPT

## 2021-07-08 PROCEDURE — 80061 LIPID PANEL: CPT

## 2021-07-08 PROCEDURE — 85025 COMPLETE CBC W/AUTO DIFF WBC: CPT

## 2021-07-13 RX ORDER — AMLODIPINE BESYLATE 5 MG/1
5 TABLET ORAL DAILY
Qty: 90 TABLET | Refills: 1 | Status: SHIPPED | OUTPATIENT
Start: 2021-07-13

## 2021-07-13 NOTE — TELEPHONE ENCOUNTER
Current Outpatient Medications:   •  AMLODIPINE BESYLATE 5 MG Oral Tab, TAKE ONE TABLET BY MOUTH ONE TIME DAILY , Disp: 90 tablet, Rfl: 0

## 2021-07-20 RX ORDER — METOPROLOL SUCCINATE 25 MG/1
25 TABLET, EXTENDED RELEASE ORAL DAILY
Qty: 90 TABLET | Refills: 1 | Status: SHIPPED | OUTPATIENT
Start: 2021-07-20

## 2021-07-27 NOTE — TELEPHONE ENCOUNTER
Patient advised and will contact the pharmacy.     Pharmacy    [id number] - [address], [phone]   Outpatient Medication Detail     Disp Refills Start End    metoprolol succinate 25 MG Oral Tablet 24 Hr 90 tablet 1

## 2021-08-03 RX ORDER — ALLOPURINOL 100 MG/1
TABLET ORAL
Qty: 90 TABLET | Refills: 0 | Status: SHIPPED | OUTPATIENT
Start: 2021-08-03 | End: 2021-10-24

## 2021-08-03 RX ORDER — TAMSULOSIN HYDROCHLORIDE 0.4 MG/1
CAPSULE ORAL
Qty: 90 CAPSULE | Refills: 0 | Status: SHIPPED | OUTPATIENT
Start: 2021-08-03 | End: 2021-11-16

## 2021-08-03 NOTE — TELEPHONE ENCOUNTER
Protocol failed or has No Protocol, please review  Requested Prescriptions   Pending Prescriptions Disp Refills    ALLOPURINOL 100 MG Oral Tab [Pharmacy Med Name: Allopurinol 100 Mg Tab Nort] 90 tablet 0     Sig: TAKE ONE TABLET BY MOUTH ONE TIME DAILY

## 2021-08-24 ENCOUNTER — MED REC SCAN ONLY (OUTPATIENT)
Dept: ORTHOPEDICS CLINIC | Facility: CLINIC | Age: 86
End: 2021-08-24

## 2021-09-20 ENCOUNTER — OFFICE VISIT (OUTPATIENT)
Dept: PAIN CLINIC | Facility: HOSPITAL | Age: 86
End: 2021-09-20
Attending: NURSE PRACTITIONER
Payer: MEDICARE

## 2021-09-20 VITALS — SYSTOLIC BLOOD PRESSURE: 114 MMHG | DIASTOLIC BLOOD PRESSURE: 57 MMHG | HEART RATE: 70 BPM | OXYGEN SATURATION: 97 %

## 2021-09-20 DIAGNOSIS — M54.40 ACUTE BILATERAL LOW BACK PAIN WITH SCIATICA, SCIATICA LATERALITY UNSPECIFIED: Primary | ICD-10-CM

## 2021-09-20 PROCEDURE — 99211 OFF/OP EST MAY X REQ PHY/QHP: CPT

## 2021-09-20 NOTE — PROGRESS NOTES
PT presents to the [facility] with use of a front wheel walker. Pt reports bilateral low back pain with sciatica has returned. 5-6/10 Pain increases after sitting for a prolonged amount of time on a hard surface.   MARTHA Gutierrez saw PT for pain eval.  See notes for P

## 2021-09-20 NOTE — CHRONIC PAIN
Follow-up Note  CC: low back pain   HISTORY OF PRESENT ILLNESS:  Dave Neal is a 81year old old male, originally referred to the pain clinic by Dr. Wu ref.  provider found, with history of Acute bilateral low back pain with sciatica, sciatica laterali Besylate 5 MG Oral Tab Take 1 tablet (5 mg total) by mouth daily. 90 tablet 1   • traMADol HCl 50 MG Oral Tab Take 1 tablet (50 mg total) by mouth every 8 (eight) hours as needed for Pain.  90 tablet 5   • Ferrous Gluconate 324 (37.5 Fe) MG Oral Tab Take 1 stenosis     Aortic atherosclerosis (HCC)     Hx of adenomatous colonic polyps     Aortic stenosis     Pulmonary hypertension (HCC)     Diverticulosis     Essential hypertension     Primary osteoarthritis of knees, bilateral     BPH (benign prostatic hyper per NG   • Hypertension Mother         per NG   • Diabetes Neg    • Glaucoma Neg    • Macular degeneration Neg        SOCIAL HISTORY:  Social History    Socioeconomic History      Marital status:        Spouse name: Not on file      Number of Club or Organization Meetings: Not on file      Marital Status: Not on file  Intimate Partner Violence:       Fear of Current or Ex-Partner: Not on file      Emotionally Abused: Not on file      Physically Abused: Not on file      Sexually Abused: Not on f minutes    Comprehensive analgesic plan was formulated. Conservative vs. Aggressive measures were discussed at length including pharmacotherapy (eg.  Anti- inflammatories, muscle relaxants, neuropathic medications, oral steroids, analgesics), injections, an

## 2021-09-22 ENCOUNTER — DOCUMENTATION ONLY (OUTPATIENT)
Dept: PAIN CLINIC | Facility: HOSPITAL | Age: 86
End: 2021-09-22

## 2021-09-22 NOTE — PROGRESS NOTES
Procedure code 44834--0109/28/2021 APROVED      Medicare--- No PA needed per pt's insurance order form faxed to the surgery center

## 2021-09-27 RX ORDER — TRAMADOL HYDROCHLORIDE 50 MG/1
50 TABLET ORAL EVERY 8 HOURS PRN
Qty: 90 TABLET | Refills: 5 | Status: SHIPPED | OUTPATIENT
Start: 2021-09-27 | End: 2021-12-22

## 2021-09-28 ENCOUNTER — SURGERY CENTER DOCUMENTATION (OUTPATIENT)
Dept: SURGERY | Age: 86
End: 2021-09-28

## 2021-09-28 NOTE — PROCEDURES
[address] [facility] [address]   NAME:  Smita Abdul Visit ID: [id number]   MR #:    [id number] :  1935     PHYSICIAN:  Ming Baum DO       Operative Report    DATE OF PROCEDURE: 2021   PREOPERATI cleaned. Band-Aids were applied. The patient was transferred to the cart and into taken to recovery. He tolerated procedure well and there were no complications.  The patient was given discharge instructions and will follow up in the clinic in 2 weeks

## 2021-10-24 RX ORDER — ALLOPURINOL 100 MG/1
TABLET ORAL
Qty: 90 TABLET | Refills: 0 | Status: SHIPPED | OUTPATIENT
Start: 2021-10-24 | End: 2022-04-14

## 2021-10-26 NOTE — TELEPHONE ENCOUNTER
Spoke to Patient.  He Will Call Us Back When He Has His Schedule Book In [address] [place] [address]

## 2021-11-04 ENCOUNTER — OFFICE VISIT (OUTPATIENT)
Dept: PAIN CLINIC | Facility: HOSPITAL | Age: 86
End: 2021-11-04
Attending: NURSE PRACTITIONER
Payer: MEDICARE

## 2021-11-04 VITALS — SYSTOLIC BLOOD PRESSURE: 123 MMHG | OXYGEN SATURATION: 98 % | DIASTOLIC BLOOD PRESSURE: 65 MMHG | HEART RATE: 60 BPM

## 2021-11-04 DIAGNOSIS — M54.40 ACUTE BILATERAL LOW BACK PAIN WITH SCIATICA, SCIATICA LATERALITY UNSPECIFIED: Primary | ICD-10-CM

## 2021-11-04 DIAGNOSIS — M47.816 ARTHRITIS OF FACET JOINT OF LUMBAR SPINE: ICD-10-CM

## 2021-11-04 PROCEDURE — 99211 OFF/OP EST MAY X REQ PHY/QHP: CPT

## 2021-11-04 NOTE — PROGRESS NOTES
PT presents to the [facility] with use of a rolling walking. Pt reports last injection worked for 5 days but pain has returned. Pain is worse in the morning and increases with standing, walking and ADL's   Interested in another injection.   MARTHA Gutierrez saw PT for b

## 2021-11-04 NOTE — CHRONIC PAIN
Follow-up Note  CC: low back pain   HISTORY OF PRESENT ILLNESS:  Omi Faye is a 81year old old male, originally referred to the pain clinic by Dr. Wu ref.  provider found, with history of Acute bilateral low back pain with sciatica, sciatica laterali every 8 (eight) hours as needed for Pain. 90 tablet 5   • tamsulosin (FLOMAX) cap TAKE 1 CAPSULE BY MOUTH ONE-HALF HOUR AFTER SUPPER DAILY    90 capsule 0   • metoprolol succinate 25 MG Oral Tablet 24 Hr Take 1 tablet (25 mg total) by mouth daily.  90 table prostatic hyperplasia with lower urinary tract symptoms     Panlobular emphysema (HCC)     Osteomyelitis (HCC)     Physical exam     Lumbar spinal stenosis     Aortic atherosclerosis (HCC)     Hx of adenomatous colonic polyps     Aortic stenosis     Pulmon Right 11/2014       FAMILY HISTORY:  Family History   Problem Relation Age of Onset   • Cancer Father         Lung cancer; per NG   • Arthritis Father         per NG   • Hypertension Mother         per NG   • Diabetes Neg    • Glaucoma Neg    • Macular deg with Friends and Family: Not on file      Attends Taoism Services: Not on file      Active Member of Clubs or Organizations: Not on file      Attends Club or Organization Meetings: Not on file      Marital Status: Not on file  Intimate Partner Violence: until recently   - plan for bilateral lumbar RFAs    Pt will return to clinic for follow up 6-8 weeks after the injection   Total Time: 13 minutes    Comprehensive analgesic plan was formulated.  Conservative vs. Aggressive measures were discussed at length

## 2021-11-08 ENCOUNTER — DOCUMENTATION ONLY (OUTPATIENT)
Dept: PAIN CLINIC | Facility: HOSPITAL | Age: 86
End: 2021-11-08

## 2021-11-08 NOTE — PROGRESS NOTES
Procedure code [id number]/[id number] APPROVED  Follow up 12/16/2021 at 12pm      Medicare--- No Pa needed per pt's insurance   Order form faxed to the surgery center

## 2021-11-16 ENCOUNTER — OFFICE VISIT (OUTPATIENT)
Dept: OPHTHALMOLOGY | Facility: CLINIC | Age: 86
End: 2021-11-16
Payer: MEDICARE

## 2021-11-16 DIAGNOSIS — H25.13 AGE-RELATED NUCLEAR CATARACT OF BOTH EYES: ICD-10-CM

## 2021-11-16 DIAGNOSIS — H40.003 SUSPECTED GLAUCOMA OF BOTH EYES: Primary | ICD-10-CM

## 2021-11-16 DIAGNOSIS — H43.393 VITREOUS FLOATERS OF BOTH EYES: ICD-10-CM

## 2021-11-16 DIAGNOSIS — H35.30 AGE-RELATED MACULAR DEGENERATION: ICD-10-CM

## 2021-11-16 PROCEDURE — 92015 DETERMINE REFRACTIVE STATE: CPT | Performed by: OPHTHALMOLOGY

## 2021-11-16 PROCEDURE — 92014 COMPRE OPH EXAM EST PT 1/>: CPT | Performed by: OPHTHALMOLOGY

## 2021-11-16 RX ORDER — TAMSULOSIN HYDROCHLORIDE 0.4 MG/1
CAPSULE ORAL
Qty: 90 CAPSULE | Refills: 3 | Status: SHIPPED | OUTPATIENT
Start: 2021-11-16

## 2021-11-16 NOTE — ASSESSMENT & PLAN NOTE
Discussed with patient that he is a glaucoma suspect based on increased cupping of the optic nerves in both eyes. .  Glaucoma diagnostic testing ordered. Will not start medication, but will continue to observe. Patient verbalized understanding.

## 2021-11-16 NOTE — PATIENT INSTRUCTIONS
Age-related macular degeneration  Stable; follow up with Dr. Swapnil Mitchell every 6 months as directed. Age-related nuclear cataract of both eyes  No treatment is recommended on moderate cataracts at this time. New glasses today; update as needed.   Discusse

## 2021-11-16 NOTE — PROGRESS NOTES
Yuan Broderick is a 81year old male. HPI:     HPI     Pt is here for a complete exam. Pt was last seen by Dr Monica Cardenas 8/24/21, he will see him again in Feb. 2022. Pt complains of a decrease in near vision since last visit.  Pt would like an updated gla Arthritis Father         per NG   • Hypertension Mother         per NG   • Diabetes Neg    • Glaucoma Neg    • Macular degeneration Neg        Social History: Social History    Tobacco Use      Smoking status: Former Smoker        Types: Pipe        Quit d reaction(s): Rash  Hydrochlorothiazide     RASH    Comment:Other reaction(s): Rash  Triamterene             RASH    ROS:       PHYSICAL EXAM:     Base Eye Exam     Visual Acuity (Snellen - Linear)       Right Left    Dist cc 20/200 20/100 +1    Dist ph cc 20/100+ +4.00 20/60    Type: Flat top bifocal   +4.00 add recommended per RJM                  ASSESSMENT/PLAN:     Diagnoses and Plan:     Age-related macular degeneration  Stable; follow up with Dr. Tania Seaman every 6 months as directed.      Age-related nu

## 2021-11-16 NOTE — ASSESSMENT & PLAN NOTE
No treatment is recommended on moderate cataracts at this time. New glasses today; update as needed. Discussed that new prescription is only a slight change.

## 2021-11-22 ENCOUNTER — OFFICE VISIT (OUTPATIENT)
Dept: INTERNAL MEDICINE CLINIC | Facility: CLINIC | Age: 86
End: 2021-11-22
Payer: MEDICARE

## 2021-11-22 VITALS
SYSTOLIC BLOOD PRESSURE: 126 MMHG | WEIGHT: 175.81 LBS | DIASTOLIC BLOOD PRESSURE: 68 MMHG | HEIGHT: 65 IN | HEART RATE: 57 BPM | BODY MASS INDEX: 29.29 KG/M2

## 2021-11-22 DIAGNOSIS — M48.061 SPINAL STENOSIS OF LUMBAR REGION, UNSPECIFIED WHETHER NEUROGENIC CLAUDICATION PRESENT: ICD-10-CM

## 2021-11-22 DIAGNOSIS — N40.1 BENIGN PROSTATIC HYPERPLASIA WITH POST-VOID DRIBBLING: ICD-10-CM

## 2021-11-22 DIAGNOSIS — I10 ESSENTIAL HYPERTENSION: ICD-10-CM

## 2021-11-22 DIAGNOSIS — Z00.00 ENCOUNTER FOR ANNUAL HEALTH EXAMINATION: ICD-10-CM

## 2021-11-22 DIAGNOSIS — I27.20 PULMONARY HYPERTENSION (HCC): ICD-10-CM

## 2021-11-22 DIAGNOSIS — Z00.00 ANNUAL PHYSICAL EXAM: Primary | ICD-10-CM

## 2021-11-22 DIAGNOSIS — I70.0 AORTIC ATHEROSCLEROSIS (HCC): ICD-10-CM

## 2021-11-22 DIAGNOSIS — Z86.010 HX OF ADENOMATOUS COLONIC POLYPS: ICD-10-CM

## 2021-11-22 DIAGNOSIS — D50.9 IRON DEFICIENCY ANEMIA, UNSPECIFIED IRON DEFICIENCY ANEMIA TYPE: ICD-10-CM

## 2021-11-22 DIAGNOSIS — N39.43 BENIGN PROSTATIC HYPERPLASIA WITH POST-VOID DRIBBLING: ICD-10-CM

## 2021-11-22 DIAGNOSIS — J44.9 CHRONIC OBSTRUCTIVE PULMONARY DISEASE, UNSPECIFIED COPD TYPE (HCC): ICD-10-CM

## 2021-11-22 DIAGNOSIS — I35.0 AORTIC VALVE STENOSIS, ETIOLOGY OF CARDIAC VALVE DISEASE UNSPECIFIED: ICD-10-CM

## 2021-11-22 PROCEDURE — 90732 PPSV23 VACC 2 YRS+ SUBQ/IM: CPT | Performed by: INTERNAL MEDICINE

## 2021-11-22 PROCEDURE — G0439 PPPS, SUBSEQ VISIT: HCPCS | Performed by: INTERNAL MEDICINE

## 2021-11-22 PROCEDURE — 99213 OFFICE O/P EST LOW 20 MIN: CPT | Performed by: INTERNAL MEDICINE

## 2021-11-22 PROCEDURE — G0009 ADMIN PNEUMOCOCCAL VACCINE: HCPCS | Performed by: INTERNAL MEDICINE

## 2021-11-22 NOTE — PROGRESS NOTES
HPI:   Kalani Tay is an 81year old male who presents this afternoon for a Medicare Subsequent Annual Wellness visit (Pt already had Initial Annual Wellness). Lately he has been feeling fairly well. He has no specific issues for discussion today. depressed, or hopeless: Not at all  PHQ-2 SCORE: 0      Advanced Directive:  He has a Living Will on file in [address] He has a Power of  for [employer] on file in [facility] He smoked tobacco in the past but quit greater than 12 months ago.   Social H Last Chemistry Labs:   Lab Results   Component Value Date    AST 19 07/08/2021    ALT 31 07/08/2021    CA 8.9 07/08/2021    ALB 3.3 (L) 07/08/2021    TSH 1.570 07/08/2021    CREATSERUM 0.68 (L) 07/08/2021    GLU 81 07/08/2021        CBC  (most recent l Pulmonary hypertension ([facility] [address]     He  has a past surgical history that includes appendectomy; hip replacement surgery (Right, 2002); arthroscopy, ankle, surgical; w/ankle arthrodesis (Right); knee arthroscopy (Bilateral); colonoscopy (2005); other (Right, 0 trouble hearing conversations in a noisy background such as a crowded room or restaurant: No   I get confused about where sounds come from: No I misunderstand some words in a sentence and need to ask people to repeat themselves: No   I especially have trou CHRONIC CONDITIONS:   Jesús Tolentino is a 81year old male who presents for a Medicare Assessment. PLAN SUMMARY:   Diagnoses and all orders for this visit:    Annual physical exam  Pneumovax today. Also recommended influenza vaccine. He declines.   C not be covered, or covered at this frequency, by your insurer. Please check with your insurance carrier before scheduling to verify coverage.    PREVENTATIVE SERVICES FREQUENCY &  COVERAGE DETAILS LAST COMPLETION DATE   Diabetes Screening    Fasting Blood Pneumococcal Vaccination(1 of 1 - PPSV23) Never done    Hepatitis B One screening covered for patients with certain risk factors   -  No recommendations at this time    Tetanus Toxoid Not covered by Medicare Part B unless medically necessary (cut with

## 2021-11-22 NOTE — PATIENT INSTRUCTIONS
Your blood pressure today was excellent at 126/68 and your exam was normal.  You received Pneumovax today. Continue current medications. Return visit in 6 months.   Papito Jolly's SCREENING SCHEDULE   Tests on this list are recommended by your physicia reminders to display for this patient.    Immunizations    Influenza Covered once per flu season  Please get every year -  Influenza Vaccine(1) due on 10/01/2021    Pneumococcal Each vaccine ([id number] & [id number]) covered once after 65 Prevnar 13: -    P

## 2021-11-27 ENCOUNTER — LAB REQUISITION (OUTPATIENT)
Dept: SURGERY | Age: 86
End: 2021-11-27
Payer: MEDICARE

## 2021-11-27 DIAGNOSIS — Z01.818 PREOP EXAMINATION: ICD-10-CM

## 2021-11-30 ENCOUNTER — SURGERY CENTER DOCUMENTATION (OUTPATIENT)
Dept: SURGERY | Age: 86
End: 2021-11-30

## 2021-12-09 ENCOUNTER — TELEPHONE (OUTPATIENT)
Dept: OPHTHALMOLOGY | Facility: CLINIC | Age: 86
End: 2021-12-09

## 2021-12-09 NOTE — TELEPHONE ENCOUNTER
Pt called to cancel same day appointment 12-9-21 for vf and oct, no md.  Pt is coming down with something. Pt will call back to reschedule when feeling better.

## 2021-12-09 NOTE — PROCEDURES
[facility] OPERATIVE NOTE         DATE OF SERVICE: 2021     Visit ID# Visit ID: [id number]     : 1935     PREOPERATIVE DIAGNOSIS:  Facet Arthropathy Lumbar Spine      POSTOPERATIVE DIAGNOSIS:   Facet Arthropathy Lumbar Spine      PROCEDURE PERFORME anesthetic was used to infiltrate the skin and underlying tissue at each needle insertion site. A 20-gauge RFK needles with 10 mm active tips were introduced through the skin and advanced towards the final needle site until contact made with the bone.

## 2021-12-16 ENCOUNTER — TELEPHONE (OUTPATIENT)
Dept: PAIN CLINIC | Facility: HOSPITAL | Age: 86
End: 2021-12-16

## 2021-12-16 NOTE — TELEPHONE ENCOUNTER
Pt called ASKING FOR PAIN MEDICATION;  Returned pt call, no answer; left V.M..  WE ARE NOT PRESCRIBING PAIN MEDS-HE SEES A RHEUMATOLOGIST FOR TRAMADOL  Pt has appt 1/6/2021  He was sched for today and called yesterday to cancel d/u another obligation

## 2021-12-21 ENCOUNTER — TELEPHONE (OUTPATIENT)
Dept: RHEUMATOLOGY | Facility: CLINIC | Age: 86
End: 2021-12-21

## 2021-12-21 ENCOUNTER — OFFICE VISIT (OUTPATIENT)
Dept: RHEUMATOLOGY | Facility: CLINIC | Age: 86
End: 2021-12-21
Payer: MEDICARE

## 2021-12-21 VITALS
DIASTOLIC BLOOD PRESSURE: 70 MMHG | HEIGHT: 65 IN | RESPIRATION RATE: 16 BRPM | HEART RATE: 58 BPM | WEIGHT: 179 LBS | SYSTOLIC BLOOD PRESSURE: 121 MMHG | BODY MASS INDEX: 29.82 KG/M2 | OXYGEN SATURATION: 96 %

## 2021-12-21 DIAGNOSIS — G89.29 CHRONIC PAIN OF LEFT KNEE: ICD-10-CM

## 2021-12-21 DIAGNOSIS — M25.562 CHRONIC PAIN OF LEFT KNEE: ICD-10-CM

## 2021-12-21 DIAGNOSIS — M15.9 OSTEOARTHRITIS OF MULTIPLE JOINTS, UNSPECIFIED OSTEOARTHRITIS TYPE: Primary | ICD-10-CM

## 2021-12-21 PROCEDURE — 20610 DRAIN/INJ JOINT/BURSA W/O US: CPT | Performed by: INTERNAL MEDICINE

## 2021-12-21 PROCEDURE — 99214 OFFICE O/P EST MOD 30 MIN: CPT | Performed by: INTERNAL MEDICINE

## 2021-12-21 RX ORDER — TRIAMCINOLONE ACETONIDE 40 MG/ML
40 INJECTION, SUSPENSION INTRA-ARTICULAR; INTRAMUSCULAR ONCE
Status: COMPLETED | OUTPATIENT
Start: 2021-12-21 | End: 2021-12-21

## 2021-12-21 RX ADMIN — TRIAMCINOLONE ACETONIDE 40 MG: 40 INJECTION, SUSPENSION INTRA-ARTICULAR; INTRAMUSCULAR at 12:20:00

## 2021-12-21 NOTE — TELEPHONE ENCOUNTER
Martell Barriga,   You can take over pain scripts for [place].  He states the tramadol is not working for him any longer.   -Louis Hammans

## 2021-12-21 NOTE — TELEPHONE ENCOUNTER
Hi Dr. Adriana Nam,   Noted. He has a follow up with Dr. May Shi on 1/6/22 but I will see if they can move him up in the schedule.      Kierra

## 2021-12-21 NOTE — PROGRESS NOTES
Xiomara Carlos is a 81year old male. HPI:   Patient presents with:  Knee Pain: Left  Other: B/L PAIN OF buttocks       I had the pleasure of seeing Nichole Cross.  As you recall, he is a pleasant 27-year-old who's been having ongoing osteoarthritis of change in weather bothers his knee. It doesn't hurt all the time. He has 5/10 pain in his hands. - he can't close hands compeltely anymore. He's taking trmaodl for the pain. He is taking tramadol mostly twice a day - occl three times.    He does take it back pain and got 3 epidural injections. It only lasted 10days. He has so much pain in his buttocks and thighs. He can't sit or stand easily. Laying down is only what he is comfortable with. His left knee is aggravating.      HISTORY:  Past Medical Vitamins-Minerals (EYE VITAMINS) Oral Cap Take 1 tablet by mouth 2 (two) times daily. 1 tab daily      • cholecalciferol 25 MCG (1000 UT) Oral Cap Take 1,000 Units by mouth nightly.  Vitamin D3 1,000 unit capsule      • Multiple Vitamin (MULTI-VITAMINS) Ora Absolute      0.0 - 1.0 K/UL 0.6    Eosinophils Absolute      0.0 - 0.7 K/UL 0.1    Basophils Absolute      0.0 - 0.2 K/UL 0.2    MYELOCYTE ABSOLUTE MANUAL      0 K/UL 0.06 (H)    Glucose      70 - 99 mg/dL  93   Sodium      136 - 144 mmol/L  134 (L)   Pot bad reaction to synvisc in the pasts -    -  Stay on  tramadol  50 mg every 6 hours prn   2. Allergies to discharge on this or Celebrex vs. voltarenGEL   3.  ankle pain-following with podiatry, orthotics. 4.  History right hip replacement   5.  Left ankl

## 2021-12-22 ENCOUNTER — TELEPHONE (OUTPATIENT)
Dept: PAIN CLINIC | Facility: HOSPITAL | Age: 86
End: 2021-12-22

## 2021-12-22 ENCOUNTER — TELEPHONE (OUTPATIENT)
Dept: RHEUMATOLOGY | Facility: CLINIC | Age: 86
End: 2021-12-22

## 2021-12-22 RX ORDER — TRAMADOL HYDROCHLORIDE 50 MG/1
TABLET ORAL
Qty: 270 TABLET | Refills: 1 | Status: SHIPPED | OUTPATIENT
Start: 2021-12-22 | End: 2021-12-29

## 2021-12-22 NOTE — TELEPHONE ENCOUNTER
Let him know I increased to 1-2 tablets every 8 hours    And I also let pain clinic know to help titrate his meds better at his next visit - on 1/6/

## 2021-12-22 NOTE — TELEPHONE ENCOUNTER
Patient requesting increase of dosage of the following medication. Patient stated when visiting pain management department after a  procedure there was an increase the pain.      traMADol 50 MG Oral Tab

## 2021-12-22 NOTE — TELEPHONE ENCOUNTER
Pt called CPM recently to update he was seeing his rheumatologist  For some increased pain; He called again to today; he states her recommedation is mayela ncrease the tramadol dose; After reviewing his chart -we are not giving him any medication;   His rh

## 2021-12-23 ENCOUNTER — HOSPITAL ENCOUNTER (EMERGENCY)
Facility: HOSPITAL | Age: 86
Discharge: HOME | End: 2021-12-24
Attending: EMERGENCY MEDICINE
Payer: MEDICARE

## 2021-12-23 DIAGNOSIS — M54.32 SCIATICA OF LEFT SIDE: Primary | ICD-10-CM

## 2021-12-23 PROCEDURE — 99283 EMERGENCY DEPT VISIT LOW MDM: CPT

## 2021-12-23 RX ORDER — HYDROCODONE BITARTRATE AND ACETAMINOPHEN 5; 325 MG/1; MG/1
TABLET ORAL
Status: DISCONTINUED
Start: 2021-12-23 | End: 2021-12-24

## 2021-12-23 RX ORDER — HYDROCODONE BITARTRATE AND ACETAMINOPHEN 5; 325 MG/1; MG/1
1-2 TABLET ORAL EVERY 6 HOURS PRN
Qty: 30 TABLET | Refills: 0 | Status: ON HOLD | OUTPATIENT
Start: 2021-12-23 | End: 2021-12-29

## 2021-12-23 RX ORDER — HYDROCODONE BITARTRATE AND ACETAMINOPHEN 5; 325 MG/1; MG/1
2 TABLET ORAL ONCE
Status: COMPLETED | OUTPATIENT
Start: 2021-12-23 | End: 2021-12-23

## 2021-12-24 ENCOUNTER — HOSPITAL ENCOUNTER (OUTPATIENT)
Facility: HOSPITAL | Age: 86
Setting detail: OBSERVATION
Discharge: NURSING, REHAB OR LONG-TERM CARE | End: 2021-12-29
Attending: EMERGENCY MEDICINE | Admitting: HOSPITALIST
Payer: MEDICARE

## 2021-12-24 VITALS
RESPIRATION RATE: 18 BRPM | SYSTOLIC BLOOD PRESSURE: 164 MMHG | OXYGEN SATURATION: 92 % | DIASTOLIC BLOOD PRESSURE: 91 MMHG | HEART RATE: 101 BPM

## 2021-12-24 DIAGNOSIS — M53.3 SACROILIAC JOINT PAIN: ICD-10-CM

## 2021-12-24 DIAGNOSIS — M54.9 INTRACTABLE BACK PAIN: Primary | ICD-10-CM

## 2021-12-24 LAB
ANION GAP SERPL CALC-SCNC: 9 MMOL/L (ref 0–18)
BASOPHILS # BLD AUTO: 0.07 X10(3) UL (ref 0–0.2)
BASOPHILS NFR BLD AUTO: 0.8 %
BILIRUB UR QL: NEGATIVE
BUN BLD-MCNC: 18 MG/DL (ref 7–18)
BUN/CREAT SERPL: 32.7 (ref 10–20)
CALCIUM BLD-MCNC: 8.9 MG/DL (ref 8.5–10.1)
CHLORIDE SERPL-SCNC: 101 MMOL/L (ref 98–112)
CLARITY UR: CLEAR
CO2 SERPL-SCNC: 29 MMOL/L (ref 21–32)
COLOR UR: YELLOW
CREAT BLD-MCNC: 0.55 MG/DL
DEPRECATED RDW RBC AUTO: 52 FL (ref 35.1–46.3)
EOSINOPHIL # BLD AUTO: 0.04 X10(3) UL (ref 0–0.7)
EOSINOPHIL NFR BLD AUTO: 0.5 %
ERYTHROCYTE [DISTWIDTH] IN BLOOD BY AUTOMATED COUNT: 14.3 % (ref 11–15)
GLUCOSE BLD-MCNC: 100 MG/DL (ref 70–99)
GLUCOSE UR-MCNC: NEGATIVE MG/DL
HCT VFR BLD AUTO: 37 %
HGB BLD-MCNC: 12.2 G/DL
HGB UR QL STRIP.AUTO: NEGATIVE
IMM GRANULOCYTES # BLD AUTO: 0.17 X10(3) UL (ref 0–1)
IMM GRANULOCYTES NFR BLD: 2.1 %
KETONES UR-MCNC: NEGATIVE MG/DL
LEUKOCYTE ESTERASE UR QL STRIP.AUTO: NEGATIVE
LYMPHOCYTES # BLD AUTO: 1.25 X10(3) UL (ref 1–4)
LYMPHOCYTES NFR BLD AUTO: 15.1 %
MCH RBC QN AUTO: 32.3 PG (ref 26–34)
MCHC RBC AUTO-ENTMCNC: 33 G/DL (ref 31–37)
MCV RBC AUTO: 97.9 FL
MONOCYTES # BLD AUTO: 0.89 X10(3) UL (ref 0.1–1)
MONOCYTES NFR BLD AUTO: 10.7 %
NEUTROPHILS # BLD AUTO: 5.86 X10 (3) UL (ref 1.5–7.7)
NEUTROPHILS # BLD AUTO: 5.86 X10(3) UL (ref 1.5–7.7)
NEUTROPHILS NFR BLD AUTO: 70.8 %
NITRITE UR QL STRIP.AUTO: NEGATIVE
OSMOLALITY SERPL CALC.SUM OF ELEC: 290 MOSM/KG (ref 275–295)
PH UR: 9 [PH] (ref 5–8)
PLATELET # BLD AUTO: 198 10(3)UL (ref 150–450)
POTASSIUM SERPL-SCNC: 4.4 MMOL/L (ref 3.5–5.1)
PROT UR-MCNC: NEGATIVE MG/DL
RBC # BLD AUTO: 3.78 X10(6)UL
SARS-COV-2 RNA RESP QL NAA+PROBE: NOT DETECTED
SODIUM SERPL-SCNC: 139 MMOL/L (ref 136–145)
SP GR UR STRIP: 1.01 (ref 1–1.03)
UROBILINOGEN UR STRIP-ACNC: <2
WBC # BLD AUTO: 8.3 X10(3) UL (ref 4–11)

## 2021-12-24 PROCEDURE — 99220 INITIAL OBSERVATION CARE,LEVL III: CPT | Performed by: HOSPITALIST

## 2021-12-24 RX ORDER — ACETAMINOPHEN 325 MG/1
650 TABLET ORAL EVERY 4 HOURS PRN
Status: DISCONTINUED | OUTPATIENT
Start: 2021-12-24 | End: 2021-12-29

## 2021-12-24 RX ORDER — ALLOPURINOL 100 MG/1
100 TABLET ORAL DAILY
Status: DISCONTINUED | OUTPATIENT
Start: 2021-12-24 | End: 2021-12-29

## 2021-12-24 RX ORDER — SENNOSIDES 8.6 MG
17.2 TABLET ORAL NIGHTLY PRN
Status: DISCONTINUED | OUTPATIENT
Start: 2021-12-24 | End: 2021-12-29

## 2021-12-24 RX ORDER — AMLODIPINE BESYLATE 5 MG/1
5 TABLET ORAL DAILY
Status: DISCONTINUED | OUTPATIENT
Start: 2021-12-24 | End: 2021-12-26

## 2021-12-24 RX ORDER — SODIUM PHOSPHATE, DIBASIC AND SODIUM PHOSPHATE, MONOBASIC 7; 19 G/133ML; G/133ML
1 ENEMA RECTAL ONCE AS NEEDED
Status: DISCONTINUED | OUTPATIENT
Start: 2021-12-24 | End: 2021-12-29

## 2021-12-24 RX ORDER — DULOXETIN HYDROCHLORIDE 30 MG/1
30 CAPSULE, DELAYED RELEASE ORAL NIGHTLY
Status: DISCONTINUED | OUTPATIENT
Start: 2021-12-24 | End: 2021-12-29

## 2021-12-24 RX ORDER — MORPHINE SULFATE 4 MG/ML
4 INJECTION, SOLUTION INTRAMUSCULAR; INTRAVENOUS ONCE
Status: COMPLETED | OUTPATIENT
Start: 2021-12-24 | End: 2021-12-24

## 2021-12-24 RX ORDER — TAMSULOSIN HYDROCHLORIDE 0.4 MG/1
0.4 CAPSULE ORAL
Status: DISCONTINUED | OUTPATIENT
Start: 2021-12-25 | End: 2021-12-29

## 2021-12-24 RX ORDER — HEPARIN SODIUM 5000 [USP'U]/ML
5000 INJECTION, SOLUTION INTRAVENOUS; SUBCUTANEOUS EVERY 8 HOURS SCHEDULED
Status: DISCONTINUED | OUTPATIENT
Start: 2021-12-24 | End: 2021-12-29

## 2021-12-24 RX ORDER — MORPHINE SULFATE 4 MG/ML
4 INJECTION, SOLUTION INTRAMUSCULAR; INTRAVENOUS EVERY 2 HOUR PRN
Status: DISCONTINUED | OUTPATIENT
Start: 2021-12-24 | End: 2021-12-26

## 2021-12-24 RX ORDER — METOCLOPRAMIDE HYDROCHLORIDE 5 MG/ML
10 INJECTION INTRAMUSCULAR; INTRAVENOUS EVERY 8 HOURS PRN
Status: DISCONTINUED | OUTPATIENT
Start: 2021-12-24 | End: 2021-12-29

## 2021-12-24 RX ORDER — ONDANSETRON 2 MG/ML
4 INJECTION INTRAMUSCULAR; INTRAVENOUS EVERY 6 HOURS PRN
Status: DISCONTINUED | OUTPATIENT
Start: 2021-12-24 | End: 2021-12-29

## 2021-12-24 RX ORDER — METOPROLOL SUCCINATE 25 MG/1
25 TABLET, EXTENDED RELEASE ORAL DAILY
Status: DISCONTINUED | OUTPATIENT
Start: 2021-12-24 | End: 2021-12-29

## 2021-12-24 RX ORDER — CETIRIZINE HYDROCHLORIDE 5 MG/1
5 TABLET ORAL DAILY
Status: DISCONTINUED | OUTPATIENT
Start: 2021-12-24 | End: 2021-12-29

## 2021-12-24 RX ORDER — HYDROCODONE BITARTRATE AND ACETAMINOPHEN 5; 325 MG/1; MG/1
1 TABLET ORAL EVERY 4 HOURS PRN
Status: DISCONTINUED | OUTPATIENT
Start: 2021-12-24 | End: 2021-12-29

## 2021-12-24 RX ORDER — MORPHINE SULFATE 2 MG/ML
2 INJECTION, SOLUTION INTRAMUSCULAR; INTRAVENOUS EVERY 2 HOUR PRN
Status: DISCONTINUED | OUTPATIENT
Start: 2021-12-24 | End: 2021-12-26

## 2021-12-24 RX ORDER — BISACODYL 10 MG
10 SUPPOSITORY, RECTAL RECTAL
Status: DISCONTINUED | OUTPATIENT
Start: 2021-12-24 | End: 2021-12-29

## 2021-12-24 RX ORDER — HYDROCODONE BITARTRATE AND ACETAMINOPHEN 5; 325 MG/1; MG/1
2 TABLET ORAL EVERY 4 HOURS PRN
Status: DISCONTINUED | OUTPATIENT
Start: 2021-12-24 | End: 2021-12-29

## 2021-12-24 RX ORDER — HYDROCODONE BITARTRATE AND ACETAMINOPHEN 5; 325 MG/1; MG/1
1 TABLET ORAL ONCE
Status: COMPLETED | OUTPATIENT
Start: 2021-12-24 | End: 2021-12-24

## 2021-12-24 RX ORDER — SODIUM CHLORIDE 9 MG/ML
INJECTION, SOLUTION INTRAVENOUS CONTINUOUS
Status: DISCONTINUED | OUTPATIENT
Start: 2021-12-24 | End: 2021-12-25

## 2021-12-24 RX ORDER — POLYETHYLENE GLYCOL 3350 17 G/17G
17 POWDER, FOR SOLUTION ORAL DAILY PRN
Status: DISCONTINUED | OUTPATIENT
Start: 2021-12-24 | End: 2021-12-29

## 2021-12-24 RX ORDER — MORPHINE SULFATE 2 MG/ML
1 INJECTION, SOLUTION INTRAMUSCULAR; INTRAVENOUS EVERY 2 HOUR PRN
Status: DISCONTINUED | OUTPATIENT
Start: 2021-12-24 | End: 2021-12-26

## 2021-12-24 RX ORDER — TAMSULOSIN HYDROCHLORIDE 0.4 MG/1
0.8 CAPSULE ORAL
Status: DISCONTINUED | OUTPATIENT
Start: 2021-12-24 | End: 2021-12-24

## 2021-12-24 NOTE — ED PROVIDER NOTES
Patient Seen in: [facility] AND [facility] Emergency Department      History   Patient presents with:  Back Pain    Stated Complaint: lower back pain    Subjective:   HPI  Pt is 80 yo M who arrives by EMS for worsening back pain.  Had epidural injection 2 weeks a revision amputation of right index finger.    • TOTAL KNEE REPLACEMENT Right 2014                Social History    Tobacco Use      Smoking status: Former Smoker        Types: Pipe        Quit date: 1985        Years since quittin.0      Smokele components within normal limits   CBC WITH DIFFERENTIAL WITH PLATELET    Narrative: The following orders were created for panel order CBC With Differential With Platelet.   Procedure                               Abnormality         Status (Principal) Intractable back pain M54.9 12/24/2021 Unknown

## 2021-12-24 NOTE — H&P
Douglas Taveras [address] Patient Status:  Observation    1935 MRN [id number]   Location [facility] 1W Attending Rufino Dasilva MD   Hosp Day # 0 PCP Varsha Gilbert MD     Date:  2021  Phu Echo 5-18 with pulmonary artery pressure 45 mmHg     Past Surgical History:   Procedure Laterality Date   • APPENDECTOMY      per NG   • ARTHROSCOPY, ANKLE, SURGICAL; W/ANKLE ARTHRODESIS Right     \"Arthrocentesis of the right ankle joint (2-3)\"; per NG CAPSULE BY MOUTH ONE-HALF HOUR AFTER SUPPER DAILY  ALLOPURINOL 100 MG Oral Tab, TAKE ONE TABLET BY MOUTH ONE TIME DAILY (Patient taking differently: Take 100 mg by mouth daily with dinner.)  metoprolol succinate 25 MG Oral Tablet 24 Hr, Take 1 tablet (25 m Straight leg test +ve b/l  Neurologic: Alert and oriented, normal affect. Skin: Warm and dry.      Results:     Lab Results   Component Value Date    WBC 8.3 12/24/2021    HGB 12.2 (L) 12/24/2021    HCT 37.0 (L) 12/24/2021    .0 12/24/2021    CREATS

## 2021-12-24 NOTE — CONSULTS
[facility]  Report of Consultation    Shoaib Poag Patient Status:  Observation    1935 MRN [id number]   Location [facility] 1W Attending Blanche Hale MD   Hosp Day # 0 PCP Myke Da Silva MD     Date of Admission: pressure 45 mmHg     Past Surgical History:   Procedure Laterality Date   • APPENDECTOMY      per NG   • ARTHROSCOPY, ANKLE, SURGICAL; W/ANKLE ARTHRODESIS Right     \"Arthrocentesis of the right ankle joint (2-3)\"; per NG   • COLONOSCOPY  2005    per NG per tab 2 tablet, 2 tablet, Oral, Q4H PRN  •  morphINE sulfate (PF) 2 MG/ML injection 1 mg, 1 mg, Intravenous, Q2H PRN **OR** morphINE sulfate (PF) 2 MG/ML injection 2 mg, 2 mg, Intravenous, Q2H PRN **OR** morphINE sulfate (PF) 4 MG/ML injection 4 mg, 4 mg Results   Component Value Date    WBC 8.3 12/24/2021    HGB 12.2 12/24/2021    HCT 37.0 12/24/2021    .0 12/24/2021    CREATSERUM 0.55 12/24/2021    BUN 18 12/24/2021     12/24/2021    K 4.4 12/24/2021     12/24/2021    CO2 29.0 12/24/20 Diffuse disc bulge with ligamentum flavum redundancy and mild bilateral facet arthropathy.  Mild right neural foraminal stenosis with no additional significant neural compromise.    L4-L5: Right eccentric disc bulge with right greater than left hypertrophic 140/90     Benign prostatic hyperplasia with lower urinary tract symptoms     Panlobular emphysema (HCC)     Osteomyelitis (HCC)     Physical exam     Lumbar spinal stenosis     Aortic atherosclerosis (HCC)     Hx of adenomatous colonic polyps     Aortic s discussing the care with the patients health care providers.        Time spent 20 minutes      Kathrin More DO  Anesthesiology  Pain Medicine

## 2021-12-24 NOTE — ED QUICK NOTES
Orders for admission, patient is aware of plan and ready to go upstairs.  Any questions, please call ED CINTHYA Cates at extension [phone]     Patient Covid vaccination status: Fully vaccinated     COVID Test Ordered in ED: Rapid SARS-CoV-2 by [address]

## 2021-12-24 NOTE — ED INITIAL ASSESSMENT (HPI)
Pt arrive in ER per [facility] with c/o lower back pain, sts that he has chronic back pain and get epidural medication in the past, c/o pain getting worst lately and was seen in this ER last night with the same complain, was sent home with a prescr

## 2021-12-24 NOTE — ED INITIAL ASSESSMENT (HPI)
Pt reports he has chronic back pain and has received medications and many injections but now the pain is worsening and unable to bear weight on left leg. Denies any loss of bowel or bladder.

## 2021-12-24 NOTE — CM/SW NOTE
CM notified by [facility] requesting assistance in arranging medicar for the patient. Pt is okay with paying for transport if his insurance doesn't cover it. Pt address was confirmed to be [address] with CARLOS SNIDER at [facility]

## 2021-12-24 NOTE — ED QUICK NOTES
Patient provided with discharge instructions. Verbalized understanding for plan of care at home and follow up. All question and concerns addressed prior to discharge. Pt discharge via medicar to home in stable condition.  Let pt know rx was sent to pharmacy

## 2021-12-24 NOTE — PROGRESS NOTES
[facility] Pharmacy Note:  Renal Dose Adjustment for Cetirizine (ZYRTEC)    Heena Diaz has been prescribed Cetirizine (Zyrtec) 10 mg orally daily. Estimated Creatinine Clearance: 88.6 mL/min (A) (based on SCr of 0.55 mg/dL (L)).     The dose has been hough

## 2021-12-25 LAB
ANION GAP SERPL CALC-SCNC: 8 MMOL/L (ref 0–18)
BASOPHILS # BLD AUTO: 0.06 X10(3) UL (ref 0–0.2)
BASOPHILS NFR BLD AUTO: 1 %
BUN BLD-MCNC: 12 MG/DL (ref 7–18)
BUN/CREAT SERPL: 26.1 (ref 10–20)
CALCIUM BLD-MCNC: 8.4 MG/DL (ref 8.5–10.1)
CHLORIDE SERPL-SCNC: 103 MMOL/L (ref 98–112)
CO2 SERPL-SCNC: 27 MMOL/L (ref 21–32)
CREAT BLD-MCNC: 0.46 MG/DL
DEPRECATED RDW RBC AUTO: 49.8 FL (ref 35.1–46.3)
EOSINOPHIL # BLD AUTO: 0.09 X10(3) UL (ref 0–0.7)
EOSINOPHIL NFR BLD AUTO: 1.5 %
ERYTHROCYTE [DISTWIDTH] IN BLOOD BY AUTOMATED COUNT: 14.3 % (ref 11–15)
GLUCOSE BLD-MCNC: 93 MG/DL (ref 70–99)
HCT VFR BLD AUTO: 36.8 %
HGB BLD-MCNC: 12.2 G/DL
IMM GRANULOCYTES # BLD AUTO: 0.14 X10(3) UL (ref 0–1)
IMM GRANULOCYTES NFR BLD: 2.3 %
LYMPHOCYTES # BLD AUTO: 1.52 X10(3) UL (ref 1–4)
LYMPHOCYTES NFR BLD AUTO: 24.7 %
MAGNESIUM SERPL-MCNC: 2 MG/DL (ref 1.6–2.6)
MCH RBC QN AUTO: 31.7 PG (ref 26–34)
MCHC RBC AUTO-ENTMCNC: 33.2 G/DL (ref 31–37)
MCV RBC AUTO: 95.6 FL
MONOCYTES # BLD AUTO: 0.7 X10(3) UL (ref 0.1–1)
MONOCYTES NFR BLD AUTO: 11.4 %
NEUTROPHILS # BLD AUTO: 3.64 X10 (3) UL (ref 1.5–7.7)
NEUTROPHILS # BLD AUTO: 3.64 X10(3) UL (ref 1.5–7.7)
NEUTROPHILS NFR BLD AUTO: 59.1 %
OSMOLALITY SERPL CALC.SUM OF ELEC: 285 MOSM/KG (ref 275–295)
PLATELET # BLD AUTO: 189 10(3)UL (ref 150–450)
POTASSIUM SERPL-SCNC: 4.3 MMOL/L (ref 3.5–5.1)
RBC # BLD AUTO: 3.85 X10(6)UL
SODIUM SERPL-SCNC: 138 MMOL/L (ref 136–145)
WBC # BLD AUTO: 6.2 X10(3) UL (ref 4–11)

## 2021-12-25 PROCEDURE — 99225 SUBSEQUENT OBSERVATION CARE: CPT | Performed by: HOSPITALIST

## 2021-12-25 NOTE — PLAN OF CARE
Problem: Patient Centered Care  Goal: Patient preferences are identified and integrated in the patient's plan of care  Description: Interventions:  - What would you like us to know as we care for you?  Lives at home alone  - Provide timely, complete, and appropriate  Outcome: Progressing     Problem: SAFETY ADULT - FALL  Goal: Free from fall injury  Description: INTERVENTIONS:  - Assess pt frequently for physical needs  - Identify cognitive and physical deficits and behaviors that affect risk of falls.   - mobilization  - Obtain PT/OT consults as needed  - Advance activity as appropriate  - Communicate ordered activity level and limitations with patient/family  Outcome: Progressing     Problem: Impaired Functional Mobility  Goal: Achieve highest/safest level

## 2021-12-25 NOTE — PROGRESS NOTES
[facility]    Progress Note    Jerry Shah Patient Status:  Observation    1935 MRN [id number]   Location [facility] 1W Attending Michelene Leventhal., MD   Hosp Day # 0 PCP Dariana Bhagat MD       Subjective:   Vikram Snider mg, Rectal, Daily PRN  •  Fleet Enema (FLEET) 7-19 GM/118ML enema 133 mL, 1 enema, Rectal, Once PRN  •  ondansetron (ZOFRAN) injection 4 mg, 4 mg, Intravenous, Q6H PRN  •  metoclopramide (REGLAN) injection 10 mg, 10 mg, Intravenous, Q8H PRN  •  allopurinol

## 2021-12-25 NOTE — OCCUPATIONAL THERAPY NOTE
Occupational Therapy order received, chart reviewed. Patient admitted under observation with intractable back pain. Per anesthesiologist note on 12/24, plan is for patient to have B SIJ injections with local anesthesia. RN stated patient okay to attempt.  P

## 2021-12-25 NOTE — PHYSICAL THERAPY NOTE
Orders received and chart reviewed. Discussed with CINTHYA Valdivia. Patient admitted with intractable back pain. Per anesthesiology note, plan is to have bilateral SIJ injections.  Patient presented in bed with increased pain, had just received norco. Discussed wi

## 2021-12-25 NOTE — CHRONIC PAIN
[facility]OSP - [facility]  Report of Consultation    Katherin Naranjo Patient Status:  Observation    1935 MRN [id number]   Location [facility] 1W Attending Steve Morel MD   Hosp Day # 0 PCP Nini Saavedra MD             History of P Laterality Date   • APPENDECTOMY      per NG   • ARTHROSCOPY, ANKLE, SURGICAL; W/ANKLE ARTHRODESIS Right     \"Arthrocentesis of the right ankle joint (2-3)\"; per NG   • COLONOSCOPY  2005    per NG   • HIP REPLACEMENT SURGERY Right 2002    per NG   • KNEE sulfate (PF) 2 MG/ML injection 1 mg, 1 mg, Intravenous, Q2H PRN **OR** morphINE sulfate (PF) 2 MG/ML injection 2 mg, 2 mg, Intravenous, Q2H PRN **OR** morphINE sulfate (PF) 4 MG/ML injection 4 mg, 4 mg, Intravenous, Q2H PRN  •  polyethylene glycol ( bilaterally, L>R  Neurologic: no sensory or motor deficits     Laboratory Data:  Lab Results   Component Value Date    WBC 6.2 12/25/2021    HGB 12.2 12/25/2021    HCT 36.8 12/25/2021    .0 12/25/2021    CREATSERUM 0.46 12/25/2021    BUN 12 12/25/20  Moderate multifactorial spinal canal with moderate left and mild right neural foraminal stenosis.    L3-L4: Diffuse disc bulge with ligamentum flavum redundancy and mild bilateral facet arthropathy.  Mild right neural foraminal stenosis with no additional floaters of both eyes     Iron deficiency anemia     Essential hypertension with goal blood pressure less than 140/90     Benign prostatic hyperplasia with lower urinary tract symptoms     Panlobular emphysema (HCC)     Osteomyelitis (HCC)     Physical exa options), face to face time, time spent reviewing data, obtaining patient information and discussing the care with the patients health care providers.        Time spent 20 minutes      Lopez Bates MD  Anesthesiology  Pain Medicine

## 2021-12-25 NOTE — ED PROVIDER NOTES
Patient Seen in: [facility] Emergency Department    History   Patient presents with:  Back Pain      HPI    The patient presents to the ED complaining of chronic left lower back pain that feels worse tonight.   He states that he has had multiple in REPLACEMENT Right 2014         Medications :  (Not in a hospital admission)       Family History   Problem Relation Age of Onset   • Arthritis Father         per NG   • Other (Lung Cancer) Father          age 54   • Hypertension Mother         per N distress. Appearance: He is well-developed. HENT:      Head: Normocephalic and atraumatic. Eyes:      General:         Right eye: No discharge. Left eye: No discharge.       Conjunctiva/sclera: Conjunctivae normal.   Neck:      Trachea: No t have any pertinent problems on file. to contribute to the complexity of this ED evaluation. ED Course: The patient presents to the ED with left lower back pain and sciatica symptoms.   No concern for cauda equina or epidural abscess on exam.  Patient afe

## 2021-12-26 PROCEDURE — 99225 SUBSEQUENT OBSERVATION CARE: CPT | Performed by: HOSPITALIST

## 2021-12-26 RX ORDER — AMLODIPINE BESYLATE 10 MG/1
10 TABLET ORAL DAILY
Status: DISCONTINUED | OUTPATIENT
Start: 2021-12-27 | End: 2021-12-29

## 2021-12-26 NOTE — OCCUPATIONAL THERAPY NOTE
OCCUPATIONAL THERAPY EVALUATION - INPATIENT      Room Number: 417/417-A  Evaluation Date: 12/26/2021  Type of Evaluation: Initial  Presenting Problem: intractable back pain    Physician Order: IP Consult to Occupational Therapy  Reason for Therapy: ADL/IAD skilled inpatient OT to address the above deficits, maximizing patient's ability to return to prior level of function.     The patient's Approx Degree of Impairment: 53.32% has been calculated based on documentation in the [facility] '6 clicks' Inpatient Daily A Procedure Laterality Date   • APPENDECTOMY      per NG   • ARTHROSCOPY, ANKLE, SURGICAL; W/ANKLE ARTHRODESIS Right     \"Arthrocentesis of the right ankle joint (2-3)\"; per NG   • COLONOSCOPY  2005    per NG   • HIP REPLACEMENT SURGERY Right 2002    per Inpatient Daily Activity Short Form  How much help from another person does the patient currently need…  -   Putting on and taking off regular lower body clothing?: A Lot  -   Bathing (including washing, rinsing, drying)?: A Lot  -   Toileting, which inclu

## 2021-12-26 NOTE — PROGRESS NOTES
[facility]    Progress Note    Christiane Hartmann Patient Status:  Observation    1935 MRN [id number]   Location [facility] 1W Attending Juan Lackey MD   Hosp Day # 0 PCP lEba Bermeo MD       Subjective:   Jhon Zarate mg, Oral, Nightly PRN  •  bisacodyl (DULCOLAX) rectal suppository 10 mg, 10 mg, Rectal, Daily PRN  •  Fleet Enema (FLEET) 7-19 GM/118ML enema 133 mL, 1 enema, Rectal, Once PRN  •  ondansetron (ZOFRAN) injection 4 mg, 4 mg, Intravenous, Q6H PRN  •  metoclop

## 2021-12-26 NOTE — CHRONIC PAIN
[facility]  Inpatient Progress Note     Dustin Miles Patient Status:  Observation    1935 MRN [id number]   Location [facility] 1W Attending Carrol Duvall MD   Hosp Day # 0 PCP Maldonado Zepeda MD         Subjective: reports that he quit smoking about 37 years ago. His smoking use included pipe. He has never used smokeless tobacco. He reports current alcohol use of about 5.8 standard drinks of alcohol per week. He reports that he does not use drugs.     Allergies: Oral, Daily  •  metoprolol succinate (Toprol XL) 24 hr tab 25 mg, 25 mg, Oral, Daily  •  lidocaine-menthol 4-1 % 2 patch, 2 patch, Transdermal, Daily  •  DULoxetine (CYMBALTA) DR particles cap 30 mg, 30 mg, Oral, Nightly  •  tamsulosin (FLOMAX) cap 0.4 mg, the lowest fully formed disc space is designated as L5-S1.   ALIGNMENT:   The anatomic lumbar lordosis is preserved.  Mild dextroscoliosis. VERTEBRAL BODIES:   A total of 5 non-rib-bearing lumbar-type vertebral bodies are identified.  Demineralization.    6. L1-L2:  Mild left neural foraminal stenosis.       7. Prior right L4 hemilaminectomy is suspected.       8. Mild lumbar dextroscoliosis.       9. 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       10.  Degenerative-type gra duloxetine 30mg bedtime  Continue tylenol and norco as needed  Disc morphine iv since not helping    Thank you for allowing me to participate in the care of your patient. Comprehensive analgesic plan was formulated.  Conservative vs. Aggressive measu

## 2021-12-26 NOTE — PLAN OF CARE
Adriana Elizondo is hospital day #2, admitted with intractable back pain. Received as transfer from [facility]. C/O pain in bilateral buttocks radiating into thigh. Lower back pain. Has had steroid injections in the past. States he is unable to walk due to pain.  Alert and appropriate resources  Description: INTERVENTIONS:  - Identify barriers to discharge w/pt and caregiver  - Include patient/family/discharge partner in discharge planning  - Arrange for needed discharge resources and transportation as appropriate  - Identif

## 2021-12-26 NOTE — PROGRESS NOTES
Pt admitted w/ back pain, no imaging available, ortho was not consulted. Plan is [facility] 12.27. Attempted eval, Dr. Kourtney Romero requesting no therapy for today, per RN. Will re-attempt asap.    Levell [place], PT

## 2021-12-27 ENCOUNTER — APPOINTMENT (OUTPATIENT)
Dept: GENERAL RADIOLOGY | Facility: HOSPITAL | Age: 86
End: 2021-12-27
Attending: ANESTHESIOLOGY
Payer: MEDICARE

## 2021-12-27 PROCEDURE — 99225 SUBSEQUENT OBSERVATION CARE: CPT | Performed by: HOSPITALIST

## 2021-12-27 PROCEDURE — 3E0R33Z INTRODUCTION OF ANTI-INFLAMMATORY INTO SPINAL CANAL, PERCUTANEOUS APPROACH: ICD-10-PCS | Performed by: ANESTHESIOLOGY

## 2021-12-27 RX ORDER — LIDOCAINE HYDROCHLORIDE 5 MG/ML
INJECTION, SOLUTION INFILTRATION; INTRAVENOUS AS NEEDED
Status: DISCONTINUED | OUTPATIENT
Start: 2021-12-27 | End: 2021-12-27 | Stop reason: HOSPADM

## 2021-12-27 RX ORDER — TRIAMCINOLONE ACETONIDE 40 MG/ML
INJECTION, SUSPENSION INTRA-ARTICULAR; INTRAMUSCULAR AS NEEDED
Status: DISCONTINUED | OUTPATIENT
Start: 2021-12-27 | End: 2021-12-27 | Stop reason: HOSPADM

## 2021-12-27 NOTE — PHYSICAL THERAPY NOTE
Physical Therapy:    Orders received and chart reviewed. Patient unavailable at this time per RN. PT will re-attempt with time permitting.     Simón Yap, PT, DPT

## 2021-12-27 NOTE — CHRONIC PAIN
[facility]  Inpatient Pain Management Progress Note      Patient name: Joe Johnson 81year old male  : 1935  MRN: [id number]    Diagnosis: Intractable back pain  (primary encounter diagnosis)  Sacroiliac joint pain    Reason for amputation of right index finger.    • TOTAL KNEE REPLACEMENT Right 2014     Family History   Problem Relation Age of Onset   • Arthritis Father         per    • Other (Lung Cancer) Father          age 54   • Hypertension Mother         per    • injection  Ears: no obvious deformities noted   Nose: externally grossly within normal limits, no unusual discharge or rhinorrhea   Throat: lips grossly within normal limits by visual exam externally  Neck: supple, trachea midline, no obvious JVD  Neuro/ M

## 2021-12-27 NOTE — OCCUPATIONAL THERAPY NOTE
OCCUPATIONAL THERAPY TREATMENT NOTE - INPATIENT        Room Number: 417/417-A           Presenting Problem: intractable back pain    Problem List  Principal Problem:    Intractable back pain      OCCUPATIONAL THERAPY ASSESSMENT     Chart review complete.  R yet.\"    OBJECTIVE  Precautions: Spine    WEIGHT BEARING RESTRICTION  None    PAIN ASSESSMENT  Ratin  Location: sacrum  Management Techniques:  Body mechanics;Repositioning     ACTIVITIES OF DAILY LIVING ASSESSMENT  AM-PAC ‘6-Clicks’ Inpatient Daily Ac

## 2021-12-27 NOTE — PLAN OF CARE
Pt is A&Ox4, RA. pt states he is unable to walk due to pain. Limited UE mobility- states he has bad shoulders. Norco for pain relief.   Farideh diet, voids per urinal. Plans for steroid injection on Monday- consent on chart, patient prefers to sign it on Monday cultural and social influences on pain and pain management  - Manage/alleviate anxiety  - Utilize distraction and/or relaxation techniques  - Monitor for opioid side effects  - Notify MD/LIP if interventions unsuccessful or patient reports new pain  - Anti function  Description: INTERVENTIONS:  - Assess patient stability and activity tolerance for standing, transferring and ambulating w/ or w/o assistive devices  - Assist with transfers and ambulation using safe patient handling equipment as needed  - Ensure

## 2021-12-27 NOTE — OCCUPATIONAL THERAPY NOTE
Chart reviewed. Attempted patient for Occupational Therapy treatment session. Per RN, patient in OR for B SIJ injection. OT will re-attempt patient for therapy session as census permits.       Luiz January, [address] [facility]  #[phone]

## 2021-12-27 NOTE — PLAN OF CARE
Problem: Patient Centered Care  Goal: Patient preferences are identified and integrated in the patient's plan of care  Description: Interventions:  - What would you like us to know as we care for you?  Lives at home alone  - Provide timely, complete, and schedule  Outcome: Progressing     Problem: MUSCULOSKELETAL - ADULT  Goal: Return mobility to safest level of function  Description: INTERVENTIONS:  - Assess patient stability and activity tolerance for standing, transferring and ambulating w/ or w/o mulugeta

## 2021-12-27 NOTE — PLAN OF CARE
Patient is hospital day 3 related to intractable back pain which caused him not able to walk or get out of his chair at home. Admitted. Plan for steroid injection around 1130 today.  Patient is refusing to sit up, refusing to work with therapy, refusing SCD Progressing     Problem: PAIN - ADULT  Goal: Verbalizes/displays adequate comfort level or patient's stated pain goal  Description: INTERVENTIONS:  - Encourage pt to monitor pain and request assistance  - Assess pain using appropriate pain scale  - Adminis appropriate  - Assess patient's ability to be responsible for managing their own health  - Refer to Case Management Department for coordinating discharge planning if the patient needs post-hospital services based on physician/LIP order or complex needs rel

## 2021-12-27 NOTE — PROGRESS NOTES
[facility]    Progress Note    Rayfield Phalen Patient Status:  Observation    1935 MRN [id number]   Location [facility] 1W Attending Cleo Wynne MD   Hosp Day # 0 PCP Lobo Catalan MD       Subjective:   Sherley Power (REGLAN) injection 10 mg, 10 mg, Intravenous, Q8H PRN  •  allopurinol (ZYLOPRIM) tab 100 mg, 100 mg, Oral, Daily  •  cetirizine (ZYRTEC) tab 5 mg, 5 mg, Oral, Daily  •  metoprolol succinate (Toprol XL) 24 hr tab 25 mg, 25 mg, Oral, Daily  •  lidocaine-ment

## 2021-12-27 NOTE — PHYSICAL THERAPY NOTE
PHYSICAL THERAPY EVALUATION - INPATIENT     Room Number: 417/417-A  Evaluation Date: 12/27/2021  Type of Evaluation: Initial        Presenting Problem: low back pain     Reason for Therapy: Mobility Dysfunction and Discharge Planning    PHYSICAL THERAPY A LVH, normal LV function, EF 55%, mild aortic stenosis with mean gradient 13 mmHg and peak gradient 24 mmHg, NARCISA, pulmonary hypertension 45 mmHg   • BPH (benign prostatic hyperplasia)    • COPD (chronic obstructive pulmonary disease) ([address]     CXR 11-14   • buttocks  Management Techniques: Repositioning    COGNITION  · Orientation Level:  oriented x4    RANGE OF MOTION AND STRENGTH ASSESSMENT  Upper extremity ROM and strength are within functional limits   Lower extremity ROM is within functional limits   Low walker - rolling at assistance level: modified independent   Goal #3   Current Status    Goal #4 Patient will negotiate 12 stairs/one curb w/ assistive device and modified independent   Goal #4   Current Status    Goal #5 Patient to demonstrate independenc

## 2021-12-27 NOTE — PROCEDURES
[facility] HOSP - [facility]  Procedure Stefan Jolly Patient Status:  Observation    1935 MRN [id number]   Location [place] [address] Attending Silverio Alvarado MD   Hosp Day # 0 PCP Kassie Carroll MD     Date of Admiss

## 2021-12-28 PROCEDURE — 99225 SUBSEQUENT OBSERVATION CARE: CPT | Performed by: HOSPITALIST

## 2021-12-28 NOTE — CHRONIC PAIN
[facility]  Inpatient Pain Management Progress Note      Patient name: Christiane Hartmann 81year old male  : 1935  MRN: [id number]    Diagnosis: Intractable back pain  (primary encounter diagnosis)  Sacroiliac joint pain    Reason for Onset   • Arthritis Father         per REJI   • Other (Lung Cancer) Father          age 54   • Hypertension Mother         per REJI   • Diabetes Neg    • Glaucoma Neg    • Macular degeneration Neg       reports that he quit smoking about 37 years ago.  His discharge or rhinorrhea   Throat: lips grossly within normal limits by visual exam externally  Neck: supple, trachea midline, no obvious JVD  Neuro/ Musculoskeletal: moves all four extremities to command     Assessment:  82 yo male known to the [facility]

## 2021-12-28 NOTE — PHYSICAL THERAPY NOTE
PHYSICAL THERAPY TREATMENT NOTE - INPATIENT     Room Number: 417/417-A       Presenting Problem: low back pain       Problem List  Principal Problem:    Intractable back pain      PHYSICAL THERAPY ASSESSMENT     RN approved PT session and pt was medicated mechanics; Relaxation;Repositioning    BALANCE  Static Sitting: Fair +  Dynamic Sitting: Fair  Static Standing: Fair -  Dynamic Standing: Poor    ACTIVITY TOLERANCE                          O2 WALK  Oxygen Therapy  SPO2% on Room Air at Rest: 96    AM-PAC '6 R side of bed by log roll   Goal #2 Patient is able to demonstrate transfers Sit to/from Stand at assistance level: modified independent with walker - rolling     Goal #2  Current Status Mod A with RW   Goal #3 Patient is able to ambulate 200 feet with ass

## 2021-12-28 NOTE — CM/SW NOTE
[facility] referrals started for RUPESH, referrals pending. [facility] referrals also started and pending.  CM to follow up

## 2021-12-28 NOTE — PLAN OF CARE
Alert and oriented x 3. DNAR. Glasses. [place]. CMS intact. Refused SCDs at night, but agree to wear them during the day while awake. Room air. Flatus present. Tolerated diet fair. Nausea present and relieved with Zofran and Reglan PRN. LBM 12/26/21.  Voiding INTERVENTIONS:  - Encourage pt to monitor pain and request assistance  - Assess pain using appropriate pain scale  - Administer analgesics based on type and severity of pain and evaluate response  - Implement non-pharmacological measures as appropriate and coordinating discharge planning if the patient needs post-hospital services based on physician/LIP order or complex needs related to functional status, cognitive ability or social support system  Outcome: Progressing     Problem: MUSCULOSKELETAL - ADULT  G

## 2021-12-28 NOTE — OCCUPATIONAL THERAPY NOTE
OCCUPATIONAL THERAPY TREATMENT NOTE - INPATIENT    Room Number: 417/417-A         Presenting Problem: intractable back pain     Problem List  Principal Problem:    Intractable back pain      OCCUPATIONAL THERAPY ASSESSMENT     Chart review complete.  RN [place] injections to work. \"    OBJECTIVE  Precautions: Spine    WEIGHT BEARING RESTRICTION  None    PAIN ASSESSMENT  Ratin  Location: B hips  Management Techniques: Activity promotion; Body mechanics;Repositioning     ACTIVITIES OF DAILY LIVING ASSESSMENT  AM

## 2021-12-28 NOTE — CM/SW NOTE
CM received MDO for to complete POLST form. CM placed POLST form on chart. MD to address and notify CM once completed with the pt/ family and signed by MD or APN. Brice Martini.  Jaren Jauregui RN, BSN  Nurse   [phone]

## 2021-12-28 NOTE — CM/SW NOTE
12/28/21 1400   CM/SW Referral Data   Referral Source Physician   Reason for Referral Discharge planning   Informant Patient   Pertinent Medical Hx   Does patient have an established PCP?   Day Arana)   Significant Past Medical/Mental Health Hx yen

## 2021-12-29 VITALS
DIASTOLIC BLOOD PRESSURE: 71 MMHG | RESPIRATION RATE: 18 BRPM | HEART RATE: 79 BPM | SYSTOLIC BLOOD PRESSURE: 121 MMHG | BODY MASS INDEX: 28.28 KG/M2 | TEMPERATURE: 98 F | HEIGHT: 66 IN | OXYGEN SATURATION: 96 % | WEIGHT: 176 LBS

## 2021-12-29 PROCEDURE — 99217 OBSERVATION CARE DISCHARGE: CPT | Performed by: HOSPITALIST

## 2021-12-29 RX ORDER — SENNA AND DOCUSATE SODIUM 50; 8.6 MG/1; MG/1
1 TABLET, FILM COATED ORAL DAILY
Refills: 0 | Status: SHIPPED | COMMUNITY
Start: 2021-12-29

## 2021-12-29 RX ORDER — POLYETHYLENE GLYCOL 3350 17 G/17G
17 POWDER, FOR SOLUTION ORAL DAILY PRN
Refills: 0 | Status: SHIPPED | COMMUNITY
Start: 2021-12-29

## 2021-12-29 RX ORDER — HYDROCODONE BITARTRATE AND ACETAMINOPHEN 5; 325 MG/1; MG/1
1-2 TABLET ORAL EVERY 6 HOURS PRN
Qty: 30 TABLET | Refills: 0 | Status: SHIPPED | OUTPATIENT
Start: 2021-12-29

## 2021-12-29 RX ORDER — DULOXETIN HYDROCHLORIDE 30 MG/1
30 CAPSULE, DELAYED RELEASE ORAL NIGHTLY
Refills: 0 | Status: SHIPPED | COMMUNITY
Start: 2021-12-29

## 2021-12-29 NOTE — OCCUPATIONAL THERAPY NOTE
OCCUPATIONAL THERAPY TREATMENT NOTE - INPATIENT    Room Number: 417/417-A         Presenting Problem: intractable back pain     Problem List  Principal Problem:    Intractable back pain      OCCUPATIONAL THERAPY ASSESSMENT     Chart review complete.  RN virginia comfortable. \"    OBJECTIVE  Precautions: Spine    WEIGHT BEARING RESTRICTION  None    PAIN ASSESSMENT  Rating: 10  Location: L buttock and low back  Management Techniques: Activity promotion; Body mechanics;Repositioning     ACTIVITY TOLERANCE  Pulse: 79 Comment: ongoing    Patient will complete self care task at sink level with MOD I   Comment: ongoing    Patient will complete bed mobility with MOD I  Comment: max a for supine>sidelying>sit         Goals  on: 22  Frequency: 3-5x/week    Nagi Garvey

## 2021-12-29 NOTE — PLAN OF CARE
Patient is hospital day 5 for intractable back pain. Patient is post op day 2 of steroid injection. Patient is turning side to side in bed better than when this RN had him on Monday. Patient refusing anything else at this time.  Physical therapy was able to level or patient's stated pain goal  Description: INTERVENTIONS:  - Encourage pt to monitor pain and request assistance  - Assess pain using appropriate pain scale  - Administer analgesics based on type and severity of pain and evaluate response  - Impleme health  - Refer to Case Management Department for coordinating discharge planning if the patient needs post-hospital services based on physician/LIP order or complex needs related to functional status, cognitive ability or social support system  Outcome: P

## 2021-12-29 NOTE — PHYSICAL THERAPY NOTE
PHYSICAL THERAPY TREATMENT NOTE - INPATIENT     Room Number: 417/417-A       Presenting Problem: low back pain       Problem List  Principal Problem:    Intractable back pain      PHYSICAL THERAPY ASSESSMENT   During this session the following PPE was worn this chair\"    OBJECTIVE  Precautions: Spine    PAIN ASSESSMENT   Rating: Unable to rate  Location: L buttocks  Management Techniques: Activity promotion; Body mechanics;Repositioning    BALANCE  Static Sitting: Fair  Dynamic Sitting: Fair -  Static Standi Current Status Mod to roll. Max A x 2 sidelying>sit.    Goal #2 Patient is able to demonstrate transfers Sit to/from Stand at assistance level: modified independent with walker - rolling      Goal #2  Current Status Mod A x 1-2 with RW   Goal #3 Patient i

## 2021-12-29 NOTE — CM/SW NOTE
12/29/21 1046   Discharge disposition   Expected discharge disposition [address] Provider [facility]  (Now [facility]   Discharge transportation [facility]       Pt discussed during nursing rounds.  Pt is stable f

## 2021-12-29 NOTE — DISCHARGE SUMMARY
[facility] Hospitalist Discharge Summary   Patient ID:  Graham Chapman  [id number]  53 year old  7/24/1935    Admit date: 12/24/2021  Discharge date: 12/29/2021  Primary Care Physician: Oletta Homans, MD   Attending Physician: Nasima Parham MD   Consults CTAB/L  CARDIO: Regular, no murmur  ABD: soft, NT, ND  EXTREMITIES: no edema, no calf tenderness    Operative Procedures: Procedure(s) (LRB):  BILATERAL SACROILIAC JOINT INJECTION UNDER FLUOROSCOPIC GUIDANCE (Bilateral)  Radiology:   XR PAIN CLINIC FLUOROS Tabs         Activity: activity as tolerated  Diet: regular diet  Wound Care: NA  Code Status: DNAR/Selective Treatment        Discharge Instructions       FU with PCP In 2 weeks  If back pain worsens or persists call Pain clinic may need to reinject.    Ta

## 2021-12-29 NOTE — PLAN OF CARE
Patient is A&O x4. Lower back puncture sites clean/dry. Refused SCDs overnight. Given Heparin for DVT prophylaxis. Voiding per urinal with appropriate urine output. Up with 1-2 assists and walker. Denies needing any pain medication.  Given Zofran PRN for na measures as appropriate and evaluate response  - Consider cultural and social influences on pain and pain management  - Manage/alleviate anxiety  - Utilize distraction and/or relaxation techniques  - Monitor for opioid side effects  - Notify MD/LIP if inte MUSCULOSKELETAL - ADULT  Goal: Return mobility to safest level of function  Description: INTERVENTIONS:  - Assess patient stability and activity tolerance for standing, transferring and ambulating w/ or w/o assistive devices  - Assist with transfers and am

## 2021-12-30 ENCOUNTER — INITIAL APN SNF VISIT (OUTPATIENT)
Dept: INTERNAL MEDICINE CLINIC | Facility: SKILLED NURSING FACILITY | Age: 86
End: 2021-12-30

## 2021-12-30 DIAGNOSIS — I10 ESSENTIAL HYPERTENSION: ICD-10-CM

## 2021-12-30 DIAGNOSIS — N40.1 BENIGN PROSTATIC HYPERPLASIA WITH POST-VOID DRIBBLING: ICD-10-CM

## 2021-12-30 DIAGNOSIS — T78.40XD ALLERGY, SUBSEQUENT ENCOUNTER: ICD-10-CM

## 2021-12-30 DIAGNOSIS — N39.43 BENIGN PROSTATIC HYPERPLASIA WITH POST-VOID DRIBBLING: ICD-10-CM

## 2021-12-30 DIAGNOSIS — I27.20 PULMONARY HYPERTENSION (HCC): ICD-10-CM

## 2021-12-30 DIAGNOSIS — F32.A DEPRESSION, UNSPECIFIED DEPRESSION TYPE: ICD-10-CM

## 2021-12-30 DIAGNOSIS — K59.00 CONSTIPATION, UNSPECIFIED CONSTIPATION TYPE: ICD-10-CM

## 2021-12-30 DIAGNOSIS — M54.9 INTRACTABLE BACK PAIN: ICD-10-CM

## 2021-12-30 PROCEDURE — 1111F DSCHRG MED/CURRENT MED MERGE: CPT | Performed by: NURSE PRACTITIONER

## 2021-12-30 PROCEDURE — 99309 SBSQ NF CARE MODERATE MDM 30: CPT | Performed by: NURSE PRACTITIONER

## 2021-12-30 NOTE — PROGRESS NOTES
Daniel Georges  : 1935  Age 81year old  male patient is admitted to [address] [place] [address] for rehabilitation and strengthening.       Chief complaint: back pain, constipation, initial apn visit, labs ordered    HPI  81year old male with HTN, HL, lumbar W/ANKLE ARTHRODESIS Right     \"Arthrocentesis of the right ankle joint (2-3)\"; per NG   • COLONOSCOPY  2005    per NG   • HIP REPLACEMENT SURGERY Right 2002    per NG   • KNEE ARTHROSCOPY Bilateral     per NG   • OTHER Right 07/15/2018    ORIF proximal r get PT and hoping for home soon.        PHYSICAL EXAM:  GENERAL HEALTH: well developed, well nourished, in no apparent distress  LINES, TUBES, DRAINS:  none  SKIN: no rashes, no suspicious lesions, warm, dry  WOUND: see wound assessment  EYES: REUBEN CASTILLO, continue multivitamin   - continue cholecalciferol daily    Discharge planning  - lives alone  - friend takes him to the grocery store in the winter  - he has a cane/walker at home  - independent     This is a 35 minute visit and greater than 50% of the ti

## 2022-01-04 ENCOUNTER — INITIAL APN SNF VISIT (OUTPATIENT)
Dept: INTERNAL MEDICINE CLINIC | Facility: SKILLED NURSING FACILITY | Age: 87
End: 2022-01-04

## 2022-01-04 VITALS
RESPIRATION RATE: 20 BRPM | WEIGHT: 177 LBS | TEMPERATURE: 97 F | DIASTOLIC BLOOD PRESSURE: 71 MMHG | SYSTOLIC BLOOD PRESSURE: 123 MMHG | OXYGEN SATURATION: 97 % | BODY MASS INDEX: 29 KG/M2 | HEART RATE: 77 BPM

## 2022-01-04 DIAGNOSIS — N39.43 BENIGN PROSTATIC HYPERPLASIA WITH POST-VOID DRIBBLING: ICD-10-CM

## 2022-01-04 DIAGNOSIS — M54.2 NECK PAIN: ICD-10-CM

## 2022-01-04 DIAGNOSIS — I10 ESSENTIAL HYPERTENSION: ICD-10-CM

## 2022-01-04 DIAGNOSIS — M54.40 CHRONIC LOW BACK PAIN WITH SCIATICA, SCIATICA LATERALITY UNSPECIFIED, UNSPECIFIED BACK PAIN LATERALITY: ICD-10-CM

## 2022-01-04 DIAGNOSIS — R26.2 DIFFICULTY WALKING: ICD-10-CM

## 2022-01-04 DIAGNOSIS — K59.00 CONSTIPATION, UNSPECIFIED CONSTIPATION TYPE: ICD-10-CM

## 2022-01-04 DIAGNOSIS — N40.1 BENIGN PROSTATIC HYPERPLASIA WITH POST-VOID DRIBBLING: ICD-10-CM

## 2022-01-04 DIAGNOSIS — G89.29 CHRONIC LOW BACK PAIN WITH SCIATICA, SCIATICA LATERALITY UNSPECIFIED, UNSPECIFIED BACK PAIN LATERALITY: ICD-10-CM

## 2022-01-04 DIAGNOSIS — M25.519 ACUTE SHOULDER PAIN, UNSPECIFIED LATERALITY: ICD-10-CM

## 2022-01-04 PROCEDURE — 99310 SBSQ NF CARE HIGH MDM 45: CPT | Performed by: NURSE PRACTITIONER

## 2022-01-04 NOTE — PROGRESS NOTES
Graham Chapman  : 1935  Age 81year old  male patient is admitted to    [address] [place] [address] for rehabilitation and strengthening.       Chief complaint: back pain, constipation, difficulty walking    HPI  81year old male with HTN, HL, lumbar spinal s Rash  Hydrochlorothiazide     RASH    Comment:Other reaction(s): Rash  Triamterene             RASH    CODE STATUS:  DNR    ADVANCED CARE PLANNING TEAM: will need family care plan     CURRENT MEDICATIONS - reviewed and updated     Current Outpatient Medica 97%   BMI 28.57 kg/m²       REVIEW OF SYSTEMS:  GENERAL HEALTH:reports has back pain and the way he lays now has neck and shoulder pain   SKIN: denies any unusual skin lesions or rashes  WOUNDS: none   EYES:no visual complaints or deficits  HENT: denies na pulses 2+  NEUROLOGIC: intact; no sensorimotor deficit, follows commands  PSYCHIATRIC: alert and oriented x 3; affect appropriate     SEE PLAN BELOW  Intractable back pain  - Due to facet arthropathy of lumbar spine  - Pt c/o b/l buttock pain which radiate

## 2022-01-11 ENCOUNTER — TELEPHONE (OUTPATIENT)
Dept: PAIN CLINIC | Facility: HOSPITAL | Age: 87
End: 2022-01-11

## 2022-01-24 ENCOUNTER — SNF VISIT (OUTPATIENT)
Dept: INTERNAL MEDICINE CLINIC | Facility: SKILLED NURSING FACILITY | Age: 87
End: 2022-01-24

## 2022-01-24 VITALS
HEART RATE: 82 BPM | DIASTOLIC BLOOD PRESSURE: 60 MMHG | TEMPERATURE: 98 F | SYSTOLIC BLOOD PRESSURE: 125 MMHG | WEIGHT: 177.19 LBS | BODY MASS INDEX: 29 KG/M2 | OXYGEN SATURATION: 98 % | RESPIRATION RATE: 20 BRPM

## 2022-01-24 DIAGNOSIS — M54.40 ACUTE LOW BACK PAIN WITH SCIATICA, SCIATICA LATERALITY UNSPECIFIED, UNSPECIFIED BACK PAIN LATERALITY: ICD-10-CM

## 2022-01-24 DIAGNOSIS — R26.2 DIFFICULTY WALKING: ICD-10-CM

## 2022-01-24 DIAGNOSIS — Z02.9 DISCHARGE PLANNING ISSUES: ICD-10-CM

## 2022-01-24 DIAGNOSIS — K59.03 DRUG-INDUCED CONSTIPATION: ICD-10-CM

## 2022-01-24 PROCEDURE — 99310 SBSQ NF CARE HIGH MDM 45: CPT | Performed by: NURSE PRACTITIONER

## 2022-01-24 NOTE — PROGRESS NOTES
Shoaib Poag  : 1935  Age 81year old  male patient is admitted to   [address] [place] [address] for rehabilitation and strengthening.       Chief complaint: back pain, constipation, difficulty walking    HPI  81year old male with HTN, HL, lumbar spinal st RASH    CODE STATUS:  DNR    ADVANCED CARE PLANNING TEAM:   family care plan 1/25/22 unclear of dc palns    CURRENT MEDICATIONS - reviewed and updated     Current Outpatient Medications   Medication Sig Dispense Refill   • HYDROcodone-acetaminophen 5-325 M doing well, ready to be dc'd from here    SKIN: denies any unusual skin lesions or rashes  WOUNDS: none   EYES:no visual complaints or deficits  HENT: denies nasal congestion, sinus pain or sore throat;  RESPIRATORY: denies shortness of breath, wheezing or resolved   - Due to facet arthropathy of lumbar spine  - Pt c/o b/l buttock pain which radiates to b/l thigh's, L>R  - Pt has no pain while at rest  - Pt s/p Radiofrequency Ablation of the Bilateral L3/4,L4/5 and L5/S1 Medial Branch Nerves on 11/30  Carrie Anand

## 2022-02-01 ENCOUNTER — SNF DISCHARGE (OUTPATIENT)
Dept: INTERNAL MEDICINE CLINIC | Facility: SKILLED NURSING FACILITY | Age: 87
End: 2022-02-01

## 2022-02-01 VITALS
BODY MASS INDEX: 29 KG/M2 | WEIGHT: 177.19 LBS | RESPIRATION RATE: 20 BRPM | HEART RATE: 74 BPM | DIASTOLIC BLOOD PRESSURE: 84 MMHG | OXYGEN SATURATION: 97 % | SYSTOLIC BLOOD PRESSURE: 138 MMHG | TEMPERATURE: 98 F

## 2022-02-01 PROCEDURE — 99310 SBSQ NF CARE HIGH MDM 45: CPT | Performed by: NURSE PRACTITIONER

## 2022-03-09 ENCOUNTER — TELEPHONE (OUTPATIENT)
Dept: INTERNAL MEDICINE CLINIC | Facility: CLINIC | Age: 87
End: 2022-03-09

## 2022-03-09 NOTE — TELEPHONE ENCOUNTER
Patient stated that he was having back pain and went to [address] [place] [address] on 12/24/2021 and was admitted and then transferred to [facility] for physical therapy. Patient stated that they hopefully should be discharging him in 2 weeks. Wanted to get an order for physical therapy and occupational therapy at home. Declined office visit at this time. Wanted to see if could get an order started without office visit currently. Last office visit with Dr Stephany Mccormick was 11/22/2021. Please advise.

## 2022-03-09 NOTE — TELEPHONE ENCOUNTER
Dr. Cynthia Zamarripa, patient was told by [facility] he needs to continue PT and OT but will need to contact you for orders. [address] will not be ordering.

## 2022-03-09 NOTE — TELEPHONE ENCOUNTER
Physical and occupational therapy will likely be arranged by his providers at [facility] prior to his discharge.

## 2022-03-29 ENCOUNTER — HOSPITAL ENCOUNTER (EMERGENCY)
Facility: HOSPITAL | Age: 87
Discharge: HOME | End: 2022-03-30
Attending: EMERGENCY MEDICINE
Payer: MEDICARE

## 2022-03-29 DIAGNOSIS — S61.419A SKIN TEAR OF HAND WITHOUT COMPLICATION, INITIAL ENCOUNTER: Primary | ICD-10-CM
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
## 2022-03-29 PROCEDURE — 90471 IMMUNIZATION ADMIN: CPT

## 2022-03-29 PROCEDURE — 99284 EMERGENCY DEPT VISIT MOD MDM: CPT

## 2022-03-30 ENCOUNTER — APPOINTMENT (OUTPATIENT)
Dept: GENERAL RADIOLOGY | Facility: HOSPITAL | Age: 87
End: 2022-03-30
Attending: EMERGENCY MEDICINE
Payer: MEDICARE

## 2022-03-30 ENCOUNTER — APPOINTMENT (OUTPATIENT)
Dept: CT IMAGING | Facility: HOSPITAL | Age: 87
End: 2022-03-30
Attending: EMERGENCY MEDICINE
Payer: MEDICARE

## 2022-03-30 VITALS
SYSTOLIC BLOOD PRESSURE: 111 MMHG | WEIGHT: 170 LBS | BODY MASS INDEX: 27.32 KG/M2 | HEIGHT: 66 IN | DIASTOLIC BLOOD PRESSURE: 70 MMHG | RESPIRATION RATE: 17 BRPM | OXYGEN SATURATION: 96 % | HEART RATE: 74 BPM | TEMPERATURE: 99 F

## 2022-03-30 PROCEDURE — 73130 X-RAY EXAM OF HAND: CPT | Performed by: EMERGENCY MEDICINE

## 2022-03-30 PROCEDURE — 73630 X-RAY EXAM OF FOOT: CPT | Performed by: EMERGENCY MEDICINE

## 2022-03-30 PROCEDURE — 70486 CT MAXILLOFACIAL W/O DYE: CPT | Performed by: EMERGENCY MEDICINE

## 2022-03-30 PROCEDURE — 70450 CT HEAD/BRAIN W/O DYE: CPT | Performed by: EMERGENCY MEDICINE

## 2022-03-30 NOTE — ED INITIAL ASSESSMENT (HPI)
Pt states that he was going to use the bathroom and his walker moved from him and he fell, causing injury to his nose, left hand and right big to. Pt denies LOC.

## 2022-04-01 ENCOUNTER — OFFICE VISIT (OUTPATIENT)
Dept: INTERNAL MEDICINE CLINIC | Facility: CLINIC | Age: 87
End: 2022-04-01
Payer: MEDICARE

## 2022-04-01 VITALS
WEIGHT: 168.81 LBS | DIASTOLIC BLOOD PRESSURE: 73 MMHG | BODY MASS INDEX: 27.13 KG/M2 | HEART RATE: 72 BPM | SYSTOLIC BLOOD PRESSURE: 118 MMHG | HEIGHT: 66 IN

## 2022-04-01 DIAGNOSIS — M48.061 SPINAL STENOSIS OF LUMBAR REGION, UNSPECIFIED WHETHER NEUROGENIC CLAUDICATION PRESENT: Primary | ICD-10-CM

## 2022-04-01 DIAGNOSIS — I10 ESSENTIAL HYPERTENSION: ICD-10-CM

## 2022-04-01 PROCEDURE — 99214 OFFICE O/P EST MOD 30 MIN: CPT | Performed by: INTERNAL MEDICINE

## 2022-04-05 ENCOUNTER — TELEPHONE (OUTPATIENT)
Dept: INTERNAL MEDICINE CLINIC | Facility: CLINIC | Age: 87
End: 2022-04-05

## 2022-04-05 NOTE — TELEPHONE ENCOUNTER
Patient calling, identified name and , saw Dr. Corby Hoyos on Friday and forgot to ask for RX for Norco for his bilateral shoulder pain;   Due to using walker to assist with ambulation and decreased mobility      Original RX was from [employer] [facility] , states RX was for [employer] every 6 hours as need for pain but was only taking  it twice a day with good relief      Rates pain at 5/10 to both shoulders      Allergies reviewed and pharmacy confirmed    Please advise and thank you.

## 2022-04-05 NOTE — TELEPHONE ENCOUNTER
Advised patient of Dr. Armen Ann note. Patient verbalized understanding and does not want to go to the pain clinic. Stated was not impressed with them and ended up in the hospital. Is there any other option?

## 2022-04-05 NOTE — TELEPHONE ENCOUNTER
Spoke with pt,  verified  Pt was informed of MD recommendation, pt stated understanding. Dr Jimenez Section info provided.

## 2022-04-07 RX ORDER — AMLODIPINE BESYLATE 5 MG/1
5 TABLET ORAL DAILY
Qty: 90 TABLET | Refills: 1 | Status: SHIPPED | OUTPATIENT
Start: 2022-04-07 | End: 2023-07-10

## 2022-04-07 NOTE — TELEPHONE ENCOUNTER
Refill passed per [facility] protocol. Requested Prescriptions   Pending Prescriptions Disp Refills    AMLODIPINE 5 MG Oral Tab [Pharmacy Med Name: Amlodipine Besylate 5 Mg Tab Unic] 90 tablet 0     Sig: Take 1 tablet (5 mg total) by mouth daily.         Hypertensive Medications Protocol Passed - 4/7/2022  8:38 AM        Passed - CMP or BMP in past 12 months        Passed - Appointment in past 6 or next 3 months        Passed - GFR Non- > 50     Lab Results   Component Value Date    GFRNAA 102 02/03/2022                       Recent Outpatient Visits              6 days ago Spinal stenosis of lumbar region, unspecified whether neurogenic claudication present    [facility], Dari Da Silva MD    Office Visit    3 months ago Osteoarthritis of multiple joints, unspecified osteoarthritis type    [facility], [address], Karen Banerjee MD    Office Visit    4 months ago Annual physical exam    [facility], Dari Da Silva MD    Office Visit    4 months ago 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    [facility] Ophthalmology Ezequiel Gibbs MD    Office Visit    5 months ago Acute bilateral low back pain with sciatica, sciatica laterality unspecified    [facility] AMBREEN Villasenor    Office Visit            Future Appointments         Provider Department Appt Notes    In 5 days Sofi Torres, [facility], [facility] left knee    In 3 weeks Lesly Graves, PT [facility] Medicare/BCBS    In 3 weeks Lesly Graves, PT [facility] Medicare/BCBS    In 1 month Lesly Graves, PT [facility] Medicare/BCBS    In 1 month Lesly Graves, PT [facility] Medicare/BCBS    In 1 month Lesly Graves [address] Medicare/BCBS    In 1 month HemLesly posada,  [address] Medicare/BCBS    In 1 month HemLesly posada,  [address] Medicare/BCBS    In 1 month Lesly Graves,  [address] Medicare/BCBS    In 1 month HemLesly posada, [address] Medicare/BCBS

## 2022-04-12 ENCOUNTER — OFFICE VISIT (OUTPATIENT)
Dept: RHEUMATOLOGY | Facility: CLINIC | Age: 87
End: 2022-04-12
Payer: MEDICARE

## 2022-04-12 VITALS
BODY MASS INDEX: 27 KG/M2 | DIASTOLIC BLOOD PRESSURE: 71 MMHG | HEIGHT: 66 IN | SYSTOLIC BLOOD PRESSURE: 115 MMHG | RESPIRATION RATE: 16 BRPM | HEART RATE: 79 BPM | WEIGHT: 168 LBS

## 2022-04-12 DIAGNOSIS — M25.562 CHRONIC PAIN OF LEFT KNEE: ICD-10-CM

## 2022-04-12 DIAGNOSIS — G89.29 CHRONIC PAIN OF LEFT KNEE: ICD-10-CM

## 2022-04-12 DIAGNOSIS — M15.9 OSTEOARTHRITIS OF MULTIPLE JOINTS, UNSPECIFIED OSTEOARTHRITIS TYPE: Primary | ICD-10-CM

## 2022-04-12 PROCEDURE — 99213 OFFICE O/P EST LOW 20 MIN: CPT | Performed by: INTERNAL MEDICINE

## 2022-04-12 RX ORDER — HYDROCODONE BITARTRATE AND ACETAMINOPHEN 5; 325 MG/1; MG/1
1 TABLET ORAL DAILY PRN
Qty: 30 TABLET | Refills: 0 | Status: SHIPPED | OUTPATIENT
Start: 2022-04-12

## 2022-04-12 NOTE — PATIENT INSTRUCTIONS
1. Check tramadol 100mg every 12 hours as needed   2. norco 5/325mg a day as needed #30  3. Physical therapy -   4. Return to clinic in 2 months.

## 2022-04-14 RX ORDER — ALLOPURINOL 100 MG/1
100 TABLET ORAL
Qty: 90 TABLET | Refills: 1 | Status: SHIPPED | OUTPATIENT
Start: 2022-04-14

## 2022-04-14 NOTE — TELEPHONE ENCOUNTER
Please review; protocol failed/no protocol    Please send, with refills, if appropriate      Requested Prescriptions   Pending Prescriptions Disp Refills    ALLOPURINOL 100 MG Oral Tab [Pharmacy Med Name: Allopurinol 100 Mg Tab Nort] 90 tablet 0     Sig: TAKE ONE TABLET BY MOUTH ONE TIME DAILY        There is no refill protocol information for this order            Recent Outpatient Visits              2 days ago Osteoarthritis of multiple joints, unspecified osteoarthritis type    [facility] Roel Counter, MD    Office Visit    1 week ago Spinal stenosis of lumbar region, unspecified whether neurogenic claudication present    [facility]    [facility]    3 months ago Osteoarthritis of multiple joints, unspecified osteoarthritis type    [facility] for Roel Counter, MD    Office Visit    4 months ago Annual physical exam    [address], [place] Kaitlin, MD    Office Visit    4 months ago 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    [facility] Ophthalmology Jonny Liu MD    Office Visit             Future Appointments         Provider Department Appt Notes    In 2 weeks Lesly Graves, PT [address] Medicare/BCBS    In 2 weeks HemLesly posada, [address] Medicare/BCBS    In 3 weeks HemLesly posada, [address] Medicare/BCBS    In 3 weeks HemLesly posada, PT [address] Medicare/BCBS    In 3 weeks HemLesly posada, PT [address] Medicare/BCBS    In 1 month HemLesly posada, PT [address] Medicare/BCBS    In 1 month HemLesly posada, [address] Medicare/BCBS    In 1 month HemLesly posada, PT [address] Medicare/BCBS    In 1 month Lesly Graves, [address] Medicare/[facility]

## 2022-04-27 NOTE — TELEPHONE ENCOUNTER
Spoke to patient,  verified. Informed of Dr. Fernando Gomez note, verbalized understanding. Detail Level: Zone

## 2022-04-28 ENCOUNTER — TELEPHONE (OUTPATIENT)
Dept: PHYSICAL THERAPY | Facility: HOSPITAL | Age: 87
End: 2022-04-28

## 2022-05-02 ENCOUNTER — OFFICE VISIT (OUTPATIENT)
Dept: PHYSICAL THERAPY | Facility: HOSPITAL | Age: 87
End: 2022-05-02
Attending: INTERNAL MEDICINE
Payer: MEDICARE

## 2022-05-02 DIAGNOSIS — M48.061 SPINAL STENOSIS OF LUMBAR REGION, UNSPECIFIED WHETHER NEUROGENIC CLAUDICATION PRESENT: ICD-10-CM

## 2022-05-02 PROCEDURE — 97162 PT EVAL MOD COMPLEX 30 MIN: CPT

## 2022-05-04 ENCOUNTER — OFFICE VISIT (OUTPATIENT)
Dept: PHYSICAL THERAPY | Facility: HOSPITAL | Age: 87
End: 2022-05-04
Attending: INTERNAL MEDICINE
Payer: MEDICARE

## 2022-05-04 ENCOUNTER — TELEPHONE (OUTPATIENT)
Dept: RHEUMATOLOGY | Facility: CLINIC | Age: 87
End: 2022-05-04

## 2022-05-04 PROCEDURE — 97110 THERAPEUTIC EXERCISES: CPT

## 2022-05-04 PROCEDURE — 97530 THERAPEUTIC ACTIVITIES: CPT

## 2022-05-04 NOTE — TELEPHONE ENCOUNTER
Patient requesting an office visit to discuss knee pain, he declined to schedule first available in July.

## 2022-05-04 NOTE — TELEPHONE ENCOUNTER
Spoke to patient, name and  verified. Appointment scheduled.    LOV:   Future Appointments   Date Time Provider Piedad Isidro   2022 12:45 PM Kalina Graves, PT [facility]   5/10/2022  1:40 PM Liban Vanegas MD  [facility]

## 2022-05-04 NOTE — TELEPHONE ENCOUNTER
He said PT is aggravating his knee's and would like kenalog shot again. He had last one on 12/21/2021. Please advise when can he have next one? If you can do it?

## 2022-05-09 ENCOUNTER — OFFICE VISIT (OUTPATIENT)
Dept: PHYSICAL THERAPY | Facility: HOSPITAL | Age: 87
End: 2022-05-09
Attending: INTERNAL MEDICINE
Payer: MEDICARE

## 2022-05-09 PROCEDURE — 97110 THERAPEUTIC EXERCISES: CPT

## 2022-05-09 PROCEDURE — 97112 NEUROMUSCULAR REEDUCATION: CPT

## 2022-05-09 PROCEDURE — 97530 THERAPEUTIC ACTIVITIES: CPT

## 2022-05-10 ENCOUNTER — OFFICE VISIT (OUTPATIENT)
Dept: RHEUMATOLOGY | Facility: CLINIC | Age: 87
End: 2022-05-10
Payer: MEDICARE

## 2022-05-10 VITALS
RESPIRATION RATE: 16 BRPM | WEIGHT: 168 LBS | HEART RATE: 64 BPM | HEIGHT: 66 IN | DIASTOLIC BLOOD PRESSURE: 68 MMHG | SYSTOLIC BLOOD PRESSURE: 108 MMHG | BODY MASS INDEX: 27 KG/M2

## 2022-05-10 DIAGNOSIS — G89.29 CHRONIC PAIN OF LEFT KNEE: Primary | ICD-10-CM

## 2022-05-10 DIAGNOSIS — M25.562 CHRONIC PAIN OF LEFT KNEE: Primary | ICD-10-CM

## 2022-05-10 PROCEDURE — 20610 DRAIN/INJ JOINT/BURSA W/O US: CPT | Performed by: INTERNAL MEDICINE

## 2022-05-10 PROCEDURE — 99213 OFFICE O/P EST LOW 20 MIN: CPT | Performed by: INTERNAL MEDICINE

## 2022-05-10 RX ORDER — TRIAMCINOLONE ACETONIDE 40 MG/ML
40 INJECTION, SUSPENSION INTRA-ARTICULAR; INTRAMUSCULAR ONCE
Status: COMPLETED | OUTPATIENT
Start: 2022-05-10 | End: 2022-05-10

## 2022-05-10 RX ADMIN — TRIAMCINOLONE ACETONIDE 40 MG: 40 INJECTION, SUSPENSION INTRA-ARTICULAR; INTRAMUSCULAR at 13:56:00

## 2022-05-11 ENCOUNTER — APPOINTMENT (OUTPATIENT)
Dept: PHYSICAL THERAPY | Facility: HOSPITAL | Age: 87
End: 2022-05-11
Payer: MEDICARE

## 2022-05-11 ENCOUNTER — OFFICE VISIT (OUTPATIENT)
Dept: PHYSICAL THERAPY | Facility: HOSPITAL | Age: 87
End: 2022-05-11
Attending: INTERNAL MEDICINE
Payer: MEDICARE

## 2022-05-11 PROCEDURE — 97110 THERAPEUTIC EXERCISES: CPT

## 2022-05-11 PROCEDURE — 97530 THERAPEUTIC ACTIVITIES: CPT

## 2022-05-11 PROCEDURE — 97112 NEUROMUSCULAR REEDUCATION: CPT

## 2022-05-12 ENCOUNTER — TELEPHONE (OUTPATIENT)
Dept: RHEUMATOLOGY | Facility: CLINIC | Age: 87
End: 2022-05-12

## 2022-05-16 ENCOUNTER — OFFICE VISIT (OUTPATIENT)
Dept: PHYSICAL THERAPY | Facility: HOSPITAL | Age: 87
End: 2022-05-16
Attending: INTERNAL MEDICINE
Payer: MEDICARE

## 2022-05-16 PROCEDURE — 97530 THERAPEUTIC ACTIVITIES: CPT

## 2022-05-16 PROCEDURE — 97110 THERAPEUTIC EXERCISES: CPT

## 2022-05-16 PROCEDURE — 97112 NEUROMUSCULAR REEDUCATION: CPT

## 2022-05-18 ENCOUNTER — OFFICE VISIT (OUTPATIENT)
Dept: PHYSICAL THERAPY | Facility: HOSPITAL | Age: 87
End: 2022-05-18
Attending: INTERNAL MEDICINE
Payer: MEDICARE

## 2022-05-18 PROCEDURE — 97530 THERAPEUTIC ACTIVITIES: CPT

## 2022-05-18 PROCEDURE — 97112 NEUROMUSCULAR REEDUCATION: CPT

## 2022-05-18 PROCEDURE — 97110 THERAPEUTIC EXERCISES: CPT

## 2022-05-19 ENCOUNTER — TELEPHONE (OUTPATIENT)
Dept: OPHTHALMOLOGY | Facility: CLINIC | Age: 87
End: 2022-05-19

## 2022-05-19 NOTE — TELEPHONE ENCOUNTER
Follow up instructions:  Return for Next avail. VF and OCT with no MD,    I LM for pt to call back to schedule appointment.

## 2022-05-23 ENCOUNTER — OFFICE VISIT (OUTPATIENT)
Dept: PHYSICAL THERAPY | Facility: HOSPITAL | Age: 87
End: 2022-05-23
Attending: INTERNAL MEDICINE
Payer: MEDICARE

## 2022-05-23 PROCEDURE — 97112 NEUROMUSCULAR REEDUCATION: CPT

## 2022-05-23 PROCEDURE — 97110 THERAPEUTIC EXERCISES: CPT

## 2022-05-23 PROCEDURE — 97530 THERAPEUTIC ACTIVITIES: CPT

## 2022-05-25 ENCOUNTER — OFFICE VISIT (OUTPATIENT)
Dept: PHYSICAL THERAPY | Facility: HOSPITAL | Age: 87
End: 2022-05-25
Attending: INTERNAL MEDICINE
Payer: MEDICARE

## 2022-05-25 PROCEDURE — 97530 THERAPEUTIC ACTIVITIES: CPT

## 2022-05-25 PROCEDURE — 97110 THERAPEUTIC EXERCISES: CPT

## 2022-05-26 ENCOUNTER — NURSE ONLY (OUTPATIENT)
Dept: OPHTHALMOLOGY | Facility: CLINIC | Age: 87
End: 2022-05-26
Payer: MEDICARE

## 2022-05-26 DIAGNOSIS — H40.003 SUSPECTED GLAUCOMA OF BOTH EYES: ICD-10-CM

## 2022-05-26 PROCEDURE — 92133 CPTRZD OPH DX IMG PST SGM ON: CPT | Performed by: OPHTHALMOLOGY

## 2022-05-26 PROCEDURE — 92083 EXTENDED VISUAL FIELD XM: CPT | Performed by: OPHTHALMOLOGY

## 2022-05-31 ENCOUNTER — OFFICE VISIT (OUTPATIENT)
Dept: PODIATRY CLINIC | Facility: CLINIC | Age: 87
End: 2022-05-31
Payer: MEDICARE

## 2022-05-31 DIAGNOSIS — M79.672 FOOT PAIN, LEFT: Primary | ICD-10-CM

## 2022-05-31 DIAGNOSIS — L84 FOOT CALLUS: ICD-10-CM

## 2022-05-31 DIAGNOSIS — M79.675 PAIN IN TOES OF BOTH FEET: ICD-10-CM

## 2022-05-31 DIAGNOSIS — B35.1 ONYCHOMYCOSIS: ICD-10-CM

## 2022-05-31 DIAGNOSIS — M79.674 PAIN IN TOES OF BOTH FEET: ICD-10-CM

## 2022-05-31 PROCEDURE — 11721 DEBRIDE NAIL 6 OR MORE: CPT | Performed by: PODIATRIST

## 2022-05-31 PROCEDURE — 99203 OFFICE O/P NEW LOW 30 MIN: CPT | Performed by: PODIATRIST

## 2022-06-14 ENCOUNTER — OFFICE VISIT (OUTPATIENT)
Dept: PHYSICAL THERAPY | Facility: HOSPITAL | Age: 87
End: 2022-06-14
Attending: INTERNAL MEDICINE
Payer: MEDICARE

## 2022-06-14 PROCEDURE — 97110 THERAPEUTIC EXERCISES: CPT

## 2022-06-14 PROCEDURE — 97112 NEUROMUSCULAR REEDUCATION: CPT

## 2022-06-14 PROCEDURE — 97530 THERAPEUTIC ACTIVITIES: CPT

## 2022-06-16 ENCOUNTER — OFFICE VISIT (OUTPATIENT)
Dept: PHYSICAL THERAPY | Facility: HOSPITAL | Age: 87
End: 2022-06-16
Attending: INTERNAL MEDICINE
Payer: MEDICARE

## 2022-06-16 PROCEDURE — 97110 THERAPEUTIC EXERCISES: CPT

## 2022-06-16 PROCEDURE — 97530 THERAPEUTIC ACTIVITIES: CPT

## 2022-06-16 PROCEDURE — 97112 NEUROMUSCULAR REEDUCATION: CPT

## 2022-06-20 ENCOUNTER — OFFICE VISIT (OUTPATIENT)
Dept: PHYSICAL THERAPY | Facility: HOSPITAL | Age: 87
End: 2022-06-20
Attending: INTERNAL MEDICINE
Payer: MEDICARE

## 2022-06-20 PROCEDURE — 97112 NEUROMUSCULAR REEDUCATION: CPT

## 2022-06-20 PROCEDURE — 97530 THERAPEUTIC ACTIVITIES: CPT

## 2022-06-20 PROCEDURE — 97110 THERAPEUTIC EXERCISES: CPT

## 2022-06-22 ENCOUNTER — OFFICE VISIT (OUTPATIENT)
Dept: PHYSICAL THERAPY | Facility: HOSPITAL | Age: 87
End: 2022-06-22
Attending: INTERNAL MEDICINE
Payer: MEDICARE

## 2022-06-22 PROCEDURE — 97110 THERAPEUTIC EXERCISES: CPT

## 2022-06-22 PROCEDURE — 97112 NEUROMUSCULAR REEDUCATION: CPT

## 2022-06-22 PROCEDURE — 97530 THERAPEUTIC ACTIVITIES: CPT

## 2022-06-28 RX ORDER — METOPROLOL SUCCINATE 25 MG/1
TABLET, EXTENDED RELEASE ORAL
Qty: 90 TABLET | Refills: 0 | Status: SHIPPED | OUTPATIENT
Start: 2022-06-28

## 2022-07-15 ENCOUNTER — OFFICE VISIT (OUTPATIENT)
Dept: RHEUMATOLOGY | Facility: CLINIC | Age: 87
End: 2022-07-15
Payer: MEDICARE

## 2022-07-15 VITALS
HEIGHT: 66 IN | HEART RATE: 61 BPM | DIASTOLIC BLOOD PRESSURE: 77 MMHG | WEIGHT: 168 LBS | SYSTOLIC BLOOD PRESSURE: 121 MMHG | BODY MASS INDEX: 27 KG/M2

## 2022-07-15 DIAGNOSIS — G89.29 CHRONIC PAIN OF LEFT KNEE: Primary | ICD-10-CM

## 2022-07-15 DIAGNOSIS — M15.9 OSTEOARTHRITIS OF MULTIPLE JOINTS, UNSPECIFIED OSTEOARTHRITIS TYPE: ICD-10-CM

## 2022-07-15 DIAGNOSIS — M25.562 CHRONIC PAIN OF LEFT KNEE: Primary | ICD-10-CM

## 2022-07-15 PROCEDURE — 20610 DRAIN/INJ JOINT/BURSA W/O US: CPT | Performed by: INTERNAL MEDICINE

## 2022-07-15 PROCEDURE — 99213 OFFICE O/P EST LOW 20 MIN: CPT | Performed by: INTERNAL MEDICINE

## 2022-07-15 RX ORDER — TRIAMCINOLONE ACETONIDE 40 MG/ML
40 INJECTION, SUSPENSION INTRA-ARTICULAR; INTRAMUSCULAR ONCE
Status: COMPLETED | OUTPATIENT
Start: 2022-07-15 | End: 2022-07-15

## 2022-07-15 RX ADMIN — TRIAMCINOLONE ACETONIDE 40 MG: 40 INJECTION, SUSPENSION INTRA-ARTICULAR; INTRAMUSCULAR at 12:25:00

## 2022-07-19 ENCOUNTER — OFFICE VISIT (OUTPATIENT)
Dept: DERMATOLOGY CLINIC | Facility: CLINIC | Age: 87
End: 2022-07-19
Payer: MEDICARE

## 2022-07-19 DIAGNOSIS — Q80.9 ICHTHYOSIS: Primary | ICD-10-CM

## 2022-07-19 PROCEDURE — 99213 OFFICE O/P EST LOW 20 MIN: CPT | Performed by: PHYSICIAN ASSISTANT

## 2022-07-19 RX ORDER — FLUOCINONIDE 0.5 MG/G
1 OINTMENT TOPICAL 2 TIMES DAILY
Qty: 60 G | Refills: 2 | Status: SHIPPED | OUTPATIENT
Start: 2022-07-19

## 2022-07-20 ENCOUNTER — TELEPHONE (OUTPATIENT)
Dept: PHYSICAL THERAPY | Facility: HOSPITAL | Age: 87
End: 2022-07-20

## 2022-07-21 ENCOUNTER — OFFICE VISIT (OUTPATIENT)
Dept: PHYSICAL THERAPY | Facility: HOSPITAL | Age: 87
End: 2022-07-21
Attending: INTERNAL MEDICINE
Payer: MEDICARE

## 2022-07-21 PROCEDURE — 97110 THERAPEUTIC EXERCISES: CPT

## 2022-07-21 PROCEDURE — 97116 GAIT TRAINING THERAPY: CPT

## 2022-07-25 ENCOUNTER — OFFICE VISIT (OUTPATIENT)
Dept: PHYSICAL THERAPY | Facility: HOSPITAL | Age: 87
End: 2022-07-25
Attending: INTERNAL MEDICINE
Payer: MEDICARE

## 2022-07-25 PROCEDURE — 97112 NEUROMUSCULAR REEDUCATION: CPT

## 2022-07-25 PROCEDURE — 97110 THERAPEUTIC EXERCISES: CPT

## 2022-07-25 PROCEDURE — 97530 THERAPEUTIC ACTIVITIES: CPT

## 2022-07-25 RX ORDER — TRAMADOL HYDROCHLORIDE 50 MG/1
50 TABLET ORAL EVERY 8 HOURS PRN
Qty: 90 TABLET | Refills: 5 | Status: SHIPPED | OUTPATIENT
Start: 2022-07-25 | End: 2023-01-21

## 2022-07-27 ENCOUNTER — OFFICE VISIT (OUTPATIENT)
Dept: PHYSICAL THERAPY | Facility: HOSPITAL | Age: 87
End: 2022-07-27
Attending: INTERNAL MEDICINE
Payer: MEDICARE

## 2022-07-27 PROCEDURE — 97112 NEUROMUSCULAR REEDUCATION: CPT

## 2022-07-27 PROCEDURE — 97110 THERAPEUTIC EXERCISES: CPT

## 2022-07-27 PROCEDURE — 97530 THERAPEUTIC ACTIVITIES: CPT

## 2022-08-01 ENCOUNTER — OFFICE VISIT (OUTPATIENT)
Dept: PHYSICAL THERAPY | Facility: HOSPITAL | Age: 87
End: 2022-08-01
Attending: INTERNAL MEDICINE
Payer: MEDICARE

## 2022-08-01 PROCEDURE — 97110 THERAPEUTIC EXERCISES: CPT

## 2022-08-01 PROCEDURE — 97530 THERAPEUTIC ACTIVITIES: CPT

## 2022-08-03 ENCOUNTER — OFFICE VISIT (OUTPATIENT)
Dept: PHYSICAL THERAPY | Facility: HOSPITAL | Age: 87
End: 2022-08-03
Attending: INTERNAL MEDICINE
Payer: MEDICARE

## 2022-08-03 PROCEDURE — 97530 THERAPEUTIC ACTIVITIES: CPT

## 2022-08-03 PROCEDURE — 97110 THERAPEUTIC EXERCISES: CPT

## 2022-08-16 ENCOUNTER — OFFICE VISIT (OUTPATIENT)
Dept: DERMATOLOGY CLINIC | Facility: CLINIC | Age: 87
End: 2022-08-16
Payer: MEDICARE

## 2022-08-16 DIAGNOSIS — Q80.9 ICHTHYOSIS: Primary | ICD-10-CM

## 2022-08-16 PROCEDURE — 99213 OFFICE O/P EST LOW 20 MIN: CPT | Performed by: PHYSICIAN ASSISTANT

## 2022-08-17 ENCOUNTER — OFFICE VISIT (OUTPATIENT)
Dept: PODIATRY CLINIC | Facility: CLINIC | Age: 87
End: 2022-08-17
Payer: MEDICARE

## 2022-08-17 DIAGNOSIS — B35.1 ONYCHOMYCOSIS: ICD-10-CM

## 2022-08-17 DIAGNOSIS — M79.675 PAIN IN TOES OF BOTH FEET: Primary | ICD-10-CM

## 2022-08-17 DIAGNOSIS — M79.674 PAIN IN TOES OF BOTH FEET: Primary | ICD-10-CM

## 2022-08-17 PROCEDURE — 11721 DEBRIDE NAIL 6 OR MORE: CPT | Performed by: PODIATRIST

## 2022-09-13 ENCOUNTER — OFFICE VISIT (OUTPATIENT)
Dept: INTERNAL MEDICINE CLINIC | Facility: CLINIC | Age: 87
End: 2022-09-13
Payer: MEDICARE

## 2022-09-13 VITALS
HEART RATE: 72 BPM | BODY MASS INDEX: 27 KG/M2 | DIASTOLIC BLOOD PRESSURE: 66 MMHG | SYSTOLIC BLOOD PRESSURE: 126 MMHG | HEIGHT: 66 IN | WEIGHT: 168 LBS

## 2022-09-13 DIAGNOSIS — I10 ESSENTIAL HYPERTENSION: ICD-10-CM

## 2022-09-13 DIAGNOSIS — M54.50 ACUTE BILATERAL LOW BACK PAIN WITHOUT SCIATICA: Primary | ICD-10-CM

## 2022-09-13 PROCEDURE — 99214 OFFICE O/P EST MOD 30 MIN: CPT | Performed by: INTERNAL MEDICINE

## 2022-09-13 NOTE — PATIENT INSTRUCTIONS
Please rest some and avoid painful activity, and apply heat 2-3 times daily. Take Tylenol if needed for additional pain relief. Call if pain does not continue to go away.

## 2022-09-14 RX ORDER — HYDROCODONE BITARTRATE AND ACETAMINOPHEN 5; 325 MG/1; MG/1
1 TABLET ORAL DAILY PRN
Qty: 30 TABLET | Refills: 0 | Status: SHIPPED | OUTPATIENT
Start: 2022-09-14

## 2022-09-27 ENCOUNTER — OFFICE VISIT (OUTPATIENT)
Dept: INTERNAL MEDICINE CLINIC | Facility: CLINIC | Age: 87
End: 2022-09-27

## 2022-09-27 VITALS
SYSTOLIC BLOOD PRESSURE: 139 MMHG | HEART RATE: 73 BPM | WEIGHT: 166.19 LBS | BODY MASS INDEX: 26.71 KG/M2 | DIASTOLIC BLOOD PRESSURE: 84 MMHG | HEIGHT: 66 IN

## 2022-09-27 DIAGNOSIS — M54.9 MID BACK PAIN ON RIGHT SIDE: Primary | ICD-10-CM

## 2022-09-27 PROCEDURE — 99213 OFFICE O/P EST LOW 20 MIN: CPT | Performed by: INTERNAL MEDICINE

## 2022-09-27 NOTE — PATIENT INSTRUCTIONS
Please continue to rest and apply heat 2-3 times daily. Schedule physical therapy if pain does not continue to improve.

## 2022-10-03 RX ORDER — METOPROLOL SUCCINATE 25 MG/1
25 TABLET, EXTENDED RELEASE ORAL DAILY
Qty: 90 TABLET | Refills: 1 | Status: SHIPPED | OUTPATIENT
Start: 2022-10-03

## 2022-10-16 RX ORDER — ALLOPURINOL 100 MG/1
100 TABLET ORAL
Qty: 90 TABLET | Refills: 0 | Status: SHIPPED | OUTPATIENT
Start: 2022-10-16

## 2022-10-23 RX ORDER — ALLOPURINOL 100 MG/1
100 TABLET ORAL
Qty: 90 TABLET | Refills: 0 | OUTPATIENT
Start: 2022-10-23

## 2022-11-03 ENCOUNTER — OFFICE VISIT (OUTPATIENT)
Dept: PHYSICAL THERAPY | Facility: HOSPITAL | Age: 87
End: 2022-11-03
Attending: INTERNAL MEDICINE
Payer: MEDICARE

## 2022-11-03 DIAGNOSIS — M54.9 MID BACK PAIN ON RIGHT SIDE: ICD-10-CM

## 2022-11-03 PROCEDURE — 97162 PT EVAL MOD COMPLEX 30 MIN: CPT

## 2022-11-04 ENCOUNTER — OFFICE VISIT (OUTPATIENT)
Dept: DERMATOLOGY CLINIC | Facility: CLINIC | Age: 87
End: 2022-11-04
Payer: MEDICARE

## 2022-11-04 DIAGNOSIS — Q80.9 ICHTHYOSIS: Primary | ICD-10-CM

## 2022-11-04 PROCEDURE — 99213 OFFICE O/P EST LOW 20 MIN: CPT | Performed by: PHYSICIAN ASSISTANT

## 2022-11-04 RX ORDER — FLUOCINONIDE 0.5 MG/G
1 OINTMENT TOPICAL 2 TIMES DAILY
Qty: 60 G | Refills: 2 | Status: SHIPPED | OUTPATIENT
Start: 2022-11-04

## 2022-11-09 ENCOUNTER — OFFICE VISIT (OUTPATIENT)
Dept: PHYSICAL THERAPY | Facility: HOSPITAL | Age: 87
End: 2022-11-09
Attending: INTERNAL MEDICINE
Payer: MEDICARE

## 2022-11-09 PROCEDURE — 97140 MANUAL THERAPY 1/> REGIONS: CPT

## 2022-11-09 PROCEDURE — 97110 THERAPEUTIC EXERCISES: CPT

## 2022-11-11 ENCOUNTER — OFFICE VISIT (OUTPATIENT)
Dept: PHYSICAL THERAPY | Facility: HOSPITAL | Age: 87
End: 2022-11-11
Attending: INTERNAL MEDICINE
Payer: MEDICARE

## 2022-11-11 PROCEDURE — 97110 THERAPEUTIC EXERCISES: CPT

## 2022-11-11 PROCEDURE — 97140 MANUAL THERAPY 1/> REGIONS: CPT

## 2022-11-14 ENCOUNTER — OFFICE VISIT (OUTPATIENT)
Dept: PHYSICAL THERAPY | Facility: HOSPITAL | Age: 87
End: 2022-11-14
Attending: INTERNAL MEDICINE
Payer: MEDICARE

## 2022-11-14 PROCEDURE — 97140 MANUAL THERAPY 1/> REGIONS: CPT

## 2022-11-14 PROCEDURE — 97110 THERAPEUTIC EXERCISES: CPT

## 2022-11-18 ENCOUNTER — OFFICE VISIT (OUTPATIENT)
Dept: PHYSICAL THERAPY | Facility: HOSPITAL | Age: 87
End: 2022-11-18
Attending: INTERNAL MEDICINE
Payer: MEDICARE

## 2022-11-18 PROCEDURE — 97110 THERAPEUTIC EXERCISES: CPT

## 2022-11-18 PROCEDURE — 97530 THERAPEUTIC ACTIVITIES: CPT

## 2022-11-18 PROCEDURE — 97140 MANUAL THERAPY 1/> REGIONS: CPT

## 2022-11-21 ENCOUNTER — OFFICE VISIT (OUTPATIENT)
Dept: PHYSICAL THERAPY | Facility: HOSPITAL | Age: 87
End: 2022-11-21
Attending: INTERNAL MEDICINE
Payer: MEDICARE

## 2022-11-21 PROCEDURE — 97530 THERAPEUTIC ACTIVITIES: CPT

## 2022-11-21 PROCEDURE — 97110 THERAPEUTIC EXERCISES: CPT

## 2022-11-21 PROCEDURE — 97140 MANUAL THERAPY 1/> REGIONS: CPT

## 2022-11-23 ENCOUNTER — OFFICE VISIT (OUTPATIENT)
Dept: PHYSICAL THERAPY | Facility: HOSPITAL | Age: 87
End: 2022-11-23
Attending: INTERNAL MEDICINE
Payer: MEDICARE

## 2022-11-23 PROCEDURE — 97110 THERAPEUTIC EXERCISES: CPT

## 2022-11-23 PROCEDURE — 97530 THERAPEUTIC ACTIVITIES: CPT

## 2022-11-23 PROCEDURE — 97140 MANUAL THERAPY 1/> REGIONS: CPT

## 2022-11-28 ENCOUNTER — OFFICE VISIT (OUTPATIENT)
Dept: PHYSICAL THERAPY | Facility: HOSPITAL | Age: 87
End: 2022-11-28
Attending: INTERNAL MEDICINE
Payer: MEDICARE

## 2022-11-28 PROCEDURE — 97530 THERAPEUTIC ACTIVITIES: CPT

## 2022-11-28 PROCEDURE — 97140 MANUAL THERAPY 1/> REGIONS: CPT

## 2022-11-28 PROCEDURE — 97110 THERAPEUTIC EXERCISES: CPT

## 2022-11-30 ENCOUNTER — OFFICE VISIT (OUTPATIENT)
Dept: PODIATRY CLINIC | Facility: CLINIC | Age: 87
End: 2022-11-30
Payer: MEDICARE

## 2022-11-30 DIAGNOSIS — M79.674 PAIN IN TOES OF BOTH FEET: Primary | ICD-10-CM

## 2022-11-30 DIAGNOSIS — M79.675 PAIN IN TOES OF BOTH FEET: Primary | ICD-10-CM

## 2022-11-30 DIAGNOSIS — B35.1 ONYCHOMYCOSIS: ICD-10-CM

## 2022-11-30 PROCEDURE — 11721 DEBRIDE NAIL 6 OR MORE: CPT | Performed by: PODIATRIST

## 2022-12-08 RX ORDER — HYDROCODONE BITARTRATE AND ACETAMINOPHEN 5; 325 MG/1; MG/1
1 TABLET ORAL DAILY PRN
Qty: 30 TABLET | Refills: 0 | Status: SHIPPED | OUTPATIENT
Start: 2022-12-08

## 2022-12-08 NOTE — TELEPHONE ENCOUNTER
LOV: 7/15/2022  Future Appointments   Date Time Provider Piedad Isidro   12/30/2022 11:20 AM Bertrand Altamirano MD [facility]     Last refilled 9/14/2022 #30 with no refills.

## 2022-12-12 ENCOUNTER — TELEPHONE (OUTPATIENT)
Dept: RHEUMATOLOGY | Facility: CLINIC | Age: 87
End: 2022-12-12

## 2022-12-12 NOTE — TELEPHONE ENCOUNTER
Dr. Sandi Francois, patient requesting sooner appointment for left knee injection. Currently scheduled on 12/30/22. He is taking Norco but feels he needs injection. Willing to see another doctor if needed as he is scheduled with Dr. Sandi Francois on 12/30/22.

## 2022-12-17 ENCOUNTER — OFFICE VISIT (OUTPATIENT)
Dept: RHEUMATOLOGY | Facility: CLINIC | Age: 87
End: 2022-12-17
Payer: MEDICARE

## 2022-12-17 VITALS — DIASTOLIC BLOOD PRESSURE: 65 MMHG | BODY MASS INDEX: 27 KG/M2 | SYSTOLIC BLOOD PRESSURE: 127 MMHG | WEIGHT: 168.31 LBS

## 2022-12-17 DIAGNOSIS — G89.29 CHRONIC PAIN OF LEFT KNEE: Primary | ICD-10-CM

## 2022-12-17 DIAGNOSIS — M15.9 OSTEOARTHRITIS OF MULTIPLE JOINTS, UNSPECIFIED OSTEOARTHRITIS TYPE: ICD-10-CM

## 2022-12-17 DIAGNOSIS — M25.562 CHRONIC PAIN OF LEFT KNEE: Primary | ICD-10-CM

## 2022-12-17 RX ORDER — TRIAMCINOLONE ACETONIDE 40 MG/ML
40 INJECTION, SUSPENSION INTRA-ARTICULAR; INTRAMUSCULAR ONCE
Status: COMPLETED | OUTPATIENT
Start: 2022-12-17 | End: 2022-12-17

## 2023-01-21 RX ORDER — TRAMADOL HYDROCHLORIDE 50 MG/1
50 TABLET ORAL EVERY 8 HOURS PRN
Qty: 90 TABLET | Refills: 5 | Status: SHIPPED | OUTPATIENT
Start: 2023-01-21

## 2023-01-26 ENCOUNTER — OFFICE VISIT (OUTPATIENT)
Dept: PODIATRY CLINIC | Facility: CLINIC | Age: 88
End: 2023-01-26

## 2023-01-26 DIAGNOSIS — L84 CALLUS OF FOOT: ICD-10-CM

## 2023-01-26 DIAGNOSIS — M79.672 PAIN IN BOTH FEET: ICD-10-CM

## 2023-01-26 DIAGNOSIS — B35.1 ONYCHOMYCOSIS: Primary | ICD-10-CM

## 2023-01-26 DIAGNOSIS — M79.671 PAIN IN BOTH FEET: ICD-10-CM

## 2023-01-26 PROCEDURE — 11055 PARING/CUTG B9 HYPRKER LES 1: CPT | Performed by: STUDENT IN AN ORGANIZED HEALTH CARE EDUCATION/TRAINING PROGRAM

## 2023-01-26 PROCEDURE — 11721 DEBRIDE NAIL 6 OR MORE: CPT | Performed by: STUDENT IN AN ORGANIZED HEALTH CARE EDUCATION/TRAINING PROGRAM

## 2023-02-02 ENCOUNTER — HOSPITAL ENCOUNTER (OUTPATIENT)
Dept: GENERAL RADIOLOGY | Facility: HOSPITAL | Age: 88
Discharge: HOME | End: 2023-02-02
Attending: ORTHOPAEDIC SURGERY
Payer: MEDICARE

## 2023-02-02 ENCOUNTER — OFFICE VISIT (OUTPATIENT)
Dept: ORTHOPEDICS CLINIC | Facility: CLINIC | Age: 88
End: 2023-02-02
Payer: MEDICARE

## 2023-02-02 DIAGNOSIS — M25.562 LEFT KNEE PAIN, UNSPECIFIED CHRONICITY: ICD-10-CM

## 2023-02-02 DIAGNOSIS — M17.12 PRIMARY OSTEOARTHRITIS OF LEFT KNEE: Primary | ICD-10-CM

## 2023-02-02 PROCEDURE — 99213 OFFICE O/P EST LOW 20 MIN: CPT | Performed by: ORTHOPAEDIC SURGERY

## 2023-02-02 PROCEDURE — 73564 X-RAY EXAM KNEE 4 OR MORE: CPT | Performed by: ORTHOPAEDIC SURGERY

## 2023-02-03 ENCOUNTER — TELEPHONE (OUTPATIENT)
Dept: ORTHOPEDICS CLINIC | Facility: CLINIC | Age: 88
End: 2023-02-03

## 2023-02-03 NOTE — TELEPHONE ENCOUNTER
Called and spoke with patient. I again went over everything with him. I told him that right now, all we want him to do is schedule the MRI - it is good for 6 months. Once we see the MRI scheduled we will get surgery dates and call him. Once he picks a surgery date, we will let him know when to see his PCP and Dentist. The COVID test is nothing to worry about now, that will be done 3 days prior to surgery. Once again, told Caden Romero, that we will be here step by step to get him through surgery. Caden Romero understood and had no further questions.

## 2023-02-03 NOTE — TELEPHONE ENCOUNTER
Per pt asking when to get MRI done prior to surgery? Also when will he need to make appt for covid test. Please call thank you.

## 2023-02-06 RX ORDER — TAMSULOSIN HYDROCHLORIDE 0.4 MG/1
CAPSULE ORAL
Qty: 90 CAPSULE | Refills: 3 | Status: SHIPPED | OUTPATIENT
Start: 2023-02-06

## 2023-02-09 ENCOUNTER — HOSPITAL ENCOUNTER (OUTPATIENT)
Dept: MRI IMAGING | Facility: HOSPITAL | Age: 88
Discharge: HOME | End: 2023-02-09
Attending: ORTHOPAEDIC SURGERY
Payer: MEDICARE

## 2023-02-09 DIAGNOSIS — M17.12 PRIMARY OSTEOARTHRITIS OF LEFT KNEE: ICD-10-CM

## 2023-02-09 PROCEDURE — 73721 MRI JNT OF LWR EXTRE W/O DYE: CPT | Performed by: ORTHOPAEDIC SURGERY

## 2023-02-13 ENCOUNTER — TELEPHONE (OUTPATIENT)
Dept: ORTHOPEDICS CLINIC | Facility: CLINIC | Age: 88
End: 2023-02-13

## 2023-02-13 NOTE — TELEPHONE ENCOUNTER
Called patient to offer surgery dates, 4/3 or 4/7. Patient needs to talk with his friends, to see which date works. He will call us back tomorrow.

## 2023-02-15 ENCOUNTER — TELEPHONE (OUTPATIENT)
Dept: INTERNAL MEDICINE CLINIC | Facility: CLINIC | Age: 88
End: 2023-02-15

## 2023-02-15 NOTE — TELEPHONE ENCOUNTER
Patient is scheduled for surgery on 4/3/23 with Dr. Sriram Bo, Orthopaedic at [facility].

## 2023-02-16 ENCOUNTER — TELEPHONE (OUTPATIENT)
Dept: ORTHOPEDICS CLINIC | Facility: CLINIC | Age: 88
End: 2023-02-16

## 2023-02-16 DIAGNOSIS — M17.12 PRIMARY OSTEOARTHRITIS OF LEFT KNEE: Primary | ICD-10-CM

## 2023-02-16 NOTE — TELEPHONE ENCOUNTER
Type of surgery:  Left total knee arthroplasty  Date: 4/3/23  Location: [facility] Inpatient  Medical Clearance:      *Medical: Yes - apt 3/13/23      *Dental: Yes      *Other:   Prior Authorization Status: Pending  Workers Comp:  [place]: [id number]  [place]: Yes  POV: 4/19/23

## 2023-02-27 NOTE — TELEPHONE ENCOUNTER
LOV - See Below      LR 2/1/19      Recent Visits  Date Type Provider Dept   01/03/19 Office Visit Marcelino Oconnell MD [facility]   12/10/18 Office Visit Lorri Smiley MD [facility]   07/10/18 Office Visit Lorri Smiley MD [facility]- needs insurance forms filled out to be reimbursed for OOP costs related to her procedure/OV.

## 2023-03-11 PROBLEM — M17.12 OSTEOARTHRITIS OF LEFT KNEE: Status: ACTIVE | Noted: 2023-03-11

## 2023-03-13 ENCOUNTER — LAB ENCOUNTER (OUTPATIENT)
Dept: LAB | Age: 88
End: 2023-03-13
Attending: INTERNAL MEDICINE
Payer: MEDICARE

## 2023-03-13 ENCOUNTER — OFFICE VISIT (OUTPATIENT)
Dept: INTERNAL MEDICINE CLINIC | Facility: CLINIC | Age: 88
End: 2023-03-13

## 2023-03-13 VITALS
SYSTOLIC BLOOD PRESSURE: 133 MMHG | WEIGHT: 167.19 LBS | HEART RATE: 65 BPM | HEIGHT: 66 IN | BODY MASS INDEX: 26.87 KG/M2 | DIASTOLIC BLOOD PRESSURE: 76 MMHG

## 2023-03-13 DIAGNOSIS — Z01.818 PREOPERATIVE EXAMINATION: Primary | ICD-10-CM

## 2023-03-13 DIAGNOSIS — J44.9 CHRONIC OBSTRUCTIVE PULMONARY DISEASE, UNSPECIFIED COPD TYPE (HCC): ICD-10-CM

## 2023-03-13 DIAGNOSIS — Z01.818 PREOPERATIVE EXAMINATION: ICD-10-CM

## 2023-03-13 DIAGNOSIS — I10 ESSENTIAL HYPERTENSION: ICD-10-CM

## 2023-03-13 DIAGNOSIS — M17.12 PRIMARY OSTEOARTHRITIS OF LEFT KNEE: ICD-10-CM

## 2023-03-13 DIAGNOSIS — I35.0 AORTIC VALVE STENOSIS, ETIOLOGY OF CARDIAC VALVE DISEASE UNSPECIFIED: ICD-10-CM

## 2023-03-13 DIAGNOSIS — Z01.818 PREOPERATIVE EXAMINATION, UNSPECIFIED: Primary | ICD-10-CM

## 2023-03-13 LAB
ALBUMIN SERPL-MCNC: 3.4 G/DL (ref 3.4–5)
ALBUMIN/GLOB SERPL: 0.9 {RATIO} (ref 1–2)
ALP LIVER SERPL-CCNC: 128 U/L
ALT SERPL-CCNC: 21 U/L
ANION GAP SERPL CALC-SCNC: 2 MMOL/L (ref 0–18)
AST SERPL-CCNC: 20 U/L (ref 15–37)
ATRIAL RATE: 57 BPM
BILIRUB SERPL-MCNC: 0.5 MG/DL (ref 0.1–2)
BILIRUB UR QL: NEGATIVE
BUN BLD-MCNC: 22 MG/DL (ref 7–18)
BUN/CREAT SERPL: 30.1 (ref 10–20)
CALCIUM BLD-MCNC: 9.1 MG/DL (ref 8.5–10.1)
CHLORIDE SERPL-SCNC: 103 MMOL/L (ref 98–112)
CLARITY UR: CLEAR
CO2 SERPL-SCNC: 33 MMOL/L (ref 21–32)
COLOR UR: YELLOW
CREAT BLD-MCNC: 0.73 MG/DL
DEPRECATED RDW RBC AUTO: 51.2 FL (ref 35.1–46.3)
ERYTHROCYTE [DISTWIDTH] IN BLOOD BY AUTOMATED COUNT: 14.1 % (ref 11–15)
FASTING STATUS PATIENT QL REPORTED: NO
GFR SERPLBLD BASED ON 1.73 SQ M-ARVRAT: 89 ML/MIN/1.73M2 (ref 60–?)
GLOBULIN PLAS-MCNC: 3.6 G/DL (ref 2.8–4.4)
GLUCOSE BLD-MCNC: 86 MG/DL (ref 70–99)
GLUCOSE UR-MCNC: NEGATIVE MG/DL
HCT VFR BLD AUTO: 37.4 %
HGB BLD-MCNC: 12.5 G/DL
HGB UR QL STRIP.AUTO: NEGATIVE
KETONES UR-MCNC: NEGATIVE MG/DL
LEUKOCYTE ESTERASE UR QL STRIP.AUTO: NEGATIVE
MCH RBC QN AUTO: 32.8 PG (ref 26–34)
MCHC RBC AUTO-ENTMCNC: 33.4 G/DL (ref 31–37)
MCV RBC AUTO: 98.2 FL
NITRITE UR QL STRIP.AUTO: NEGATIVE
OSMOLALITY SERPL CALC.SUM OF ELEC: 289 MOSM/KG (ref 275–295)
P AXIS: 85 DEGREES
P-R INTERVAL: 176 MS
PH UR: 7 [PH] (ref 5–8)
PLATELET # BLD AUTO: 153 10(3)UL (ref 150–450)
POTASSIUM SERPL-SCNC: 4.6 MMOL/L (ref 3.5–5.1)
PROT SERPL-MCNC: 7 G/DL (ref 6.4–8.2)
PROT UR-MCNC: NEGATIVE MG/DL
Q-T INTERVAL: 404 MS
QRS DURATION: 80 MS
QTC CALCULATION (BEZET): 393 MS
R AXIS: 15 DEGREES
RBC # BLD AUTO: 3.81 X10(6)UL
SODIUM SERPL-SCNC: 138 MMOL/L (ref 136–145)
SP GR UR STRIP: 1.02 (ref 1–1.03)
T AXIS: 49 DEGREES
UROBILINOGEN UR STRIP-ACNC: <2
VENTRICULAR RATE: 57 BPM
VIT C UR-MCNC: 40 MG/DL
WBC # BLD AUTO: 6.7 X10(3) UL (ref 4–11)

## 2023-03-13 PROCEDURE — 87641 MR-STAPH DNA AMP PROBE: CPT

## 2023-03-13 PROCEDURE — 36415 COLL VENOUS BLD VENIPUNCTURE: CPT

## 2023-03-13 PROCEDURE — 85027 COMPLETE CBC AUTOMATED: CPT

## 2023-03-13 PROCEDURE — 81001 URINALYSIS AUTO W/SCOPE: CPT

## 2023-03-13 PROCEDURE — 80053 COMPREHEN METABOLIC PANEL: CPT

## 2023-03-13 PROCEDURE — 93010 ELECTROCARDIOGRAM REPORT: CPT | Performed by: INTERNAL MEDICINE

## 2023-03-13 PROCEDURE — 93005 ELECTROCARDIOGRAM TRACING: CPT

## 2023-03-13 PROCEDURE — 99215 OFFICE O/P EST HI 40 MIN: CPT | Performed by: INTERNAL MEDICINE

## 2023-03-14 LAB — MRSA DNA SPEC QL NAA+PROBE: NEGATIVE

## 2023-03-14 NOTE — TELEPHONE ENCOUNTER
medically stable for scheduled left TKA though he is at higher risk for perioperative complications    Pending Prior Auth  Pending Dental clearance

## 2023-03-15 ENCOUNTER — HOSPITAL ENCOUNTER (OUTPATIENT)
Dept: CV DIAGNOSTICS | Facility: HOSPITAL | Age: 88
Discharge: HOME | End: 2023-03-15
Attending: INTERNAL MEDICINE
Payer: MEDICARE

## 2023-03-15 ENCOUNTER — TELEPHONE (OUTPATIENT)
Dept: INTERNAL MEDICINE CLINIC | Facility: CLINIC | Age: 88
End: 2023-03-15

## 2023-03-15 DIAGNOSIS — Z01.818 PREOPERATIVE EXAMINATION: ICD-10-CM

## 2023-03-15 DIAGNOSIS — R94.31 ABNORMAL EKG: ICD-10-CM

## 2023-03-15 DIAGNOSIS — I35.0 AORTIC VALVE STENOSIS, ETIOLOGY OF CARDIAC VALVE DISEASE UNSPECIFIED: Primary | ICD-10-CM

## 2023-03-15 DIAGNOSIS — I35.0 AORTIC VALVE STENOSIS, ETIOLOGY OF CARDIAC VALVE DISEASE UNSPECIFIED: ICD-10-CM

## 2023-03-15 NOTE — TELEPHONE ENCOUNTER
[facility] calling regarding patients nuclear stress test that is scheduled 3/16/2023 at 8:30 am. There needs to be different codes per Medicare, please advise.

## 2023-03-16 ENCOUNTER — HOSPITAL ENCOUNTER (OUTPATIENT)
Dept: NUCLEAR MEDICINE | Facility: HOSPITAL | Age: 88
Discharge: HOME | End: 2023-03-16
Attending: INTERNAL MEDICINE
Payer: MEDICARE

## 2023-03-16 ENCOUNTER — HOSPITAL ENCOUNTER (OUTPATIENT)
Dept: CV DIAGNOSTICS | Facility: HOSPITAL | Age: 88
Discharge: HOME | End: 2023-03-16
Attending: INTERNAL MEDICINE
Payer: MEDICARE

## 2023-03-16 DIAGNOSIS — I35.0 AORTIC VALVE STENOSIS, ETIOLOGY OF CARDIAC VALVE DISEASE UNSPECIFIED: ICD-10-CM

## 2023-03-16 DIAGNOSIS — R94.31 ABNORMAL EKG: ICD-10-CM

## 2023-03-16 LAB
% OF MAX PREDICTED HR: 100 %
MAX DIASTOLIC BP: 69 MMHG
MAX HEART RATE: 77 BPM
MAX PREDICTED HEART RATE: 134 BPM
MAX SYSTOLIC BP: 165 MMHG
MAX WORK LOAD: 10

## 2023-03-16 PROCEDURE — 93017 CV STRESS TEST TRACING ONLY: CPT | Performed by: INTERNAL MEDICINE

## 2023-03-16 PROCEDURE — 78452 HT MUSCLE IMAGE SPECT MULT: CPT | Performed by: INTERNAL MEDICINE

## 2023-03-16 PROCEDURE — 93018 CV STRESS TEST I&R ONLY: CPT | Performed by: INTERNAL MEDICINE

## 2023-03-16 PROCEDURE — 93016 CV STRESS TEST SUPVJ ONLY: CPT | Performed by: INTERNAL MEDICINE

## 2023-03-17 ENCOUNTER — HOSPITAL ENCOUNTER (OUTPATIENT)
Dept: CV DIAGNOSTICS | Facility: HOSPITAL | Age: 88
Discharge: HOME | End: 2023-03-17
Attending: INTERNAL MEDICINE
Payer: MEDICARE

## 2023-03-17 PROCEDURE — 93306 TTE W/DOPPLER COMPLETE: CPT | Performed by: INTERNAL MEDICINE

## 2023-03-20 ENCOUNTER — TELEPHONE (OUTPATIENT)
Dept: ORTHOPEDICS CLINIC | Facility: CLINIC | Age: 88
End: 2023-03-20

## 2023-03-20 NOTE — TELEPHONE ENCOUNTER
Patient states that she has finished getting all pre-op testing done so she wants to know what she needs to do next.

## 2023-03-20 NOTE — TELEPHONE ENCOUNTER
Called to inform patient that we have not received his dental clearance yet. He will call his dental office to have them fax it again.

## 2023-03-23 NOTE — TELEPHONE ENCOUNTER
He can see Physiatry.   Please provide him with contact information for Dr. Regulo Rose [Nasal Endoscopy Performed] : nasal endoscopy was performed, see procedure section for findings [] : septum deviated to the left [Midline] : trachea located in midline position [Normal] : no rashes [de-identified] : cerumen in the the left ear canal. once removed normal EACs

## 2023-03-29 ENCOUNTER — TELEPHONE (OUTPATIENT)
Dept: ORTHOPEDICS CLINIC | Facility: CLINIC | Age: 88
End: 2023-03-29

## 2023-03-29 NOTE — TELEPHONE ENCOUNTER
Called patient back. He is looking for PreAdmission Team. He has not recv'd a phone call from them yet. Transferred patient down to [phone].

## 2023-03-29 NOTE — TELEPHONE ENCOUNTER
Patient requesting to be transferred to preadmission, he needs to reschedule covid test. Please call at [phone].

## 2023-03-29 NOTE — TELEPHONE ENCOUNTER
Patient would like to get pre-op instructions. Patient states that he has family coming in from out of town.

## 2023-03-30 NOTE — TELEPHONE ENCOUNTER
Spoke with patient. He states that he doesn't need to reschedule his COVID test now.  He found someone to bring him for his COVID test.

## 2023-03-31 ENCOUNTER — LAB ENCOUNTER (OUTPATIENT)
Dept: LAB | Facility: HOSPITAL | Age: 88
End: 2023-03-31
Attending: ORTHOPAEDIC SURGERY
Payer: MEDICARE

## 2023-03-31 DIAGNOSIS — Z01.818 PREOP TESTING: ICD-10-CM

## 2023-03-31 LAB
ANTIBODY SCREEN: NEGATIVE
RH BLOOD TYPE: POSITIVE

## 2023-03-31 PROCEDURE — 86900 BLOOD TYPING SEROLOGIC ABO: CPT

## 2023-03-31 PROCEDURE — 86850 RBC ANTIBODY SCREEN: CPT

## 2023-03-31 PROCEDURE — 36415 COLL VENOUS BLD VENIPUNCTURE: CPT

## 2023-03-31 PROCEDURE — 86901 BLOOD TYPING SEROLOGIC RH(D): CPT

## 2023-04-01 LAB — SARS-COV-2 RNA RESP QL NAA+PROBE: NOT DETECTED

## 2023-04-03 ENCOUNTER — APPOINTMENT (OUTPATIENT)
Dept: GENERAL RADIOLOGY | Facility: HOSPITAL | Age: 88
End: 2023-04-03
Attending: ORTHOPAEDIC SURGERY
Payer: MEDICARE

## 2023-04-03 ENCOUNTER — ANESTHESIA (OUTPATIENT)
Dept: SURGERY | Facility: HOSPITAL | Age: 88
End: 2023-04-03
Payer: MEDICARE

## 2023-04-03 ENCOUNTER — ANESTHESIA EVENT (OUTPATIENT)
Dept: SURGERY | Facility: HOSPITAL | Age: 88
End: 2023-04-03
Payer: MEDICARE

## 2023-04-03 ENCOUNTER — HOSPITAL ENCOUNTER (INPATIENT)
Facility: HOSPITAL | Age: 88
LOS: 3 days | Discharge: NURSING, REHAB OR LONG-TERM CARE | End: 2023-04-06
Attending: ORTHOPAEDIC SURGERY | Admitting: ORTHOPAEDIC SURGERY
Payer: MEDICARE

## 2023-04-03 DIAGNOSIS — M17.12 PRIMARY OSTEOARTHRITIS OF LEFT KNEE: ICD-10-CM

## 2023-04-03 DIAGNOSIS — Z01.818 PREOP TESTING: Primary | ICD-10-CM

## 2023-04-03 PROBLEM — M17.9 OSTEOARTHRITIS OF KNEE: Status: ACTIVE | Noted: 2023-04-03

## 2023-04-03 PROCEDURE — 73560 X-RAY EXAM OF KNEE 1 OR 2: CPT | Performed by: ORTHOPAEDIC SURGERY

## 2023-04-03 PROCEDURE — 99232 SBSQ HOSP IP/OBS MODERATE 35: CPT | Performed by: HOSPITALIST

## 2023-04-03 PROCEDURE — 0SRD0J9 REPLACEMENT OF LEFT KNEE JOINT WITH SYNTHETIC SUBSTITUTE, CEMENTED, OPEN APPROACH: ICD-10-PCS | Performed by: ORTHOPAEDIC SURGERY

## 2023-04-03 DEVICE — IMPLANTABLE DEVICE
Type: IMPLANTABLE DEVICE | Site: KNEE | Status: FUNCTIONAL
Brand: PERSONA®

## 2023-04-03 DEVICE — BIOMET BC R 1X40 US: Type: IMPLANTABLE DEVICE | Site: KNEE | Status: FUNCTIONAL

## 2023-04-03 DEVICE — PSN MC VE ASF L 10MM 8-9/CD: Type: IMPLANTABLE DEVICE | Site: KNEE | Status: FUNCTIONAL

## 2023-04-03 DEVICE — IMPLANTABLE DEVICE
Type: IMPLANTABLE DEVICE | Site: KNEE | Status: FUNCTIONAL
Brand: PERSONA® NATURAL TIBIA®

## 2023-04-03 RX ORDER — ALLOPURINOL 100 MG/1
100 TABLET ORAL
Status: DISCONTINUED | OUTPATIENT
Start: 2023-04-04 | End: 2023-04-06

## 2023-04-03 RX ORDER — SODIUM CHLORIDE, SODIUM LACTATE, POTASSIUM CHLORIDE, CALCIUM CHLORIDE 600; 310; 30; 20 MG/100ML; MG/100ML; MG/100ML; MG/100ML
INJECTION, SOLUTION INTRAVENOUS CONTINUOUS
Status: DISCONTINUED | OUTPATIENT
Start: 2023-04-03 | End: 2023-04-04

## 2023-04-03 RX ORDER — OXYCODONE HCL 10 MG/1
10 TABLET, FILM COATED, EXTENDED RELEASE ORAL
Status: COMPLETED | OUTPATIENT
Start: 2023-04-03 | End: 2023-04-03

## 2023-04-03 RX ORDER — NALOXONE HYDROCHLORIDE 0.4 MG/ML
80 INJECTION, SOLUTION INTRAMUSCULAR; INTRAVENOUS; SUBCUTANEOUS AS NEEDED
Status: DISCONTINUED | OUTPATIENT
Start: 2023-04-03 | End: 2023-04-03 | Stop reason: HOSPADM

## 2023-04-03 RX ORDER — SODIUM CHLORIDE, SODIUM LACTATE, POTASSIUM CHLORIDE, CALCIUM CHLORIDE 600; 310; 30; 20 MG/100ML; MG/100ML; MG/100ML; MG/100ML
INJECTION, SOLUTION INTRAVENOUS CONTINUOUS
Status: DISCONTINUED | OUTPATIENT
Start: 2023-04-03 | End: 2023-04-03 | Stop reason: HOSPADM

## 2023-04-03 RX ORDER — HALOPERIDOL 5 MG/ML
0.5 INJECTION INTRAMUSCULAR ONCE AS NEEDED
Status: ACTIVE | OUTPATIENT
Start: 2023-04-03 | End: 2023-04-03

## 2023-04-03 RX ORDER — HYDROMORPHONE HYDROCHLORIDE 1 MG/ML
0.6 INJECTION, SOLUTION INTRAMUSCULAR; INTRAVENOUS; SUBCUTANEOUS EVERY 5 MIN PRN
Status: DISCONTINUED | OUTPATIENT
Start: 2023-04-03 | End: 2023-04-03 | Stop reason: HOSPADM

## 2023-04-03 RX ORDER — METOPROLOL SUCCINATE 25 MG/1
25 TABLET, EXTENDED RELEASE ORAL DAILY
Status: DISCONTINUED | OUTPATIENT
Start: 2023-04-04 | End: 2023-04-06

## 2023-04-03 RX ORDER — MORPHINE SULFATE 4 MG/ML
4 INJECTION, SOLUTION INTRAMUSCULAR; INTRAVENOUS EVERY 2 HOUR PRN
Status: DISCONTINUED | OUTPATIENT
Start: 2023-04-03 | End: 2023-04-06

## 2023-04-03 RX ORDER — DULOXETIN HYDROCHLORIDE 30 MG/1
30 CAPSULE, DELAYED RELEASE ORAL NIGHTLY
Status: DISCONTINUED | OUTPATIENT
Start: 2023-04-03 | End: 2023-04-06

## 2023-04-03 RX ORDER — HYDROMORPHONE HYDROCHLORIDE 1 MG/ML
0.6 INJECTION, SOLUTION INTRAMUSCULAR; INTRAVENOUS; SUBCUTANEOUS
Status: DISPENSED | OUTPATIENT
Start: 2023-04-03 | End: 2023-04-04

## 2023-04-03 RX ORDER — DIPHENHYDRAMINE HYDROCHLORIDE 50 MG/ML
12.5 INJECTION INTRAMUSCULAR; INTRAVENOUS EVERY 4 HOURS PRN
Status: DISCONTINUED | OUTPATIENT
Start: 2023-04-03 | End: 2023-04-06

## 2023-04-03 RX ORDER — MELATONIN
325 2 TIMES DAILY WITH MEALS
Status: DISCONTINUED | OUTPATIENT
Start: 2023-04-03 | End: 2023-04-06

## 2023-04-03 RX ORDER — AMLODIPINE BESYLATE 5 MG/1
5 TABLET ORAL DAILY
Status: DISCONTINUED | OUTPATIENT
Start: 2023-04-04 | End: 2023-04-06

## 2023-04-03 RX ORDER — MORPHINE SULFATE 1 MG/ML
INJECTION, SOLUTION EPIDURAL; INTRATHECAL; INTRAVENOUS AS NEEDED
Status: DISCONTINUED | OUTPATIENT
Start: 2023-04-03 | End: 2023-04-03 | Stop reason: SURG

## 2023-04-03 RX ORDER — ONDANSETRON 2 MG/ML
4 INJECTION INTRAMUSCULAR; INTRAVENOUS EVERY 6 HOURS PRN
Status: DISCONTINUED | OUTPATIENT
Start: 2023-04-03 | End: 2023-04-06

## 2023-04-03 RX ORDER — PROCHLORPERAZINE EDISYLATE 5 MG/ML
5 INJECTION INTRAMUSCULAR; INTRAVENOUS ONCE AS NEEDED
Status: ACTIVE | OUTPATIENT
Start: 2023-04-03 | End: 2023-04-03

## 2023-04-03 RX ORDER — ACETAMINOPHEN 500 MG
1000 TABLET ORAL ONCE
Status: COMPLETED | OUTPATIENT
Start: 2023-04-03 | End: 2023-04-03

## 2023-04-03 RX ORDER — BUPIVACAINE HYDROCHLORIDE 7.5 MG/ML
INJECTION, SOLUTION INTRASPINAL AS NEEDED
Status: DISCONTINUED | OUTPATIENT
Start: 2023-04-03 | End: 2023-04-03 | Stop reason: SURG

## 2023-04-03 RX ORDER — ONDANSETRON 2 MG/ML
INJECTION INTRAMUSCULAR; INTRAVENOUS AS NEEDED
Status: DISCONTINUED | OUTPATIENT
Start: 2023-04-03 | End: 2023-04-03 | Stop reason: SURG

## 2023-04-03 RX ORDER — NALOXONE HYDROCHLORIDE 0.4 MG/ML
0.08 INJECTION, SOLUTION INTRAMUSCULAR; INTRAVENOUS; SUBCUTANEOUS
Status: ACTIVE | OUTPATIENT
Start: 2023-04-03 | End: 2023-04-04

## 2023-04-03 RX ORDER — OXYCODONE HCL 10 MG/1
10 TABLET, FILM COATED, EXTENDED RELEASE ORAL EVERY 12 HOURS
Status: DISCONTINUED | OUTPATIENT
Start: 2023-04-03 | End: 2023-04-04

## 2023-04-03 RX ORDER — SODIUM PHOSPHATE, DIBASIC AND SODIUM PHOSPHATE, MONOBASIC 7; 19 G/133ML; G/133ML
1 ENEMA RECTAL ONCE AS NEEDED
Status: DISCONTINUED | OUTPATIENT
Start: 2023-04-03 | End: 2023-04-06

## 2023-04-03 RX ORDER — NALBUPHINE HCL 10 MG/ML
2.5 AMPUL (ML) INJECTION EVERY 4 HOURS PRN
Status: ACTIVE | OUTPATIENT
Start: 2023-04-03 | End: 2023-04-04

## 2023-04-03 RX ORDER — MORPHINE SULFATE 2 MG/ML
1 INJECTION, SOLUTION INTRAMUSCULAR; INTRAVENOUS EVERY 2 HOUR PRN
Status: DISCONTINUED | OUTPATIENT
Start: 2023-04-03 | End: 2023-04-06

## 2023-04-03 RX ORDER — ONDANSETRON 2 MG/ML
4 INJECTION INTRAMUSCULAR; INTRAVENOUS ONCE AS NEEDED
Status: ACTIVE | OUTPATIENT
Start: 2023-04-03 | End: 2023-04-03

## 2023-04-03 RX ORDER — TRANEXAMIC ACID 10 MG/ML
INJECTION, SOLUTION INTRAVENOUS AS NEEDED
Status: DISCONTINUED | OUTPATIENT
Start: 2023-04-03 | End: 2023-04-03 | Stop reason: SURG

## 2023-04-03 RX ORDER — POLYETHYLENE GLYCOL 3350 17 G/17G
17 POWDER, FOR SOLUTION ORAL DAILY PRN
Status: DISCONTINUED | OUTPATIENT
Start: 2023-04-03 | End: 2023-04-06

## 2023-04-03 RX ORDER — DIPHENHYDRAMINE HYDROCHLORIDE 50 MG/ML
12.5 INJECTION INTRAMUSCULAR; INTRAVENOUS EVERY 4 HOURS PRN
Status: ACTIVE | OUTPATIENT
Start: 2023-04-03 | End: 2023-04-04

## 2023-04-03 RX ORDER — BISACODYL 10 MG
10 SUPPOSITORY, RECTAL RECTAL
Status: DISCONTINUED | OUTPATIENT
Start: 2023-04-03 | End: 2023-04-06

## 2023-04-03 RX ORDER — HYDROMORPHONE HYDROCHLORIDE 1 MG/ML
0.4 INJECTION, SOLUTION INTRAMUSCULAR; INTRAVENOUS; SUBCUTANEOUS
Status: DISPENSED | OUTPATIENT
Start: 2023-04-03 | End: 2023-04-04

## 2023-04-03 RX ORDER — SENNOSIDES 8.6 MG
17.2 TABLET ORAL NIGHTLY
Status: DISCONTINUED | OUTPATIENT
Start: 2023-04-03 | End: 2023-04-06

## 2023-04-03 RX ORDER — DIPHENHYDRAMINE HCL 25 MG
25 CAPSULE ORAL EVERY 4 HOURS PRN
Status: DISPENSED | OUTPATIENT
Start: 2023-04-03 | End: 2023-04-04

## 2023-04-03 RX ORDER — MORPHINE SULFATE 4 MG/ML
2 INJECTION, SOLUTION INTRAMUSCULAR; INTRAVENOUS EVERY 10 MIN PRN
Status: DISCONTINUED | OUTPATIENT
Start: 2023-04-03 | End: 2023-04-03 | Stop reason: HOSPADM

## 2023-04-03 RX ORDER — HYDROCODONE BITARTRATE AND ACETAMINOPHEN 7.5; 325 MG/1; MG/1
2 TABLET ORAL EVERY 6 HOURS PRN
Status: DISPENSED | OUTPATIENT
Start: 2023-04-03 | End: 2023-04-04

## 2023-04-03 RX ORDER — HYDROMORPHONE HYDROCHLORIDE 1 MG/ML
0.4 INJECTION, SOLUTION INTRAMUSCULAR; INTRAVENOUS; SUBCUTANEOUS EVERY 5 MIN PRN
Status: DISCONTINUED | OUTPATIENT
Start: 2023-04-03 | End: 2023-04-03 | Stop reason: HOSPADM

## 2023-04-03 RX ORDER — DIPHENHYDRAMINE HCL 25 MG
25 CAPSULE ORAL EVERY 4 HOURS PRN
Status: DISCONTINUED | OUTPATIENT
Start: 2023-04-03 | End: 2023-04-06

## 2023-04-03 RX ORDER — HYDROCODONE BITARTRATE AND ACETAMINOPHEN 7.5; 325 MG/1; MG/1
1 TABLET ORAL EVERY 4 HOURS PRN
Status: DISCONTINUED | OUTPATIENT
Start: 2023-04-03 | End: 2023-04-06

## 2023-04-03 RX ORDER — HYDROCODONE BITARTRATE AND ACETAMINOPHEN 7.5; 325 MG/1; MG/1
1 TABLET ORAL EVERY 6 HOURS PRN
Status: DISPENSED | OUTPATIENT
Start: 2023-04-03 | End: 2023-04-04

## 2023-04-03 RX ORDER — METOCLOPRAMIDE HYDROCHLORIDE 5 MG/ML
10 INJECTION INTRAMUSCULAR; INTRAVENOUS EVERY 8 HOURS PRN
Status: DISCONTINUED | OUTPATIENT
Start: 2023-04-03 | End: 2023-04-06

## 2023-04-03 RX ORDER — MORPHINE SULFATE 4 MG/ML
4 INJECTION, SOLUTION INTRAMUSCULAR; INTRAVENOUS EVERY 10 MIN PRN
Status: DISCONTINUED | OUTPATIENT
Start: 2023-04-03 | End: 2023-04-03 | Stop reason: HOSPADM

## 2023-04-03 RX ORDER — GLYCOPYRROLATE 0.2 MG/ML
INJECTION, SOLUTION INTRAMUSCULAR; INTRAVENOUS AS NEEDED
Status: DISCONTINUED | OUTPATIENT
Start: 2023-04-03 | End: 2023-04-03 | Stop reason: SURG

## 2023-04-03 RX ORDER — HYDROMORPHONE HYDROCHLORIDE 1 MG/ML
0.2 INJECTION, SOLUTION INTRAMUSCULAR; INTRAVENOUS; SUBCUTANEOUS EVERY 5 MIN PRN
Status: DISCONTINUED | OUTPATIENT
Start: 2023-04-03 | End: 2023-04-03 | Stop reason: HOSPADM

## 2023-04-03 RX ORDER — ACETAMINOPHEN 325 MG/1
650 TABLET ORAL EVERY 6 HOURS PRN
Status: ACTIVE | OUTPATIENT
Start: 2023-04-03 | End: 2023-04-04

## 2023-04-03 RX ORDER — TAMSULOSIN HYDROCHLORIDE 0.4 MG/1
0.4 CAPSULE ORAL
Status: DISCONTINUED | OUTPATIENT
Start: 2023-04-04 | End: 2023-04-06

## 2023-04-03 RX ORDER — DOCUSATE SODIUM 100 MG/1
100 CAPSULE, LIQUID FILLED ORAL 2 TIMES DAILY
Status: DISCONTINUED | OUTPATIENT
Start: 2023-04-03 | End: 2023-04-06

## 2023-04-03 RX ORDER — MORPHINE SULFATE 2 MG/ML
2 INJECTION, SOLUTION INTRAMUSCULAR; INTRAVENOUS EVERY 2 HOUR PRN
Status: DISCONTINUED | OUTPATIENT
Start: 2023-04-03 | End: 2023-04-06

## 2023-04-03 RX ORDER — MORPHINE SULFATE 10 MG/ML
6 INJECTION, SOLUTION INTRAMUSCULAR; INTRAVENOUS EVERY 10 MIN PRN
Status: DISCONTINUED | OUTPATIENT
Start: 2023-04-03 | End: 2023-04-03 | Stop reason: HOSPADM

## 2023-04-03 RX ADMIN — GLYCOPYRROLATE 0.2 MG: 0.2 INJECTION, SOLUTION INTRAMUSCULAR; INTRAVENOUS at 13:02:00

## 2023-04-03 RX ADMIN — TRANEXAMIC ACID 1000 MG: 10 INJECTION, SOLUTION INTRAVENOUS at 12:38:00

## 2023-04-03 RX ADMIN — MORPHINE SULFATE 0.2 MG: 1 INJECTION, SOLUTION EPIDURAL; INTRATHECAL; INTRAVENOUS at 12:30:00

## 2023-04-03 RX ADMIN — ONDANSETRON 4 MG: 2 INJECTION INTRAMUSCULAR; INTRAVENOUS at 12:30:00

## 2023-04-03 RX ADMIN — BUPIVACAINE HYDROCHLORIDE 1.5 ML: 7.5 INJECTION, SOLUTION INTRASPINAL at 12:30:00

## 2023-04-03 NOTE — ANESTHESIA PROCEDURE NOTES
Spinal Block    Date/Time: 4/3/2023 12:12 PM    Performed by: Lizy Huang CRNA  Authorized by: Fabrizio Avelar MD      General Information and Staff    Start Time:  4/3/2023 12:12 PM  End Time:  4/3/2023 12:20 PM  CRNA:  Lizy Huang CRNA  Performed by:  CRNA  Patient Location:  OR  Site identification: surface landmarks    Reason for Block: at surgeon's request and surgical anesthesia    Preanesthetic Checklist: patient identified, IV checked, risks and benefits discussed, monitors and equipment checked, pre-op evaluation, timeout performed, anesthesia consent and sterile technique used      Procedure Details    Patient Position:  Sitting  Prep: ChloraPrep    Monitoring:  Cardiac monitor and continuous pulse ox  Approach:  Midline  Location:  L3-4  Injection Technique:  Single-shot    Needle    Needle Type:  Pencil-tip  Needle Gauge:  24 G  Needle Length:  3.5 in    Assessment    Sensory Level:   Events: clear CSF, CSF aspirated, well tolerated and blood negative      Additional Comments

## 2023-04-03 NOTE — PLAN OF CARE
Lula Palm is POD 0. Alert & Oriented x 4. Dressing in place to back. Receiving IV LR per MD order. Voiding via usn. SCDs, teds for DVT prophylaxis. Norco and oxy ER as needed for pain. Vital signs monitored. No acute changes noted throughout shift. Tolerating diet. Frequent ambulation encouraged. Cough and deep breathe in addition to use incentive spirometer. Fall precautions in place- bed alarm on, bed in lowest position, call light and personal belongings within reach, non-skid socks in place. Frequent rounding by nursing staff. Hemovac in place. Plan is PT/OT evals. Problem: Patient Centered Care  Goal: Patient preferences are identified and integrated in the patient's plan of care  Description: Interventions:  - What would you like us to know as we care for you? I live at home alone  - Provide timely, complete, and accurate information to patient/family  - Incorporate patient and family knowledge, values, beliefs, and cultural backgrounds into the planning and delivery of care  - Encourage patient/family to participate in care and decision-making at the level they choose  - Honor patient and family perspectives and choices  Outcome: Progressing     Problem: Patient/Family Goals  Goal: Patient/Family Long Term Goal  Description: Patient's Long Term Goal: Regain strength/endurance/mobility in L knee. Interventions:  - PT/OT; Pain management; Frequent ambulation  - See additional Care Plan goals for specific interventions  Outcome: Progressing  Goal: Patient/Family Short Term Goal  Description: Patient's Short Term Goal: Manage pain    Interventions:   - Analgesics;  Non-pharm therapy - gel ice; frequent ambulation; PT/OT  - See additional Care Plan goals for specific interventions  Outcome: Progressing

## 2023-04-03 NOTE — OPERATIVE REPORT
Operative Note    Patient Name: Marquetta Hamman    Preoperative Diagnosis: Primary osteoarthritis of left knee [M17.12]    Postoperative Diagnosis: Primary osteoarthritis of left knee [M17.12]    Primary Surgeon: Bismark Andrew MD     Assistant: Nick Jean    Procedures: L TKA with PSI, preoperative computer navigation    Surgical Findings: above    Anesthesia: Spinal    Complications: none    Specimen: L knee bone and soft tissue to pathology    Drains: hemovac, sun    Condition: stable to RR    Estimated Blood Loss: Heidy Munoz MD

## 2023-04-03 NOTE — OPERATIVE REPORT
[facility]    PATIENT'S NAME: CARLOS ALBERTO MIRZA   ATTENDING PHYSICIAN: Prosper Otero MD   OPERATING PHYSICIAN: Prosper Otero MD   PATIENT ACCOUNT#:   [id number]    LOCATION:  [address] [place] [address] #:   [id number]       YOB: 1935  ADMISSION DATE:       04/03/2023      OPERATION DATE:  04/03/2023    OPERATIVE REPORT      PREOPERATIVE DIAGNOSIS:  Left knee osteoarthritis, primary. POSTOPERATIVE DIAGNOSIS:  Left knee osteoarthritis, primary. PROCEDURE:  Left total knee arthroplasty with patient-specific instrumentation and preoperative computer navigation. ASSISTANTS:    1.   Claudia Palomares PA-C   2. HECTOR Marie     ANESTHESIA:  Spinal, with sedation. SPECIMEN:  Left knee bone and soft tissue to Pathology. DRAINS:  Hemovac drain to suction device, Rangel catheter to gravity drainage. COMPLICATIONS:  None. INDICATIONS:  Patient is an 69-year-old male with history of progressive left knee pain unresponsive to conservative care. Preoperative physical findings and imaging studies were consistent with end-stage osteoarthritis of the left knee. He failed conservative treatments including anti-inflammatory medications, therapeutic exercise, activity modifications, and attempts at weight loss. After discussion with the patient of the risks and benefits of proceeding with operative treatment of the left knee, he wished to proceed with surgery. OPERATIVE TECHNIQUE:  The patient was identified in the preoperative holding area. The appropriate consents were obtained. He was taken to the operating room and placed in supine position on the operating room table. After adequate spinal anesthesia and sedation was achieved, a Rangel catheter was inserted using sterile technique. A tourniquet was placed on the left thigh. An SCD device was placed on the right lower extremity. The patient was given preoperative IV antibiotics and IV tranexamic acid.   The left knee and lower extremity were then prepped and draped in a sterile fashion. The left foot was padded and placed in the foot amaya for the [facility] type knee positioner. The extremity was exsanguinated, and the tourniquet was inflated to 250 mmHg. An anterior approach to the knee was then performed through a 6-inch incision centered over the medial border of the patella. A medial parapatellar retinacular incision was made. Portions of the superficial medial collateral ligament were sharply elevated from the anteromedial proximal tibia. The menisci and anterior cruciate ligament were sharply excised. The patella was everted, and a freehand method was used to resect 9 mm of articular surface. Care was taken to make the patellar cut parallel to the dorsal surface of the patella. The patella was sized, and a size 35 implant was chosen. The drill guide was applied to the cut surface in a superomedial position. The 3 drill holes were made. The patellar trial was inserted and fit appropriately. Patellar protector was next inserted. The knee was brought into a hyperflexed position. The patient-specific pin guide for the distal femur was applied to the anterior distal femur, and a good fit was noted. Anterior and distal pins were placed. Distal pins were removed. Distal cutting guide was next inserted, and the distal cut was made. Bone fragments were sized on the back table and appeared consistent with the preoperative plan. Appropriate valgus cut was made. Next, a 4-in-1 cutting guide for a size 9 femoral implant was inserted and secured. Anterior, posterior, and chamfer cuts were made. Rotational alignment was checked, and there was good alignment with Whitesides line. The posterior bone fragments were sized on the back table and appeared consistent with the preoperative plan. Next, PCL retractor was placed. The patient-specific pin guide for the proximal tibia was applied.   The anterior and superior pins were placed. Superior pins were removed. The cutting guide was applied, and alignment was checked. It was appropriately aligned in all 3 planes with the tibial shaft. The tibial cut was made. The bone fragment was removed and sized and appeared consistent with the preoperative plan. The tibial trial size E was next inserted and secured to the tibia using the superior drill holes. Rotational alignment was checked and appeared appropriate. Tibia was finished with reamer and keel cutter. Stem extension was chosen due to the soft tibial bone. Next, the trial implants were inserted, along with a 10 mm polyethylene bearing surface trial.  The knee was brought through a range of motion. It was adequately stable in all planes. Full range of motion was achieved. There was no impingement on the implants. Next, using third-generation cementing technique, we cemented in a size 9 femur, a size 35 patella, and a size E tibial implant. These were Leidy Persona implants. Excess cement was removed. We again trialed and chose a 10 mm medial congruent, highly cross-linked vitamin E polyethylene bearing. This was snapped into position using the Leidy . Good stability was noted. The knee was again brought through range of motion. It was adequately stable in all planes. Full range of motion was achieved. There was no impingement on the implants. The wound was copiously irrigated. The retinacular layer was repaired with 0 Ethibond sutures in interrupted fashion. A 1/8-inch Hemovac drain was placed deep to the fascial layer and exited the anterolateral thigh. Skin and subcutaneous layers were closed with 2-0 Vicryl sutures and skin staples. A sterile dressing was applied, as well as an Ace wrap and ice pack. The patient's anesthesia was reversed. He was taken to recovery room in stable condition. All sponge and instrument counts were reported as correct.   The attending physician,  Kim Elena, was present and performed all critical portions of the procedure. There were no complications. The first and second assistants were medically necessary for the surgery. They assisted with the patient positioning, retraction of soft tissues for accurate placement of the implants and cutting guides. They also assisted with cement removal, hemostasis, and wound closure. Without the aid of the assistants, the surgical procedure would not have been possible. Dictated By Nini Elena MD  d: 04/03/2023 14:05:50  t: 04/03/2023 17:17:00  [facility] [id number]  [facility]/

## 2023-04-04 LAB
ANION GAP SERPL CALC-SCNC: 7 MMOL/L (ref 0–18)
BUN BLD-MCNC: 15 MG/DL (ref 7–18)
BUN/CREAT SERPL: 26.8 (ref 10–20)
CALCIUM BLD-MCNC: 8.5 MG/DL (ref 8.5–10.1)
CHLORIDE SERPL-SCNC: 102 MMOL/L (ref 98–112)
CO2 SERPL-SCNC: 28 MMOL/L (ref 21–32)
CREAT BLD-MCNC: 0.56 MG/DL
GFR SERPLBLD BASED ON 1.73 SQ M-ARVRAT: 96 ML/MIN/1.73M2 (ref 60–?)
GLUCOSE BLD-MCNC: 79 MG/DL (ref 70–99)
HCT VFR BLD AUTO: 34.7 %
HGB BLD-MCNC: 11.6 G/DL
OSMOLALITY SERPL CALC.SUM OF ELEC: 284 MOSM/KG (ref 275–295)
POTASSIUM SERPL-SCNC: 4.3 MMOL/L (ref 3.5–5.1)
SODIUM SERPL-SCNC: 137 MMOL/L (ref 136–145)

## 2023-04-04 PROCEDURE — 99233 SBSQ HOSP IP/OBS HIGH 50: CPT | Performed by: HOSPITALIST

## 2023-04-04 RX ORDER — SODIUM CHLORIDE 9 MG/ML
INJECTION, SOLUTION INTRAVENOUS CONTINUOUS
Status: DISCONTINUED | OUTPATIENT
Start: 2023-04-04 | End: 2023-04-06

## 2023-04-04 RX ORDER — IPRATROPIUM BROMIDE AND ALBUTEROL SULFATE 2.5; .5 MG/3ML; MG/3ML
3 SOLUTION RESPIRATORY (INHALATION) EVERY 6 HOURS PRN
Status: DISCONTINUED | OUTPATIENT
Start: 2023-04-04 | End: 2023-04-06

## 2023-04-04 RX ORDER — GUAIFENESIN 600 MG/1
600 TABLET, EXTENDED RELEASE ORAL 2 TIMES DAILY
Status: DISCONTINUED | OUTPATIENT
Start: 2023-04-04 | End: 2023-04-06

## 2023-04-04 NOTE — HOME CARE LIAISON
Received referral via [facility] for Home Health services. Spoke w/ patient and provided with list of [facility] providers from [facility], choice is [facility]. Agency reserved in [facility] and contact information placed on AVS.Financial interest disclosure provided. Patient states he plans to go to [facility] first but wants [facility] once he returns home.  Notified Vernell King

## 2023-04-04 NOTE — CONSULTS
Pod 1 left knee arthroplasty with duramorph spinal  Pt tolerated procedure  Well good post op pain relief no headaceh no backache  Doing well  cpm

## 2023-04-04 NOTE — PLAN OF CARE
Buster Toscano is POD 1. Alert & Oriented x 4. Dressing in place to L knee. Receiving NS at 100mL per MD order. SCDs, teds for DVT prophylaxis. Norco PRN for pain. Vital signs monitored. No acute changes noted throughout shift. Tolerating diet. Frequent ambulation encouraged. Cough and deep breathe in addition to use incentive spirometer. Fall precautions in place- bed alarm on, bed in lowest position, call light and personal belongings within reach, non-skid socks in place. Frequent rounding by nursing staff. Hemovac and ace wrap removed this am per ortho's orders. Plan is [facility] - needs choice. Problem: Patient Centered Care  Goal: Patient preferences are identified and integrated in the patient's plan of care  Description: Interventions:  - What would you like us to know as we care for you?  I live at home alone  - Provide timely, complete, and accurate information to patient/family  - Incorporate patient and family knowledge, values, beliefs, and cultural backgrounds into the planning and delivery of care  - Encourage patient/family to participate in care and decision-making at the level they choose  - Honor patient and family perspectives and choices  Outcome: Progressing     Problem: PAIN - ADULT  Goal: Verbalizes/displays adequate comfort level or patient's stated pain goal  Description: INTERVENTIONS:  - Encourage pt to monitor pain and request assistance  - Assess pain using appropriate pain scale  - Administer analgesics based on type and severity of pain and evaluate response  - Implement non-pharmacological measures as appropriate and evaluate response  - Consider cultural and social influences on pain and pain management  - Manage/alleviate anxiety  - Utilize distraction and/or relaxation techniques  - Monitor for opioid side effects  - Notify MD/LIP if interventions unsuccessful or patient reports new pain  - Anticipate increased pain with activity and pre-medicate as appropriate  Outcome: Progressing Problem: Patient/Family Goals  Goal: Patient/Family Long Term Goal  Description: Patient's Long Term Goal: Regain strength/endurance/mobility in L knee. Interventions:  - PT/OT; Pain management; Frequent ambulation  - See additional Care Plan goals for specific interventions  Outcome: Progressing  Goal: Patient/Family Short Term Goal  Description: Patient's Short Term Goal: Manage pain    Interventions:   - Analgesics;  Non-pharm therapy - gel ice; frequent ambulation; PT/OT  - See additional Care Plan goals for specific interventions  Outcome: Progressing     Problem: SKIN/TISSUE INTEGRITY - ADULT  Goal: Skin integrity remains intact  Description: INTERVENTIONS  - Assess and document risk factors for pressure ulcer development  - Assess and document skin integrity  - Monitor for areas of redness and/or skin breakdown  - Initiate interventions, skin care algorithm/standards of care as needed  Outcome: Progressing  Goal: Incision(s), wounds(s) or drain site(s) healing without S/S of infection  Description: INTERVENTIONS:  - Assess and document risk factors for pressure ulcer development  - Assess and document skin integrity  - Assess and document dressing/incision, wound bed, drain sites and surrounding tissue  - Implement wound care per orders  - Initiate isolation precautions as appropriate  - Initiate Pressure Ulcer prevention bundle as indicated  Outcome: Progressing     Problem: MUSCULOSKELETAL - ADULT  Goal: Return mobility to safest level of function  Description: INTERVENTIONS:  - Assess patient stability and activity tolerance for standing, transferring and ambulating w/ or w/o assistive devices  - Assist with transfers and ambulation using safe patient handling equipment as needed  - Ensure adequate protection for wounds/incisions during mobilization  - Obtain PT/OT consults as needed  - Advance activity as appropriate  - Communicate ordered activity level and limitations with patient/family  Outcome: Progressing  Goal: Maintain proper alignment of affected body part  Description: INTERVENTIONS:  - Support and protect limb and body alignment per provider's orders  - Instruct and reinforce with patient and family use of appropriate assistive device and precautions (e.g. spinal or hip dislocation precautions)  Outcome: Progressing     Problem: Impaired Functional Mobility  Goal: Achieve highest/safest level of mobility/gait  Description: Interventions:  - Assess patient's functional ability and stability  - Promote increasing activity/tolerance for mobility and gait  - Educate and engage patient/family in tolerated activity level and precautions  Outcome: Progressing

## 2023-04-04 NOTE — CM/SW NOTE
Department  notified of request for [facility], [facility] referrals started. Assigned CM/SW to follow up with pt/family on further discharge planning.      Nabila Travis   April 04, 2023   10:23

## 2023-04-04 NOTE — CM/SW NOTE
Department  notified of request for [facility] referrals started. Assigned CM/SW to follow up with pt/family on further discharge planning.      Amelie Night   April 04, 2023   10:49

## 2023-04-04 NOTE — DISCHARGE INSTRUCTIONS
Call to schedule Follow up appt with Dr. Muriel Roblero in 2 weeks. Sometimes managing your health at home requires assistance. The [place] team has recognized your preference to use [facility]. They can be reached by phone at [phone]. The fax number for your reference is [phone]. A representative from the home health agency will contact you or your family to schedule your first visit once you return home.

## 2023-04-04 NOTE — CM/SW NOTE
SW followed up on DC planning. SW spoke with pt at bedside to discuss DC planning. SW received confirmation that pt would like [facility] upon DC as pt lives alone and lives in a 2 story home. Pt would like [facility] - who has no beds to accept. Is agreeable to review list for back up choice     PASRR level 1 screen completed and uploaded to aidin referral      PLAN: SNF pending choice/ bed avail    Miryam Do, JEFFREYW, MSW ext.  [phone]

## 2023-04-04 NOTE — PLAN OF CARE
Patient is POD# 1 of L knee. Aox4 on RA. Hasn't been oob overnight. Xarelto and SCDs for DVT prophylaxis. Hemovac in place. Rangel removed this AM - check void. Lauren Oxy ER, [place] and Dilaudid given for pain. Spinal duramorph ends at 1230p 4/4/23. Plan for PT/OT Eval.     Problem: Patient Centered Care  Goal: Patient preferences are identified and integrated in the patient's plan of care  Description: Interventions:  - What would you like us to know as we care for you? I live at home alone  - Provide timely, complete, and accurate information to patient/family  - Incorporate patient and family knowledge, values, beliefs, and cultural backgrounds into the planning and delivery of care  - Encourage patient/family to participate in care and decision-making at the level they choose  - Honor patient and family perspectives and choices  4/4/2023 0243 by Dede Price RN  Outcome: Progressing  4/4/2023 0242 by Dede Price RN  Outcome: Progressing     Problem: Patient/Family Goals  Goal: Patient/Family Long Term Goal  Description: Patient's Long Term Goal: Regain strength/endurance/mobility in L knee. Interventions:  - PT/OT; Pain management; Frequent ambulation  - See additional Care Plan goals for specific interventions  4/4/2023 0243 by Dede Price RN  Outcome: Progressing  4/4/2023 0242 by Dede Price RN  Outcome: Progressing  Goal: Patient/Family Short Term Goal  Description: Patient's Short Term Goal: Manage pain    Interventions:   - Analgesics;  Non-pharm therapy - gel ice; frequent ambulation; PT/OT  - See additional Care Plan goals for specific interventions  4/4/2023 0243 by Dede Price RN  Outcome: Progressing  4/4/2023 0242 by Dede rPice RN  Outcome: Progressing     Problem: SKIN/TISSUE INTEGRITY - ADULT  Goal: Skin integrity remains intact  Description: INTERVENTIONS  - Assess and document risk factors for pressure ulcer development  - Assess and document skin integrity  - Monitor for areas of redness and/or skin breakdown  - Initiate interventions, skin care algorithm/standards of care as needed  Outcome: Progressing  Goal: Incision(s), wounds(s) or drain site(s) healing without S/S of infection  Description: INTERVENTIONS:  - Assess and document risk factors for pressure ulcer development  - Assess and document skin integrity  - Assess and document dressing/incision, wound bed, drain sites and surrounding tissue  - Implement wound care per orders  - Initiate isolation precautions as appropriate  - Initiate Pressure Ulcer prevention bundle as indicated  Outcome: Progressing     Problem: MUSCULOSKELETAL - ADULT  Goal: Return mobility to safest level of function  Description: INTERVENTIONS:  - Assess patient stability and activity tolerance for standing, transferring and ambulating w/ or w/o assistive devices  - Assist with transfers and ambulation using safe patient handling equipment as needed  - Ensure adequate protection for wounds/incisions during mobilization  - Obtain PT/OT consults as needed  - Advance activity as appropriate  - Communicate ordered activity level and limitations with patient/family  Outcome: Progressing  Goal: Maintain proper alignment of affected body part  Description: INTERVENTIONS:  - Support and protect limb and body alignment per provider's orders  - Instruct and reinforce with patient and family use of appropriate assistive device and precautions (e.g. spinal or hip dislocation precautions)  Outcome: Progressing     Problem: Impaired Functional Mobility  Goal: Achieve highest/safest level of mobility/gait  Description: Interventions:  - Assess patient's functional ability and stability  - Promote increasing activity/tolerance for mobility and gait  - Educate and engage patient/family in tolerated activity level and precautions  Outcome: Progressing

## 2023-04-05 LAB
ANION GAP SERPL CALC-SCNC: 6 MMOL/L (ref 0–18)
BILIRUB UR QL: NEGATIVE
BUN BLD-MCNC: 12 MG/DL (ref 7–18)
BUN/CREAT SERPL: 24 (ref 10–20)
CALCIUM BLD-MCNC: 8.4 MG/DL (ref 8.5–10.1)
CHLORIDE SERPL-SCNC: 100 MMOL/L (ref 98–112)
CLARITY UR: CLEAR
CO2 SERPL-SCNC: 29 MMOL/L (ref 21–32)
COLOR UR: YELLOW
CREAT BLD-MCNC: 0.5 MG/DL
DEPRECATED RDW RBC AUTO: 47.8 FL (ref 35.1–46.3)
ERYTHROCYTE [DISTWIDTH] IN BLOOD BY AUTOMATED COUNT: 13.6 % (ref 11–15)
GFR SERPLBLD BASED ON 1.73 SQ M-ARVRAT: 99 ML/MIN/1.73M2 (ref 60–?)
GLUCOSE BLD-MCNC: 113 MG/DL (ref 70–99)
GLUCOSE UR-MCNC: NORMAL MG/DL
HCT VFR BLD AUTO: 30.2 %
HGB BLD-MCNC: 10.5 G/DL
KETONES UR-MCNC: 10 MG/DL
LEUKOCYTE ESTERASE UR QL STRIP.AUTO: NEGATIVE
MCH RBC QN AUTO: 33.1 PG (ref 26–34)
MCHC RBC AUTO-ENTMCNC: 34.8 G/DL (ref 31–37)
MCV RBC AUTO: 95.3 FL
NITRITE UR QL STRIP.AUTO: NEGATIVE
OSMOLALITY SERPL CALC.SUM OF ELEC: 281 MOSM/KG (ref 275–295)
PH UR: 5.5 [PH] (ref 5–8)
PLATELET # BLD AUTO: 135 10(3)UL (ref 150–450)
POTASSIUM SERPL-SCNC: 3.8 MMOL/L (ref 3.5–5.1)
PROT UR-MCNC: 30 MG/DL
RBC # BLD AUTO: 3.17 X10(6)UL
RBC #/AREA URNS AUTO: >10 /HPF
SODIUM SERPL-SCNC: 135 MMOL/L (ref 136–145)
SP GR UR STRIP: 1.03 (ref 1–1.03)
UROBILINOGEN UR STRIP-ACNC: NORMAL
WBC # BLD AUTO: 14.5 X10(3) UL (ref 4–11)

## 2023-04-05 PROCEDURE — 99232 SBSQ HOSP IP/OBS MODERATE 35: CPT | Performed by: HOSPITALIST

## 2023-04-05 RX ORDER — HYDROCODONE BITARTRATE AND ACETAMINOPHEN 5; 325 MG/1; MG/1
1 TABLET ORAL DAILY PRN
Qty: 30 TABLET | Refills: 0 | Status: SHIPPED | OUTPATIENT
Start: 2023-04-05

## 2023-04-05 NOTE — PLAN OF CARE
Pt is A&O X4. Breathing on 1L O2 NC. Surgical dressing CDI. Xarelto and SCDs for DVT prophylaxis. Continuous IV fluid infusing. Voiding with small amount of urine output. Bladder scan 577 ml, straight cath 600 ml out. Given Norco prn for pain. D.C. plan is [facility] pending for pt's choice and acceptance. Call light within reach. Safety precaution in place. Problem: Patient Centered Care  Goal: Patient preferences are identified and integrated in the patient's plan of care  Description: Interventions:  - What would you like us to know as we care for you? I live at home alone  - Provide timely, complete, and accurate information to patient/family  - Incorporate patient and family knowledge, values, beliefs, and cultural backgrounds into the planning and delivery of care  - Encourage patient/family to participate in care and decision-making at the level they choose  - Honor patient and family perspectives and choices  Outcome: Progressing     Problem: PAIN - ADULT  Goal: Verbalizes/displays adequate comfort level or patient's stated pain goal  Description: INTERVENTIONS:  - Encourage pt to monitor pain and request assistance  - Assess pain using appropriate pain scale  - Administer analgesics based on type and severity of pain and evaluate response  - Implement non-pharmacological measures as appropriate and evaluate response  - Consider cultural and social influences on pain and pain management  - Manage/alleviate anxiety  - Utilize distraction and/or relaxation techniques  - Monitor for opioid side effects  - Notify MD/LIP if interventions unsuccessful or patient reports new pain  - Anticipate increased pain with activity and pre-medicate as appropriate  Outcome: Progressing     Problem: Patient/Family Goals  Goal: Patient/Family Long Term Goal  Description: Patient's Long Term Goal: Regain strength/endurance/mobility in L knee.      Interventions:  - PT/OT; Pain management; Frequent ambulation  - See additional Care Plan goals for specific interventions  Outcome: Progressing  Goal: Patient/Family Short Term Goal  Description: Patient's Short Term Goal: Manage pain    Interventions:   - Analgesics;  Non-pharm therapy - gel ice; frequent ambulation; PT/OT  - See additional Care Plan goals for specific interventions  Outcome: Progressing     Problem: SKIN/TISSUE INTEGRITY - ADULT  Goal: Skin integrity remains intact  Description: INTERVENTIONS  - Assess and document risk factors for pressure ulcer development  - Assess and document skin integrity  - Monitor for areas of redness and/or skin breakdown  - Initiate interventions, skin care algorithm/standards of care as needed  Outcome: Progressing  Goal: Incision(s), wounds(s) or drain site(s) healing without S/S of infection  Description: INTERVENTIONS:  - Assess and document risk factors for pressure ulcer development  - Assess and document skin integrity  - Assess and document dressing/incision, wound bed, drain sites and surrounding tissue  - Implement wound care per orders  - Initiate isolation precautions as appropriate  - Initiate Pressure Ulcer prevention bundle as indicated  Outcome: Progressing     Problem: MUSCULOSKELETAL - ADULT  Goal: Return mobility to safest level of function  Description: INTERVENTIONS:  - Assess patient stability and activity tolerance for standing, transferring and ambulating w/ or w/o assistive devices  - Assist with transfers and ambulation using safe patient handling equipment as needed  - Ensure adequate protection for wounds/incisions during mobilization  - Obtain PT/OT consults as needed  - Advance activity as appropriate  - Communicate ordered activity level and limitations with patient/family  Outcome: Progressing  Goal: Maintain proper alignment of affected body part  Description: INTERVENTIONS:  - Support and protect limb and body alignment per provider's orders  - Instruct and reinforce with patient and family use of appropriate assistive device and precautions (e.g. spinal or hip dislocation precautions)  Outcome: Progressing     Problem: Impaired Functional Mobility  Goal: Achieve highest/safest level of mobility/gait  Description: Interventions:  - Assess patient's functional ability and stability  - Promote increasing activity/tolerance for mobility and gait  - Educate and engage patient/family in tolerated activity level and precautions  Outcome: Progressing

## 2023-04-05 NOTE — CM/SW NOTE
CM f/u with pt re [facility] choice. Pt wants a facility in [place]. CM reviewed the 2 options and pt is agreeable to [facility]. CM reserved in aidin and sent clinical updates. 1500  MDO for dc    CM called to confirm with Ruby Solares, CINTHYA. Per RN pt is to stay for IVF and UA w/ Cx. Pt will likely be dc ready tomorrow. CM notified C of above and pt provided with update. [address].    / to remain available for support and/or discharge planning.      Gina Singh, RN    Ext [phone]

## 2023-04-05 NOTE — PLAN OF CARE
Up with 2 max assist. Pain managed with oral pain meds and ice PRN. Primofit in place. 1 L O2 NC for cough and c/o SOB and hoarse voice/sore throat with cepachol lozenges (hx COPD without O2 at home). Xarelto and SCDs. DC plan will be to [facility] pending choice and ins auth. 12- SW asked if he is ready today, informed her the MD was updated at 1430 and new orders received and no bed was ready yet and SW was working on it. 1755- Assisted back to bed with stand pivot 2 max assist. Voided per urinal and UA and culture reflex sent. Wearing depends. 200- MD informed UA culture reflex sent and results are in computer    Problem: Patient Centered Care  Goal: Patient preferences are identified and integrated in the patient's plan of care  Description: Interventions:  - What would you like us to know as we care for you?  I live at home alone  - Provide timely, complete, and accurate information to patient/family  - Incorporate patient and family knowledge, values, beliefs, and cultural backgrounds into the planning and delivery of care  - Encourage patient/family to participate in care and decision-making at the level they choose  - Honor patient and family perspectives and choices  Outcome: Progressing     Problem: PAIN - ADULT  Goal: Verbalizes/displays adequate comfort level or patient's stated pain goal  Description: INTERVENTIONS:  - Encourage pt to monitor pain and request assistance  - Assess pain using appropriate pain scale  - Administer analgesics based on type and severity of pain and evaluate response  - Implement non-pharmacological measures as appropriate and evaluate response  - Consider cultural and social influences on pain and pain management  - Manage/alleviate anxiety  - Utilize distraction and/or relaxation techniques  - Monitor for opioid side effects  - Notify MD/LIP if interventions unsuccessful or patient reports new pain  - Anticipate increased pain with activity and pre-medicate as appropriate  Outcome: Progressing     Problem: Patient/Family Goals  Goal: Patient/Family Long Term Goal  Description: Patient's Long Term Goal: Regain strength/endurance/mobility in L knee. Interventions:  - PT/OT; Pain management; Frequent ambulation  - See additional Care Plan goals for specific interventions  Outcome: Progressing  Goal: Patient/Family Short Term Goal  Description: Patient's Short Term Goal: Manage pain    Interventions:   - Analgesics;  Non-pharm therapy - gel ice; frequent ambulation; PT/OT  - See additional Care Plan goals for specific interventions  Outcome: Progressing     Problem: SKIN/TISSUE INTEGRITY - ADULT  Goal: Skin integrity remains intact  Description: INTERVENTIONS  - Assess and document risk factors for pressure ulcer development  - Assess and document skin integrity  - Monitor for areas of redness and/or skin breakdown  - Initiate interventions, skin care algorithm/standards of care as needed  Outcome: Progressing  Goal: Incision(s), wounds(s) or drain site(s) healing without S/S of infection  Description: INTERVENTIONS:  - Assess and document risk factors for pressure ulcer development  - Assess and document skin integrity  - Assess and document dressing/incision, wound bed, drain sites and surrounding tissue  - Implement wound care per orders  - Initiate isolation precautions as appropriate  - Initiate Pressure Ulcer prevention bundle as indicated  Outcome: Progressing     Problem: MUSCULOSKELETAL - ADULT  Goal: Return mobility to safest level of function  Description: INTERVENTIONS:  - Assess patient stability and activity tolerance for standing, transferring and ambulating w/ or w/o assistive devices  - Assist with transfers and ambulation using safe patient handling equipment as needed  - Ensure adequate protection for wounds/incisions during mobilization  - Obtain PT/OT consults as needed  - Advance activity as appropriate  - Communicate ordered activity level and limitations with patient/family  Outcome: Progressing  Goal: Maintain proper alignment of affected body part  Description: INTERVENTIONS:  - Support and protect limb and body alignment per provider's orders  - Instruct and reinforce with patient and family use of appropriate assistive device and precautions (e.g. spinal or hip dislocation precautions)  Outcome: Progressing     Problem: Impaired Functional Mobility  Goal: Achieve highest/safest level of mobility/gait  Description: Interventions:  - Assess patient's functional ability and stability  - Promote increasing activity/tolerance for mobility and gait  - Educate and engage patient/family in tolerated activity level and precautions  Outcome: Progressing

## 2023-04-06 VITALS
RESPIRATION RATE: 18 BRPM | OXYGEN SATURATION: 93 % | WEIGHT: 163 LBS | HEIGHT: 66 IN | HEART RATE: 107 BPM | SYSTOLIC BLOOD PRESSURE: 106 MMHG | TEMPERATURE: 98 F | DIASTOLIC BLOOD PRESSURE: 52 MMHG | BODY MASS INDEX: 26.2 KG/M2

## 2023-04-06 LAB
ANION GAP SERPL CALC-SCNC: 4 MMOL/L (ref 0–18)
BUN BLD-MCNC: 11 MG/DL (ref 7–18)
BUN/CREAT SERPL: 26.8 (ref 10–20)
CALCIUM BLD-MCNC: 8.4 MG/DL (ref 8.5–10.1)
CHLORIDE SERPL-SCNC: 104 MMOL/L (ref 98–112)
CO2 SERPL-SCNC: 29 MMOL/L (ref 21–32)
CREAT BLD-MCNC: 0.41 MG/DL
DEPRECATED RDW RBC AUTO: 47.8 FL (ref 35.1–46.3)
ERYTHROCYTE [DISTWIDTH] IN BLOOD BY AUTOMATED COUNT: 13.6 % (ref 11–15)
GFR SERPLBLD BASED ON 1.73 SQ M-ARVRAT: 105 ML/MIN/1.73M2 (ref 60–?)
GLUCOSE BLD-MCNC: 112 MG/DL (ref 70–99)
HCT VFR BLD AUTO: 26 %
HGB BLD-MCNC: 9.1 G/DL
MCH RBC QN AUTO: 33.6 PG (ref 26–34)
MCHC RBC AUTO-ENTMCNC: 35 G/DL (ref 31–37)
MCV RBC AUTO: 95.9 FL
OSMOLALITY SERPL CALC.SUM OF ELEC: 284 MOSM/KG (ref 275–295)
PLATELET # BLD AUTO: 118 10(3)UL (ref 150–450)
POTASSIUM SERPL-SCNC: 3.3 MMOL/L (ref 3.5–5.1)
RBC # BLD AUTO: 2.71 X10(6)UL
SARS-COV-2 RNA RESP QL NAA+PROBE: NOT DETECTED
SODIUM SERPL-SCNC: 137 MMOL/L (ref 136–145)
WBC # BLD AUTO: 10.4 X10(3) UL (ref 4–11)

## 2023-04-06 PROCEDURE — 99239 HOSP IP/OBS DSCHRG MGMT >30: CPT | Performed by: HOSPITALIST

## 2023-04-06 RX ORDER — POTASSIUM CHLORIDE 20 MEQ/1
40 TABLET, EXTENDED RELEASE ORAL ONCE
Status: COMPLETED | OUTPATIENT
Start: 2023-04-06 | End: 2023-04-06

## 2023-04-06 RX ORDER — HYDROCODONE BITARTRATE AND ACETAMINOPHEN 7.5; 325 MG/1; MG/1
1 TABLET ORAL EVERY 6 HOURS PRN
Qty: 30 TABLET | Refills: 0 | Status: SHIPPED | OUTPATIENT
Start: 2023-04-06

## 2023-04-06 NOTE — CM/SW NOTE
04/06/23 1201   Discharge disposition   Expected discharge disposition Skilled [facility] Provider Renaldo Dowd Ext   Discharge transportation [facility] intern talked to [facility] to set up a [facility] for this pt as soon as possible. Luly Statoner is set up for 1PM today and  intern notified pt's RN and pt at bedside. Report number is [phone].     Social Work Intern  [employer]

## 2023-04-06 NOTE — PLAN OF CARE
Onel Gavin is cleared for discharge today to rehab. He is voiding. Taking norco as needed for pain. Aquacel dressing changed to left knee incision. Had a small BM. Plan to be transported via Medicare. All patient belongings sent with. Report called to Ezequiel Hicks RN at [facility]. [facility] back number provided for questions. Problem: Patient Centered Care  Goal: Patient preferences are identified and integrated in the patient's plan of care  Description: Interventions:  - What would you like us to know as we care for you? I live at home alone  - Provide timely, complete, and accurate information to patient/family  - Incorporate patient and family knowledge, values, beliefs, and cultural backgrounds into the planning and delivery of care  - Encourage patient/family to participate in care and decision-making at the level they choose  - Honor patient and family perspectives and choices  Outcome: Adequate for Discharge     Problem: PAIN - ADULT  Goal: Verbalizes/displays adequate comfort level or patient's stated pain goal  Description: INTERVENTIONS:  - Encourage pt to monitor pain and request assistance  - Assess pain using appropriate pain scale  - Administer analgesics based on type and severity of pain and evaluate response  - Implement non-pharmacological measures as appropriate and evaluate response  - Consider cultural and social influences on pain and pain management  - Manage/alleviate anxiety  - Utilize distraction and/or relaxation techniques  - Monitor for opioid side effects  - Notify MD/LIP if interventions unsuccessful or patient reports new pain  - Anticipate increased pain with activity and pre-medicate as appropriate  Outcome: Adequate for Discharge     Problem: Patient/Family Goals  Goal: Patient/Family Long Term Goal  Description: Patient's Long Term Goal: Regain strength/endurance/mobility in L knee.      Interventions:  - PT/OT; Pain management; Frequent ambulation  - See additional Care Plan goals for specific interventions  Outcome: Adequate for Discharge  Goal: Patient/Family Short Term Goal  Description: Patient's Short Term Goal: Manage pain    Interventions:   - Analgesics;  Non-pharm therapy - gel ice; frequent ambulation; PT/OT  - See additional Care Plan goals for specific interventions  Outcome: Adequate for Discharge     Problem: SKIN/TISSUE INTEGRITY - ADULT  Goal: Skin integrity remains intact  Description: INTERVENTIONS  - Assess and document risk factors for pressure ulcer development  - Assess and document skin integrity  - Monitor for areas of redness and/or skin breakdown  - Initiate interventions, skin care algorithm/standards of care as needed  Outcome: Adequate for Discharge  Goal: Incision(s), wounds(s) or drain site(s) healing without S/S of infection  Description: INTERVENTIONS:  - Assess and document risk factors for pressure ulcer development  - Assess and document skin integrity  - Assess and document dressing/incision, wound bed, drain sites and surrounding tissue  - Implement wound care per orders  - Initiate isolation precautions as appropriate  - Initiate Pressure Ulcer prevention bundle as indicated  Outcome: Adequate for Discharge     Problem: MUSCULOSKELETAL - ADULT  Goal: Return mobility to safest level of function  Description: INTERVENTIONS:  - Assess patient stability and activity tolerance for standing, transferring and ambulating w/ or w/o assistive devices  - Assist with transfers and ambulation using safe patient handling equipment as needed  - Ensure adequate protection for wounds/incisions during mobilization  - Obtain PT/OT consults as needed  - Advance activity as appropriate  - Communicate ordered activity level and limitations with patient/family  Outcome: Adequate for Discharge  Goal: Maintain proper alignment of affected body part  Description: INTERVENTIONS:  - Support and protect limb and body alignment per provider's orders  - Instruct and reinforce with patient and family use of appropriate assistive device and precautions (e.g. spinal or hip dislocation precautions)  Outcome: Adequate for Discharge     Problem: Impaired Functional Mobility  Goal: Achieve highest/safest level of mobility/gait  Description: Interventions:  - Assess patient's functional ability and stability  - Promote increasing activity/tolerance for mobility and gait  - Educate and engage patient/family in tolerated activity level and precautions  - Recommend use of  RW for transfers and ambulation  Outcome: Adequate for Discharge

## 2023-04-06 NOTE — PLAN OF CARE
Patient is progressing well, vital signs stable, A&Ox4. Denying pain and cooperating with nursing care. Bed alarm on and using call light appropriately. _______________________________________________________  Problem: Patient Centered Care  Goal: Patient preferences are identified and integrated in the patient's plan of care  Description: Interventions:  - What would you like us to know as we care for you? I live at home alone  - Provide timely, complete, and accurate information to patient/family  - Incorporate patient and family knowledge, values, beliefs, and cultural backgrounds into the planning and delivery of care  - Encourage patient/family to participate in care and decision-making at the level they choose  - Honor patient and family perspectives and choices  Outcome: Progressing     Problem: PAIN - ADULT  Goal: Verbalizes/displays adequate comfort level or patient's stated pain goal  Description: INTERVENTIONS:  - Encourage pt to monitor pain and request assistance  - Assess pain using appropriate pain scale  - Administer analgesics based on type and severity of pain and evaluate response  - Implement non-pharmacological measures as appropriate and evaluate response  - Consider cultural and social influences on pain and pain management  - Manage/alleviate anxiety  - Utilize distraction and/or relaxation techniques  - Monitor for opioid side effects  - Notify MD/LIP if interventions unsuccessful or patient reports new pain  - Anticipate increased pain with activity and pre-medicate as appropriate  Outcome: Progressing     Problem: Patient/Family Goals  Goal: Patient/Family Long Term Goal  Description: Patient's Long Term Goal: Regain strength/endurance/mobility in L knee.      Interventions:  - PT/OT; Pain management; Frequent ambulation  - See additional Care Plan goals for specific interventions  Outcome: Progressing  Goal: Patient/Family Short Term Goal  Description: Patient's Short Term Goal: Manage pain    Interventions:   - Analgesics;  Non-pharm therapy - gel ice; frequent ambulation; PT/OT  - See additional Care Plan goals for specific interventions  Outcome: Progressing     Problem: SKIN/TISSUE INTEGRITY - ADULT  Goal: Skin integrity remains intact  Description: INTERVENTIONS  - Assess and document risk factors for pressure ulcer development  - Assess and document skin integrity  - Monitor for areas of redness and/or skin breakdown  - Initiate interventions, skin care algorithm/standards of care as needed  Outcome: Progressing  Goal: Incision(s), wounds(s) or drain site(s) healing without S/S of infection  Description: INTERVENTIONS:  - Assess and document risk factors for pressure ulcer development  - Assess and document skin integrity  - Assess and document dressing/incision, wound bed, drain sites and surrounding tissue  - Implement wound care per orders  - Initiate isolation precautions as appropriate  - Initiate Pressure Ulcer prevention bundle as indicated  Outcome: Progressing     Problem: MUSCULOSKELETAL - ADULT  Goal: Return mobility to safest level of function  Description: INTERVENTIONS:  - Assess patient stability and activity tolerance for standing, transferring and ambulating w/ or w/o assistive devices  - Assist with transfers and ambulation using safe patient handling equipment as needed  - Ensure adequate protection for wounds/incisions during mobilization  - Obtain PT/OT consults as needed  - Advance activity as appropriate  - Communicate ordered activity level and limitations with patient/family  Outcome: Progressing  Goal: Maintain proper alignment of affected body part  Description: INTERVENTIONS:  - Support and protect limb and body alignment per provider's orders  - Instruct and reinforce with patient and family use of appropriate assistive device and precautions (e.g. spinal or hip dislocation precautions)  Outcome: Progressing     Problem: Impaired Functional Mobility  Goal: Achieve highest/safest level of mobility/gait  Description: Interventions:  - Assess patient's functional ability and stability  - Promote increasing activity/tolerance for mobility and gait  - Educate and engage patient/family in tolerated activity level and precautions  Outcome: Progressing

## 2023-04-10 ENCOUNTER — INITIAL APN SNF VISIT (OUTPATIENT)
Dept: INTERNAL MEDICINE CLINIC | Facility: SKILLED NURSING FACILITY | Age: 88
End: 2023-04-10

## 2023-04-10 DIAGNOSIS — I10 PRIMARY HYPERTENSION: ICD-10-CM

## 2023-04-10 DIAGNOSIS — D64.9 ANEMIA, UNSPECIFIED TYPE: ICD-10-CM

## 2023-04-10 DIAGNOSIS — R33.9 URINARY RETENTION: ICD-10-CM

## 2023-04-10 DIAGNOSIS — N40.0 BENIGN PROSTATIC HYPERPLASIA, UNSPECIFIED WHETHER LOWER URINARY TRACT SYMPTOMS PRESENT: ICD-10-CM

## 2023-04-10 DIAGNOSIS — Z96.652 STATUS POST TOTAL LEFT KNEE REPLACEMENT: ICD-10-CM

## 2023-04-10 DIAGNOSIS — Z79.899 ENCOUNTER FOR MEDICATION REVIEW: ICD-10-CM

## 2023-04-19 ENCOUNTER — OFFICE VISIT (OUTPATIENT)
Dept: ORTHOPEDICS CLINIC | Facility: CLINIC | Age: 88
End: 2023-04-19

## 2023-04-19 ENCOUNTER — HOSPITAL ENCOUNTER (OUTPATIENT)
Dept: GENERAL RADIOLOGY | Facility: HOSPITAL | Age: 88
Discharge: HOME | End: 2023-04-19
Attending: ORTHOPAEDIC SURGERY
Payer: MEDICARE

## 2023-04-19 DIAGNOSIS — Z47.89 ORTHOPEDIC AFTERCARE: Primary | ICD-10-CM

## 2023-04-19 DIAGNOSIS — Z47.89 ORTHOPEDIC AFTERCARE: ICD-10-CM

## 2023-04-19 PROCEDURE — 73562 X-RAY EXAM OF KNEE 3: CPT | Performed by: ORTHOPAEDIC SURGERY

## 2023-04-19 PROCEDURE — 1111F DSCHRG MED/CURRENT MED MERGE: CPT

## 2023-04-19 PROCEDURE — 99024 POSTOP FOLLOW-UP VISIT: CPT

## 2023-04-20 ENCOUNTER — SNF VISIT (OUTPATIENT)
Dept: INTERNAL MEDICINE CLINIC | Facility: SKILLED NURSING FACILITY | Age: 88
End: 2023-04-20

## 2023-04-20 DIAGNOSIS — Z96.652 STATUS POST TOTAL LEFT KNEE REPLACEMENT: ICD-10-CM

## 2023-04-20 DIAGNOSIS — R53.81 PHYSICAL DECONDITIONING: ICD-10-CM

## 2023-04-20 DIAGNOSIS — D64.9 ANEMIA, UNSPECIFIED TYPE: ICD-10-CM

## 2023-04-20 DIAGNOSIS — Z09 ENCOUNTER FOR FOLLOW-UP: ICD-10-CM

## 2023-04-20 DIAGNOSIS — I10 PRIMARY HYPERTENSION: ICD-10-CM

## 2023-04-20 PROCEDURE — 1111F DSCHRG MED/CURRENT MED MERGE: CPT | Performed by: CLINICAL NURSE SPECIALIST

## 2023-04-20 PROCEDURE — 99309 SBSQ NF CARE MODERATE MDM 30: CPT | Performed by: CLINICAL NURSE SPECIALIST

## 2023-04-24 ENCOUNTER — SNF VISIT (OUTPATIENT)
Dept: INTERNAL MEDICINE CLINIC | Facility: SKILLED NURSING FACILITY | Age: 88
End: 2023-04-24

## 2023-04-24 DIAGNOSIS — R53.81 PHYSICAL DECONDITIONING: ICD-10-CM

## 2023-04-24 DIAGNOSIS — Z09 ENCOUNTER FOR FOLLOW-UP: ICD-10-CM

## 2023-04-24 DIAGNOSIS — Z96.652 STATUS POST TOTAL LEFT KNEE REPLACEMENT: ICD-10-CM

## 2023-04-24 DIAGNOSIS — D64.9 ANEMIA, UNSPECIFIED TYPE: ICD-10-CM

## 2023-04-27 ENCOUNTER — SNF VISIT (OUTPATIENT)
Dept: INTERNAL MEDICINE CLINIC | Facility: SKILLED NURSING FACILITY | Age: 88
End: 2023-04-27

## 2023-04-27 DIAGNOSIS — L08.9 SKIN INFECTION: ICD-10-CM

## 2023-04-27 DIAGNOSIS — R53.81 PHYSICAL DECONDITIONING: ICD-10-CM

## 2023-04-27 DIAGNOSIS — Z09 ENCOUNTER FOR FOLLOW-UP: ICD-10-CM

## 2023-04-27 DIAGNOSIS — Z96.652 STATUS POST TOTAL LEFT KNEE REPLACEMENT: ICD-10-CM

## 2023-05-01 ENCOUNTER — SNF VISIT (OUTPATIENT)
Dept: INTERNAL MEDICINE CLINIC | Facility: SKILLED NURSING FACILITY | Age: 88
End: 2023-05-01

## 2023-05-01 DIAGNOSIS — R53.81 PHYSICAL DECONDITIONING: ICD-10-CM

## 2023-05-01 DIAGNOSIS — Z09 ENCOUNTER FOR FOLLOW-UP: ICD-10-CM

## 2023-05-01 DIAGNOSIS — L08.9 SKIN INFECTION: ICD-10-CM

## 2023-05-01 DIAGNOSIS — Z96.652 STATUS POST TOTAL LEFT KNEE REPLACEMENT: ICD-10-CM

## 2023-05-01 RX ORDER — DOXYCYCLINE HYCLATE 100 MG/1
100 CAPSULE ORAL 2 TIMES DAILY
COMMUNITY
End: 2023-05-04

## 2023-05-04 ENCOUNTER — SNF VISIT (OUTPATIENT)
Dept: INTERNAL MEDICINE CLINIC | Facility: SKILLED NURSING FACILITY | Age: 88
End: 2023-05-04

## 2023-05-04 DIAGNOSIS — Z79.899 ENCOUNTER FOR MEDICATION REVIEW: ICD-10-CM

## 2023-05-04 DIAGNOSIS — Z02.9 DISCHARGE PLANNING ISSUES: ICD-10-CM

## 2023-05-04 DIAGNOSIS — R53.81 PHYSICAL DECONDITIONING: ICD-10-CM

## 2023-05-04 DIAGNOSIS — L08.9 SKIN INFECTION: ICD-10-CM

## 2023-05-04 DIAGNOSIS — Z96.652 S/P TOTAL KNEE ARTHROPLASTY, LEFT: ICD-10-CM

## 2023-05-04 DIAGNOSIS — Z09 ENCOUNTER FOR FOLLOW-UP: ICD-10-CM

## 2023-05-04 DIAGNOSIS — D64.9 ANEMIA, UNSPECIFIED TYPE: ICD-10-CM

## 2023-05-05 ENCOUNTER — PATIENT OUTREACH (OUTPATIENT)
Dept: CASE MANAGEMENT | Age: 88
End: 2023-05-05

## 2023-05-05 NOTE — PROGRESS NOTES
TCM order received from SNF. Patient will be discharging to home today. TCM episode created. See next patient outreach encounter for patient contact. Encounter closing.

## 2023-05-07 ENCOUNTER — APPOINTMENT (OUTPATIENT)
Dept: GENERAL RADIOLOGY | Facility: HOSPITAL | Age: 88
End: 2023-05-07
Attending: EMERGENCY MEDICINE
Payer: MEDICARE

## 2023-05-07 ENCOUNTER — APPOINTMENT (OUTPATIENT)
Dept: CT IMAGING | Facility: HOSPITAL | Age: 88
End: 2023-05-07
Attending: STUDENT IN AN ORGANIZED HEALTH CARE EDUCATION/TRAINING PROGRAM
Payer: MEDICARE

## 2023-05-07 ENCOUNTER — HOSPITAL ENCOUNTER (EMERGENCY)
Facility: HOSPITAL | Age: 88
Discharge: HOME | End: 2023-05-07
Attending: STUDENT IN AN ORGANIZED HEALTH CARE EDUCATION/TRAINING PROGRAM
Payer: MEDICARE

## 2023-05-07 DIAGNOSIS — M54.50 LUMBAR BACK PAIN: Primary | ICD-10-CM

## 2023-05-07 LAB
ANION GAP SERPL CALC-SCNC: 5 MMOL/L (ref 0–18)
BASOPHILS # BLD AUTO: 0.06 X10(3) UL (ref 0–0.2)
BASOPHILS NFR BLD AUTO: 0.5 %
BILIRUB UR QL: NEGATIVE
BUN BLD-MCNC: 20 MG/DL (ref 7–18)
BUN/CREAT SERPL: 33.9 (ref 10–20)
CALCIUM BLD-MCNC: 8.6 MG/DL (ref 8.5–10.1)
CHLORIDE SERPL-SCNC: 103 MMOL/L (ref 98–112)
CLARITY UR: CLEAR
CO2 SERPL-SCNC: 30 MMOL/L (ref 21–32)
CREAT BLD-MCNC: 0.59 MG/DL
DEPRECATED RDW RBC AUTO: 52.4 FL (ref 35.1–46.3)
EOSINOPHIL # BLD AUTO: 0 X10(3) UL (ref 0–0.7)
EOSINOPHIL NFR BLD AUTO: 0 %
ERYTHROCYTE [DISTWIDTH] IN BLOOD BY AUTOMATED COUNT: 14.9 % (ref 11–15)
GFR SERPLBLD BASED ON 1.73 SQ M-ARVRAT: 94 ML/MIN/1.73M2 (ref 60–?)
GLUCOSE BLD-MCNC: 123 MG/DL (ref 70–99)
GLUCOSE UR-MCNC: NORMAL MG/DL
HCT VFR BLD AUTO: 30.5 %
HGB BLD-MCNC: 10.1 G/DL
HGB UR QL STRIP.AUTO: NEGATIVE
HYALINE CASTS #/AREA URNS AUTO: PRESENT /LPF
IMM GRANULOCYTES # BLD AUTO: 0.27 X10(3) UL (ref 0–1)
IMM GRANULOCYTES NFR BLD: 2.2 %
KETONES UR-MCNC: NEGATIVE MG/DL
LEUKOCYTE ESTERASE UR QL STRIP.AUTO: NEGATIVE
LYMPHOCYTES # BLD AUTO: 0.61 X10(3) UL (ref 1–4)
LYMPHOCYTES NFR BLD AUTO: 5 %
MCH RBC QN AUTO: 32 PG (ref 26–34)
MCHC RBC AUTO-ENTMCNC: 33.1 G/DL (ref 31–37)
MCV RBC AUTO: 96.5 FL
MONOCYTES # BLD AUTO: 0.61 X10(3) UL (ref 0.1–1)
MONOCYTES NFR BLD AUTO: 5 %
NEUTROPHILS # BLD AUTO: 10.74 X10 (3) UL (ref 1.5–7.7)
NEUTROPHILS # BLD AUTO: 10.74 X10(3) UL (ref 1.5–7.7)
NEUTROPHILS NFR BLD AUTO: 87.3 %
NITRITE UR QL STRIP.AUTO: NEGATIVE
OSMOLALITY SERPL CALC.SUM OF ELEC: 290 MOSM/KG (ref 275–295)
PH UR: 8 [PH] (ref 5–8)
PLATELET # BLD AUTO: 184 10(3)UL (ref 150–450)
POTASSIUM SERPL-SCNC: 4.8 MMOL/L (ref 3.5–5.1)
PROT UR-MCNC: NEGATIVE MG/DL
RBC # BLD AUTO: 3.16 X10(6)UL
SARS-COV-2 RNA RESP QL NAA+PROBE: NOT DETECTED
SODIUM SERPL-SCNC: 138 MMOL/L (ref 136–145)
SP GR UR STRIP: 1.01 (ref 1–1.03)
UROBILINOGEN UR STRIP-ACNC: NORMAL
WBC # BLD AUTO: 12.3 X10(3) UL (ref 4–11)

## 2023-05-07 PROCEDURE — 96374 THER/PROPH/DIAG INJ IV PUSH: CPT

## 2023-05-07 PROCEDURE — 99285 EMERGENCY DEPT VISIT HI MDM: CPT

## 2023-05-07 PROCEDURE — 72131 CT LUMBAR SPINE W/O DYE: CPT | Performed by: STUDENT IN AN ORGANIZED HEALTH CARE EDUCATION/TRAINING PROGRAM

## 2023-05-07 PROCEDURE — 80048 BASIC METABOLIC PNL TOTAL CA: CPT | Performed by: EMERGENCY MEDICINE

## 2023-05-07 PROCEDURE — 85025 COMPLETE CBC W/AUTO DIFF WBC: CPT | Performed by: EMERGENCY MEDICINE

## 2023-05-07 RX ORDER — MORPHINE SULFATE 4 MG/ML
4 INJECTION, SOLUTION INTRAMUSCULAR; INTRAVENOUS ONCE
Status: COMPLETED | OUTPATIENT
Start: 2023-05-07 | End: 2023-05-07

## 2023-05-07 RX ORDER — HYDROCODONE BITARTRATE AND ACETAMINOPHEN 5; 325 MG/1; MG/1
1 TABLET ORAL ONCE
Status: COMPLETED | OUTPATIENT
Start: 2023-05-07 | End: 2023-05-07

## 2023-05-07 NOTE — CM/SW NOTE
Per Tucker Flowers issues- Help desk to be called at [phone] to correct. Referral sent in [address] [facility] [address]. Patient declining breakfast tray but given PO fluids as requested.

## 2023-05-07 NOTE — CM/SW NOTE
Per Aruna Joy @ [facility]: Please call report to [phone] instead to RN, Dakotah Espinoza.

## 2023-05-07 NOTE — CM/SW NOTE
Message left for Surya Castle for [address]- she will call back with updated # for report.

## 2023-05-07 NOTE — CM/SW NOTE
Patient is able to discharge to [facility] with shared room. Spoke with Gerald Scott and able to discharge immediatly. BLS transport will be called.

## 2023-05-07 NOTE — ED INITIAL ASSESSMENT (HPI)
Pt brought in via ems from home with c/o of lower back pain, s/p pt tripped and fell onto his left side around 1900 denies loc, denies trauma to head, numbness/tingling

## 2023-05-07 NOTE — CM/SW NOTE
Patient here with low back pain and left shoulder pain. Pt. Aaron Islas was discharged Friday from [facility] home with home health. Pt. Lives in a 2-story home with stairs uses a wheelchair, life alert button, and walker PRN. Pt. Reports, \"I was eating dinner and cleaning up and lost my footing on the stool and wheelchair and fell on my left shoulder and back. \"  Pt. Reports back pain and pain in the legs. Pt. Is requesting placement at [facility] and does not choose [facility] due to lack of communication per patient. Pt. Carloz Ennis may be contacted PRN.

## 2023-05-07 NOTE — CM/SW NOTE
[facility] Help desk called @ [phone] spoke with Florence Ybarra to correct birthday and will updated at this time.

## 2023-05-07 NOTE — CM/SW NOTE
Per Nicky Bradshaw @ [facility]-  Patient will be going to Room #: 986 Bed 1 RN darshan RN report # [phone].

## 2023-05-08 ENCOUNTER — APPOINTMENT (OUTPATIENT)
Dept: GENERAL RADIOLOGY | Facility: HOSPITAL | Age: 88
End: 2023-05-08
Attending: EMERGENCY MEDICINE
Payer: MEDICARE

## 2023-05-08 ENCOUNTER — HOSPITAL ENCOUNTER (INPATIENT)
Facility: HOSPITAL | Age: 88
LOS: 4 days | Discharge: NURSING, REHAB OR LONG-TERM CARE | End: 2023-05-13
Attending: EMERGENCY MEDICINE | Admitting: HOSPITALIST
Payer: MEDICARE

## 2023-05-08 ENCOUNTER — EXTERNAL FACILITY (OUTPATIENT)
Dept: PULMONOLOGY | Facility: CLINIC | Age: 88
End: 2023-05-08

## 2023-05-08 ENCOUNTER — APPOINTMENT (OUTPATIENT)
Dept: CT IMAGING | Facility: HOSPITAL | Age: 88
End: 2023-05-08
Attending: EMERGENCY MEDICINE
Payer: MEDICARE

## 2023-05-08 ENCOUNTER — PATIENT OUTREACH (OUTPATIENT)
Dept: CASE MANAGEMENT | Age: 88
End: 2023-05-08

## 2023-05-08 ENCOUNTER — EXTERNAL FACILITY (OUTPATIENT)
Dept: INTERNAL MEDICINE CLINIC | Facility: CLINIC | Age: 88
End: 2023-05-08

## 2023-05-08 DIAGNOSIS — S22.009D CLOSED FRACTURE OF THORACIC VERTEBRA WITH ROUTINE HEALING, UNSPECIFIED FRACTURE MORPHOLOGY, UNSPECIFIED THORACIC VERTEBRAL LEVEL, SUBSEQUENT ENCOUNTER: Primary | ICD-10-CM

## 2023-05-08 DIAGNOSIS — R09.02 HYPOXIA: Primary | ICD-10-CM
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
## 2023-05-08 DIAGNOSIS — M54.50 ACUTE BILATERAL LOW BACK PAIN WITHOUT SCIATICA: Primary | ICD-10-CM

## 2023-05-08 LAB
ANION GAP SERPL CALC-SCNC: 3 MMOL/L (ref 0–18)
BASOPHILS # BLD AUTO: 0.04 X10(3) UL (ref 0–0.2)
BASOPHILS NFR BLD AUTO: 0.3 %
BUN BLD-MCNC: 21 MG/DL (ref 7–18)
BUN/CREAT SERPL: 33.3 (ref 10–20)
CALCIUM BLD-MCNC: 8.6 MG/DL (ref 8.5–10.1)
CHLORIDE SERPL-SCNC: 102 MMOL/L (ref 98–112)
CO2 SERPL-SCNC: 32 MMOL/L (ref 21–32)
CREAT BLD-MCNC: 0.63 MG/DL
DEPRECATED RDW RBC AUTO: 54.2 FL (ref 35.1–46.3)
EOSINOPHIL # BLD AUTO: 0.02 X10(3) UL (ref 0–0.7)
EOSINOPHIL NFR BLD AUTO: 0.2 %
ERYTHROCYTE [DISTWIDTH] IN BLOOD BY AUTOMATED COUNT: 15.3 % (ref 11–15)
GFR SERPLBLD BASED ON 1.73 SQ M-ARVRAT: 93 ML/MIN/1.73M2 (ref 60–?)
GLUCOSE BLD-MCNC: 140 MG/DL (ref 70–99)
HCT VFR BLD AUTO: 33.5 %
HGB BLD-MCNC: 11.1 G/DL
IMM GRANULOCYTES # BLD AUTO: 0.23 X10(3) UL (ref 0–1)
IMM GRANULOCYTES NFR BLD: 1.9 %
LYMPHOCYTES # BLD AUTO: 1.04 X10(3) UL (ref 1–4)
LYMPHOCYTES NFR BLD AUTO: 8.6 %
MCH RBC QN AUTO: 32.3 PG (ref 26–34)
MCHC RBC AUTO-ENTMCNC: 33.1 G/DL (ref 31–37)
MCV RBC AUTO: 97.4 FL
MONOCYTES # BLD AUTO: 0.93 X10(3) UL (ref 0.1–1)
MONOCYTES NFR BLD AUTO: 7.7 %
NEUTROPHILS # BLD AUTO: 9.85 X10 (3) UL (ref 1.5–7.7)
NEUTROPHILS # BLD AUTO: 9.85 X10(3) UL (ref 1.5–7.7)
NEUTROPHILS NFR BLD AUTO: 81.3 %
OSMOLALITY SERPL CALC.SUM OF ELEC: 289 MOSM/KG (ref 275–295)
PLATELET # BLD AUTO: 177 10(3)UL (ref 150–450)
POTASSIUM SERPL-SCNC: 4 MMOL/L (ref 3.5–5.1)
RBC # BLD AUTO: 3.44 X10(6)UL
SODIUM SERPL-SCNC: 137 MMOL/L (ref 136–145)
WBC # BLD AUTO: 12.1 X10(3) UL (ref 4–11)

## 2023-05-08 PROCEDURE — 72170 X-RAY EXAM OF PELVIS: CPT | Performed by: EMERGENCY MEDICINE

## 2023-05-08 PROCEDURE — 72128 CT CHEST SPINE W/O DYE: CPT | Performed by: EMERGENCY MEDICINE

## 2023-05-08 PROCEDURE — 71260 CT THORAX DX C+: CPT | Performed by: EMERGENCY MEDICINE

## 2023-05-08 RX ORDER — MORPHINE SULFATE 2 MG/ML
2 INJECTION, SOLUTION INTRAMUSCULAR; INTRAVENOUS ONCE
Status: COMPLETED | OUTPATIENT
Start: 2023-05-08 | End: 2023-05-09

## 2023-05-08 RX ORDER — TRAMADOL HYDROCHLORIDE 50 MG/1
50 TABLET ORAL ONCE
Status: DISCONTINUED | OUTPATIENT
Start: 2023-05-08 | End: 2023-05-13

## 2023-05-08 NOTE — PROGRESS NOTES
NCM placed call to patient for TCM, LM requesting a call back to [phone] with a condition update.     Discharge Dx:    S/P Left TKA on 4/3/23  4[facility]5/5 [facility] [address] [place] [address] ED on 5/7 for Lumbar back pain

## 2023-05-08 NOTE — PROGRESS NOTES
Pulmonary Consult Note  [facility]    History of Present Illness:   Kandi Almaraz is a(n) 81year old male who I am now evaluating for hypoxemia at [facility]. Patient had left total knee replacement 4/3/23 and completed rehabilitation at [facility]. He discharged home on 5/5 and then unfortunately fell at home yesterday 5/7 and was evaluated in ED for this. He is now admitted at [facility] for rehabilitation. He lost his footing and stumbled over walker or stool and fell onto left side of his back. CT spine demonstrated acute-appearing T11 and T12 fractures. Pulmonary consult requested for hypoxemia. Patient is requiring 2 L O2 and unable to wean oxygen off. Denies history of lung disease and venous thromboembolism. He had been on Xarelto for DVT prophylaxis follow knee replacement and this was continued through at least 5/5 when he was in rehab initially. It is unclear whether he was discharged home on Xarelto. He denies shortness of breath, cough, and wheezing. He has low back pain which is worse with deep breaths. No chest pain or palpitations. 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 EMR    Review of Systems: Vision notable for visual impairment, wears glasses. Ears nose and throat notable for postnasal drainage. Bowel normal. Bladder function notable for constipaton. No thyroid disease. No depression.  No rash. Muscles and joints notable for severe low back pain. No weight loss or weight gain. Physical Exam:  /58, HR 72 on my exam, sat 84% on 2 L O2 lying on left side, sat 95% on 2 L O2 lying on right side  Constitutional: O2 via NC, awake, alert, NAD  HEENT: Head NC/AT, PEERL, MMM  Cardio: Irregular rhythm, regular rate, S1S2, no murmurs  Respiratory: Thorax symmetrical with no labored breathing, decreased breath sounds at left base with left basilar rales, right lung clear to auscultation  GI: NABS. Abd soft, non-tender. Extremities: No clubbing or cyanosis. No LE edema. No calf tenderness. Neurologic: A&Ox3. No gross motor deficits. Skin: Warm, dry. Scattered ecchymosis to sacrum. Psych: Calm, cooperative. Pleasant affect. Results:  -Labs 5/7/23: cr 0.59, bun 20, na 138, k 4.8, co2 30, wbc 12.3 with neutrophil % 87%, hgb 10.1, plt 184    Assessment and Plan:  1. Hypoxemia - no O2 at baseline and requiring 2 L O2. Initial concern for left sided atelectasis in patient who has been laying on left side for 24 hours with left basilar rales and worsening oxygen saturations while lying on left side. Also noted irregular heart rhythm although rate controlled. No known h/o of arrhythmia. Had been on Xarelto DVT prophylaxis through at least 5/5. Ordered STAT chest x-ray, d-dimer, and EKG. Received update from [facility] - patient refused chest x-ray and EKG due to back pain and d-dimer pending. Requested updated vitals, as follows: 88/54, , sat 82% on 2 L O2. Order to send patient to ER for evaluation. Plan:  -Send to ER for evaluation  -Recommend CTA chest to r/o PE, EKG to eval for arrhthymia    2. Thoracic vertebral body fractures - severe uncontrolled pain. Plan:  -Pain control    Update provided to patient's brother-in-law Joe Lady. 50 min spent in d/w patient, review of medical records, d/w Dr. Alexandr Portillo. Raymond/RN, and documentation.     Thank you for allowing me to participate in [place].     ISIDORO CazaresC  Pulmonary Medicine

## 2023-05-08 NOTE — ED INITIAL ASSESSMENT (HPI)
Patient arrives via EMS from [facility]. Patient had a fall on Saturday and since then has been persistent back pain. Patient got medication about an hour ago.

## 2023-05-09 ENCOUNTER — APPOINTMENT (OUTPATIENT)
Dept: MRI IMAGING | Facility: HOSPITAL | Age: 88
End: 2023-05-09
Attending: EMERGENCY MEDICINE
Payer: MEDICARE

## 2023-05-09 VITALS
SYSTOLIC BLOOD PRESSURE: 127 MMHG | BODY MASS INDEX: 24.91 KG/M2 | HEIGHT: 66 IN | DIASTOLIC BLOOD PRESSURE: 84 MMHG | HEART RATE: 102 BPM | RESPIRATION RATE: 17 BRPM | TEMPERATURE: 98 F | WEIGHT: 155 LBS | OXYGEN SATURATION: 92 %
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
## 2023-05-09 PROBLEM — S22.009D: Status: ACTIVE | Noted: 2023-05-09

## 2023-05-09 LAB
ALBUMIN SERPL-MCNC: 2.6 G/DL (ref 3.4–5)
ALBUMIN/GLOB SERPL: 0.7 {RATIO} (ref 1–2)
ALP LIVER SERPL-CCNC: 114 U/L
ALT SERPL-CCNC: 23 U/L
ANION GAP SERPL CALC-SCNC: 6 MMOL/L (ref 0–18)
AST SERPL-CCNC: 26 U/L (ref 15–37)
BILIRUB SERPL-MCNC: 0.4 MG/DL (ref 0.1–2)
BUN BLD-MCNC: 21 MG/DL (ref 7–18)
BUN/CREAT SERPL: 30.4 (ref 10–20)
CALCIUM BLD-MCNC: 8.9 MG/DL (ref 8.5–10.1)
CHLORIDE SERPL-SCNC: 102 MMOL/L (ref 98–112)
CO2 SERPL-SCNC: 29 MMOL/L (ref 21–32)
CREAT BLD-MCNC: 0.69 MG/DL
GFR SERPLBLD BASED ON 1.73 SQ M-ARVRAT: 90 ML/MIN/1.73M2 (ref 60–?)
GLOBULIN PLAS-MCNC: 3.9 G/DL (ref 2.8–4.4)
GLUCOSE BLD-MCNC: 134 MG/DL (ref 70–99)
OSMOLALITY SERPL CALC.SUM OF ELEC: 289 MOSM/KG (ref 275–295)
POTASSIUM SERPL-SCNC: 4.2 MMOL/L (ref 3.5–5.1)
PROCALCITONIN SERPL-MCNC: 0.23 NG/ML (ref ?–0.16)
PROT SERPL-MCNC: 6.5 G/DL (ref 6.4–8.2)
SARS-COV-2 RNA RESP QL NAA+PROBE: NOT DETECTED
SODIUM SERPL-SCNC: 137 MMOL/L (ref 136–145)

## 2023-05-09 PROCEDURE — 72146 MRI CHEST SPINE W/O DYE: CPT | Performed by: EMERGENCY MEDICINE

## 2023-05-09 PROCEDURE — 99222 1ST HOSP IP/OBS MODERATE 55: CPT | Performed by: HOSPITALIST

## 2023-05-09 RX ORDER — HYDROCODONE BITARTRATE AND ACETAMINOPHEN 5; 325 MG/1; MG/1
1 TABLET ORAL EVERY 6 HOURS PRN
Status: DISCONTINUED | OUTPATIENT
Start: 2023-05-09 | End: 2023-05-09

## 2023-05-09 RX ORDER — PANTOPRAZOLE SODIUM 40 MG/1
40 TABLET, DELAYED RELEASE ORAL
Status: DISCONTINUED | OUTPATIENT
Start: 2023-05-09 | End: 2023-05-13

## 2023-05-09 RX ORDER — MULTIPLE VITAMINS W/ MINERALS TAB 9MG-400MCG
1 TAB ORAL DAILY
Status: DISCONTINUED | OUTPATIENT
Start: 2023-05-09 | End: 2023-05-13

## 2023-05-09 RX ORDER — ONDANSETRON 2 MG/ML
4 INJECTION INTRAMUSCULAR; INTRAVENOUS EVERY 6 HOURS PRN
Status: DISCONTINUED | OUTPATIENT
Start: 2023-05-09 | End: 2023-05-13

## 2023-05-09 RX ORDER — METOPROLOL SUCCINATE 25 MG/1
25 TABLET, EXTENDED RELEASE ORAL DAILY
Status: DISCONTINUED | OUTPATIENT
Start: 2023-05-09 | End: 2023-05-13

## 2023-05-09 RX ORDER — HYDROCODONE BITARTRATE AND ACETAMINOPHEN 5; 325 MG/1; MG/1
2 TABLET ORAL EVERY 6 HOURS PRN
Status: DISCONTINUED | OUTPATIENT
Start: 2023-05-09 | End: 2023-05-11

## 2023-05-09 RX ORDER — ALLOPURINOL 100 MG/1
100 TABLET ORAL
Status: DISCONTINUED | OUTPATIENT
Start: 2023-05-09 | End: 2023-05-13

## 2023-05-09 RX ORDER — AZITHROMYCIN 250 MG/1
500 TABLET, FILM COATED ORAL
Status: COMPLETED | OUTPATIENT
Start: 2023-05-09 | End: 2023-05-11

## 2023-05-09 RX ORDER — MORPHINE SULFATE 2 MG/ML
2 INJECTION, SOLUTION INTRAMUSCULAR; INTRAVENOUS EVERY 2 HOUR PRN
Status: DISCONTINUED | OUTPATIENT
Start: 2023-05-09 | End: 2023-05-13

## 2023-05-09 RX ORDER — AMLODIPINE BESYLATE 5 MG/1
5 TABLET ORAL DAILY
Status: DISCONTINUED | OUTPATIENT
Start: 2023-05-09 | End: 2023-05-13

## 2023-05-09 RX ORDER — DOCUSATE SODIUM 100 MG/1
100 CAPSULE, LIQUID FILLED ORAL 2 TIMES DAILY
Status: DISCONTINUED | OUTPATIENT
Start: 2023-05-09 | End: 2023-05-13

## 2023-05-09 RX ORDER — LORAZEPAM 2 MG/ML
0.5 INJECTION INTRAMUSCULAR ONCE
Status: COMPLETED | OUTPATIENT
Start: 2023-05-09 | End: 2023-05-09

## 2023-05-09 RX ORDER — MAGNESIUM HYDROXIDE/ALUMINUM HYDROXICE/SIMETHICONE 120; 1200; 1200 MG/30ML; MG/30ML; MG/30ML
30 SUSPENSION ORAL 4 TIMES DAILY PRN
Status: DISCONTINUED | OUTPATIENT
Start: 2023-05-09 | End: 2023-05-13

## 2023-05-09 RX ORDER — HEPARIN SODIUM 5000 [USP'U]/ML
5000 INJECTION, SOLUTION INTRAVENOUS; SUBCUTANEOUS EVERY 12 HOURS
Status: DISCONTINUED | OUTPATIENT
Start: 2023-05-09 | End: 2023-05-13

## 2023-05-09 RX ORDER — ACETAMINOPHEN 325 MG/1
650 TABLET ORAL EVERY 6 HOURS PRN
Status: DISCONTINUED | OUTPATIENT
Start: 2023-05-09 | End: 2023-05-13

## 2023-05-09 RX ORDER — MORPHINE SULFATE 4 MG/ML
4 INJECTION, SOLUTION INTRAMUSCULAR; INTRAVENOUS EVERY 2 HOUR PRN
Status: DISCONTINUED | OUTPATIENT
Start: 2023-05-09 | End: 2023-05-13

## 2023-05-09 RX ORDER — VITS A,C,E/LUTEIN/MINERALS 300MCG-200
1 TABLET ORAL 2 TIMES DAILY
Status: DISCONTINUED | OUTPATIENT
Start: 2023-05-09 | End: 2023-05-13

## 2023-05-09 RX ORDER — DOCUSATE SODIUM 100 MG/1
100 CAPSULE, LIQUID FILLED ORAL 2 TIMES DAILY
Status: DISCONTINUED | OUTPATIENT
Start: 2023-05-09 | End: 2023-05-10

## 2023-05-09 RX ORDER — TAMSULOSIN HYDROCHLORIDE 0.4 MG/1
0.4 CAPSULE ORAL
Status: DISCONTINUED | OUTPATIENT
Start: 2023-05-09 | End: 2023-05-13

## 2023-05-09 RX ORDER — ZOLPIDEM TARTRATE 5 MG/1
5 TABLET ORAL NIGHTLY PRN
Status: DISCONTINUED | OUTPATIENT
Start: 2023-05-09 | End: 2023-05-13

## 2023-05-09 RX ORDER — POLYETHYLENE GLYCOL 3350 17 G/17G
17 POWDER, FOR SOLUTION ORAL DAILY PRN
Status: DISCONTINUED | OUTPATIENT
Start: 2023-05-09 | End: 2023-05-13

## 2023-05-09 RX ORDER — HYDRALAZINE HYDROCHLORIDE 20 MG/ML
10 INJECTION INTRAMUSCULAR; INTRAVENOUS EVERY 4 HOURS PRN
Status: DISCONTINUED | OUTPATIENT
Start: 2023-05-09 | End: 2023-05-13

## 2023-05-09 NOTE — PROGRESS NOTES
I was notified by pulmonary NP that she had seen pt earlier and due to hypoxia she had ordered stat labs and cxr but due to pt unable to tolerate to lay flat for the xr and having the hypoxia he was transferred to ER. of note when pt was seen in ER yesterday he had a ct  and found to have non displaced t11 t12 fracture and was transferred to [facility] with out a pain medication.   when seen this am pt was in a lot of pain was given norco.    I spoke now with Er dr Dylan Pitt  that pt needs to be evaluated for PE and also probably be admitted due to the sever pain he is having on the back from the fractures

## 2023-05-09 NOTE — PLAN OF CARE
Patient admitted from [facility] but was previously from home alone with assistance from friends. Patient has pain that shoots up in intensity with movement; to medicate per MAR. Patient is alert to self, place and situation. Plan of care and admission reviewed with patient. At baseline patient uses walker at home; will await for PT and OT recommendations. Generalized bruising to bilateral upper and lower extremities; intergluteal cleft and right buttock. Blanchable redness noted to left ischium, foam dressings placed to left ischium, sacrum and right buttocks. Patient's belongings in room safe. Problem: Patient Centered Care  Goal: Patient preferences are identified and integrated in the patient's plan of care  Description: Interventions:  - What would you like us to know as we care for you?  \"I want to sleep\"   - Provide timely, complete, and accurate information to patient/family  - Incorporate patient and family knowledge, values, beliefs, and cultural backgrounds into the planning and delivery of care  - Encourage patient/family to participate in care and decision-making at the level they choose  - Honor patient and family perspectives and choices  Outcome: Not Progressing     Problem: Patient/Family Goals  Goal: Patient/Family Long Term Goal  Description: Patient's Long Term Goal: Discharge home    Interventions:  - See additional Care Plan goals for specific interventions  Outcome: Not Progressing     Problem: PAIN - ADULT  Goal: Verbalizes/displays adequate comfort level or patient's stated pain goal  Description: INTERVENTIONS:  - Encourage pt to monitor pain and request assistance  - Assess pain using appropriate pain scale  - Administer analgesics based on type and severity of pain and evaluate response  - Implement non-pharmacological measures as appropriate and evaluate response  - Consider cultural and social influences on pain and pain management  - Manage/alleviate anxiety  - Utilize distraction and/or relaxation techniques  - Monitor for opioid side effects  - Notify MD/LIP if interventions unsuccessful or patient reports new pain  - Anticipate increased pain with activity and pre-medicate as appropriate  Outcome: Not Progressing

## 2023-05-09 NOTE — PROGRESS NOTES
05/09/23 0941   VISIT TYPE   PT Inpatient Visit Type (Documentation Required) Attempted Evaluation  (PT orders recd, chart reviewed. TLSO to be ordered per MD. Will await arrival of TLSO.)   OT discussed with rn. TLSO ordering not handled by PT department, to be ordered from outside company. Rn to manage ordering.

## 2023-05-09 NOTE — ED QUICK NOTES
Orders for admission, patient is aware of plan and ready to go upstairs. Any questions, please call ED RN Sofia Gillr at extension [phone].      Patient Covid vaccination status: Fully vaccinated     COVID Test Ordered in ED: Rapid SARS-CoV-2 by PCR    COVID Suspicion at Admission: N/A    Running Infusions:  None    Mental Status/LOC at time of transport: A&Ox3    Other pertinent information: fall risk, forgetful at times, 2LNC  CIWA score: N/A   NIH score:  N/A

## 2023-05-09 NOTE — PROGRESS NOTES
05/09/23 0800   VISIT TYPE   OT Inpatient Visit Type (Documentation Required) Attempted Evaluation     Patient admitted with compression fractures; a TLSO was recommended but unable to be ordered 2/2 time of admission; reached out to medical team to ascertain if there are any activity restrictions or guidelines to follow. Will hold evaluation until this information is provided. Will continue to follow.      [place], Occupational Therapist, OTR/L ext [phone]

## 2023-05-09 NOTE — CM/SW NOTE
EDC WAS CALLED BY DR Susan Lee TO SEE IF WE COULD GET A TLSO BRACE FOR THIS PATIENT. DUE TO THE FACT THAT IT IS 0130 AM , NO PLACE IS OPEN AT THIS TIME. PATIENT IS TO BE ADMITTED AND EDC WILL FOLLOW UP WITH FLOOR CM IN THE MORNING TO GET THE PROCESS STARTED FOR THIS BRACE. THERE IS A COMPANY CALLED  CLINIC IN [facility] THAT WE WILL CALL IN THE MORNING. [phone].     Thank you so very much,  Catie Mckinnon MSN, JENNY, RN  Emergency Department   Extension [phone]

## 2023-05-09 NOTE — PROGRESS NOTES
POST MIDNIGHT HOSPITALIST  PROGRESS NOTE    S: patient in bed, unable to sit upright due to back pain. On 4L nc at 92%, no 02 at home  Recently discharged from [place] [address] on Friday, fell at home on Saturday. Lives alone. Came in for back pain. EXAM  General: in  Distress due to back pain  HEENT: anicteric  CV: s1s2 RRR, no murmur or JVD  Lung: diminished bilaterally, no wheezes, no crackles, no rhonchi, on 4L nc  GI: soft,  distended, non tender  Ext: no edema  Skin: intact  Neuro: no focal deficits    ASSESSMENT AND PLAN    #s/p Mechanical fall with T7, T8, T9, T11, T12, L1 mildly displaced fracture/contusion  -pain control: IV morphine, increase po norco to 2 tabs q 6 prn  =NeuroSurgery  consulted: d/w Dr. Jaiden Barry  -bowel protocol for constipation prevention    #T8 marrow replacing process  -MRI Thoracic spine: suspicious for malignant process  -Dr. Jaiden Barry consulted for possible biopsy    #Acute hypoxic respiratory failure  CT chest shows consolidation, no PE. WBC mildly elevated, afebrile, rapid covid neg, recently discharged from [place] [address], has allergy to PCN  No 02 needs at home, this is new, now on 4L nc  =add  procal   =SLP consult  =blood cx x 2  =add cmp to am labs  =start IV Levaquin, pharmacy to dose, d/w Dr. 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 Soto Melton 5/18    OTHER  #BPH: flomax  #HTN: amldoipine, metoprolol    Code status: DNAR  DVT prophylaxis: scd, subcutaneous heparin  Disposition: inpatient    Dejan MANUEL  [address] [facility] Team  [phone]  5/9/23  Discussed assessment and plan of care with  Dr. Van Esteban

## 2023-05-09 NOTE — ED NOTES
[address] [facility], [address] [phone] requests to be called back regarding wether or not patient will be admitted or not.

## 2023-05-09 NOTE — PROGRESS NOTES
Eye Vitamins CAPS 1 tablet bid  is Non-Formulary Medication &  Auto-Substituted to Ocuvite tablet bid Per P&T PROTOCOL

## 2023-05-09 NOTE — PROGRESS NOTES
05/09/23 1500   VISIT TYPE   OT Inpatient Visit Type (Documentation Required) Attempted Evaluation     Ongoing OT follow up- I reached out to medical team to ascertain if pt is ok to be up without brace but also the brace wearing schedule (e.g. only for OOB vs on at all times) I still have not heard back; contacted RN who reports brace is still not delivered (order was sent to clinic); will continue to hold at this time until we receive further activity guidelines.      Fernandez Anderson, Occupational Therapist, OTR/L ext [phone]

## 2023-05-10 LAB
ANION GAP SERPL CALC-SCNC: 8 MMOL/L (ref 0–18)
BUN BLD-MCNC: 19 MG/DL (ref 7–18)
BUN/CREAT SERPL: 42.2 (ref 10–20)
CALCIUM BLD-MCNC: 8.9 MG/DL (ref 8.5–10.1)
CHLORIDE SERPL-SCNC: 100 MMOL/L (ref 98–112)
CO2 SERPL-SCNC: 29 MMOL/L (ref 21–32)
CREAT BLD-MCNC: 0.45 MG/DL
DEPRECATED RDW RBC AUTO: 52.4 FL (ref 35.1–46.3)
ERYTHROCYTE [DISTWIDTH] IN BLOOD BY AUTOMATED COUNT: 14.9 % (ref 11–15)
GFR SERPLBLD BASED ON 1.73 SQ M-ARVRAT: 103 ML/MIN/1.73M2 (ref 60–?)
GLUCOSE BLD-MCNC: 108 MG/DL (ref 70–99)
HCT VFR BLD AUTO: 35.4 %
HGB BLD-MCNC: 11.6 G/DL
MCH RBC QN AUTO: 31 PG (ref 26–34)
MCHC RBC AUTO-ENTMCNC: 32.8 G/DL (ref 31–37)
MCV RBC AUTO: 94.7 FL
OSMOLALITY SERPL CALC.SUM OF ELEC: 287 MOSM/KG (ref 275–295)
PLATELET # BLD AUTO: 194 10(3)UL (ref 150–450)
POTASSIUM SERPL-SCNC: 3.4 MMOL/L (ref 3.5–5.1)
RBC # BLD AUTO: 3.74 X10(6)UL
SODIUM SERPL-SCNC: 137 MMOL/L (ref 136–145)
VIT B12 SERPL-MCNC: 797 PG/ML (ref 193–986)
VIT D+METAB SERPL-MCNC: 46.7 NG/ML (ref 30–100)
WBC # BLD AUTO: 10.9 X10(3) UL (ref 4–11)

## 2023-05-10 PROCEDURE — 99233 SBSQ HOSP IP/OBS HIGH 50: CPT | Performed by: HOSPITALIST

## 2023-05-10 RX ORDER — POLYETHYLENE GLYCOL 3350 17 G/17G
17 POWDER, FOR SOLUTION ORAL DAILY
Status: DISCONTINUED | OUTPATIENT
Start: 2023-05-10 | End: 2023-05-13

## 2023-05-10 RX ORDER — POTASSIUM CHLORIDE 20 MEQ/1
40 TABLET, EXTENDED RELEASE ORAL ONCE
Status: COMPLETED | OUTPATIENT
Start: 2023-05-10 | End: 2023-05-10

## 2023-05-10 RX ORDER — OXYCODONE HYDROCHLORIDE 5 MG/1
5 TABLET ORAL EVERY 4 HOURS PRN
Status: DISCONTINUED | OUTPATIENT
Start: 2023-05-10 | End: 2023-05-12

## 2023-05-10 NOTE — PLAN OF CARE
Patient is A&OX4, RA, patient has TLSO brace on at all times. Patient cannot work with PT/OT or speech today due to too much pain and can not sit up in bed. Can only lay flat, was able to bring him up a little for pills. Patient pain being controlled with oxy and morphine PRN. Generalized bruising to bilateral upper and lower extremities; intergluteal cleft and right buttock. Blanchable redness noted to left ischium, foam dressings placed to left ischium, sacrum and right buttocks. Ortho came to consult today and plan at this time is to do biopsy tomorrow. Patient family came to visit and did update brother Angela Sesay of plan. Bed in lowest position and call light within reach.   Problem: Patient Centered Care  Goal: Patient preferences are identified and integrated in the patient's plan of care  Description: Interventions:  - What would you like us to know as we care for you?   - Provide timely, complete, and accurate information to patient/family  - Incorporate patient and family knowledge, values, beliefs, and cultural backgrounds into the planning and delivery of care  - Encourage patient/family to participate in care and decision-making at the level they choose  - Honor patient and family perspectives and choices  Outcome: Progressing

## 2023-05-10 NOTE — CM/SW NOTE
05/10/23 1600   CM/SW Referral Data   Referral Source    Reason for Referral Discharge planning   Informant Patient   Medical Hx   Does patient have an established PCP? Yes  Yahaira Woods)   Patient Info   Patient's Current Mental Status at Time of Assessment Alert;Oriented   Patient's [address]   Patient lives with Alone   Patient Status Prior to Admission   Independent with ADLs and Mobility Yes   Discharge Needs   Anticipated D/C needs Subacute rehab   Services Requested   PASRR Level 1 Submitted No - Current within 90 days     Pt discussed during nursing rounds. 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 From [facility], home alone prior. Pt also had very recent RUPESH stay at [facility] after TKA. Sx on hold as of the time of his note. Rec RUPESH at OR, pt agreeable to return to PP at OR. Return referral sent in [address]. Plan: PP for RUPESH pending facility acceptance and medical clearance. / to remain available for support and/or discharge planning.      ROXANE WhittingtonN    [phone]

## 2023-05-10 NOTE — SLP NOTE
SPEECH DAILY NOTE - INPATIENT    ASSESSMENT & PLAN   ASSESSMENT    PPE REQUIRED. THIS THERAPIST WORE GLOVES, DROPLET MASK, AND GOGGLES FOR DURATION OF EVALUATION. HANDS WASHED UPON ENTRANCE/EXIT. SLP f/u for ongoing dysphagia tx/meal assessment per recommendations of BSE. RN reports that since receiving TLSO back brace, patient pain has not subsided and patient continues to prefer lying completely supine in bed. Pt reports receiving all nutrition via Ensure supplement in reclined position and directing kitchen staff to discontinue bringing meal trays     Following education regarding importance of positioning for safe swallow, pt allowed SLP to adjust HOB to ~45 degree angle. Pt afebrile with adequate oxygen status on RA prior to the start of oral trials. SLP reviewed aspiration precautions with the patient. Safe swallow guidelines remain written on the white board in purple. Patient v/u. Provided feeding assistance, pt tolerated thin liquids (Ensure and water) via straw with no overt clinical signs/symptoms of aspiration. SLP attempted trial of soft solid (bite of sandwich), however patient spit out due to dislike of taste and persistent back pain. Hyolaryngeal elevation/excursion judged to be adequate to palpation. Timely initiation of swallow perceived across trials. Oxygen status remained stable at ~95 degrees SPO2  t/o the entire session. Recommend patient continue to receive nutrition via Ensure w/ straw with HOB adjusted as upright as possible. SLP to attempted f/u meal assessment with focus on trial of solid consistencies x 2-3 pending patient tolerance of HOB increased to 90 degree angle, monitor CXR, and request VFSS if any overt CSA and/or decline in CXR. Recommend medications crushed in puree. RN alerted with results and recommendations.      MOST RECENT CXR:  N/A       Diet Recommendations - Solids:  (assess when Pt tolerates HOB raised)  Diet Recommendations - Liquids: (Thin liquids; raise HOB as tolerated)  Aspiration Precautions:  Upright Position; Slow rate  Medication Administration Recommendations:  Crushed in Puree  Patient Experiencing Pain:  Yes    Discharge Recommendations  Discharge Recommendations/Plan: Undetermined    Treatment Plan  Treatment Plan/Recommendations: Aspiration precautions    Interdisciplinary Communication: Discussed with RN  Plan posted at bedside      GOALS  Goal #1 The patient will tolerate thin liquids without overt signs or symptoms of aspiration with 100 % accuracy over 1-2 session(s). Pt tolerated Ensure and water via straw with HOB adjusted to ~45 degree angle with no CSA. Recommend additional trials with HOB adjusted to 90 degree angle as patient tolerates  In Progress   Goal #2 The patient will utilize compensatory strategies as outlined by  BSSE (clinical evaluation) including Slow rate, Small bites, Small sips, No straws, Upright 90 degrees with no feeding assistance 90 % of the time across 3 sessions. SLP provided thin liquid/Ensure via straw with no CSA. Treatment plan/recommendations updated to include straws. Pt demonstrated small sips and slow rate. In Progress   Goal #3 The patient will tolerate trial upgrade of SOLID consistency and thin liquids without overt signs or symptoms of aspiration with 100 % accuracy over 1-2 session(s).     Attempted trial of soft solid this date, patient spit out stating he as unable due to pain and dislike of item  In Progress       FOLLOW UP  Follow Up Needed (Documentation Required): Yes  SLP Follow-up Date: 05/11/23  Number of Visits to Meet Established Goals: 3    Session: 2 of 3    If you have any questions, please contact Sean Ceron, SLP    Sean Ceron M.S., [address] [facility]  Speech Language Pathologist  Phone Number [phone]

## 2023-05-11 LAB
DEPRECATED RDW RBC AUTO: 49.8 FL (ref 35.1–46.3)
ERYTHROCYTE [DISTWIDTH] IN BLOOD BY AUTOMATED COUNT: 14.6 % (ref 11–15)
HCT VFR BLD AUTO: 34.8 %
HGB BLD-MCNC: 11.7 G/DL
MCH RBC QN AUTO: 31.5 PG (ref 26–34)
MCHC RBC AUTO-ENTMCNC: 33.6 G/DL (ref 31–37)
MCV RBC AUTO: 93.5 FL
PLATELET # BLD AUTO: 205 10(3)UL (ref 150–450)
POTASSIUM SERPL-SCNC: 3.6 MMOL/L (ref 3.5–5.1)
RBC # BLD AUTO: 3.72 X10(6)UL
WBC # BLD AUTO: 8.8 X10(3) UL (ref 4–11)

## 2023-05-11 PROCEDURE — 0PB43ZX EXCISION OF THORACIC VERTEBRA, PERCUTANEOUS APPROACH, DIAGNOSTIC: ICD-10-PCS | Performed by: RADIOLOGY

## 2023-05-11 PROCEDURE — 99233 SBSQ HOSP IP/OBS HIGH 50: CPT | Performed by: HOSPITALIST

## 2023-05-11 RX ORDER — HYDROCODONE BITARTRATE AND ACETAMINOPHEN 10; 325 MG/1; MG/1
1 TABLET ORAL EVERY 4 HOURS PRN
Status: DISCONTINUED | OUTPATIENT
Start: 2023-05-11 | End: 2023-05-13

## 2023-05-11 RX ORDER — SODIUM CHLORIDE 9 MG/ML
INJECTION, SOLUTION INTRAVENOUS CONTINUOUS
Status: DISCONTINUED | OUTPATIENT
Start: 2023-05-11 | End: 2023-05-13

## 2023-05-11 NOTE — PLAN OF CARE
Problem: Patient Centered Care  Goal: Patient preferences are identified and integrated in the patient's plan of care  Description: Interventions:  - What would you like us to know as we care for you?   - Provide timely, complete, and accurate information to patient/family  - Incorporate patient and family knowledge, values, beliefs, and cultural backgrounds into the planning and delivery of care  - Encourage patient/family to participate in care and decision-making at the level they choose  - Honor patient and family perspectives and choices  Outcome: Progressing     Problem: Patient/Family Goals  Goal: Patient/Family Long Term Goal  Description: Patient's Long Term Goal: Discharge home    Interventions:  - See additional Care Plan goals for specific interventions  Outcome: Progressing  Goal: Patient/Family Short Term Goal  Description: Patient's Short Term Goal:     Interventions:   - See additional Care Plan goals for specific interventions  Outcome: Progressing     Problem: PAIN - ADULT  Goal: Verbalizes/displays adequate comfort level or patient's stated pain goal  Description: INTERVENTIONS:  - Encourage pt to monitor pain and request assistance  - Assess pain using appropriate pain scale  - Administer analgesics based on type and severity of pain and evaluate response  - Implement non-pharmacological measures as appropriate and evaluate response  - Consider cultural and social influences on pain and pain management  - Manage/alleviate anxiety  - Utilize distraction and/or relaxation techniques  - Monitor for opioid side effects  - Notify MD/LIP if interventions unsuccessful or patient reports new pain  - Anticipate increased pain with activity and pre-medicate as appropriate  Outcome: Progressing     Problem: SAFETY ADULT - FALL  Goal: Free from fall injury  Description: INTERVENTIONS:  - Assess pt frequently for physical needs  - Identify cognitive and physical deficits and behaviors that affect risk of falls.  - [place] fall precautions as indicated by assessment.  - Educate pt/family on patient safety including physical limitations  - Instruct pt to call for assistance with activity based on assessment  - Modify environment to reduce risk of injury  - Provide assistive devices as appropriate  - Consider OT/PT consult to assist with strengthening/mobility  - Encourage toileting schedule  Outcome: Progressing     Problem: DISCHARGE PLANNING  Goal: Discharge to home or other facility with appropriate resources  Description: INTERVENTIONS:  - Identify barriers to discharge w/pt and caregiver  - Include patient/family/discharge partner in discharge planning  - Arrange for needed discharge resources and transportation as appropriate  - Identify discharge learning needs (meds, wound care, etc)  - Arrange for interpreters to assist at discharge as needed  - Consider post-discharge preferences of patient/family/discharge partner  - Complete POLST form as appropriate  - Assess patient's ability to be responsible for managing their own health  - Refer to Case Management Department for coordinating discharge planning if the patient needs post-hospital services based on physician/LIP order or complex needs related to functional status, cognitive ability or social support system  Outcome: Martha Jenkins was bedrest during the night, has extreme pain with movement, TLSO brace on at all times. Gave oxy IR for pain during pm shift, gave morphine after midnight when npo. Amy Rae will have a biopsy in IR today. Discharge plan will be back to [facility].

## 2023-05-11 NOTE — PLAN OF CARE
Patient is A&OX4, RA, patient has TLSO brace on at all times. Patient refused to  with PT/OT again today. Can only lay flat, was able to bring him up a little for pills, and pain is being controlled with norco and morphine PRN. Oxy was given yesterday and patient feels it was too strong and since norco is now working better so we will continue with norco. Generalized bruising to bilateral upper and lower extremities; intergluteal cleft and right buttock. Blanchable redness noted to left ischium, foam dressings placed to left ischium, sacrum and right buttocks. Ortho plan is to do biopsy tomorrow. Patient stated during afternoon that pain was under control and was at a 2/10, we got patient to do log roll onto side and was able to get up at bedside and then was able to walk few steps to chair. Patient tolerated chair for about an hour and then back to bed to lay flat. Patient states that this is the best way to lay for him. Bed in lowest position and call light within reach.   Problem: Patient Centered Care  Goal: Patient preferences are identified and integrated in the patient's plan of care  Description: Interventions:  - What would you like us to know as we care for you?  - Provide timely, complete, and accurate information to patient/family  - Incorporate patient and family knowledge, values, beliefs, and cultural backgrounds into the planning and delivery of care  - Encourage patient/family to participate in care and decision-making at the level they choose  - Honor patient and family perspectives and choices  Outcome: Progressing

## 2023-05-11 NOTE — CDS QUERY
Malnutrition  Bri Gomez    Dear Dr Monnie Dancer,   Clinical information (provided below) includes documentation of Moderate Malnutrition by the Clinical Dietician. PLEASE INDICATE IF YOU ARE IN AGREEMENT WITH THE FOLLOWING   ASSESSMENT BY THE CONSULTANT:  Moderate Malnutrition as documented by the Clinical Dietician on : 5/11/23  [ x ] Yes, I agree with this assessment      [  ] No, I do not agree with this assessment  If not in agreement with this assessment, please provide your independent assessment of the nutritional status:  ____________________________        Documentation from the Medical Record:    Dietician Nutritional Assessment 05/11/2023: Pt meets Moderate Malnutrition criteria. CRITERIA FOR MALNUTRITION DIAGNOSIS: Criteria for non-severe malnutrition diagnosis: chronic illness related to wt loss 10% in 6 months, energy intake less than75% for greater than 1 month and body fat mild depletion  Nutrition Diagnosis/Problem: Moderate Malnutrition related to Chronic - Physiological causes resulting in anorexia or diminished intake as evidenced by wt loss 10% in 6 months, energy intake less than75% for greater than 1 month and body fat mild depletion. Nutrition Focused Physical Exam (NFPE): - Nutrition Focused Physical Exam (NFPE): mild depletion body fat triceps region per visual exam  - Fluid Accumulation: non-pitting Bilateral Feet see RN documentation for details  - Skin Integrity: intact see RN documentation for details       For questions regarding this query, please contact Clinical Documentation : [email or web address]  Thank You

## 2023-05-11 NOTE — PHYSICAL THERAPY NOTE
05/11/23 6423   VISIT TYPE   PT Inpatient Visit Type (Documentation Required) Attempted Treatment  (Hold per RN until tomorrow 2/2 pain control)       Attempted to see pt, chart reviewed. Will re-attempt tomorrow after procedure.      [address] [place] [address]  [phone]

## 2023-05-12 ENCOUNTER — ANESTHESIA (OUTPATIENT)
Dept: INTERVENTIONAL RADIOLOGY/VASCULAR | Facility: HOSPITAL | Age: 88
End: 2023-05-12
Payer: MEDICARE

## 2023-05-12 ENCOUNTER — APPOINTMENT (OUTPATIENT)
Dept: INTERVENTIONAL RADIOLOGY/VASCULAR | Facility: HOSPITAL | Age: 88
End: 2023-05-12
Attending: CLINICAL NURSE SPECIALIST
Payer: MEDICARE

## 2023-05-12 LAB
DEPRECATED RDW RBC AUTO: 49.8 FL (ref 35.1–46.3)
ERYTHROCYTE [DISTWIDTH] IN BLOOD BY AUTOMATED COUNT: 14.6 % (ref 11–15)
HCT VFR BLD AUTO: 32.9 %
HGB BLD-MCNC: 11.4 G/DL
MCH RBC QN AUTO: 32.1 PG (ref 26–34)
MCHC RBC AUTO-ENTMCNC: 34.7 G/DL (ref 31–37)
MCV RBC AUTO: 92.7 FL
PLATELET # BLD AUTO: 205 10(3)UL (ref 150–450)
RBC # BLD AUTO: 3.55 X10(6)UL
WBC # BLD AUTO: 7.2 X10(3) UL (ref 4–11)

## 2023-05-12 PROCEDURE — 99233 SBSQ HOSP IP/OBS HIGH 50: CPT | Performed by: NURSE PRACTITIONER

## 2023-05-12 RX ORDER — PSEUDOEPHEDRINE HCL 30 MG
100 TABLET ORAL 2 TIMES DAILY
Refills: 0 | Status: SHIPPED | COMMUNITY
Start: 2023-05-12 | End: 2023-05-23

## 2023-05-12 RX ORDER — SODIUM CHLORIDE, SODIUM LACTATE, POTASSIUM CHLORIDE, CALCIUM CHLORIDE 600; 310; 30; 20 MG/100ML; MG/100ML; MG/100ML; MG/100ML
INJECTION, SOLUTION INTRAVENOUS CONTINUOUS PRN
Status: DISCONTINUED | OUTPATIENT
Start: 2023-05-12 | End: 2023-05-12 | Stop reason: SURG

## 2023-05-12 RX ORDER — SODIUM CHLORIDE, SODIUM LACTATE, POTASSIUM CHLORIDE, CALCIUM CHLORIDE 600; 310; 30; 20 MG/100ML; MG/100ML; MG/100ML; MG/100ML
INJECTION, SOLUTION INTRAVENOUS CONTINUOUS
Status: DISCONTINUED | OUTPATIENT
Start: 2023-05-12 | End: 2023-05-12

## 2023-05-12 RX ORDER — HYDROMORPHONE HYDROCHLORIDE 1 MG/ML
0.2 INJECTION, SOLUTION INTRAMUSCULAR; INTRAVENOUS; SUBCUTANEOUS EVERY 5 MIN PRN
Status: DISCONTINUED | OUTPATIENT
Start: 2023-05-12 | End: 2023-05-12 | Stop reason: HOSPADM

## 2023-05-12 RX ORDER — ACETAMINOPHEN 325 MG/1
650 TABLET ORAL EVERY 6 HOURS PRN
Refills: 0 | Status: SHIPPED | COMMUNITY
Start: 2023-05-12

## 2023-05-12 RX ORDER — MORPHINE SULFATE 2 MG/ML
2 INJECTION, SOLUTION INTRAMUSCULAR; INTRAVENOUS EVERY 10 MIN PRN
Status: DISCONTINUED | OUTPATIENT
Start: 2023-05-12 | End: 2023-05-12 | Stop reason: HOSPADM

## 2023-05-12 RX ORDER — HEPARIN SODIUM 5000 [USP'U]/ML
5000 INJECTION, SOLUTION INTRAVENOUS; SUBCUTANEOUS EVERY 12 HOURS
Refills: 0 | Status: SHIPPED | COMMUNITY
Start: 2023-05-12

## 2023-05-12 RX ORDER — HYDROMORPHONE HYDROCHLORIDE 1 MG/ML
0.6 INJECTION, SOLUTION INTRAMUSCULAR; INTRAVENOUS; SUBCUTANEOUS EVERY 5 MIN PRN
Status: DISCONTINUED | OUTPATIENT
Start: 2023-05-12 | End: 2023-05-12 | Stop reason: HOSPADM

## 2023-05-12 RX ORDER — PHENYLEPHRINE HCL 10 MG/ML
VIAL (ML) INJECTION AS NEEDED
Status: DISCONTINUED | OUTPATIENT
Start: 2023-05-12 | End: 2023-05-12 | Stop reason: SURG

## 2023-05-12 RX ORDER — MORPHINE SULFATE 10 MG/ML
6 INJECTION, SOLUTION INTRAMUSCULAR; INTRAVENOUS EVERY 10 MIN PRN
Status: DISCONTINUED | OUTPATIENT
Start: 2023-05-12 | End: 2023-05-12 | Stop reason: HOSPADM

## 2023-05-12 RX ORDER — PANTOPRAZOLE SODIUM 40 MG/1
40 TABLET, DELAYED RELEASE ORAL
Refills: 0 | Status: SHIPPED | COMMUNITY
Start: 2023-05-13

## 2023-05-12 RX ORDER — HYDROMORPHONE HYDROCHLORIDE 1 MG/ML
0.4 INJECTION, SOLUTION INTRAMUSCULAR; INTRAVENOUS; SUBCUTANEOUS EVERY 5 MIN PRN
Status: DISCONTINUED | OUTPATIENT
Start: 2023-05-12 | End: 2023-05-12 | Stop reason: HOSPADM

## 2023-05-12 RX ORDER — NALOXONE HYDROCHLORIDE 0.4 MG/ML
80 INJECTION, SOLUTION INTRAMUSCULAR; INTRAVENOUS; SUBCUTANEOUS AS NEEDED
Status: DISCONTINUED | OUTPATIENT
Start: 2023-05-12 | End: 2023-05-12 | Stop reason: HOSPADM

## 2023-05-12 RX ORDER — MORPHINE SULFATE 4 MG/ML
4 INJECTION, SOLUTION INTRAMUSCULAR; INTRAVENOUS EVERY 10 MIN PRN
Status: DISCONTINUED | OUTPATIENT
Start: 2023-05-12 | End: 2023-05-12 | Stop reason: HOSPADM

## 2023-05-12 RX ORDER — LIDOCAINE HYDROCHLORIDE 10 MG/ML
INJECTION, SOLUTION EPIDURAL; INFILTRATION; INTRACAUDAL; PERINEURAL AS NEEDED
Status: DISCONTINUED | OUTPATIENT
Start: 2023-05-12 | End: 2023-05-12 | Stop reason: SURG

## 2023-05-12 RX ORDER — HYDROCODONE BITARTRATE AND ACETAMINOPHEN 10; 325 MG/1; MG/1
1 TABLET ORAL EVERY 4 HOURS PRN
Qty: 10 TABLET | Refills: 0 | Status: SHIPPED | OUTPATIENT
Start: 2023-05-12

## 2023-05-12 RX ORDER — POLYETHYLENE GLYCOL 3350 17 G/17G
17 POWDER, FOR SOLUTION ORAL DAILY
Refills: 0 | Status: SHIPPED | COMMUNITY
Start: 2023-05-13

## 2023-05-12 RX ORDER — LIDOCAINE HYDROCHLORIDE 20 MG/ML
INJECTION, SOLUTION EPIDURAL; INFILTRATION; INTRACAUDAL; PERINEURAL
Status: COMPLETED
Start: 2023-05-12 | End: 2023-05-12

## 2023-05-12 RX ADMIN — LIDOCAINE HYDROCHLORIDE 50 MG: 10 INJECTION, SOLUTION EPIDURAL; INFILTRATION; INTRACAUDAL; PERINEURAL at 14:13:00

## 2023-05-12 RX ADMIN — PHENYLEPHRINE HCL 100 MCG: 10 MG/ML VIAL (ML) INJECTION at 14:39:00

## 2023-05-12 RX ADMIN — SODIUM CHLORIDE, SODIUM LACTATE, POTASSIUM CHLORIDE, CALCIUM CHLORIDE: 600; 310; 30; 20 INJECTION, SOLUTION INTRAVENOUS at 14:09:00

## 2023-05-12 RX ADMIN — PHENYLEPHRINE HCL 100 MCG: 10 MG/ML VIAL (ML) INJECTION at 14:26:00

## 2023-05-12 RX ADMIN — PHENYLEPHRINE HCL 100 MCG: 10 MG/ML VIAL (ML) INJECTION at 14:28:00

## 2023-05-12 RX ADMIN — PHENYLEPHRINE HCL 100 MCG: 10 MG/ML VIAL (ML) INJECTION at 14:31:00

## 2023-05-12 NOTE — ANESTHESIA POSTPROCEDURE EVALUATION
Patient: Natali Aleman    Procedure Summary     Date: 05/12/23 Room / Location: [facility]    Anesthesia Start: 1808 Anesthesia Stop: 8700    Procedure: IR BIOPSY Diagnosis: (T8 lytic lesion)    Scheduled Providers: Natalie Marrufo MD Anesthesiologist: Nikolay Merino MD    Anesthesia Type: MAC ASA Status: 4          Anesthesia Type: MAC    Vitals Value Taken Time   /68 05/12/23 1455   Temp  05/12/23 1456   Pulse 76 05/12/23 1455   Resp 16 05/12/23 1455   SpO2 97 % 05/12/23 1455       [facility] AN Post Evaluation:   Patient Evaluated in Patient location: Cath lab holding. Patient Participation: complete - patient participated  Level of Consciousness: awake and alert  Pain Management: adequate  Airway Patency:patent  Yes    Cardiovascular Status: acceptable  Respiratory Status: acceptable and nasal cannula  Postoperative Hydration acceptable  Comments: Report given to RN in Cath Lab holding room.  Patient awake and alert      Rodney James MD  5/12/2023 2:56 PM

## 2023-05-12 NOTE — PLAN OF CARE
Patient alert and oriented X4. LSO brace in place at all times. Voiding with primofit. SCD's and heparin for DVT prophylaxis. Pain management with norco prn. NPO. Encouraged to cough and deep breathe and patient is compliant. Fall precautions in place including bed in lowest position, call light and personal belongings within reach, non-skid socks. Frequent rounding by nursing staff. Plan for biopsy this morning.     Problem: Patient Centered Care  Goal: Patient preferences are identified and integrated in the patient's plan of care  Description: Interventions:  - What would you like us to know as we care for you?  - Provide timely, complete, and accurate information to patient/family  - Incorporate patient and family knowledge, values, beliefs, and cultural backgrounds into the planning and delivery of care  - Encourage patient/family to participate in care and decision-making at the level they choose  - Honor patient and family perspectives and choices  Outcome: Progressing     Problem: PAIN - ADULT  Goal: Verbalizes/displays adequate comfort level or patient's stated pain goal  Description: INTERVENTIONS:  - Encourage pt to monitor pain and request assistance  - Assess pain using appropriate pain scale  - Administer analgesics based on type and severity of pain and evaluate response  - Implement non-pharmacological measures as appropriate and evaluate response  - Consider cultural and social influences on pain and pain management  - Manage/alleviate anxiety  - Utilize distraction and/or relaxation techniques  - Monitor for opioid side effects  - Notify MD/LIP if interventions unsuccessful or patient reports new pain  - Anticipate increased pain with activity and pre-medicate as appropriate  Outcome: Progressing     Problem: SAFETY ADULT - FALL  Goal: Free from fall injury  Description: INTERVENTIONS:  - Assess pt frequently for physical needs  - Identify cognitive and physical deficits and behaviors that affect risk of falls.   - [place] fall precautions as indicated by assessment.  - Educate pt/family on patient safety including physical limitations  - Instruct pt to call for assistance with activity based on assessment  - Modify environment to reduce risk of injury  - Provide assistive devices as appropriate  - Consider OT/PT consult to assist with strengthening/mobility  - Encourage toileting schedule  Outcome: Progressing     Problem: DISCHARGE PLANNING  Goal: Discharge to home or other facility with appropriate resources  Description: INTERVENTIONS:  - Identify barriers to discharge w/pt and caregiver  - Include patient/family/discharge partner in discharge planning  - Arrange for needed discharge resources and transportation as appropriate  - Identify discharge learning needs (meds, wound care, etc)  - Arrange for interpreters to assist at discharge as needed  - Consider post-discharge preferences of patient/family/discharge partner  - Complete POLST form as appropriate  - Assess patient's ability to be responsible for managing their own health  - Refer to Case Management Department for coordinating discharge planning if the patient needs post-hospital services based on physician/LIP order or complex needs related to functional status, cognitive ability or social support system  Outcome: Progressing

## 2023-05-12 NOTE — CM/SW NOTE
SW followed up on DC planning. Pts plan is to discharge back to [facility] once medically cleared    [facility] reserved - [facility] aware of pt's possible return tomorrow    Pt requires an ambulance according to the RN and PT notes indicating pt being a max assist. WILLI called and ordered an Ambulance from [employer] [facility] to transport pt to [facility] placed on [address] PCS flow sheet completed. RN to attached to AVS and print out. PCS form completed for 5/13    PLAN: DC to [facility] pending med clear, Ambulance on [address] for the weekend, PCS form completed    JEFFREY VeronicaW, MSW ext.  [phone]

## 2023-05-12 NOTE — PLAN OF CARE
Patient is A&OX4, RA, patient has TLSO brace on at all times. Patient worked with PT today and was up with two and walker. pain is being controlled with norco and morphine PRN. Generalized bruising to bilateral upper and lower extremities; intergluteal cleft and right buttock. . Bed in lowest position and call light within reach. Patient had biopsy today and plan is to [facility] after clearances and bed availability.    Problem: Patient Centered Care  Goal: Patient preferences are identified and integrated in the patient's plan of care  Description: Interventions:  - What would you like us to know as we care for you?   - Provide timely, complete, and accurate information to patient/family  - Incorporate patient and family knowledge, values, beliefs, and cultural backgrounds into the planning and delivery of care  - Encourage patient/family to participate in care and decision-making at the level they choose  - Honor patient and family perspectives and choices  Outcome: Progressing

## 2023-05-12 NOTE — IVS NOTE
Hand-Off     Procedure hand off report given to Lynn MENDES. Pt's vital signs are stable. Procedural access site is dry and intact with no signs and symptoms of bleeding and hematoma.

## 2023-05-12 NOTE — BRIEF PROCEDURE NOTE
[facility]P - [facility]  Procedure Note    Laddie Round Patient Status:  Inpatient    1935 MRN [id number]   Location [facility] Attending Shawn Dickson MD   Hosp Day # 3 PCP Eufemia Robles MD     Procedure: Fluoroscopic guided percutaneous core bone biopsy at T8    Pre-Procedure Diagnosis: T8 compression fracture, suspected pathologic    Post-Procedure Diagnosis: Same    Anesthesia:  MAC    Findings: Percutaneous core biopsy T8 vertebral body    Specimens: Cores to path and for C&S    Blood Loss: Minimal    Complications:  None    Plan:  Await culture results, neurosurgical recommendations no    Tariq Duran MD  2023

## 2023-05-13 VITALS
RESPIRATION RATE: 18 BRPM | SYSTOLIC BLOOD PRESSURE: 145 MMHG | TEMPERATURE: 98 F | BODY MASS INDEX: 23.04 KG/M2 | DIASTOLIC BLOOD PRESSURE: 87 MMHG | WEIGHT: 152 LBS | HEIGHT: 68 IN | HEART RATE: 77 BPM | OXYGEN SATURATION: 95 %

## 2023-05-13 LAB — SARS-COV-2 RNA RESP QL NAA+PROBE: NOT DETECTED

## 2023-05-13 PROCEDURE — 99239 HOSP IP/OBS DSCHRG MGMT >30: CPT | Performed by: HOSPITALIST

## 2023-05-13 NOTE — PLAN OF CARE
Patient alert and oriented X4. Voiding with primofit. SCD's for DVT prophylaxis; heparin on hold. Pt has TLSO brace in place at all times. Pain management with norco prn. General diet. Encouraged to cough and deep breathe with incentive spirometer and patient is compliant. Fall precautions in place including bed in lowest position, call light and personal belongings within reach, non-skid socks. Frequent rounding by nursing staff. Plan for [facility] today. Problem: Patient Centered Care  Goal: Patient preferences are identified and integrated in the patient's plan of care  Description: Interventions:  - What would you like us to know as we care for you?  I live in [place] and I cannot wait to go home after rehab  - Provide timely, complete, and accurate information to patient/family  - Incorporate patient and family knowledge, values, beliefs, and cultural backgrounds into the planning and delivery of care  - Encourage patient/family to participate in care and decision-making at the level they choose  - Honor patient and family perspectives and choices  Outcome: Progressing     Problem: PAIN - ADULT  Goal: Verbalizes/displays adequate comfort level or patient's stated pain goal  Description: INTERVENTIONS:  - Encourage pt to monitor pain and request assistance  - Assess pain using appropriate pain scale  - Administer analgesics based on type and severity of pain and evaluate response  - Implement non-pharmacological measures as appropriate and evaluate response  - Consider cultural and social influences on pain and pain management  - Manage/alleviate anxiety  - Utilize distraction and/or relaxation techniques  - Monitor for opioid side effects  - Notify MD/LIP if interventions unsuccessful or patient reports new pain  - Anticipate increased pain with activity and pre-medicate as appropriate  Outcome: Progressing     Problem: SAFETY ADULT - FALL  Goal: Free from fall injury  Description: INTERVENTIONS:  - Assess pt frequently for physical needs  - Identify cognitive and physical deficits and behaviors that affect risk of falls.   - [place] fall precautions as indicated by assessment.  - Educate pt/family on patient safety including physical limitations  - Instruct pt to call for assistance with activity based on assessment  - Modify environment to reduce risk of injury  - Provide assistive devices as appropriate  - Consider OT/PT consult to assist with strengthening/mobility  - Encourage toileting schedule  Outcome: Progressing     Problem: DISCHARGE PLANNING  Goal: Discharge to home or other facility with appropriate resources  Description: INTERVENTIONS:  - Identify barriers to discharge w/pt and caregiver  - Include patient/family/discharge partner in discharge planning  - Arrange for needed discharge resources and transportation as appropriate  - Identify discharge learning needs (meds, wound care, etc)  - Arrange for interpreters to assist at discharge as needed  - Consider post-discharge preferences of patient/family/discharge partner  - Complete POLST form as appropriate  - Assess patient's ability to be responsible for managing their own health  - Refer to Case Management Department for coordinating discharge planning if the patient needs post-hospital services based on physician/LIP order or complex needs related to functional status, cognitive ability or social support system  Outcome: Progressing

## 2023-05-13 NOTE — CM/SW NOTE
Patient cleared for DC, confirmed bed with [address]. Need rapid covid. Notified RN. PLAN: [address] Ambulance ETA is 1pm. PCS DONE. Patient will go to room 201 and number for report is [phone]. * Need rapid covid prior to transfer.        ALPA Velez, [facility]    [id number]

## 2023-05-13 NOTE — PLAN OF CARE
Patient is A&OX4, RA, patient has TLSO brace on at all times. Patient worked with PT today and was up with two and walker. pain is being controlled with norco. Generalized bruising to bilateral upper and lower extremities; intergluteal cleft and right buttock. Bed in lowest position and call light within reach. Patient has clearances to discharge back to [facility] today from medical, nuero and IR. Patient to follow up PCP for biopsy results and next steps. Discharge went over with patient and paperwork printed and given to transport. Called in report to [facility]. Patient was able to locate watch and medical alert bracelet/necklace in bag in room, pants and wallet were not in safe or room. Made several phone calls to friends and family and discovered that they are at [facility]. Patient knows of situation and is happy they are already there.   Problem: Patient Centered Care  Goal: Patient preferences are identified and integrated in the patient's plan of care  Description: Interventions:  - What would you like us to know as we care for you?   - Provide timely, complete, and accurate information to patient/family  - Incorporate patient and family knowledge, values, beliefs, and cultural backgrounds into the planning and delivery of care  - Encourage patient/family to participate in care and decision-making at the level they choose  - Honor patient and family perspectives and choices  Outcome: Adequate for Discharge

## 2023-05-13 NOTE — PHYSICAL THERAPY NOTE
05/13/23 1232   VISIT TYPE   PT Inpatient Visit Type (Documentation Required) Attempted Treatment  (Per discussion with RN and per chart, pt will be DC to [facility] today at 1 pm. If pt to remain hospitalized for any reason, will follow up at later time to day as appropriate and able. )

## 2023-05-14 ENCOUNTER — EXTERNAL FACILITY (OUTPATIENT)
Dept: INTERNAL MEDICINE CLINIC | Facility: CLINIC | Age: 88
End: 2023-05-14

## 2023-05-14 DIAGNOSIS — M54.9 INTRACTABLE BACK PAIN: ICD-10-CM

## 2023-05-14 DIAGNOSIS — Z96.651 HISTORY OF KNEE REPLACEMENT PROCEDURE OF RIGHT KNEE: ICD-10-CM

## 2023-05-14 DIAGNOSIS — M17.12 PRIMARY OSTEOARTHRITIS OF LEFT KNEE: ICD-10-CM

## 2023-05-14 DIAGNOSIS — I10 ESSENTIAL HYPERTENSION: ICD-10-CM

## 2023-05-14 DIAGNOSIS — J44.9 CHRONIC OBSTRUCTIVE PULMONARY DISEASE, UNSPECIFIED COPD TYPE (HCC): ICD-10-CM

## 2023-05-14 DIAGNOSIS — S22.009D CLOSED FRACTURE OF THORACIC VERTEBRA WITH ROUTINE HEALING, UNSPECIFIED FRACTURE MORPHOLOGY, UNSPECIFIED THORACIC VERTEBRAL LEVEL, SUBSEQUENT ENCOUNTER: ICD-10-CM

## 2023-05-15 ENCOUNTER — EXTERNAL FACILITY (OUTPATIENT)
Dept: PULMONOLOGY | Facility: CLINIC | Age: 88
End: 2023-05-15

## 2023-05-15 ENCOUNTER — PATIENT OUTREACH (OUTPATIENT)
Dept: CASE MANAGEMENT | Age: 88
End: 2023-05-15

## 2023-05-15 DIAGNOSIS — R91.8 PULMONARY NODULES: ICD-10-CM

## 2023-05-15 DIAGNOSIS — R09.02 HYPOXIA: Primary | ICD-10-CM

## 2023-05-15 DIAGNOSIS — J98.11 ATELECTASIS, LEFT: ICD-10-CM

## 2023-05-16 ENCOUNTER — INITIAL APN SNF VISIT (OUTPATIENT)
Dept: INTERNAL MEDICINE CLINIC | Facility: SKILLED NURSING FACILITY | Age: 88
End: 2023-05-16

## 2023-05-16 ENCOUNTER — MED REC SCAN ONLY (OUTPATIENT)
Dept: INTERNAL MEDICINE CLINIC | Facility: CLINIC | Age: 88
End: 2023-05-16

## 2023-05-16 DIAGNOSIS — Z74.1 REQUIRES ASSISTANCE WITH ACTIVITIES OF DAILY LIVING (ADL): ICD-10-CM

## 2023-05-16 DIAGNOSIS — S22.009G CLOSED FRACTURE OF MULTIPLE THORACIC VERTEBRAE WITH DELAYED HEALING, SUBSEQUENT ENCOUNTER: ICD-10-CM

## 2023-05-16 DIAGNOSIS — Z79.899 MEDICATION MANAGEMENT: ICD-10-CM

## 2023-05-16 DIAGNOSIS — Z91.81 AT RISK FOR FALLING: ICD-10-CM

## 2023-05-16 DIAGNOSIS — Z87.39 HISTORY OF GOUT: ICD-10-CM

## 2023-05-16 DIAGNOSIS — Z96.651 HISTORY OF KNEE REPLACEMENT PROCEDURE OF RIGHT KNEE: ICD-10-CM

## 2023-05-16 DIAGNOSIS — Z76.0 PRESCRIPTION REFILL: ICD-10-CM

## 2023-05-16 DIAGNOSIS — S32.009G: ICD-10-CM

## 2023-05-16 DIAGNOSIS — M54.9 INTRACTABLE BACK PAIN: ICD-10-CM

## 2023-05-16 DIAGNOSIS — H54.7 DECREASED VISUAL ACUITY: ICD-10-CM

## 2023-05-16 PROCEDURE — 99310 SBSQ NF CARE HIGH MDM 45: CPT | Performed by: NURSE PRACTITIONER

## 2023-05-16 PROCEDURE — 1111F DSCHRG MED/CURRENT MED MERGE: CPT | Performed by: NURSE PRACTITIONER

## 2023-05-16 NOTE — PROGRESS NOTES
Patient seen today at  [facility] while participating in Subacute Rehab  They are also receiving care and oversight by Collaborating Physician, Dr. Villanueva Harm: Jeff Jacinto     1935 MRN [id number]   Humberto [facility]ins[facility]  Admission 23      Last Hospital Discharge 23 PCP Warden Juancho MD     HPI:      Ricardo Villalpando is a 81year old male admitted to SNF for sub-acute rehabilitation. S/p mech fall with mult thoracic/lumbar spine fractures, pain, decreased mobility. Hospital Discharge Diagnoses:  Multiple thoracic/lumbar spine fractures  Pain  TKR (4/3/23)  Decreased mobility  Deconditioning    Chief Complaint/ Indication for bedside visit today:  Patient presents with:  Hospital F/U     ALLERGIES    He is allergic to demerol [meperidine], ibuprofen, penicillins, synvisc [hylan g-f 20], valdecoxib, celecoxib, hydrochlorothiazide, and triamterene. CURRENT MEDS:    HYDROcodone-acetaminophen  MG Oral Tab, Take 1 tablet by mouth every 4 (four) hours as needed. acetaminophen 325 MG Oral Tab, Take 2 tablets (650 mg total) by mouth every 6 (six) hours as needed. docusate sodium 100 MG Oral Cap, Take 100 mg by mouth 2 (two) times daily. heparin 5000 UNIT/ML Injection Solution, Inject 1 mL (5,000 Units total) into the skin every 12 (twelve) hours. lidocaine-menthol 4-1 % External Patch, Place 1 patch onto the skin daily. pantoprazole 40 MG Oral Tab EC, Take 1 tablet (40 mg total) by mouth every morning before breakfast.  polyethylene glycol, PEG 3350, 17 g Oral Powd Pack, Take 17 g by mouth daily. Cetirizine-Pseudoephedrine (ZYRTEC-D ALLERGY & CONGESTION OR), Take by mouth. tamsulosin 0.4 MG Oral Cap, TAKE 1 CAPSULE BY MOUTH ONE-HALF HOUR AFTER SUPPER DAILY  ALLOPURINOL 100 MG Oral Tab, Take 1 tablet (100 mg total) by mouth daily with dinner.   metoprolol succinate ER 25 MG Oral Tablet 24 Hr, Take 1 tablet (25 mg total) by mouth daily. amLODIPine 5 MG Oral Tab, Take 1 tablet (5 mg total) by mouth daily. Ferrous Gluconate 324 (37.5 Fe) MG Oral Tab, Take 1 tablet (324 mg total) by mouth 2 (two) times daily. Multiple Vitamins-Minerals (EYE VITAMINS) Oral Cap, Take 1 tablet by mouth 2 (two) times daily. 1 tab daily   cholecalciferol 25 MCG (1000 UT) Oral Cap, Take 1 capsule (1,000 Units total) by mouth nightly. Vitamin D3 1,000 unit capsule  Multiple Vitamin (MULTI-VITAMINS) Oral Tab, Take 1 tablet by mouth nightly. take 1 tablet by ORAL route  every day with food    No current facility-administered medications on file prior to visit. HISTORY:    He  has a past medical history of Anesthesia complication, Anxiety state, Aortic atherosclerosis ([address] Aortic stenosis, BPH (benign prostatic hyperplasia), COPD (chronic obstructive pulmonary disease) ([address] Diverticulosis, Essential hypertension, High blood pressure, History of blood transfusion (2003), History of stomach ulcers, adenomatous colonic polyps, melanoma of skin (1979), Iron deficiency anemia, Lumbar spinal stenosis, Macular degeneration, Osteoarthritis, Osteoarthritis of left knee, PONV (postoperative nausea and vomiting), Primary osteoarthritis of knees, bilateral, Pulmonary hypertension ([address] Total knee replacement status, left (04/03/2023), and Visual impairment. He  has a past surgical history that includes appendectomy; hip replacement surgery (Right, 2002); arthroscopy, ankle, surgical; w/ankle arthrodesis (Right); knee arthroscopy (Bilateral); colonoscopy (2005); other (Right, 07/15/2018); other (05/2018); total knee replacement (Right, 11/2014); Fracture surgery (Right); and Total knee arthroplasty (Left, 04/03/2023).       CODE STATUS VERIFIED: DNR    SUBJECTIVE/ ROS:     Patient seen in his room; tells me he used to do really well using his scooter to be independent with care (doesn't drive due to mac degen) and had used a walker for appts/if a further walk was anticipated. Has a 2 story home but double banisters for stairs. Drives himself in the scooter to the wright, etc. Tells me he has friends and neighbors who can help with some these things/ getting groceries, taking out the garbage after rehab but will need a good discharge plan. Patient with ongoing pain (moderate-severe pain), worse with physical activity/ despite PRN Norco/ and doesn't feel Tylenol \"does anything. \" Lying still is better but still chronically in pain and uncomfortable. Feels the back brace is ill fitting; staff to bedside to reapply. Conferred with Physiatrist, Dr. Yaya Reyes at [facility], who suggests Percocet instead. Pt agreeable, staff informed, rx provided. No observations or c/o SOB/ANDERSON/cough; on room air  No observations or c/o chest pain/palpitation/dizziness  No observations or c/o abdominal discomfort/nausea/vomiting/diarrhea/constipation  No observations or c/o urinary changes-- urgency/frequency/dysuria/hematuria; hx BPH, urinary retention. No further questions/concerns per patient, staff or nurse. OBJECTIVE:     Vital signs reviewed in SNF EMR. Hospital and rehab lab results and imaging reviewed as available.     Lab Results   Component Value Date     (H) 05/10/2023    BUN 19 (H) 05/10/2023    BUNCREA 42.2 (H) 05/10/2023    CREATSERUM 0.45 (L) 05/10/2023    ANIONGAP 8 05/10/2023    GFRNAA 102 02/03/2022    GFRAA 118 02/03/2022    CA 8.9 05/10/2023    OSMOCALC 287 05/10/2023    ALKPHO 114 05/08/2023    AST 26 05/08/2023    ALT 23 05/08/2023    ALKPHOS 106 (H) 11/11/2014    BILT 0.4 05/08/2023    TP 6.5 05/08/2023    ALB 2.6 (L) 05/08/2023    GLOBULIN 3.9 05/08/2023    AGRATIO 1.3 11/11/2014     05/10/2023    K 3.6 05/11/2023     05/10/2023    CO2 29.0 05/10/2023       Lab Results   Component Value Date    WBC 7.2 05/12/2023    RBC 3.55 (L) 05/12/2023    HGB 11.4 (L) 05/12/2023    HCT 32.9 (L) 05/12/2023    .0 05/12/2023    MPV 8.2 08/06/2018 MCV 92.7 05/12/2023    MCH 32.1 05/12/2023    MCHC 34.7 05/12/2023    RDW 14.6 05/12/2023    NEPRELIM 9.85 (H) 05/08/2023    NEUT 5.0 08/09/2016    LYMPH 1.6 08/09/2016    MON 0.7 08/09/2016    EOS 0.0 08/09/2016    BASO 0.1 08/09/2016    NEPERCENT 81.3 05/08/2023    LYPERCENT 8.6 05/08/2023    MOPERCENT 7.7 05/08/2023    EOPERCENT 0.2 05/08/2023    BAPERCENT 0.3 05/08/2023    NE 9.85 (H) 05/08/2023    LYMABS 1.04 05/08/2023    MOABSO 0.93 05/08/2023    EOABSO 0.02 05/08/2023    BAABSO 0.04 05/08/2023       ASSESSMENT/ PHYSICAL EXAM:     GENERAL HEALTH: small framed, elderly male, lying in bed, appears disheveled   LINES, TUBES, DRAINS: none  SKIN: no rashes, no suspicious lesions to exposed skin or reported by patient/nursing staff   WOUND: right knee healing well; inc well-approximated, no drainage   EYES: conjunctiva normal, no drainage from eyes; glasses+, hx mac degen  HENT: normocephalic; normal nose, no nasal drainage  RESPIRATORY: CTA anterior/posterior chest, no wheezing, no accessory muscle use, normal inspiratory effort, on RA   CARDIOVASCULAR: S1, S2 normal, RRR  ABDOMEN: active BS+, soft, non-distended; no visible masses; non-tender, no guarding  : deferred   MUSCULOSKELETAL: TLSO brace+; right knee s/p surgery, hyperkeratotic lesion left foot; gen weakness r/t recent hospitalization/diagnoses/sequelae; will undergo therapies to rehab and improve strength, endurance and independence with ADLs as able/tolerated  EXTREMITIES/VASCULAR: no cyanosis, no edema noted  NEUROLOGIC: follows commands as able, conversing appropriately  PSYCHIATRIC: alert and oriented x 3; affect appropriate , pleasant but frustrated about circumstances and ongoing discomfort    MEDICAL DECISION MAKING and PLAN OF CARE   (*Provider orders are entered into the subacute rehab EMR*):      *This patient will undergo PT/OT/ST evaluation with treatment as needed.    *Skilled nursing care including assistance with ADLs and medication administration.     Closed fracture of thoracic vertebra; T7, T8, T9, T11, T12, L1 mildly displaced fracture/contusion; hx falling (T8 \"marrow replacing process\")  Fall precautions  TLSO brace   Therapy as able   Needs follow-up appt with Dr. Efraín Simmons for recs re: T8 biopsy results; staff to schedule on patient's behalf    Recent R TKA (4/3/23)  Therapy as tolerated  Follow-up with Orthopedics, Dr. Galina Lai 5/18 @ 1:30 PM    Acute on Chronic Pain Management  Percocet 5 mg tab;; 1 po Q 4 hrs PRN mod/severe pain; rx to nurse Homer Navarro; *Hold if confusion*  Tylenol available PRN mild pain  *Lido ; change to 5% patch   Warm/cold packs PRN  Offer to pre-medicate 30-60 min prior to therapy  Physiatry evaluation with management appreciated    BPH; hx urin retention  Continue Flomax    HTN  Continue Amlodipine  Continue Metoprolol Succinate daily     Left hyperkeratotic lesion, Bilateral foot/heel/achilles tendon discomfort, onychomycosis   Supportive footwear with ambulation  PT/OT as able  Follow-up with DPM, Dr. Rico Stone; requesting appt to be scheduled    Physical Deconditioning/Weakness; at risk for falling  Fall Precautions  PT/OT/ST to evaluate and treat  RUPESH team to establish care plan meeting with patient and POA/family as appropriate  RUPESH team/ & discharge planner to assist with establishing safe discharge plan for next level of care     DVT Prophylaxis   Encourage exercise and participation with therapy as much as able  Continue Heparin injections    TINY  Continue Ferrous sulfate    Mac Degeneration; hx glaucoma, nuclear cataracts both eyes  Follow-up with Ophthalmologist     Hx gout, presumably  Allopurinol rx'd Per PCP, Dr. Nini Saavedra; on 1/20/23, rx to cont last noted 5/12/23   (5/2/16: uric acid wnl, 4.7; no other labs available)  Repeat level, monitor for symptoms    GI Prophylaxis  Continue Omep    Bowel Management Regimen/Constipation   *Suppository PRN; give if no BM x 3 days  *Change Docusate to Senna Plus po BID; may hold if loose/freq stools   *Continue Miralax daily; hold if loose/freq stools     Poor po intake; Vitamins/supplements as r/t deficiencies  Enc po  Ensure po BID  Continue Preservision tab  Continue Theradex po   Continue Vit D po   RUPESH RD to follow while in rehab; supplementation/diet as per RUPESH RD    COPD with Hx acute hypoxic resp failure, possible CAP vs HAP, O2 use; on RA    Seasonal allergies  Zyrtec-D; 1 tab po daily; rx refill provided     Future Appointments   Date Time Provider Piedad Isidro   5/18/2023  1:30 PM Luly Lemon MD THE [facility] THE [facility]   7/27/2023  9:45 AM Godwin Hernandez MD [address] [facility]       *Follow-Up with PCP within 1-2 weeks following RUPESH discharge. *Follow-Up with specialists as recommended. *Greater than 65 minutes spent w/ patient and family, reviewing medical records, labs, completing medication reconciliation and entering orders to establish plan of care in [facility]. Note to patient: The [id number] makes medical notes like these available to patients in the interest of transparency. However, this is a medical document intended as peer to peer communication. It is written in medical language and may contain abbreviations or verbiage that are unfamiliar. It may appear blunt or direct. Medical documents are intended to carry relevant information, facts as evident, and the clinical opinion of the practitioner who signs the document.     Prisca Aguilar  NPI# [id number]  [address], Post Acute APRN  05/16/23   10:30 AM

## 2023-05-18 ENCOUNTER — OFFICE VISIT (OUTPATIENT)
Dept: ORTHOPEDICS CLINIC | Facility: CLINIC | Age: 88
End: 2023-05-18

## 2023-05-18 DIAGNOSIS — M17.12 PRIMARY OSTEOARTHRITIS OF LEFT KNEE: Primary | ICD-10-CM

## 2023-05-18 PROCEDURE — 99024 POSTOP FOLLOW-UP VISIT: CPT | Performed by: ORTHOPAEDIC SURGERY

## 2023-05-18 PROCEDURE — 1111F DSCHRG MED/CURRENT MED MERGE: CPT | Performed by: ORTHOPAEDIC SURGERY

## 2023-05-23 ENCOUNTER — SNF VISIT (OUTPATIENT)
Dept: INTERNAL MEDICINE CLINIC | Facility: SKILLED NURSING FACILITY | Age: 88
End: 2023-05-23

## 2023-05-23 DIAGNOSIS — Z96.651 HISTORY OF KNEE REPLACEMENT PROCEDURE OF RIGHT KNEE: ICD-10-CM

## 2023-05-23 DIAGNOSIS — M54.2 NECK PAIN: ICD-10-CM

## 2023-05-23 DIAGNOSIS — Z71.89 CARE PLAN DISCUSSED WITH PATIENT: ICD-10-CM

## 2023-05-23 DIAGNOSIS — S22.009D CLOSED FRACTURE OF THORACIC VERTEBRA WITH ROUTINE HEALING, UNSPECIFIED FRACTURE MORPHOLOGY, UNSPECIFIED THORACIC VERTEBRAL LEVEL, SUBSEQUENT ENCOUNTER: ICD-10-CM

## 2023-05-23 DIAGNOSIS — M54.9 INTRACTABLE BACK PAIN: ICD-10-CM

## 2023-05-23 DIAGNOSIS — I10 ESSENTIAL HYPERTENSION WITH GOAL BLOOD PRESSURE LESS THAN 140/90: ICD-10-CM

## 2023-05-23 DIAGNOSIS — R19.8 CHANGE IN BOWEL MOVEMENT: ICD-10-CM

## 2023-05-23 DIAGNOSIS — Z76.0 PRESCRIPTION REFILL: ICD-10-CM

## 2023-05-23 DIAGNOSIS — Z74.1 REQUIRES ASSISTANCE WITH ACTIVITIES OF DAILY LIVING (ADL): ICD-10-CM

## 2023-05-23 DIAGNOSIS — Z79.899 MEDICATION MANAGEMENT: ICD-10-CM

## 2023-05-23 DIAGNOSIS — Z91.89 AT HIGH RISK FOR CONSTIPATION: ICD-10-CM

## 2023-05-23 DIAGNOSIS — I10 BENIGN ESSENTIAL HTN: ICD-10-CM

## 2023-05-23 PROCEDURE — 1111F DSCHRG MED/CURRENT MED MERGE: CPT | Performed by: NURSE PRACTITIONER

## 2023-05-23 PROCEDURE — 99310 SBSQ NF CARE HIGH MDM 45: CPT | Performed by: NURSE PRACTITIONER

## 2023-05-23 RX ORDER — SENNA AND DOCUSATE SODIUM 50; 8.6 MG/1; MG/1
2 TABLET, FILM COATED ORAL 2 TIMES DAILY
COMMUNITY

## 2023-05-23 RX ORDER — FENTANYL 12 UG/H
1 PATCH TRANSDERMAL
COMMUNITY

## 2023-05-23 RX ORDER — CETIRIZINE HYDROCHLORIDE 10 MG/1
10 TABLET ORAL DAILY PRN
COMMUNITY

## 2023-05-23 RX ORDER — HYDROCODONE BITARTRATE AND ACETAMINOPHEN 7.5; 325 MG/1; MG/1
1 TABLET ORAL EVERY 4 HOURS PRN
COMMUNITY

## 2023-05-25 ENCOUNTER — SNF VISIT (OUTPATIENT)
Dept: INTERNAL MEDICINE CLINIC | Facility: SKILLED NURSING FACILITY | Age: 88
End: 2023-05-25

## 2023-05-25 DIAGNOSIS — R52 PAIN: ICD-10-CM

## 2023-05-25 DIAGNOSIS — Z79.899 MEDICATION MANAGEMENT: ICD-10-CM

## 2023-05-25 DIAGNOSIS — Z86.2 HISTORY OF IRON DEFICIENCY ANEMIA: ICD-10-CM

## 2023-05-25 DIAGNOSIS — R19.8 CHANGE IN BOWEL MOVEMENT: ICD-10-CM

## 2023-05-25 DIAGNOSIS — S22.009D CLOSED FRACTURE OF THORACIC VERTEBRA WITH ROUTINE HEALING, UNSPECIFIED FRACTURE MORPHOLOGY, UNSPECIFIED THORACIC VERTEBRAL LEVEL, SUBSEQUENT ENCOUNTER: ICD-10-CM

## 2023-05-25 DIAGNOSIS — Z96.652 STATUS POST TOTAL LEFT KNEE REPLACEMENT: ICD-10-CM

## 2023-05-25 DIAGNOSIS — Z74.1 REQUIRES ASSISTANCE WITH ACTIVITIES OF DAILY LIVING (ADL): ICD-10-CM

## 2023-05-25 PROCEDURE — 1111F DSCHRG MED/CURRENT MED MERGE: CPT | Performed by: NURSE PRACTITIONER

## 2023-05-25 PROCEDURE — 99310 SBSQ NF CARE HIGH MDM 45: CPT | Performed by: NURSE PRACTITIONER

## 2023-05-29 ENCOUNTER — EXTERNAL FACILITY (OUTPATIENT)
Dept: INTERNAL MEDICINE CLINIC | Facility: CLINIC | Age: 88
End: 2023-05-29

## 2023-05-29 DIAGNOSIS — S22.009D CLOSED FRACTURE OF THORACIC VERTEBRA WITH ROUTINE HEALING, UNSPECIFIED FRACTURE MORPHOLOGY, UNSPECIFIED THORACIC VERTEBRAL LEVEL, SUBSEQUENT ENCOUNTER: ICD-10-CM

## 2023-05-29 DIAGNOSIS — N39.43 BENIGN PROSTATIC HYPERPLASIA WITH POST-VOID DRIBBLING: ICD-10-CM

## 2023-05-29 DIAGNOSIS — N40.1 BENIGN PROSTATIC HYPERPLASIA WITH POST-VOID DRIBBLING: ICD-10-CM

## 2023-05-29 DIAGNOSIS — I10 ESSENTIAL HYPERTENSION: ICD-10-CM

## 2023-05-29 DIAGNOSIS — M54.9 INTRACTABLE BACK PAIN: ICD-10-CM

## 2023-05-29 DIAGNOSIS — Z96.651 HISTORY OF KNEE REPLACEMENT PROCEDURE OF RIGHT KNEE: ICD-10-CM

## 2023-05-29 DIAGNOSIS — J44.9 CHRONIC OBSTRUCTIVE PULMONARY DISEASE, UNSPECIFIED COPD TYPE (HCC): ICD-10-CM

## 2023-05-30 ENCOUNTER — TELEPHONE (OUTPATIENT)
Dept: ORTHOPEDICS CLINIC | Facility: CLINIC | Age: 88
End: 2023-05-30

## 2023-05-30 ENCOUNTER — SNF VISIT (OUTPATIENT)
Dept: INTERNAL MEDICINE CLINIC | Facility: SKILLED NURSING FACILITY | Age: 88
End: 2023-05-30

## 2023-05-30 DIAGNOSIS — Z96.652 HISTORY OF LEFT KNEE REPLACEMENT: ICD-10-CM

## 2023-05-30 DIAGNOSIS — Z74.1 REQUIRES ASSISTANCE WITH ACTIVITIES OF DAILY LIVING (ADL): ICD-10-CM

## 2023-05-30 DIAGNOSIS — R19.4 CHANGE IN BOWEL HABITS: ICD-10-CM

## 2023-05-30 DIAGNOSIS — S22.009S CLOSED FRACTURE OF MULTIPLE THORACIC VERTEBRAE, SEQUELA: ICD-10-CM

## 2023-05-30 DIAGNOSIS — Z79.899 MEDICATION MANAGEMENT: ICD-10-CM

## 2023-05-30 DIAGNOSIS — M54.6 THORACIC BACK PAIN, UNSPECIFIED BACK PAIN LATERALITY, UNSPECIFIED CHRONICITY: ICD-10-CM

## 2023-05-30 PROCEDURE — 99310 SBSQ NF CARE HIGH MDM 45: CPT | Performed by: NURSE PRACTITIONER

## 2023-05-30 PROCEDURE — 1111F DSCHRG MED/CURRENT MED MERGE: CPT | Performed by: NURSE PRACTITIONER

## 2023-05-30 NOTE — TELEPHONE ENCOUNTER
ST. DIGNA FLOYD and she states pt wants to [facility] forward with kyphoplasty.  I advised her Dr Mere Lynch does not do back sx and she is contacting the wrong MD. She will f/u with pt regarding who is treating his back

## 2023-06-03 ENCOUNTER — EXTERNAL FACILITY (OUTPATIENT)
Dept: INTERNAL MEDICINE CLINIC | Facility: CLINIC | Age: 88
End: 2023-06-03

## 2023-06-03 DIAGNOSIS — I10 ESSENTIAL HYPERTENSION: ICD-10-CM

## 2023-06-03 DIAGNOSIS — N39.43 BENIGN PROSTATIC HYPERPLASIA WITH POST-VOID DRIBBLING: ICD-10-CM

## 2023-06-03 DIAGNOSIS — I70.0 AORTIC ATHEROSCLEROSIS (HCC): ICD-10-CM

## 2023-06-03 DIAGNOSIS — I35.0 AORTIC VALVE STENOSIS, ETIOLOGY OF CARDIAC VALVE DISEASE UNSPECIFIED: ICD-10-CM

## 2023-06-03 DIAGNOSIS — D50.9 IRON DEFICIENCY ANEMIA, UNSPECIFIED IRON DEFICIENCY ANEMIA TYPE: ICD-10-CM

## 2023-06-03 DIAGNOSIS — N40.1 BENIGN PROSTATIC HYPERPLASIA WITH POST-VOID DRIBBLING: ICD-10-CM

## 2023-06-03 DIAGNOSIS — Z96.651 HISTORY OF KNEE REPLACEMENT PROCEDURE OF RIGHT KNEE: ICD-10-CM

## 2023-06-03 DIAGNOSIS — M48.061 SPINAL STENOSIS OF LUMBAR REGION, UNSPECIFIED WHETHER NEUROGENIC CLAUDICATION PRESENT: ICD-10-CM

## 2023-06-03 DIAGNOSIS — S22.009D CLOSED FRACTURE OF THORACIC VERTEBRA WITH ROUTINE HEALING, UNSPECIFIED FRACTURE MORPHOLOGY, UNSPECIFIED THORACIC VERTEBRAL LEVEL, SUBSEQUENT ENCOUNTER: ICD-10-CM

## 2023-06-03 DIAGNOSIS — J44.9 CHRONIC OBSTRUCTIVE PULMONARY DISEASE, UNSPECIFIED COPD TYPE (HCC): ICD-10-CM

## 2023-06-03 NOTE — PROGRESS NOTES
pt seen 6/2/23 pp nh     seen in room,   \  doing ok,     Continue with pt     Vitals stable          subj   + low back pain   + rt knee pain   No cp or sob   Vit stable, pt on o2 by NC   obj   cv s1 s2   Chest clear   abd softLow back pain   Rt knee pain post surgery   A/P   s/p Mechanical fall with T7, T8, T9, T11, T12, L1 mildly displaced fracture/contusion   -NeuroSurgery  consulted: : no surgical intervention recommended   TLSO brace   Pain meds       #T8 marrow replacing process   -MRI Thoracic spine: suspicious for malignant process   -Neurosurgery recommend IR guided bx   -IR consulted:  S//p  T8 bx: pathology   Path report was benign       #Acute hypoxic respiratory failure, improved   #Possible CAP vs HAP pneumonia   Treated with iv and then po ab   Of o2 , will monitor in the nh       #Recent R TKA 4/3/23   Pt/ot, to follow with ortho       #h/o Urinary Retention   ost void residuals 30 ml, no need for catheterization            #Deconditioned state   Pt/ot

## 2023-06-03 NOTE — PROGRESS NOTES
pt seen 5/31/23 pp nh     seen in room,   \  doing ok, brace is of now no need to wear, working with pt ,    latest labs and vitals noted          subj   + low back pain   + rt knee pain   No cp or sob   Vit stable, pt on o2 by NC   obj   cv s1 s2   Chest clear   abd softLow back pain   Rt knee pain post surgery   A/P   s/p Mechanical fall with T7, T8, T9, T11, T12, L1 mildly displaced fracture/contusion   -NeuroSurgery  consulted: : no surgical intervention recommended   TLSO brace   Pain meds       #T8 marrow replacing process   -MRI Thoracic spine: suspicious for malignant process   -Neurosurgery recommend IR guided bx   -IR consulted:  S//p  T8 bx: pathology   Path report was benign       #Acute hypoxic respiratory failure, improved   #Possible CAP vs HAP pneumonia   Treated with iv and then po ab   Of o2 , will monitor in the nh       #Recent R TKA 4/3/23   Pt/ot, to follow with ortho       #h/o Urinary Retention   ost void residuals 30 ml, no need for catheterization            #Deconditioned state   Pt/ot

## 2023-06-05 PROCEDURE — 99308 SBSQ NF CARE LOW MDM 20: CPT | Performed by: INTERNAL MEDICINE

## 2023-06-05 PROCEDURE — 1111F DSCHRG MED/CURRENT MED MERGE: CPT | Performed by: INTERNAL MEDICINE

## 2023-06-07 PROCEDURE — 99308 SBSQ NF CARE LOW MDM 20: CPT | Performed by: INTERNAL MEDICINE

## 2023-06-07 PROCEDURE — 1111F DSCHRG MED/CURRENT MED MERGE: CPT | Performed by: INTERNAL MEDICINE

## 2023-06-08 ENCOUNTER — SNF VISIT (OUTPATIENT)
Dept: INTERNAL MEDICINE CLINIC | Facility: SKILLED NURSING FACILITY | Age: 88
End: 2023-06-08

## 2023-06-08 DIAGNOSIS — S22.009G CLOSED FRACTURE OF MULTIPLE THORACIC VERTEBRAE WITH DELAYED HEALING, SUBSEQUENT ENCOUNTER: ICD-10-CM

## 2023-06-08 DIAGNOSIS — Z96.652 STATUS POST LEFT KNEE REPLACEMENT: ICD-10-CM

## 2023-06-08 DIAGNOSIS — Z79.899 MEDICATION MANAGEMENT: ICD-10-CM

## 2023-06-08 DIAGNOSIS — Z76.0 PRESCRIPTION REFILL: ICD-10-CM

## 2023-06-08 DIAGNOSIS — R52 PAIN: ICD-10-CM

## 2023-06-08 DIAGNOSIS — I10 BENIGN ESSENTIAL HTN: ICD-10-CM

## 2023-06-08 PROCEDURE — 99309 SBSQ NF CARE MODERATE MDM 30: CPT | Performed by: NURSE PRACTITIONER

## 2023-06-08 PROCEDURE — 1111F DSCHRG MED/CURRENT MED MERGE: CPT | Performed by: NURSE PRACTITIONER

## 2023-06-11 ENCOUNTER — EXTERNAL FACILITY (OUTPATIENT)
Dept: INTERNAL MEDICINE CLINIC | Facility: CLINIC | Age: 88
End: 2023-06-11

## 2023-06-11 DIAGNOSIS — M54.9 INTRACTABLE BACK PAIN: ICD-10-CM

## 2023-06-11 DIAGNOSIS — J44.9 CHRONIC OBSTRUCTIVE PULMONARY DISEASE, UNSPECIFIED COPD TYPE (HCC): ICD-10-CM

## 2023-06-11 DIAGNOSIS — I10 ESSENTIAL HYPERTENSION: ICD-10-CM

## 2023-06-11 DIAGNOSIS — M48.061 SPINAL STENOSIS OF LUMBAR REGION, UNSPECIFIED WHETHER NEUROGENIC CLAUDICATION PRESENT: ICD-10-CM

## 2023-06-11 DIAGNOSIS — Z96.651 HISTORY OF KNEE REPLACEMENT PROCEDURE OF RIGHT KNEE: ICD-10-CM

## 2023-06-11 DIAGNOSIS — S22.009D CLOSED FRACTURE OF THORACIC VERTEBRA WITH ROUTINE HEALING, UNSPECIFIED FRACTURE MORPHOLOGY, UNSPECIFIED THORACIC VERTEBRAL LEVEL, SUBSEQUENT ENCOUNTER: ICD-10-CM

## 2023-06-11 DIAGNOSIS — I10 ESSENTIAL HYPERTENSION WITH GOAL BLOOD PRESSURE LESS THAN 140/90: ICD-10-CM

## 2023-06-11 NOTE — PROGRESS NOTES
pt seen 6/7/23 pp nh     continue with pt     Continue pain management     will follow with IR for procedure next week      Continue with pt   Vitals stable   subj   + low back pain   + rt knee pain   No cp or sob   Vit stable, pt on o2 by NC   obj   cv s1 s2   Chest clear   abd softLow back pain   Rt knee painpost surgery   A/P   s/p Mechanical fall with T7, T8, T9, T11, T12, L1 mildly displaced fracture/contusion   -NeuroSurgery consulted: : no surgical intervention recommended   TLSO brace   Pain meds   #T8 marrow replacing process   -MRI Thoracic spine: suspicious for malignant process   -Neurosurgery recommend IR guided bx   -IR consulted: S//p T8 bx: pathology   Path report was benign   #Acute hypoxic respiratory failure, improved   #Possible CAP vs HAP pneumonia   Treated with iv and then poab   Of o2 , will monitor in the nh   #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

## 2023-06-11 NOTE — PROGRESS NOTES
pt seen 6/5/23 pp nh   seen in room,  continues to have back pain    will follow with IR for procedure next week      Continue with pt   Vitals stable   subj   + low back pain   + rt knee pain   No cp or sob   Vit stable, pt on o2 by NC   obj   cv s1 s2   Chest clear   abd softLow back pain   Rt knee painpost surgery   A/P   s/p Mechanical fall with T7, T8, T9, T11, T12, L1 mildly displaced fracture/contusion   -NeuroSurgery consulted: : no surgical intervention recommended   TLSO brace   Pain meds   #T8 marrow replacing process   -MRI Thoracic spine: suspicious for malignant process   -Neurosurgery recommend IR guided bx   -IR consulted: S//p T8 bx: pathology   Path report was benign   #Acute hypoxic respiratory failure, improved   #Possible CAP vs HAP pneumonia   Treated with iv and then poab   Of o2 , will monitor in the nh   #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

## 2023-06-12 ENCOUNTER — ANESTHESIA (OUTPATIENT)
Dept: INTERVENTIONAL RADIOLOGY/VASCULAR | Facility: HOSPITAL | Age: 88
End: 2023-06-12
Payer: MEDICARE

## 2023-06-12 ENCOUNTER — HOSPITAL ENCOUNTER (OUTPATIENT)
Dept: INTERVENTIONAL RADIOLOGY/VASCULAR | Facility: HOSPITAL | Age: 88
Discharge: NURSING, REHAB OR LONG-TERM CARE | End: 2023-06-12
Attending: PHYSICIAN ASSISTANT | Admitting: RADIOLOGY
Payer: MEDICARE

## 2023-06-12 VITALS
HEIGHT: 68 IN | WEIGHT: 154.81 LBS | TEMPERATURE: 98 F | OXYGEN SATURATION: 93 % | BODY MASS INDEX: 23.46 KG/M2 | HEART RATE: 77 BPM | RESPIRATION RATE: 21 BRPM | SYSTOLIC BLOOD PRESSURE: 118 MMHG | DIASTOLIC BLOOD PRESSURE: 54 MMHG

## 2023-06-12 DIAGNOSIS — S22.060D COMPRESSION FRACTURE OF T8 VERTEBRA WITH ROUTINE HEALING, SUBSEQUENT ENCOUNTER: ICD-10-CM

## 2023-06-12 DIAGNOSIS — M47.814 THORACIC SPONDYLOSIS: ICD-10-CM

## 2023-06-12 DIAGNOSIS — S22.000D THORACIC COMPRESSION FRACTURE, WITH ROUTINE HEALING, SUBSEQUENT ENCOUNTER: ICD-10-CM

## 2023-06-12 PROCEDURE — 36415 COLL VENOUS BLD VENIPUNCTURE: CPT

## 2023-06-12 PROCEDURE — 0PS43ZZ REPOSITION THORACIC VERTEBRA, PERCUTANEOUS APPROACH: ICD-10-PCS | Performed by: RADIOLOGY

## 2023-06-12 PROCEDURE — 22515 PERQ VERTEBRAL AUGMENTATION: CPT | Performed by: RADIOLOGY

## 2023-06-12 PROCEDURE — 22513 PERQ VERTEBRAL AUGMENTATION: CPT | Performed by: RADIOLOGY

## 2023-06-12 PROCEDURE — 0PU43JZ SUPPLEMENT THORACIC VERTEBRA WITH SYNTHETIC SUBSTITUTE, PERCUTANEOUS APPROACH: ICD-10-PCS | Performed by: RADIOLOGY

## 2023-06-12 PROCEDURE — S0077 INJECTION, CLINDAMYCIN PHOSP: HCPCS | Performed by: GENERAL ACUTE CARE HOSPITAL

## 2023-06-12 RX ORDER — MORPHINE SULFATE 2 MG/ML
2 INJECTION, SOLUTION INTRAMUSCULAR; INTRAVENOUS EVERY 10 MIN PRN
Status: DISCONTINUED | OUTPATIENT
Start: 2023-06-12 | End: 2023-06-12 | Stop reason: HOSPADM

## 2023-06-12 RX ORDER — METOPROLOL TARTRATE 5 MG/5ML
2.5 INJECTION INTRAVENOUS ONCE
Status: DISCONTINUED | OUTPATIENT
Start: 2023-06-12 | End: 2023-06-12 | Stop reason: HOSPADM

## 2023-06-12 RX ORDER — CLINDAMYCIN PHOSPHATE 900 MG/50ML
INJECTION INTRAVENOUS
Status: COMPLETED
Start: 2023-06-12 | End: 2023-06-12

## 2023-06-12 RX ORDER — METOCLOPRAMIDE HYDROCHLORIDE 5 MG/ML
10 INJECTION INTRAMUSCULAR; INTRAVENOUS EVERY 8 HOURS PRN
Status: DISCONTINUED | OUTPATIENT
Start: 2023-06-12 | End: 2023-06-12 | Stop reason: HOSPADM

## 2023-06-12 RX ORDER — LIDOCAINE HYDROCHLORIDE 10 MG/ML
INJECTION, SOLUTION EPIDURAL; INFILTRATION; INTRACAUDAL; PERINEURAL AS NEEDED
Status: DISCONTINUED | OUTPATIENT
Start: 2023-06-12 | End: 2023-06-14 | Stop reason: SURG

## 2023-06-12 RX ORDER — CETIRIZINE HYDROCHLORIDE 10 MG/1
10 TABLET ORAL
COMMUNITY

## 2023-06-12 RX ORDER — SODIUM CHLORIDE 9 MG/ML
INJECTION, SOLUTION INTRAVENOUS CONTINUOUS
Status: DISCONTINUED | OUTPATIENT
Start: 2023-06-12 | End: 2023-06-12

## 2023-06-12 RX ORDER — CEFAZOLIN SODIUM/WATER 2 G/20 ML
SYRINGE (ML) INTRAVENOUS
Status: DISCONTINUED
Start: 2023-06-12 | End: 2023-06-12 | Stop reason: WASHOUT

## 2023-06-12 RX ORDER — SODIUM CHLORIDE, SODIUM LACTATE, POTASSIUM CHLORIDE, CALCIUM CHLORIDE 600; 310; 30; 20 MG/100ML; MG/100ML; MG/100ML; MG/100ML
INJECTION, SOLUTION INTRAVENOUS CONTINUOUS PRN
Status: DISCONTINUED | OUTPATIENT
Start: 2023-06-12 | End: 2023-06-12

## 2023-06-12 RX ORDER — ONDANSETRON 2 MG/ML
INJECTION INTRAMUSCULAR; INTRAVENOUS AS NEEDED
Status: DISCONTINUED | OUTPATIENT
Start: 2023-06-12 | End: 2023-06-14 | Stop reason: SURG

## 2023-06-12 RX ORDER — OMEPRAZOLE 20 MG/1
20 CAPSULE, DELAYED RELEASE ORAL
COMMUNITY

## 2023-06-12 RX ORDER — ONDANSETRON 2 MG/ML
4 INJECTION INTRAMUSCULAR; INTRAVENOUS EVERY 6 HOURS PRN
Status: DISCONTINUED | OUTPATIENT
Start: 2023-06-12 | End: 2023-06-12 | Stop reason: HOSPADM

## 2023-06-12 RX ORDER — HYDROMORPHONE HYDROCHLORIDE 1 MG/ML
0.4 INJECTION, SOLUTION INTRAMUSCULAR; INTRAVENOUS; SUBCUTANEOUS EVERY 5 MIN PRN
Status: DISCONTINUED | OUTPATIENT
Start: 2023-06-12 | End: 2023-06-12 | Stop reason: HOSPADM

## 2023-06-12 RX ORDER — PHENYLEPHRINE HCL 10 MG/ML
VIAL (ML) INJECTION AS NEEDED
Status: DISCONTINUED | OUTPATIENT
Start: 2023-06-12 | End: 2023-06-14 | Stop reason: SURG

## 2023-06-12 RX ORDER — MORPHINE SULFATE 4 MG/ML
4 INJECTION, SOLUTION INTRAMUSCULAR; INTRAVENOUS EVERY 10 MIN PRN
Status: DISCONTINUED | OUTPATIENT
Start: 2023-06-12 | End: 2023-06-12 | Stop reason: HOSPADM

## 2023-06-12 RX ORDER — MELATONIN
325
COMMUNITY

## 2023-06-12 RX ORDER — SODIUM CHLORIDE, SODIUM LACTATE, POTASSIUM CHLORIDE, CALCIUM CHLORIDE 600; 310; 30; 20 MG/100ML; MG/100ML; MG/100ML; MG/100ML
INJECTION, SOLUTION INTRAVENOUS CONTINUOUS
Status: DISCONTINUED | OUTPATIENT
Start: 2023-06-12 | End: 2023-06-12 | Stop reason: HOSPADM

## 2023-06-12 RX ORDER — CLINDAMYCIN PHOSPHATE 150 MG/ML
INJECTION, SOLUTION INTRAVENOUS AS NEEDED
Status: DISCONTINUED | OUTPATIENT
Start: 2023-06-12 | End: 2023-06-14 | Stop reason: SURG

## 2023-06-12 RX ORDER — DOXEPIN HYDROCHLORIDE 50 MG/1
1 CAPSULE ORAL
COMMUNITY

## 2023-06-12 RX ORDER — HYDROMORPHONE HYDROCHLORIDE 1 MG/ML
0.6 INJECTION, SOLUTION INTRAMUSCULAR; INTRAVENOUS; SUBCUTANEOUS EVERY 5 MIN PRN
Status: DISCONTINUED | OUTPATIENT
Start: 2023-06-12 | End: 2023-06-12 | Stop reason: HOSPADM

## 2023-06-12 RX ORDER — BISACODYL 10 MG
10 SUPPOSITORY, RECTAL RECTAL DAILY PRN
COMMUNITY

## 2023-06-12 RX ORDER — LIDOCAINE HYDROCHLORIDE 20 MG/ML
INJECTION, SOLUTION EPIDURAL; INFILTRATION; INTRACAUDAL; PERINEURAL
Status: COMPLETED
Start: 2023-06-12 | End: 2023-06-12

## 2023-06-12 RX ORDER — HYDROMORPHONE HYDROCHLORIDE 1 MG/ML
0.2 INJECTION, SOLUTION INTRAMUSCULAR; INTRAVENOUS; SUBCUTANEOUS EVERY 5 MIN PRN
Status: DISCONTINUED | OUTPATIENT
Start: 2023-06-12 | End: 2023-06-12 | Stop reason: HOSPADM

## 2023-06-12 RX ORDER — MORPHINE SULFATE 10 MG/ML
6 INJECTION, SOLUTION INTRAMUSCULAR; INTRAVENOUS EVERY 10 MIN PRN
Status: DISCONTINUED | OUTPATIENT
Start: 2023-06-12 | End: 2023-06-12 | Stop reason: HOSPADM

## 2023-06-12 RX ORDER — NALOXONE HYDROCHLORIDE 0.4 MG/ML
80 INJECTION, SOLUTION INTRAMUSCULAR; INTRAVENOUS; SUBCUTANEOUS AS NEEDED
Status: DISCONTINUED | OUTPATIENT
Start: 2023-06-12 | End: 2023-06-12 | Stop reason: HOSPADM

## 2023-06-12 RX ADMIN — PHENYLEPHRINE HCL 300 MCG: 10 MG/ML VIAL (ML) INJECTION at 14:02:00

## 2023-06-12 RX ADMIN — SODIUM CHLORIDE: 9 INJECTION, SOLUTION INTRAVENOUS at 14:25:00

## 2023-06-12 RX ADMIN — SODIUM CHLORIDE: 9 INJECTION, SOLUTION INTRAVENOUS at 14:10:00

## 2023-06-12 RX ADMIN — PHENYLEPHRINE HCL 700 MCG: 10 MG/ML VIAL (ML) INJECTION at 13:42:00

## 2023-06-12 RX ADMIN — ONDANSETRON 4 MG: 2 INJECTION INTRAMUSCULAR; INTRAVENOUS at 14:18:00

## 2023-06-12 RX ADMIN — PHENYLEPHRINE HCL 300 MCG: 10 MG/ML VIAL (ML) INJECTION at 13:54:00

## 2023-06-12 RX ADMIN — CLINDAMYCIN PHOSPHATE 900 MG: 150 INJECTION, SOLUTION INTRAVENOUS at 13:35:00

## 2023-06-12 RX ADMIN — LIDOCAINE HYDROCHLORIDE 50 MG: 10 INJECTION, SOLUTION EPIDURAL; INFILTRATION; INTRACAUDAL; PERINEURAL at 13:28:00

## 2023-06-12 RX ADMIN — SODIUM CHLORIDE: 9 INJECTION, SOLUTION INTRAVENOUS at 11:00:00

## 2023-06-12 RX ADMIN — SODIUM CHLORIDE: 9 INJECTION, SOLUTION INTRAVENOUS at 13:13:00

## 2023-06-12 NOTE — INTERVAL H&P NOTE
The above referenced H&P was reviewed by Franny Allen MD on 6/12/2023, the patient was examined and no significant changes have occurred in the patient's condition since the H&P was performed. Risks, benefits, alternative treatments and consequences of no treatment were discussed. We will proceed with procedure as planned.       Franny Allen MD  6/12/2023  10:38 AM

## 2023-06-12 NOTE — DISCHARGE INSTRUCTIONS
[address]  4.2  [phone]     Patient Name:  Shoaib Hillman    Procedure:  Kyphoplasty T7, T8, T9    Site Care: Remove your band-aid or dressing in 24 hours. Gently wash area with soap and water. Activity/Diet  No heavy lifting or strenuous activity for 48 hours. Drink plenty of fluids, unless you have otherwise been told to restrict your fluid intake. Do not drink alcohol for 24 hours. Do not drive,  operate heavy machinery, make important decisions or sign legal documents today. Medications: Take acetaminophen if needed for pain. Do not exceed 4000mg of acetaminophen in a 24-hour period. , Do not take blood thinners, aspirin, or Plavix for 24 hours. , and Make no changes to your existing medications. Contact Interventional Radiology at [phone] if you have severe/unrelieved pain, fever, chills, dizziness/lightheadedness, or drainage/bleeding from your incision site.

## 2023-06-12 NOTE — ANESTHESIA PROCEDURE NOTES
Airway  Date/Time: 6/12/2023 1:28 PM  Urgency: Elective      General Information and Staff    Patient location during procedure: OR  Anesthesiologist: Beatrice Cary MD  Performed: anesthesiologist   Performed by: Jonathan Putnam MD  Authorized by: Beatrice Cary MD      Indications and Patient Condition  Indications for airway management: anesthesia  Sedation level: deep  Preoxygenated: yes  Patient position: sniffing  Mask difficulty assessment: 0 - not attempted    Final Airway Details  Final airway type: endotracheal airway      Successful airway: ETT  Cuffed: yes   Successful intubation technique: Video laryngoscopy  Endotracheal tube insertion site: oral  Blade: GlideScope  Blade size: #3  ETT size (mm): 7.5    Cormack-Lehane Classification: grade I - full view of glottis  Placement verified by: capnometry   Inital cuff pressure (cm H2O): 10  Measured from: teeth  ETT to teeth (cm): 21  Number of attempts at approach: 1

## 2023-06-12 NOTE — IVS NOTE
Hand-Off     Procedure hand off report given to Gerald MENDES at [employer] vital signs are stable. Procedural access site is dry and intact with no signs and symptoms of bleeding and hematoma. Dressing to be removed tomorrow, 6/13 after 3pm   Continue same meds, Heparin subcutaneous resumed tomorrow   Dr. Loli Singh spoke with patient pre procedure. DISCHARGE NOTE     Pt is able to sit up and transfer with assistance   Pt tolerated fluids and food. Procedural site remains dry and intact with good circulation, motion, and sensation. No signs and symptoms of bleeding/hematoma noted. IV access removed  Instruction provided, patient verbalizes understanding. Dr. Loli Singh spoke with patient post procedure.      Pt discharge via wheelchair to Discharge Door to [facility]     Follow up Appointment: 6/21/23 10 am with Harish THOMAS     New Prescription: n/a

## 2023-06-13 ENCOUNTER — INITIAL APN SNF VISIT (OUTPATIENT)
Dept: INTERNAL MEDICINE CLINIC | Facility: SKILLED NURSING FACILITY | Age: 88
End: 2023-06-13

## 2023-06-13 DIAGNOSIS — Z96.651 HISTORY OF KNEE REPLACEMENT PROCEDURE OF RIGHT KNEE: ICD-10-CM

## 2023-06-13 DIAGNOSIS — Z79.899 MEDICATION MANAGEMENT: ICD-10-CM

## 2023-06-13 DIAGNOSIS — Z98.890 S/P KYPHOPLASTY: ICD-10-CM

## 2023-06-13 DIAGNOSIS — Z74.1 REQUIRES ASSISTANCE WITH ACTIVITIES OF DAILY LIVING (ADL): ICD-10-CM

## 2023-06-13 DIAGNOSIS — M54.6 THORACIC BACK PAIN, UNSPECIFIED BACK PAIN LATERALITY, UNSPECIFIED CHRONICITY: ICD-10-CM

## 2023-06-13 PROCEDURE — 99310 SBSQ NF CARE HIGH MDM 45: CPT | Performed by: NURSE PRACTITIONER

## 2023-06-18 ENCOUNTER — EXTERNAL FACILITY (OUTPATIENT)
Dept: INTERNAL MEDICINE CLINIC | Facility: CLINIC | Age: 88
End: 2023-06-18

## 2023-06-18 DIAGNOSIS — I10 ESSENTIAL HYPERTENSION: ICD-10-CM

## 2023-06-18 DIAGNOSIS — Z98.890 S/P KYPHOPLASTY: ICD-10-CM

## 2023-06-18 DIAGNOSIS — M54.9 INTRACTABLE BACK PAIN: ICD-10-CM

## 2023-06-18 DIAGNOSIS — J44.9 CHRONIC OBSTRUCTIVE PULMONARY DISEASE, UNSPECIFIED COPD TYPE (HCC): ICD-10-CM

## 2023-06-18 DIAGNOSIS — I10 ESSENTIAL HYPERTENSION WITH GOAL BLOOD PRESSURE LESS THAN 140/90: ICD-10-CM

## 2023-06-18 DIAGNOSIS — N40.1 BENIGN PROSTATIC HYPERPLASIA WITH POST-VOID DRIBBLING: ICD-10-CM

## 2023-06-18 DIAGNOSIS — N39.43 BENIGN PROSTATIC HYPERPLASIA WITH POST-VOID DRIBBLING: ICD-10-CM

## 2023-06-18 DIAGNOSIS — S22.009D CLOSED FRACTURE OF THORACIC VERTEBRA WITH ROUTINE HEALING, UNSPECIFIED FRACTURE MORPHOLOGY, UNSPECIFIED THORACIC VERTEBRAL LEVEL, SUBSEQUENT ENCOUNTER: ICD-10-CM

## 2023-06-18 NOTE — PROGRESS NOTES
pt seen 6/14/23 pp nh       continue with pt     Continue pain management  Had  IR KYPHOPLASTY on Monday 6/12/23     Continue with pt   Vitals stable   subj   + low back pain   + rt knee pain   No cp or sob   Vit stable, pt on o2 by NC   obj   cv s1 s2   Chest clear   abd softLow back pain   Rt knee painpost surgery   A/P   s/p Mechanical fall with T7, T8, T9, T11, T12, L1 mildly displaced fracture/contusion   -NeuroSurgery consulted: : no surgical intervention recommended   TLSO brace   Pain meds#T8 marrow replacing process   -MRI Thoracic spine: suspicious for malignant process   -Neurosurgery recommend IR guided bx   -IR consulted: S//p T8 bx: pathology   Path report was benign   #Acute hypoxic respiratory failure, improved   #Possible CAP vsHAP pneumonia   Treated with iv and then poab   Of o2 , will monitor in the nh   #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

## 2023-06-18 NOTE — PROGRESS NOTES
pt seen 6/12/23 pp nh       continue with pt     Continue pain management   will follow with IR today for  procedure    Continue with pt   Vitals stable   subj   + low back pain   + rt knee pain   No cp or sob   Vit stable, pt on o2 by NC   obj   cv s1 s2   Chest clear   abd softLow back pain   Rt knee painpost surgery   A/P   s/p Mechanical fall with T7, T8, T9, T11, T12, L1 mildly displaced fracture/contusion   -NeuroSurgery consulted: : no surgical intervention recommended   TLSO brace   Pain meds#T8 marrow replacing process   -MRI Thoracic spine: suspicious for malignant process   -Neurosurgery recommend IR guided bx   -IR consulted: S//p T8 bx: pathology   Path report was benign   #Acute hypoxic respiratory failure, improved   #Possible CAP vsHAP pneumonia   Treated with iv and then poab   Of o2 , will monitor in the nh   #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

## 2023-06-19 PROCEDURE — 99308 SBSQ NF CARE LOW MDM 20: CPT | Performed by: INTERNAL MEDICINE

## 2023-06-21 PROCEDURE — 99308 SBSQ NF CARE LOW MDM 20: CPT | Performed by: INTERNAL MEDICINE

## 2023-06-23 PROCEDURE — 99308 SBSQ NF CARE LOW MDM 20: CPT | Performed by: INTERNAL MEDICINE

## 2023-06-25 ENCOUNTER — EXTERNAL FACILITY (OUTPATIENT)
Dept: INTERNAL MEDICINE CLINIC | Facility: CLINIC | Age: 88
End: 2023-06-25

## 2023-06-25 DIAGNOSIS — M48.061 SPINAL STENOSIS OF LUMBAR REGION, UNSPECIFIED WHETHER NEUROGENIC CLAUDICATION PRESENT: ICD-10-CM

## 2023-06-25 DIAGNOSIS — S22.009D CLOSED FRACTURE OF THORACIC VERTEBRA WITH ROUTINE HEALING, UNSPECIFIED FRACTURE MORPHOLOGY, UNSPECIFIED THORACIC VERTEBRAL LEVEL, SUBSEQUENT ENCOUNTER: ICD-10-CM

## 2023-06-25 DIAGNOSIS — M54.9 INTRACTABLE BACK PAIN: ICD-10-CM

## 2023-06-25 DIAGNOSIS — J44.9 CHRONIC OBSTRUCTIVE PULMONARY DISEASE, UNSPECIFIED COPD TYPE (HCC): ICD-10-CM

## 2023-06-25 DIAGNOSIS — N40.1 BENIGN PROSTATIC HYPERPLASIA WITH POST-VOID DRIBBLING: ICD-10-CM

## 2023-06-25 DIAGNOSIS — N39.43 BENIGN PROSTATIC HYPERPLASIA WITH POST-VOID DRIBBLING: ICD-10-CM

## 2023-06-25 DIAGNOSIS — I10 ESSENTIAL HYPERTENSION: ICD-10-CM

## 2023-06-25 DIAGNOSIS — Z96.651 HISTORY OF KNEE REPLACEMENT PROCEDURE OF RIGHT KNEE: ICD-10-CM

## 2023-06-25 DIAGNOSIS — I70.0 AORTIC ATHEROSCLEROSIS (HCC): ICD-10-CM

## 2023-06-26 NOTE — PROGRESS NOTES
pt seen 6/19/23 pp nh    feels better over all, working with pt     continue with current care      continue with pt   Continue pain management   Had IR KYPHOPLASTY on Monday 6/12/23   Continue with pt   Vitals stable   subj   + low back pain   + rt knee pain   No cp or sob   Vit stable, pt on o2 by NC   obj   cv s1 [id number] clear   abd softLow back pain   Rt knee painpost surgery   A/P   s/p Mechanical fall with T7, T8, T9, T11, T12, L1 mildly displaced fracture/contusion   -NeuroSurgery consulted: : no surgical intervention recommended   TLSO brace   Pain meds#T8 marrow replacing process   -MRI Thoracic spine: suspicious for malignant process   -Neurosurgery recommend IR guided bx   -IR consulted: S//p T8 bx: pathology   Path report was benign   #Acute hypoxic respiratory failure, improved   #Possible CAP vsHAP pneumonia   Treated with iv and then poab   Of o2 , will monitor in the nh   #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

## 2023-06-26 NOTE — PROGRESS NOTES
pt seen 6/21/23 pp nh    , working with pt     Latest labs and vitals stable     continue with current care      continue with pt   Continue pain management   Had IR KYPHOPLASTY on Monday 6/12/23   Continue with pt   Vitals stable   subj   + low back pain   + rt knee pain   No cp or sob   Vit stable, pt on o2 by NC   obj   cv s1 [id number] clear   abd softLow back pain   Rt knee painpost surgery   A/P   s/p Mechanical fall with T7, T8, T9, T11, T12, L1 mildly displaced fracture/contusion   -NeuroSurgery consulted: : no surgical intervention recommended   TLSO brace   Pain meds#T8 marrow replacing process   -MRI Thoracic spine: suspicious for malignant process   -Neurosurgery recommend IR guided bx   -IR consulted: S//p T8 bx: pathology   Path report was benign   #Acute hypoxic respiratory failure, improved   #Possible CAP vsHAP pneumonia   Treated with iv and then poab   Of o2 , will monitor in the nh   #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

## 2023-06-26 NOTE — PROGRESS NOTES
pt seen 6/23/23 pp nh    continue to work  with pt     no new complaints     nursing notes noted       continue with pt   Continue pain management   Had IR KYPHOPLASTY on Monday 6/12/23   Continue with pt   Vitals stable   subj   + low back pain   + rt knee pain   No cp or sob   Vit stable, pt on o2 by NC   obj   cv s1 [id number] clear   abd softLow back pain   Rt knee painpost surgery   A/P   s/p Mechanical fall with T7, T8, T9, T11, T12, L1 mildly displaced fracture/contusion   -NeuroSurgery consulted: : no surgical intervention recommended   TLSO brace   Pain meds#T8 marrow replacing process   -MRI Thoracic spine: suspicious for malignant process   -Neurosurgery recommend IR guided bx   -IR consulted: S//p T8 bx: pathology   Path report was benign   #Acute hypoxic respiratory failure, improved   #Possible CAP vsHAP pneumonia   Treated with iv and then poab   Of o2 , will monitor in the nh   #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

## 2023-06-29 ENCOUNTER — OFFICE VISIT (OUTPATIENT)
Dept: ORTHOPEDICS CLINIC | Facility: CLINIC | Age: 88
End: 2023-06-29

## 2023-06-29 ENCOUNTER — HOSPITAL ENCOUNTER (OUTPATIENT)
Dept: GENERAL RADIOLOGY | Facility: HOSPITAL | Age: 88
Discharge: HOME | End: 2023-06-29
Attending: ORTHOPAEDIC SURGERY
Payer: MEDICARE

## 2023-06-29 DIAGNOSIS — Z47.89 ORTHOPEDIC AFTERCARE: ICD-10-CM

## 2023-06-29 DIAGNOSIS — M17.12 PRIMARY OSTEOARTHRITIS OF LEFT KNEE: Primary | ICD-10-CM

## 2023-06-29 PROCEDURE — 99024 POSTOP FOLLOW-UP VISIT: CPT | Performed by: ORTHOPAEDIC SURGERY

## 2023-06-29 PROCEDURE — 73562 X-RAY EXAM OF KNEE 3: CPT | Performed by: ORTHOPAEDIC SURGERY

## 2023-07-01 RX ORDER — ALLOPURINOL 100 MG/1
100 TABLET ORAL
Qty: 90 TABLET | Refills: 1 | Status: SHIPPED | OUTPATIENT
Start: 2023-07-01

## 2023-07-01 RX ORDER — METOPROLOL SUCCINATE 25 MG/1
25 TABLET, EXTENDED RELEASE ORAL DAILY
Qty: 90 TABLET | Refills: 1 | Status: SHIPPED | OUTPATIENT
Start: 2023-07-01

## 2023-07-07 ENCOUNTER — TELEPHONE (OUTPATIENT)
Dept: INTERNAL MEDICINE CLINIC | Facility: CLINIC | Age: 88
End: 2023-07-07

## 2023-07-07 NOTE — TELEPHONE ENCOUNTER
Alexandra Lindsay from [facility] called that they are discharging patient of home care on 7/11 and patient will start outpatient PT

## 2023-07-08 PROBLEM — Z87.81 HISTORY OF VERTEBRAL FRACTURE: Status: ACTIVE | Noted: 2023-05-01

## 2023-07-10 ENCOUNTER — OFFICE VISIT (OUTPATIENT)
Dept: INTERNAL MEDICINE CLINIC | Facility: CLINIC | Age: 88
End: 2023-07-10

## 2023-07-10 VITALS
DIASTOLIC BLOOD PRESSURE: 58 MMHG | BODY MASS INDEX: 24.52 KG/M2 | WEIGHT: 161.81 LBS | SYSTOLIC BLOOD PRESSURE: 104 MMHG | HEART RATE: 86 BPM | HEIGHT: 68 IN

## 2023-07-10 DIAGNOSIS — I10 ESSENTIAL HYPERTENSION: ICD-10-CM

## 2023-07-10 DIAGNOSIS — M17.0 PRIMARY OSTEOARTHRITIS OF KNEES, BILATERAL: ICD-10-CM

## 2023-07-10 DIAGNOSIS — S22.009D CLOSED FRACTURE OF THORACIC VERTEBRA WITH ROUTINE HEALING, UNSPECIFIED FRACTURE MORPHOLOGY, UNSPECIFIED THORACIC VERTEBRAL LEVEL, SUBSEQUENT ENCOUNTER: Primary | ICD-10-CM

## 2023-07-10 PROCEDURE — 99214 OFFICE O/P EST MOD 30 MIN: CPT | Performed by: INTERNAL MEDICINE

## 2023-07-10 RX ORDER — TRAMADOL HYDROCHLORIDE 50 MG/1
1 TABLET ORAL 3 TIMES DAILY
COMMUNITY
Start: 2022-02-24

## 2023-07-10 NOTE — PATIENT INSTRUCTIONS
Your blood pressure today was on the low side at 104/58. Stop amlodipine. Continue other medications. Return visit in 1 month.

## 2023-07-11 ENCOUNTER — TELEPHONE (OUTPATIENT)
Dept: PHYSICAL THERAPY | Facility: HOSPITAL | Age: 88
End: 2023-07-11

## 2023-07-12 ENCOUNTER — OFFICE VISIT (OUTPATIENT)
Dept: PHYSICAL THERAPY | Facility: HOSPITAL | Age: 88
End: 2023-07-12
Attending: ORTHOPAEDIC SURGERY
Payer: MEDICARE

## 2023-07-12 DIAGNOSIS — M17.12 PRIMARY OSTEOARTHRITIS OF LEFT KNEE: Primary | ICD-10-CM

## 2023-07-12 PROCEDURE — 97110 THERAPEUTIC EXERCISES: CPT

## 2023-07-12 PROCEDURE — 97161 PT EVAL LOW COMPLEX 20 MIN: CPT

## 2023-07-17 ENCOUNTER — OFFICE VISIT (OUTPATIENT)
Dept: PHYSICAL THERAPY | Facility: HOSPITAL | Age: 88
End: 2023-07-17
Attending: ORTHOPAEDIC SURGERY
Payer: MEDICARE

## 2023-07-17 PROCEDURE — 97110 THERAPEUTIC EXERCISES: CPT

## 2023-07-17 PROCEDURE — 97112 NEUROMUSCULAR REEDUCATION: CPT

## 2023-07-17 PROCEDURE — 97140 MANUAL THERAPY 1/> REGIONS: CPT

## 2023-07-17 NOTE — PROGRESS NOTES
Diagnosis:   Primary osteoarthritis of left knee (Q36.09)      Referring Provider: Cat Lu Date of Evaluation:    2023    Precautions:  L TKA (4/3/23) Next MD visit:   none scheduled  Date of Surgery: 4/3/23     Insurance Primary/Secondary: MEDICARE / Jeovany Newton PPO     # Auth Visits: na            Subjective: Feeling well; has been doing HEP. Feels that stairs are easier. Pain: 0/10 - na    At IE:  Pt describes pain level current - no pain   Current functional limitations include walking, stairs, getting up from chair, squatting. Objective:   AROM (PROM) R knee: 23  0-104 (110) deg (supine)    Taken from IE:  Observation: B tibial ER (R>L); hyperkyphotic, scoliosis  Integumentary: fully closed incision with well approximated scar; mild redness globally  Palpation: posterior R knee,  TTP B calf (R>L)  Sensation: intact light touch BLE    AROM (PROM): (* denotes performed with pain)  Hip Knee   Flexion: R 110; L 100   Flexion: R 105 (110); L 90 (100)  Extension: R 0; L -15 (-10)        Accessory motion: hypomobile tib/fem     Flexibility:  Hip Flexor: R mild, L mild  Hamstrings: R severe; L severe    Strength/MMT: (* denotes performed with pain)  Hip Knee   Flexion: R 4+/5; L 4+/5  Extension: R 3-/5; L 3-/5  Abduction: R 3+/5; L 4+/5   Flexion: R 5/5; L 5/5  Extension: R 5/5; L 5/5        Special tests:   NA    Gait: pt ambulates on level ground with forward flexed posture, RW, decreased step length B  Balance: SLS: unable   Romber seconds    Assessment: Improving ROM flexion and extension today. Significant stiffness continues but is improving. Good strength with QS and sit<>stand today. Would benefit from continued strengthening.        Goals:   Goals: (to be met in 10 visits)  Pt will improve knee extension ROM to 0 deg to allow proper heel strike during gait and terminal knee extension in stance   Pt will improve knee AROM flexion to >+110 degrees to improve ability to navigate stairs   Pt will improve quad strength to 5/5 to ascend 1 flight of stairs reciprocally   Pt will be ambulate 200' without RW to improve household ambulation  Pt will be independent and compliant with comprehensive HEP to maintain progress achieved in PT     Plan: Patient will be seen for 2 x/week or a total of 10 visits over a 90 day period. Treatment will include: Manual Therapy, Therapeutic Activities, Therapeutic Exercise, and Home Exercise Program instruction  Date: 7/17/2023  TX#: 2/10 Date:                 TX#: 3/ Date:                 TX#: 4/ Date:                 TX#: 5/ Date:    Tx#: 6/   MT: 15 mins  - tib/fem mobs AP Gr III  - patellar mobs, Gr III  - scar mob R  - PROM + OP R       NR: 15 mins  - QS, x10 L  - stairs, x1 laps, recip, BUE, heavy hand use  - 6\" forw step up, L-leading, x12 BUE       TE: 15 mins  - heel slide, x15 L  - sit<>stand, x10   - LAQ 5\" holds, x10 L                HEP: continue current HEP (lateral stepping, squats, marching); add heel slides, knee flex/ext on stair    Charges: 1MT, 1NR, 1TE       Total Timed Treatment: 45 min  Total Treatment Time: 45 min

## 2023-07-21 ENCOUNTER — OFFICE VISIT (OUTPATIENT)
Dept: PHYSICAL THERAPY | Facility: HOSPITAL | Age: 88
End: 2023-07-21
Attending: ORTHOPAEDIC SURGERY
Payer: MEDICARE

## 2023-07-21 PROCEDURE — 97110 THERAPEUTIC EXERCISES: CPT

## 2023-07-21 PROCEDURE — 97140 MANUAL THERAPY 1/> REGIONS: CPT

## 2023-07-21 NOTE — PROGRESS NOTES
Diagnosis:   Primary osteoarthritis of left knee (B08.25)      Referring Provider: Buck Alberts Date of Evaluation:    2023    Precautions:  L TKA (4/3/23) Next MD visit:   none scheduled  Date of Surgery: 4/3/23     Insurance Primary/Secondary: Jonelle Christian / Delila Cabot PPO     # Auth Visits: na            Subjective: Isn't sure why the back of both knees are sore. Has been relying on his arms when going up the stairs but trying now to. Pain: 0/10 - na    At IE:  Pt describes pain level current - no pain   Current functional limitations include walking, stairs, getting up from chair, squatting. Objective:   AROM (PROM) R knee: 23  0-104 (110) deg (supine)    Taken from IE:  Observation: B tibial ER (R>L); hyperkyphotic, scoliosis  Integumentary: fully closed incision with well approximated scar; mild redness globally  Palpation: posterior R knee,  TTP B calf (R>L)  Sensation: intact light touch BLE    AROM (PROM): (* denotes performed with pain)  Hip Knee   Flexion: R 110; L 100   Flexion: R 105 (110); L 90 (100)  Extension: R 0; L -15 (-10)        Accessory motion: hypomobile tib/fem     Flexibility:  Hip Flexor: R mild, L mild  Hamstrings: R severe; L severe    Strength/MMT: (* denotes performed with pain)  Hip Knee   Flexion: R 4+/5; L 4+/5  Extension: R 3-/5; L 3-/5  Abduction: R 3+/5; L 4+/5   Flexion: R 5/5; L 5/5  Extension: R 5/5; L 5/5        Special tests:   NA    Gait: pt ambulates on level ground with forward flexed posture, RW, decreased step length B  Balance: SLS: unable   Romber seconds    Assessment: Adjusted rolling walker for improved upright posturing with ambulation, verbal cueing for proximity to walker. Pt tolerating exercise in session well, seated rest breaks given as needed for fatigue.          Goals:   Goals: (to be met in 10 visits)  Pt will improve knee extension ROM to 0 deg to allow proper heel strike during gait and terminal knee extension in stance Pt will improve knee AROM flexion to >+110 degrees to improve ability to navigate stairs   Pt will improve quad strength to 5/5 to ascend 1 flight of stairs reciprocally   Pt will be ambulate 200' without RW to improve household ambulation  Pt will be independent and compliant with comprehensive HEP to maintain progress achieved in PT     Plan: Patient will be seen for 2 x/week or a total of 10 visits over a 90 day period. Treatment will include: Manual Therapy, Therapeutic Activities, Therapeutic Exercise, and Home Exercise Program instruction  Date: 7/17/2023  TX#: 2/10 Date:    7/21/2023              TX#: 3/10 Date:                 TX#: 4/ Date:                 TX#: 5/ Date:    Tx#: 6/   MT: 15 mins  - tib/fem mobs AP Gr III  - patellar mobs, Gr III  - scar mob R  - PROM + OP R MT: 15 mins  - tib/fem mobs AP Gr III  - patellar mobs, Gr III  - scar mob R  - PROM + OP R      NR: 15 mins  - QS, x10 L  - stairs, x1 laps, recip, BUE, heavy hand use  - 6\" forw step up, L-leading, x12 BUE NR: 10'  - QS, x10 L  - 6\" forw step up, L-leading, 2x12 BUE      TE: 15 mins  - heel slide, x15 L  - sit<>stand, x10   - LAQ 5\" holds, x10 L   TE: 20'  -Walker adjustment for improved upright posturing  Seated heel slide (for HEP) x20 L   - LAQ 5\" holds, 2x10 L  -Nustep, L3 6' LE              HEP: continue current HEP (lateral stepping, squats, marching); add heel slides, knee flex/ext on stair    Charges: 1MT, 2 TE       Total Timed Treatment: 45 min  Total Treatment Time: 45 min

## 2023-07-26 ENCOUNTER — OFFICE VISIT (OUTPATIENT)
Dept: PHYSICAL THERAPY | Facility: HOSPITAL | Age: 88
End: 2023-07-26
Attending: ORTHOPAEDIC SURGERY
Payer: MEDICARE

## 2023-07-26 PROCEDURE — 97110 THERAPEUTIC EXERCISES: CPT

## 2023-07-26 PROCEDURE — 97140 MANUAL THERAPY 1/> REGIONS: CPT

## 2023-07-26 NOTE — PROGRESS NOTES
Diagnosis:   Primary osteoarthritis of left knee (T67.63)      Referring Provider: Carlos Pearl Date of Evaluation:    2023    Precautions:  L TKA (4/3/23) Next MD visit:   none scheduled  Date of Surgery: 4/3/23   Insurance Primary/Secondary: Ameyadot Cabot / Brice Kuo PPO     # Auth Visits: na          Subjective: Is most limited at this time by pain in his low back, isn't sure what it's from. Knee is feeling good. Pain: 0/10 - na    At IE:  Pt describes pain level current - no pain   Current functional limitations include walking, stairs, getting up from chair, squatting. Objective:   AROM (PROM) R knee: 23  0-104 (110) deg (supine)    Taken from IE:  Observation: B tibial ER (R>L); hyperkyphotic, scoliosis  Integumentary: fully closed incision with well approximated scar; mild redness globally  Palpation: posterior R knee,  TTP B calf (R>L)  Sensation: intact light touch BLE    AROM (PROM): (* denotes performed with pain)  Hip Knee   Flexion: R 110; L 100   Flexion: R 105 (110); L 90 (100)  Extension: R 0; L -15 (-10)        Accessory motion: hypomobile tib/fem     Flexibility:  Hip Flexor: R mild, L mild  Hamstrings: R severe; L severe    Strength/MMT: (* denotes performed with pain)  Hip Knee   Flexion: R 4+/5; L 4+/5  Extension: R 3-/5; L 3-/5  Abduction: R 3+/5; L 4+/5   Flexion: R 5/5; L 5/5  Extension: R 5/5; L 5/5        Special tests:   NA    Gait: pt ambulates on level ground with forward flexed posture, RW, decreased step length B  Balance: SLS: unable   Romber seconds    Assessment: Session focused heavily on sit to stand transfers as patient demonstrates increased hip hinge with retropulsion, responding partially to heel on towel to force weight through B toes. Good quadriceps activation noted with open chain mobility, trial closed chain in future sessions for functional carryover.        Goals: (to be met in 10 visits)  Pt will improve knee extension ROM to 0 deg to allow proper heel strike during gait and terminal knee extension in stance   Pt will improve knee AROM flexion to >+110 degrees to improve ability to navigate stairs   Pt will improve quad strength to 5/5 to ascend 1 flight of stairs reciprocally   Pt will be ambulate 200' without RW to improve household ambulation  Pt will be independent and compliant with comprehensive HEP to maintain progress achieved in PT     Plan: Sit<>stand transfer, leg press, TKE  Date: 7/17/2023  TX#: 2/10 Date:    7/21/2023              TX#: 3/10 Date: 7/26/2023                 TX#: 4/10 Date:                 TX#: 5/ Date:    Tx#: 6/   MT: 15 mins  - tib/fem mobs AP Gr III  - patellar mobs, Gr III  - scar mob R  - PROM + OP R MT: 15 mins  - tib/fem mobs AP Gr III  - patellar mobs, Gr III  - scar mob R  - PROM + OP R MT : 15min   - tib/fem mobs AP Gr III  - patellar mobs, Gr III  - scar mob R  - PROM + OP R     NR: 15 mins  - QS, x10 L  - stairs, x1 laps, recip, BUE, heavy hand use  - 6\" forw step up, L-leading, x12 BUE NR: 10'  - QS, x10 L  - 6\" forw step up, L-leading, 2x12 BUE      TE: 15 mins  - heel slide, x15 L  - sit<>stand, x10   - LAQ 5\" holds, x10 L   TE: 20'  -Walker adjustment for improved upright posturing  Seated heel slide (for HEP) x20 L   - LAQ 5\" holds, 2x10 L  -Nustep, L3 6' LE  TE: 30'  -Nustep, L3 6' LE   -Supine quad set, x20  -Supine SAQ 2# 5s hold, x20  -Sit to stand elevated mat table, towel at heel for forward weight shift cueing x15 (sig hip hinge noted with retropulsion)   -Side stepping at ballet bar, 3 lap 2UE             HEP: continue current HEP (lateral stepping, squats, marching); add heel slides, knee flex/ext on stair    Charges: 1MT, 2 TE       Total Timed Treatment: 45 min  Total Treatment Time: 45 min

## 2023-07-31 ENCOUNTER — APPOINTMENT (OUTPATIENT)
Dept: PHYSICAL THERAPY | Facility: HOSPITAL | Age: 88
End: 2023-07-31
Attending: ORTHOPAEDIC SURGERY
Payer: MEDICARE

## 2023-07-31 NOTE — PROGRESS NOTES
Diagnosis:   Primary osteoarthritis of left knee (O93.78)      Referring Provider: Angie Taylor Date of Evaluation:    2023    Precautions:  L TKA (4/3/23) Next MD visit:   none scheduled  Date of Surgery: 4/3/23   Insurance Primary/Secondary: Ivone Joseph / [address] [place] [address] PPO     # Auth Visits: na          Subjective: ***Is most limited at this time by pain in his low back, isn't sure what it's from. Knee is feeling good. Pain: ***0/10 - na    At IE:  Pt describes pain level current - no pain   Current functional limitations include walking, stairs, getting up from chair, squatting. Objective:   AROM (PROM) R knee: 23  0-104 (110) deg (supine)    Taken from IE:  Observation: B tibial ER (R>L); hyperkyphotic, scoliosis  Integumentary: fully closed incision with well approximated scar; mild redness globally  Palpation: posterior R knee,  TTP B calf (R>L)  Sensation: intact light touch BLE    AROM (PROM): (* denotes performed with pain)  Hip Knee   Flexion: R 110; L 100   Flexion: R 105 (110); L 90 (100)  Extension: R 0; L -15 (-10)        Accessory motion: hypomobile tib/fem     Flexibility:  Hip Flexor: R mild, L mild  Hamstrings: R severe; L severe    Strength/MMT: (* denotes performed with pain)  Hip Knee   Flexion: R 4+/5; L 4+/5  Extension: R 3-/5; L 3-/5  Abduction: R 3+/5; L 4+/5   Flexion: R 5/5; L 5/5  Extension: R 5/5; L 5/5        Special tests:   NA    Gait: pt ambulates on level ground with forward flexed posture, RW, decreased step length B  Balance: SLS: unable   Romber seconds    Assessment: ***Session focused heavily on sit to stand transfers as patient demonstrates increased hip hinge with retropulsion, responding partially to heel on towel to force weight through B toes. Good quadriceps activation noted with open chain mobility, trial closed chain in future sessions for functional carryover.        Goals: (to be met in 10 visits)  Pt will improve knee extension ROM to 0 deg to allow proper heel strike during gait and terminal knee extension in stance   Pt will improve knee AROM flexion to >+110 degrees to improve ability to navigate stairs   Pt will improve quad strength to 5/5 to ascend 1 flight of stairs reciprocally   Pt will be ambulate 200' without RW to improve household ambulation  Pt will be independent and compliant with comprehensive HEP to maintain progress achieved in PT     Plan: ***Sit<>stand transfer, leg press, TKE  Date: 7/17/2023  TX#: 2/10 Date:    7/21/2023     TX#: 3/10 Date: 7/26/2023    TX#: 4/10 Date:     7/31/2023     TX#: 5/10 Date:    Tx#: 6/   MT: 15 mins  - tib/fem mobs AP Gr III  - patellar mobs, Gr III  - scar mob R  - PROM + OP R MT: 15 mins  - tib/fem mobs AP Gr III  - patellar mobs, Gr III  - scar mob R  - PROM + OP R MT : 15min   - tib/fem mobs AP Gr III  - patellar mobs, Gr III  - scar mob R  - PROM + OP R     NR: 15 mins  - QS, x10 L  - stairs, x1 laps, recip, BUE, heavy hand use  - 6\" forw step up, L-leading, x12 BUE NR: 10'  - QS, x10 L  - 6\" forw step up, L-leading, 2x12 BUE      TE: 15 mins  - heel slide, x15 L  - sit<>stand, x10   - LAQ 5\" holds, x10 L   TE: 20'  -Walker adjustment for improved upright posturing  Seated heel slide (for HEP) x20 L   - LAQ 5\" holds, 2x10 L  -Nustep, L3 6' LE  TE: 30'  -Nustep, L3 6' LE   -Supine quad set, x20  -Supine SAQ 2# 5s hold, x20  -Sit to stand elevated mat table, towel at heel for forward weight shift cueing x15 (sig hip hinge noted with retropulsion)   -Side stepping at ballet bar, 3 lap 2UE             HEP: continue current HEP (lateral stepping, squats, marching); add heel slides, knee flex/ext on stair    Charges: 1MT, 2 TE       Total Timed Treatment: ***45 min  Total Treatment Time: *** min

## 2023-08-02 ENCOUNTER — OFFICE VISIT (OUTPATIENT)
Dept: INTERNAL MEDICINE CLINIC | Facility: CLINIC | Age: 88
End: 2023-08-02

## 2023-08-02 VITALS
HEIGHT: 68 IN | BODY MASS INDEX: 24.25 KG/M2 | DIASTOLIC BLOOD PRESSURE: 80 MMHG | HEART RATE: 76 BPM | SYSTOLIC BLOOD PRESSURE: 128 MMHG | WEIGHT: 160 LBS

## 2023-08-02 DIAGNOSIS — I10 ESSENTIAL HYPERTENSION: Primary | ICD-10-CM

## 2023-08-02 PROCEDURE — 99213 OFFICE O/P EST LOW 20 MIN: CPT | Performed by: INTERNAL MEDICINE

## 2023-08-02 NOTE — PATIENT INSTRUCTIONS
Your blood pressure today was 128/80. Please remain off amlodipine and continue current medications. Please see me again in 3 months.

## 2023-08-03 ENCOUNTER — OFFICE VISIT (OUTPATIENT)
Dept: PHYSICAL THERAPY | Facility: HOSPITAL | Age: 88
End: 2023-08-03
Attending: ORTHOPAEDIC SURGERY
Payer: MEDICARE

## 2023-08-03 PROCEDURE — 97110 THERAPEUTIC EXERCISES: CPT

## 2023-08-03 PROCEDURE — 97140 MANUAL THERAPY 1/> REGIONS: CPT

## 2023-08-03 NOTE — PROGRESS NOTES
Diagnosis:   Primary osteoarthritis of left knee (Z37.86)      Referring Provider: Chaz Welsh Date of Evaluation:    2023    Precautions:  L TKA (4/3/23) Next MD visit:   none scheduled  Date of Surgery: 4/3/23   Insurance Primary/Secondary: Tony Allen / [address] [place] [address] PPO     # Auth Visits: na          Subjective: Patient reports his knee is not painful, but his L leg still feels weak compared to the R. Does not take the stairs with his L leg yet. Balance is improving but not back to baseline with walking. Pain: 0/10 - na    At IE:  Pt describes pain level current - no pain   Current functional limitations include walking, stairs, getting up from chair, squatting. Objective:   AROM (PROM) R knee: 8/3/2023   0-102 (110) deg (supine)    Taken from IE:  Observation: B tibial ER (R>L); hyperkyphotic, scoliosis  Integumentary: fully closed incision with well approximated scar; mild redness globally  Palpation: posterior R knee,  TTP B calf (R>L)  Sensation: intact light touch BLE    AROM (PROM): (* denotes performed with pain)  Hip Knee   Flexion: R 110; L 100   Flexion: R 105 (110); L 90 (100)  Extension: R 0; L -15 (-10)        Accessory motion: hypomobile tib/fem     Flexibility:  Hip Flexor: R mild, L mild  Hamstrings: R severe; L severe    Strength/MMT: (* denotes performed with pain)  Hip Knee   Flexion: R 4+/5; L 4+/5  Extension: R 3-/5; L 3-/5  Abduction: R 3+/5; L 4+/5   Flexion: R 5/5; L 5/5  Extension: R 5/5; L 5/5        Special tests:   NA    Gait: pt ambulates on level ground with forward flexed posture, RW, decreased step length B  Balance: SLS: unable   Romber seconds    Assessment: Quadriceps weakness a likely contributor to posterior weight shifting during sit to stand, focused on shifting the weight anteriorly during this motion for quad strengthening effect. Expect improvements here will also allow more consistent progression of knee flexion ROM.       Goals: (to be met in 10 visits)  Pt will improve knee extension ROM to 0 deg to allow proper heel strike during gait and terminal knee extension in stance   Pt will improve knee AROM flexion to >+110 degrees to improve ability to navigate stairs   Pt will improve quad strength to 5/5 to ascend 1 flight of stairs reciprocally   Pt will be ambulate 200' without RW to improve household ambulation  Pt will be independent and compliant with comprehensive HEP to maintain progress achieved in PT     Plan: Sit<>stand transfer, leg press, TKE  Date: 7/17/2023  TX#: 2/10 Date:    7/21/2023     TX#: 3/10 Date: 7/26/2023    TX#: 4/10 Date: 8/3/2023   TX#: 5/10 Date:    Tx#: 6/   MT: 15 mins  - tib/fem mobs AP Gr III  - patellar mobs, Gr III  - scar mob R  - PROM + OP R MT: 15 mins  - tib/fem mobs AP Gr III  - patellar mobs, Gr III  - scar mob R  - PROM + OP R MT : 15min   - tib/fem mobs AP Gr III  - patellar mobs, Gr III  - scar mob R  - PROM + OP R MT:   L tibiofemoral AP glide in flexion, extension Gr III+  L patellofemoral sup/inf glides Gr IV+    NR: 15 mins  - QS, x10 L  - stairs, x1 laps, recip, BUE, heavy hand use  - 6\" forw step up, L-leading, x12 BUE NR: 10'  - QS, x10 L  - 6\" forw step up, L-leading, 2x12 BUE      TE: 15 mins  - heel slide, x15 L  - sit<>stand, x10   - LAQ 5\" holds, x10 L   TE: 20'  -Walker adjustment for improved upright posturing  Seated heel slide (for HEP) x20 L   - LAQ 5\" holds, 2x10 L  -Nustep, L3 6' LE  TE: 30'  -Nustep, L3 6' LE   -Supine quad set, x20  -Supine SAQ 2# 5s hold, x20  -Sit to stand elevated mat table, towel at heel for forward weight shift cueing x15 (sig hip hinge noted with retropulsion)   -Side stepping at ballet bar, 3 lap 2UE  TE  Quad iso at end range extension 5s x 10  Sit <> stand from elevated mat table: anterior HHA > no UE assist 3 x 15           HEP: continue current HEP (lateral stepping, squats, marching); add heel slides, knee flex/ext on stair    Charges: 1 MT, 2 TE       Total Timed Treatment: MT 16 min, TE 26 min  Total Treatment Time: 42 min

## 2023-08-07 ENCOUNTER — OFFICE VISIT (OUTPATIENT)
Dept: PHYSICAL THERAPY | Facility: HOSPITAL | Age: 88
End: 2023-08-07
Attending: ORTHOPAEDIC SURGERY
Payer: MEDICARE

## 2023-08-07 PROCEDURE — 97140 MANUAL THERAPY 1/> REGIONS: CPT

## 2023-08-07 PROCEDURE — 97110 THERAPEUTIC EXERCISES: CPT

## 2023-08-07 NOTE — PROGRESS NOTES
Diagnosis:   Primary osteoarthritis of left knee (W88.09)      Referring Provider: Dudley Rush Date of Evaluation:    2023    Precautions:  L TKA (4/3/23) Next MD visit:   none scheduled  Date of Surgery: 4/3/23   Insurance Primary/Secondary: Dev Layne / Karon Ibrahim PPO     # Auth Visits: na          Subjective: Patient reports he has been working on doing small squats at home to improve his ability to get up from a chair. Pain: 0/10 - na    At IE:  Pt describes pain level current - no pain   Current functional limitations include walking, stairs, getting up from chair, squatting. Objective:   AROM (PROM) R knee: 2023   Flexion 110 deg (supine)    Taken from IE:  Observation: B tibial ER (R>L); hyperkyphotic, scoliosis  Integumentary: fully closed incision with well approximated scar; mild redness globally  Palpation: posterior R knee,  TTP B calf (R>L)  Sensation: intact light touch BLE    AROM (PROM): (* denotes performed with pain)  Hip Knee   Flexion: R 110; L 100   Flexion: R 105 (110); L 90 (100)  Extension: R 0; L -15 (-10)        Accessory motion: hypomobile tib/fem     Flexibility:  Hip Flexor: R mild, L mild  Hamstrings: R severe; L severe    Strength/MMT: (* denotes performed with pain)  Hip Knee   Flexion: R 4+/5; L 4+/5  Extension: R 3-/5; L 3-/5  Abduction: R 3+/5; L 4+/5   Flexion: R 5/5; L 5/5  Extension: R 5/5; L 5/5        Special tests:   NA    Gait: pt ambulates on level ground with forward flexed posture, RW, decreased step length B  Balance: SLS: unable   Romber seconds    Assessment: Patient able to achieve goal knee flexion ROM following manual therapy. Compensations noted during squatting, in particular rising onto the toes; cueing provided and UE assistance provided to encourage posterior weight shifting to target quadriceps and gluteal mm.       Goals: (to be met in 10 visits)  Pt will improve knee extension ROM to 0 deg to allow proper heel strike during gait and terminal knee extension in stance   Pt will improve knee AROM flexion to >+110 degrees to improve ability to navigate stairs   Pt will improve quad strength to 5/5 to ascend 1 flight of stairs reciprocally   Pt will be ambulate 200' without RW to improve household ambulation  Pt will be independent and compliant with comprehensive HEP to maintain progress achieved in PT     Plan: Continue with LE strengthening, knee mobility training.   Date: 7/17/2023  TX#: 2/10 Date:    7/21/2023     TX#: 3/10 Date: 7/26/2023    TX#: 4/10 Date: 8/3/2023   TX#: 5/10 Date: 8/7/2023   Tx#: 6/10   MT: 15 mins  - tib/fem mobs AP Gr III  - patellar mobs, Gr III  - scar mob R  - PROM + OP R MT: 15 mins  - tib/fem mobs AP Gr III  - patellar mobs, Gr III  - scar mob R  - PROM + OP R MT : 15min   - tib/fem mobs AP Gr III  - patellar mobs, Gr III  - scar mob R  - PROM + OP R MT:   L tibiofemoral AP glide in flexion, extension Gr III+  L patellofemoral sup/inf glides Gr IV+ MT:   L tibiofemoral AP glide in flexion Gr IV+  L tibial IR PPM Gr IV+ in flexion, extension   NR: 15 mins  - QS, x10 L  - stairs, x1 laps, recip, BUE, heavy hand use  - 6\" forw step up, L-leading, x12 BUE NR: 10'  - QS, x10 L  - 6\" forw step up, L-leading, 2x12 BUE      TE: 15 mins  - heel slide, x15 L  - sit<>stand, x10   - LAQ 5\" holds, x10 L   TE: 20'  -Walker adjustment for improved upright posturing  Seated heel slide (for HEP) x20 L   - LAQ 5\" holds, 2x10 L  -Nustep, L3 6' LE  TE: 30'  -Nustep, L3 6' LE   -Supine quad set, x20  -Supine SAQ 2# 5s hold, x20  -Sit to stand elevated mat table, towel at heel for forward weight shift cueing x15 (sig hip hinge noted with retropulsion)   -Side stepping at ballet bar, 3 lap 2UE  TE  Quad iso at end range extension 5s x 10  Sit <> stand from elevated mat table: anterior HHA > no UE assist 3 x 15 TE  LAQ 5# 2 x 20 L  Standing TKE 2 x 20 BTB L  Squats at railing (focus on posterior weight shifting) 2 x 15          HEP: continue current HEP (lateral stepping, squats, marching); add heel slides, knee flex/ext on stair    Charges: 1 MT, 2 TE       Total Timed Treatment: MT 16 min, TE 24 min  Total Treatment Time: 40 min

## 2023-08-08 ENCOUNTER — OFFICE VISIT (OUTPATIENT)
Dept: PODIATRY CLINIC | Facility: CLINIC | Age: 88
End: 2023-08-08

## 2023-08-08 DIAGNOSIS — M21.6X2 PLANTAR FAT PAD ATROPHY OF LEFT FOOT: Primary | ICD-10-CM

## 2023-08-08 PROCEDURE — 99213 OFFICE O/P EST LOW 20 MIN: CPT | Performed by: STUDENT IN AN ORGANIZED HEALTH CARE EDUCATION/TRAINING PROGRAM

## 2023-08-08 NOTE — PROGRESS NOTES
[facility] Podiatry  Progress Note      Jaswinder Griffiths is a 81year old male. Patient presents with: Foot Pain: Left foot pain. Patient states that he has pain on the bottom of his foot when he wakes up in the middle of the night and when he steps a certain way. HPI:     Pt is pleasant 82 y/o male who PTC for evaluation of left lateral forefoot pain. He admits that he puts too much pressure on the outer portion of his left foot. Allergies: Demerol [Meperidine], Hylan G-F 20, Ibuprofen, Penicillins, Valdecoxib, Celecoxib, Hydrochlorothiazide, and Triamterene    Current Outpatient Medications   Medication Sig Dispense Refill    traMADol 50 MG Oral Tab Take 1 tablet (50 mg total) by mouth in the morning, at noon, and at bedtime. allopurinol 100 MG Oral Tab Take 1 tablet (100 mg total) by mouth daily with dinner. 90 tablet 1    metoprolol succinate ER 25 MG Oral Tablet 24 Hr Take 1 tablet (25 mg total) by mouth daily. 90 tablet 1    ferrous sulfate 325 (65 FE) MG Oral Tab EC Take 1 tablet (325 mg total) by mouth daily with breakfast.      Multiple Vitamins-Minerals (PRESERVISION AREDS 2 OR) Take 1 capsule by mouth in the morning and 1 capsule before bedtime. cetirizine 10 MG Oral Tab Take 1 tablet (10 mg total) by mouth daily as needed for Allergies (allergies). senna-docusate 8.6-50 MG Oral Tab Take 2 tablets by mouth in the morning and 2 tablets before bedtime. HYDROcodone-acetaminophen 7.5-325 MG Oral Tab Take 1 tablet by mouth every 4 (four) hours as needed for Pain.      tamsulosin 0.4 MG Oral Cap TAKE 1 CAPSULE BY MOUTH ONE-HALF HOUR AFTER SUPPER DAILY 90 capsule 3    cholecalciferol 25 MCG (1000 UT) Oral Cap Take 1 capsule (1,000 Units total) by mouth nightly. Vitamin D3 1,000 unit capsule      Multiple Vitamin (MULTI-VITAMINS) Oral Tab Take 1 tablet by mouth nightly.  take 1 tablet by ORAL route  every day with food        Past Medical History:   Diagnosis Date Anxiety state     Aortic atherosclerosis (HCC)     CXR 11    Aortic stenosis     Echo 3-23 with normal LV function, EF 55-60%, moderate LAE, moderate aortic stenosis with peak gradient 39 mmHg and mean gradient 25 mmHg, mild-moderate mitral stenosis, mild MR    BPH (benign prostatic hyperplasia)     COPD (chronic obstructive pulmonary disease) ([facility]     CXR     Diverticulosis     Colonoscopy 2-12    Essential hypertension     History of blood transfusion     no reactions    History of stomach ulcers     History of vertebral fracture 2023    T7, T8, T9, T11, T12, L1 secondary to fall; s/p kyphoplasty T7, T8, T9 -    Hx of adenomatous colonic polyps     Hx of melanoma of skin     forehead    Iron deficiency anemia     Lumbar spinal stenosis     Macular degeneration     per NG    Primary osteoarthritis of knees, bilateral     Status post right TKA ; s/p left TKA     Pulmonary hypertension ([facility]     [place]  with pulmonary artery pressure 45 mmHg    Total knee replacement status, left 2023    Visual impairment     glasses      Past Surgical History:   Procedure Laterality Date    APPENDECTOMY      per NG    ARTHROSCOPY, ANKLE, SURGICAL; W/ANKLE ARTHRODESIS Right     \"Arthrocentesis of the right ankle joint (2-3)\"; per NG    COLONOSCOPY  2005    per NG    HIP REPLACEMENT SURGERY Right     per NG    IR KYPHOPLASTY  2023    T7, T8, T9    KNEE ARTHROSCOPY Bilateral     per NG    OTHER Right 07/15/2018    ORIF proximal right humerus fracture requiring a biceps tenodesis    OTHER  2018    Incision and drainage of right index finger complicated abscess, revision amputation of right index finger.     TOTAL KNEE ARTHROPLASTY Left 2023    TOTAL KNEE REPLACEMENT Right 2014      Family History   Problem Relation Age of Onset    Arthritis Father         per NG    Other (Lung Cancer) Father          age 54    Hypertension Mother         per NG    Diabetes Neg     Glaucoma Neg     Macular degeneration Neg       Social History    Socioeconomic History      Marital status:     Tobacco Use      Smoking status: Former        Types: Pipe        Quit date: 1985        Years since quittin.6      Smokeless tobacco: Never    Vaping Use      Vaping Use: Never used    Substance and Sexual Activity      Alcohol use: Yes        Alcohol/week: 5.8 standard drinks of alcohol        Types: 7 Glasses of wine per week        Comment: 2 drinks daily      Drug use: No    Other Topics      Concerns:        Pt has a pacemaker: No        Reaction to local anesthetic: No          REVIEW OF SYSTEMS:     Denies nause, fever, chills  No calf pain  Denies chest pain or SOB      EXAM:   There were no vitals taken for this visit. GENERAL: well developed, well nourished, in no apparent distress  EXTREMITIES:   1. Integument: Normal skin temperature and turgor  2. Vascular: Dorsalis pedis two out of four bilateral and posterior tibial pulses two out of   four bilateral, capillary refill normal.   3. Musculoskeletal: All muscle groups are graded 5 out of 5 in the foot and ankle. Fat pad atrophy to left lateral foot side   4. Neurological: Normal sharp dull sensation; reflexes normal.             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 Modified left shoe insert with offloading felt padding. Advised patient to not walk barefoot and to continue using shoes with felt pad modification. Return to clinic as needed        The patient indicates understanding of these issues and agrees to the plan.         Fred Lucero DPM

## 2023-08-09 ENCOUNTER — APPOINTMENT (OUTPATIENT)
Dept: PHYSICAL THERAPY | Facility: HOSPITAL | Age: 88
End: 2023-08-09
Attending: ORTHOPAEDIC SURGERY
Payer: MEDICARE

## 2023-08-14 ENCOUNTER — OFFICE VISIT (OUTPATIENT)
Dept: PHYSICAL THERAPY | Facility: HOSPITAL | Age: 88
End: 2023-08-14
Attending: ORTHOPAEDIC SURGERY
Payer: MEDICARE

## 2023-08-14 PROCEDURE — 97140 MANUAL THERAPY 1/> REGIONS: CPT

## 2023-08-14 PROCEDURE — 97110 THERAPEUTIC EXERCISES: CPT

## 2023-08-14 RX ORDER — TRAMADOL HYDROCHLORIDE 50 MG/1
50 TABLET ORAL EVERY 8 HOURS PRN
Qty: 90 TABLET | Refills: 5 | Status: SHIPPED | OUTPATIENT
Start: 2023-08-14

## 2023-08-14 NOTE — TELEPHONE ENCOUNTER
Requested Prescriptions     Pending Prescriptions Disp Refills    TRAMADOL 50 MG Oral Tab [Pharmacy Med Name: Tramadol Hydrochloride 50 Mg Tab Unic] 90 tablet 0     Sig: Take 1 tablet (50 mg total) by mouth every 8 (eight) hours as needed for Pain. LF:   LOV: 12/17/22   Future Appointments   Date Time Provider Piedad Isidro   8/14/2023 12:30 PM Hemkendreis, Lesly, PT [facility]   8/17/2023 12:45 PM Hemkendreis, Lesly, PT [facility]   8/21/2023 12:30 PM Hemkendreis, Lesly, PT [facility]   8/24/2023 12:45 PM Hemkendreis, Lesly, PT [facility]   8/28/2023 12:30 PM Hemkendreis, Lesly, PT [facility]   8/31/2023 12:45 PM Hemkendreis, Lesly, PT [facility]   9/5/2023 11:15 AM Hemkendreis, Lesly, PT [facility]   9/7/2023 11:15 AM Hemkendreis, Lesly, PT [facility]   10/31/2023  3:30 PM Joseph Crowder MD [address] [facility]     Labs:   Component      Latest Ref Rn 5/12/2023   WBC      4.0 - 11.0 x10(3) uL 7.2    RBC      3.80 - 5.80 x10(6)uL 3.55 (L)    Hemoglobin      13.0 - 17.5 g/dL 11.4 (L)    Hematocrit      39.0 - 53.0 % 32.9 (L)    MCV      80.0 - 100.0 fL 92.7    MCH      26.0 - 34.0 pg 32.1    MCHC      31.0 - 37.0 g/dL 34.7    RDW      11.0 - 15.0 % 14.6    RDW-SD      35.1 - 46.3 fL 49.8 (H)    Platelet Count      366.0 - 450.0 10(3)uL 205.0       Component      Latest Ref Rng 5/10/2023   Glucose      70 - 99 mg/dL 108 (H)    Sodium      136 - 145 mmol/L 137    Potassium      3.5 - 5.1 mmol/L 3.4 (L)    Chloride      98 - 112 mmol/L 100    Carbon Dioxide, Total      21.0 - 32.0 mmol/L 29.0    ANION GAP      0 - 18 mmol/L 8    BUN      7 - 18 mg/dL 19 (H)    CREATININE      0.70 - 1.30 mg/dL 0.45 (L)    BUN/CREATININE RATIO      10.0 - 20.0  42.2 (H)    CALCIUM      8.5 - 10.1 mg/dL 8.9    CALCULATED OSMOLALITY      275 - 295 mOsm/kg 287    eGFR-Cr      >=60 mL/min/1.73m2 103       Legend:  (H) High  (L) Low  Summary:  1. Cont.  tramadol 100mg every 8-12 hours as needed   2. norco 5/325mg a day as needed #30  3. Physical therapy -   4. Return to clinic in 2 months. 5. Rest left knee x 3 days      - ok to change tramadol to stronger meds - for back pain - will let pain management know - ok to take over -   Increased his tramadol to 50mg every 6 hours til then.       Justin Cunningham MD  12/17/2022   11:39 AM  - Reviewed [place]- [place]  through [employer]

## 2023-08-14 NOTE — PROGRESS NOTES
Diagnosis:   Primary osteoarthritis of left knee (E56.72)      Referring Provider: Rebecca Cespedes Date of Evaluation:    2023    Precautions:  L TKA (4/3/23) Next MD visit:   none scheduled  Date of Surgery: 4/3/23   Insurance Primary/Secondary: Dave Williamson / Kevin Flores PPO     # Auth Visits: na          Subjective: Patient reports the ROM in his knee is feeling good, but the strength is not 100% yet. Still feels weakness especially with going up/down the steps with his L leg. Pain: 0/10 - na    At IE:  Pt describes pain level current - no pain   Current functional limitations include walking, stairs, getting up from chair, squatting. Objective:   AROM (PROM) R knee: 2023   Flexion 110 deg (supine)    Taken from IE:  Observation: B tibial ER (R>L); hyperkyphotic, scoliosis  Integumentary: fully closed incision with well approximated scar; mild redness globally  Palpation: posterior R knee,  TTP B calf (R>L)  Sensation: intact light touch BLE    AROM (PROM): (* denotes performed with pain)  Hip Knee   Flexion: R 110; L 100   Flexion: R 105 (110); L 90 (100)  Extension: R 0; L -15 (-10)        Accessory motion: hypomobile tib/fem     Flexibility:  Hip Flexor: R mild, L mild  Hamstrings: R severe; L severe    Strength/MMT: (* denotes performed with pain)  Hip Knee   Flexion: R 4+/5; L 4+/5  Extension: R 3-/5; L 3-/5  Abduction: R 3+/5; L 4+/5   Flexion: R 5/5; L 5/5  Extension: R 5/5; L 5/5        Special tests:   NA    Gait: pt ambulates on level ground with forward flexed posture, RW, decreased step length B  Balance: SLS: unable   Romber seconds    Assessment: Initiated stair training to further promote L quadriceps strength, mild vaulting noted though infrequent and likely related to fatigue. Continue to note strong tendency toward posterior weight shift during sit <> stand impacting patient's safety during transfers.       Goals: (to be met in 10 visits)  Pt will improve knee extension ROM to 0 deg to allow proper heel strike during gait and terminal knee extension in stance   Pt will improve knee AROM flexion to >+110 degrees to improve ability to navigate stairs   Pt will improve quad strength to 5/5 to ascend 1 flight of stairs reciprocally   Pt will be ambulate 200' without RW to improve household ambulation  Pt will be independent and compliant with comprehensive HEP to maintain progress achieved in PT     Plan: Continue with LE strengthening, knee mobility training.   Date: 7/17/2023  TX#: 2/10 Date:    7/21/2023     TX#: 3/10 Date: 7/26/2023    TX#: 4/10 Date: 8/3/2023   TX#: 5/10 Date: 8/7/2023   Tx#: 6/10 Date: 8/14/2023   Tx#: 7/10   MT: 15 mins  - tib/fem mobs AP Gr III  - patellar mobs, Gr III  - scar mob R  - PROM + OP R MT: 15 mins  - tib/fem mobs AP Gr III  - patellar mobs, Gr III  - scar mob R  - PROM + OP R MT : 15min   - tib/fem mobs AP Gr III  - patellar mobs, Gr III  - scar mob R  - PROM + OP R MT:   L tibiofemoral AP glide in flexion, extension Gr III+  L patellofemoral sup/inf glides Gr IV+ MT:   L tibiofemoral AP glide in flexion Gr IV+  L tibial IR PPM Gr IV+ in flexion, extension MT:   L tibiofemoral AP glide in flexion, extension Gr III++   NR: 15 mins  - QS, x10 L  - stairs, x1 laps, recip, BUE, heavy hand use  - 6\" forw step up, L-leading, x12 BUE NR: 10'  - QS, x10 L  - 6\" forw step up, L-leading, 2x12 BUE       TE: 15 mins  - heel slide, x15 L  - sit<>stand, x10   - LAQ 5\" holds, x10 L   TE: 20'  -Walker adjustment for improved upright posturing  Seated heel slide (for HEP) x20 L   - LAQ 5\" holds, 2x10 L  -Nustep, L3 6' LE  TE: 30'  -Nustep, L3 6' LE   -Supine quad set, x20  -Supine SAQ 2# 5s hold, x20  -Sit to stand elevated mat table, towel at heel for forward weight shift cueing x15 (sig hip hinge noted with retropulsion)   -Side stepping at ballet bar, 3 lap 2UE  TE  Quad iso at end range extension 5s x 10  Sit <> stand from elevated mat table: anterior HHA > no UE assist 3 x 15 TE  LAQ 5# 2 x 20 L  Standing TKE 2 x 20 BTB L  Squats at railing (focus on posterior weight shifting) 2 x 15 TE  Step up 6 inch 3 x 10 L  LAQ 7.5# 3 x 10 L  Sit <> stand from elevated mat table: anterior HHA 2 x 15           HEP: continue current HEP (lateral stepping, squats, marching); add heel slides, knee flex/ext on stair    Charges: 1 MT, 2 TE       Total Timed Treatment: MT 12 min, TE 28 min  Total Treatment Time: 40 min

## 2023-08-17 ENCOUNTER — OFFICE VISIT (OUTPATIENT)
Dept: PHYSICAL THERAPY | Facility: HOSPITAL | Age: 88
End: 2023-08-17
Attending: ORTHOPAEDIC SURGERY
Payer: MEDICARE

## 2023-08-17 PROCEDURE — 97110 THERAPEUTIC EXERCISES: CPT

## 2023-08-17 PROCEDURE — 97530 THERAPEUTIC ACTIVITIES: CPT

## 2023-08-17 PROCEDURE — 97140 MANUAL THERAPY 1/> REGIONS: CPT

## 2023-08-17 NOTE — PROGRESS NOTES
Diagnosis:   Primary osteoarthritis of left knee (K31.73)      Referring Provider: Nidhi Black Date of Evaluation:    2023    Precautions:  L TKA (4/3/23) Next MD visit:   none scheduled  Date of Surgery: 4/3/23   Insurance Primary/Secondary: [employer] / Liliane Friasd PPO     # Auth Visits: na          Subjective: Patient reports he has tried to do the steps at home with his L leg but can tell he is using mostly his arms to help push up - the step is just too high. Pain: 0/10 - na    At IE:  Pt describes pain level current - no pain   Current functional limitations include walking, stairs, getting up from chair, squatting. Objective:   AROM (PROM) R knee: ,2023   Flexion 110 deg (supine) start of visit, 112 deg following mobilization    Taken from IE:  Observation: B tibial ER (R>L); hyperkyphotic, scoliosis  Integumentary: fully closed incision with well approximated scar; mild redness globally  Palpation: posterior R knee,  TTP B calf (R>L)  Sensation: intact light touch BLE    AROM (PROM): (* denotes performed with pain)  Hip Knee   Flexion: R 110; L 100   Flexion: R 105 (110); L 90 (100)  Extension: R 0; L -15 (-10)        Accessory motion: hypomobile tib/fem     Flexibility:  Hip Flexor: R mild, L mild  Hamstrings: R severe; L severe    Strength/MMT: (* denotes performed with pain)  Hip Knee   Flexion: R 4+/5; L 4+/5  Extension: R 3-/5; L 3-/5  Abduction: R 3+/5; L 4+/5   Flexion: R 5/5; L 5/5  Extension: R 5/5; L 5/5        Special tests:   NA    Gait: pt ambulates on level ground with forward flexed posture, RW, decreased step length B  Balance: SLS: unable   Romber seconds    Assessment: Patient's LE strength is progressing steadily, still limited in single limb activities including ascending/descending stairs. Knee ROM is staying stable at goal, primary treatment focus is on improving strength and balance.       Goals: (to be met in 10 visits)  Pt will improve knee extension ROM to 0 deg to allow proper heel strike during gait and terminal knee extension in stance   Pt will improve knee AROM flexion to >+110 degrees to improve ability to navigate stairs   Pt will improve quad strength to 5/5 to ascend 1 flight of stairs reciprocally   Pt will be ambulate 200' without RW to improve household ambulation  Pt will be independent and compliant with comprehensive HEP to maintain progress achieved in PT     Plan: Continue with LE strengthening, knee mobility training.   Date:    7/21/2023     TX#: 3/10 Date: 7/26/2023    TX#: 4/10 Date: 8/3/2023   TX#: 5/10 Date: 8/7/2023   Tx#: 6/10 Date: 8/14/2023   Tx#: 7/10 Date: 8/17/2023   Tx#: 8/10   MT: 15 mins  - tib/fem mobs AP Gr III  - patellar mobs, Gr III  - scar mob R  - PROM + OP R MT : 15min   - tib/fem mobs AP Gr III  - patellar mobs, Gr III  - scar mob R  - PROM + OP R MT:   L tibiofemoral AP glide in flexion, extension Gr III+  L patellofemoral sup/inf glides Gr IV+ MT:   L tibiofemoral AP glide in flexion Gr IV+  L tibial IR PPM Gr IV+ in flexion, extension MT:   L tibiofemoral AP glide in flexion, extension Gr III++ MT  L tibiofemoral AP glide in flexion Gr III++  L tibial IR PPM Gr IV+ in flexion   NR: 10'  - QS, x10 L  - 6\" forw step up, L-leading, 2x12 BUE     NR  Rhomberg 1 min x 2   TE: 20'  -Walker adjustment for improved upright posturing  Seated heel slide (for HEP) x20 L   - LAQ 5\" holds, 2x10 L  -Nustep, L3 6' LE  TE: 30'  -Nustep, L3 6' LE   -Supine quad set, x20  -Supine SAQ 2# 5s hold, x20  -Sit to stand elevated mat table, towel at heel for forward weight shift cueing x15 (sig hip hinge noted with retropulsion)   -Side stepping at ballet bar, 3 lap 2UE  TE  Quad iso at end range extension 5s x 10  Sit <> stand from elevated mat table: anterior HHA > no UE assist 3 x 15 TE  LAQ 5# 2 x 20 L  Standing TKE 2 x 20 BTB L  Squats at railing (focus on posterior weight shifting) 2 x 15 TE  Step up 6 inch 3 x 10 L  LAQ 7.5# 3 x 10 L  Sit <> stand from elevated mat table: anterior HHA 2 x 15 TE  Step up 6 inch 2 x 15 L  LAQ 7.5# 3 x 15 L  Sit <> stand from elevated mat table 2 x 15        TA  Gait training in parallel bars: fwd, lat, retro 5 laps ea   HEP: continue current HEP (lateral stepping, squats, marching); add heel slides, knee flex/ext on stair    Charges: 1 MT, 1 TE, 1 TA       Total Timed Treatment: NM 4 min, MT 12 min, TA 10 min, TE 15 min  Total Treatment Time: 41 min

## 2023-08-21 ENCOUNTER — OFFICE VISIT (OUTPATIENT)
Dept: PHYSICAL THERAPY | Facility: HOSPITAL | Age: 88
End: 2023-08-21
Attending: ORTHOPAEDIC SURGERY
Payer: MEDICARE

## 2023-08-21 PROCEDURE — 97530 THERAPEUTIC ACTIVITIES: CPT

## 2023-08-21 PROCEDURE — 97110 THERAPEUTIC EXERCISES: CPT

## 2023-08-21 NOTE — PROGRESS NOTES
Diagnosis:   Primary osteoarthritis of left knee (U38.62)      Referring Provider: Elkin Grande Date of Evaluation:    2023    Precautions:  L TKA (4/3/23) Next MD visit:   none scheduled  Date of Surgery: 4/3/23   Insurance Primary/Secondary: José Elias / Mireya Going PPO     # Auth Visits: na          Subjective: Has been trying to walk short distances without the walker, specifically to the bathroom and in the kitchen. Feels like stairs are getting better. Pain: 0/10 - na    At IE:  Pt describes pain level current - no pain   Current functional limitations include walking, stairs, getting up from chair, squatting. Objective:   AROM (PROM) R knee: ,2023   Flexion 110 deg (supine) start of visit, 112 deg following mobilization    Taken from IE:  Observation: B tibial ER (R>L); hyperkyphotic, scoliosis  Integumentary: fully closed incision with well approximated scar; mild redness globally  Palpation: posterior R knee,  TTP B calf (R>L)  Sensation: intact light touch BLE    AROM (PROM): (* denotes performed with pain)  Hip Knee   Flexion: R 110; L 100   Flexion: R 105 (110); L 90 (100)  Extension: R 0; L -15 (-10)        Accessory motion: hypomobile tib/fem     Flexibility:  Hip Flexor: R mild, L mild  Hamstrings: R severe; L severe    Strength/MMT: (* denotes performed with pain)  Hip Knee   Flexion: R 4+/5; L 4+/5  Extension: R 3-/5; L 3-/5  Abduction: R 3+/5; L 4+/5   Flexion: R 5/5; L 5/5  Extension: R 5/5; L 5/5        Special tests:   NA    Gait: pt ambulates on level ground with forward flexed posture, RW, decreased step length B  Balance: SLS: unable   Romber seconds    Assessment: Pt demonstrates good stability with ambulation without AD for short duration but will benefit from continued rolling walker for longer distances due to decreased balance and stability most notable with fatigue.  Good quadriceps activation noted with shuttle leg press, pt will benefit from increased weight at next session. Goals: (to be met in 10 visits)  Pt will improve knee extension ROM to 0 deg to allow proper heel strike during gait and terminal knee extension in stance   Pt will improve knee AROM flexion to >+110 degrees to improve ability to navigate stairs   Pt will improve quad strength to 5/5 to ascend 1 flight of stairs reciprocally   Pt will be ambulate 200' without RW to improve household ambulation  Pt will be independent and compliant with comprehensive HEP to maintain progress achieved in PT     Plan: Continue with LE strengthening, knee mobility training.   Date:    7/21/2023     TX#: 3/10 Date: 7/26/2023    TX#: 4/10 Date: 8/3/2023   TX#: 5/10 Date: 8/7/2023   Tx#: 6/10 Date: 8/14/2023   Tx#: 7/10 Date: 8/17/2023   Tx#: 8/10 Date: 8/21/2023   Tx: 9/10     MT: 15 mins  - tib/fem mobs AP Gr III  - patellar mobs, Gr III  - scar mob R  - PROM + OP R MT : 15min   - tib/fem mobs AP Gr III  - patellar mobs, Gr III  - scar mob R  - PROM + OP R MT:   L tibiofemoral AP glide in flexion, extension Gr III+  L patellofemoral sup/inf glides Gr IV+ MT:   L tibiofemoral AP glide in flexion Gr IV+  L tibial IR PPM Gr IV+ in flexion, extension MT:   L tibiofemoral AP glide in flexion, extension Gr III++ MT  L tibiofemoral AP glide in flexion Gr III++  L tibial IR PPM Gr IV+ in flexion    NR: 10'  - QS, x10 L  - 6\" forw step up, L-leading, 2x12 BUE     NR  Rhomberg 1 min x 2    TE: 20'  -Walker adjustment for improved upright posturing  Seated heel slide (for HEP) x20 L   - LAQ 5\" holds, 2x10 L  -Nustep, L3 6' LE  TE: 30'  -Nustep, L3 6' LE   -Supine quad set, x20  -Supine SAQ 2# 5s hold, x20  -Sit to stand elevated mat table, towel at heel for forward weight shift cueing x15 (sig hip hinge noted with retropulsion)   -Side stepping at ballet bar, 3 lap 2UE  TE  Quad iso at end range extension 5s x 10  Sit <> stand from elevated mat table: anterior HHA > no UE assist 3 x 15 TE  LAQ 5# 2 x 20 L  Standing TKE 2 x 20 BTB L  Squats at railing (focus on posterior weight shifting) 2 x 15 TE  Step up 6 inch 3 x 10 L  LAQ 7.5# 3 x 10 L  Sit <> stand from elevated mat table: anterior HHA 2 x 15 TE  Step up 6 inch 2 x 15 L  LAQ 7.5# 3 x 15 L  Sit <> stand from elevated mat table 2 x 15 TE:   Sit <> stand from elevated mat table 2 x 15  Double Leg Shuttle 42# (progress next session) 3x10  Single Leg Shuttle R/L 37# 2x15 ea         TA  Gait training in parallel bars: fwd, lat, retro 5 laps ea TA  Gait training in parallel bars: fwd, lat, retro 5 laps ea  Gait Training, Sania Peers in hallway near wall 4x20ft    HEP: continue current HEP (lateral stepping, squats, marching); add heel slides, knee flex/ext on stair    Charges: 2 TE, 1 TA       Total Timed Treatment:  TA 18 min, TE 22 min  Total Treatment Time: 40 min

## 2023-08-24 ENCOUNTER — OFFICE VISIT (OUTPATIENT)
Dept: PHYSICAL THERAPY | Facility: HOSPITAL | Age: 88
End: 2023-08-24
Attending: ORTHOPAEDIC SURGERY
Payer: MEDICARE

## 2023-08-24 PROCEDURE — 97140 MANUAL THERAPY 1/> REGIONS: CPT

## 2023-08-24 PROCEDURE — 97110 THERAPEUTIC EXERCISES: CPT

## 2023-08-24 PROCEDURE — 97530 THERAPEUTIC ACTIVITIES: CPT

## 2023-08-24 NOTE — PROGRESS NOTES
Diagnosis:   Primary osteoarthritis of left knee (N28.85)      Referring Provider: Chelsea Flores Date of Evaluation:    2023    Precautions:  L TKA (4/3/23) Next MD visit:   none scheduled  Date of Surgery: 4/3/23    Progress Summary  Pt has attended 10 visits in Physical Therapy. Insurance Primary/Secondary: MED[employer] / Ivory Renee PPO     # Auth Visits: na          Subjective: Patient reports he continues to have no pain in his L knee. He is feeling more comfortable walking short distances in his home without the walker, and his balance feels stable. His strength is improving, his L leg feels about 80% of his R. He can go down the stairs reciprocally, but going up the stairs he still uses his R leg. He still finds himself leaning backwards when getting up from a chair. Pain: 0/10 - na    At IE:  Pt describes pain level current - no pain   Current functional limitations include walking, stairs, getting up from chair, squatting.      Objective:   AROM (PROM) R knee: ,2023   Flexion 110 deg (supine) start of visit, 112 deg following mobilization    Taken from IE:  Observation: B tibial ER (R>L); hyperkyphotic, scoliosis  Integumentary: fully closed incision with well approximated scar; mild redness globally  Palpation: posterior R knee,  TTP B calf (R>L)  Sensation: intact light touch BLE    AROM (PROM): (* denotes performed with pain)  Hip Knee   Flexion: R 110; L 100   Flexion: R 105 (110); L 90 (100)  Extension: R 0; L -15 (-10)        Accessory motion: hypomobile tib/fem     Flexibility:  Hip Flexor: R mild, L mild  Hamstrings: R severe; L severe    Strength/MMT: (* denotes performed with pain)  Hip Knee   Flexion: R 4+/5; L 4+/5  Extension: R 3-/5; L 3-/5  Abduction: R 3+/5; L 4+/5   Flexion: R 5/5; L 5/5  Extension: R 5/5; L 5/5        Special tests:   NA    Gait: pt ambulates on level ground with forward flexed posture, RW, decreased step length B  Balance: SLS: unable   Romber seconds    Assessment: Patient has made good progress overall in physical therapy demonstrating improvements in knee ROM and strength, gait and balance, and functional ability. Active ROM is at goal for flexion and nearing goal for extension; passive extension is at goal, and expect active range will improve as full quadriceps strength is restored. Patient continues to exhibit deficits in strength and balance that impact his ability to perform sit <> stand transfers, ambulation without an AD and ascending/descending stairs safely and independently. . Continued skilled physical therapy is medically necessary to promote normal ROM, strength, and motor control to facilitate patient's return to prior level of function. Goals: (to be met in 10 visits)  Pt will improve knee extension ROM to 0 deg to allow proper heel strike during gait and terminal knee extension in stance   Pt will improve knee AROM flexion to >+110 degrees to improve ability to navigate stairs   Pt will improve quad strength to 5/5 to ascend 1 flight of stairs reciprocally   Pt will be ambulate 200' without RW to improve household ambulation  Pt will be independent and compliant with comprehensive HEP to maintain progress achieved in PT       Plan: Continue skilled Physical Therapy 1-2 x/week or a total of 6 visits over a 90 day period. Treatment will include: knee mobility interventions, LE strengthening, gait and balance training. Patient/Family/Caregiver was advised of these findings, precautions, and treatment options and has agreed to actively participate in planning and for this course of care. Thank you for your referral. If you have any questions, please contact me at Dept: [phone]. Sincerely,  Electronically signed by therapist: Wilmer Waldrop, PT     Physician's certification required:  Yes  Please co-sign or sign and return this letter via fax as soon as possible to [phone].    I certify the need for these services furnished under this plan of treatment and while under my care.     X___________________________________________________ Date____________________    Certification From: 2/66/7191  To:11/22/2023     Date: 7/26/2023    TX#: 4/10 Date: 8/3/2023   TX#: 5/10 Date: 8/7/2023   Tx#: 6/10 Date: 8/14/2023   Tx#: 7/10 Date: 8/17/2023   Tx#: 8/10 Date: 8/21/2023   Tx: 9/10   Date: 8/24/2023   Tx: 10/10     MT : 15min   - tib/fem mobs AP Gr III  - patellar mobs, Gr III  - scar mob R  - PROM + OP R MT:   L tibiofemoral AP glide in flexion, extension Gr III+  L patellofemoral sup/inf glides Gr IV+ MT:   L tibiofemoral AP glide in flexion Gr IV+  L tibial IR PPM Gr IV+ in flexion, extension MT:   L tibiofemoral AP glide in flexion, extension Gr III++ MT  L tibiofemoral AP glide in flexion Gr III++  L tibial IR PPM Gr IV+ in flexion  MT:   L tibiofemoral AP glide in extension Gr III++       NR  Rhomberg 1 min x 2     TE: 30'  -Nustep, L3 6' LE   -Supine quad set, x20  -Supine SAQ 2# 5s hold, x20  -Sit to stand elevated mat table, towel at heel for forward weight shift cueing x15 (sig hip hinge noted with retropulsion)   -Side stepping at ballet bar, 3 lap 2UE  TE  Quad iso at end range extension 5s x 10  Sit <> stand from elevated mat table: anterior HHA > no UE assist 3 x 15 TE  LAQ 5# 2 x 20 L  Standing TKE 2 x 20 BTB L  Squats at railing (focus on posterior weight shifting) 2 x 15 TE  Step up 6 inch 3 x 10 L  LAQ 7.5# 3 x 10 L  Sit <> stand from elevated mat table: anterior HHA 2 x 15 TE  Step up 6 inch 2 x 15 L  LAQ 7.5# 3 x 15 L  Sit <> stand from elevated mat table 2 x 15 TE:   Sit <> stand from elevated mat table 2 x 15  Double Leg Shuttle 42# (progress next session) 3x10  Single Leg Shuttle R/L 37# 2x15 ea  TE:   Sit <> stand from elevated mat table 2 x 15  Double Leg Shuttle 62# 2 x 15  Single Leg Shuttle R/L 50# 2x15 ea   Step up 6 inch x20 L       TA  Gait training in parallel bars: fwd, lat, retro 5 laps ea TA  Gait training in parallel bars: fwd, lat, retro 5 laps ea  Gait Training, 0UE in hallway near wall 4x20ft  TA  L TKE with R LE step forward back x20 BTB   HEP: continue current HEP (lateral stepping, squats, marching); add heel slides, knee flex/ext on stair    Charges: 1 MT , 1 TE, 1 TA       Total Timed Treatment:  MT 10 min, TA 10 min, TE 20 min  Total Treatment Time: 40 min

## 2023-08-28 ENCOUNTER — OFFICE VISIT (OUTPATIENT)
Dept: PHYSICAL THERAPY | Facility: HOSPITAL | Age: 88
End: 2023-08-28
Attending: ORTHOPAEDIC SURGERY
Payer: MEDICARE

## 2023-08-28 PROCEDURE — 97530 THERAPEUTIC ACTIVITIES: CPT

## 2023-08-28 PROCEDURE — 97110 THERAPEUTIC EXERCISES: CPT

## 2023-08-28 NOTE — PROGRESS NOTES
Diagnosis:   Primary osteoarthritis of left knee (S51.96)      Referring Provider: Suhail Gomez Date of Evaluation:    2023    Precautions:  L TKA (4/3/23) Next MD visit:   none scheduled  Date of Surgery: 4/3/23   Insurance Primary/Secondary: Santos Mahoney / [address] [place] [address] PPO     # Auth Visits: na          Subjective: Patient reports the muscles in his buttocks were sore after last visit, \"feels like we worked muscles that haven't worked in a while\". Reports no pain in his L knee, but still has more difficulty going up the stairs with his L leg. Pain: 0/10 - na    At IE:  Pt describes pain level current - no pain   Current functional limitations include walking, stairs, getting up from chair, squatting. Objective:   AROM (PROM) R knee: ,2023   Flexion 110 deg (supine) start of visit, 112 deg following mobilization    Taken from IE:  Observation: B tibial ER (R>L); hyperkyphotic, scoliosis  Integumentary: fully closed incision with well approximated scar; mild redness globally  Palpation: posterior R knee,  TTP B calf (R>L)  Sensation: intact light touch BLE    AROM (PROM): (* denotes performed with pain)  Hip Knee   Flexion: R 110; L 100   Flexion: R 105 (110); L 90 (100)  Extension: R 0; L -15 (-10)        Accessory motion: hypomobile tib/fem     Flexibility:  Hip Flexor: R mild, L mild  Hamstrings: R severe; L severe    Strength/MMT: (* denotes performed with pain)  Hip Knee   Flexion: R 4+/5; L 4+/5  Extension: R 3-/5; L 3-/5  Abduction: R 3+/5; L 4+/5   Flexion: R 5/5; L 5/5  Extension: R 5/5; L 5/5        Special tests:   NA    Gait: pt ambulates on level ground with forward flexed posture, RW, decreased step length B  Balance: SLS: unable   Romber seconds    Assessment: Knee ROM is holding stable at 0-110 deg; deferred manual interventions today. Did not progress resistance for most exercises today based on muscle soreness reported after last visit.         Goals: (to be met in 10 visits)  Pt will improve knee extension ROM to 0 deg to allow proper heel strike during gait and terminal knee extension in stance   Pt will improve knee AROM flexion to >+110 degrees to improve ability to navigate stairs   Pt will improve quad strength to 5/5 to ascend 1 flight of stairs reciprocally   Pt will be ambulate 200' without RW to improve household ambulation  Pt will be independent and compliant with comprehensive HEP to maintain progress achieved in PT       Plan: Progress LE strength, functional mobility, gait and balance.   Date: 8/3/2023   TX#: 5/10 Date: 8/7/2023   Tx#: 6/10 Date: 8/14/2023   Tx#: 7/10 Date: 8/17/2023   Tx#: 8/10 Date: 8/21/2023   Tx: 9/10   Date: 8/24/2023   Tx: 10/10   Date: 8/24/2023   Tx: 11/16    MT:   L tibiofemoral AP glide in flexion, extension Gr III+  L patellofemoral sup/inf glides Gr IV+ MT:   L tibiofemoral AP glide in flexion Gr IV+  L tibial IR PPM Gr IV+ in flexion, extension MT:   L tibiofemoral AP glide in flexion, extension Gr III++ MT  L tibiofemoral AP glide in flexion Gr III++  L tibial IR PPM Gr IV+ in flexion  MT:   L tibiofemoral AP glide in extension Gr III++       NR  Rhomberg 1 min x 2      TE  Quad iso at end range extension 5s x 10  Sit <> stand from elevated mat table: anterior HHA > no UE assist 3 x 15 TE  LAQ 5# 2 x 20 L  Standing TKE 2 x 20 BTB L  Squats at railing (focus on posterior weight shifting) 2 x 15 TE  Step up 6 inch 3 x 10 L  LAQ 7.5# 3 x 10 L  Sit <> stand from elevated mat table: anterior HHA 2 x 15 TE  Step up 6 inch 2 x 15 L  LAQ 7.5# 3 x 15 L  Sit <> stand from elevated mat table 2 x 15 TE:   Sit <> stand from elevated mat table 2 x 15  Double Leg Shuttle 42# (progress next session) 3x10  Single Leg Shuttle R/L 37# 2x15 ea  TE:   Sit <> stand from elevated mat table 2 x 15  Double Leg Shuttle 62# 2 x 15  Single Leg Shuttle R/L 50# 2x15 ea   Step up 6 inch x20 L TE:   Double Leg Shuttle 67# 2 x 15  Single Leg Shuttle R/L 50# 2x15 ea Step up 6 inch 2 x 15 L  Side stepping 1 lap down hallway with railing      TA  Gait training in parallel bars: fwd, lat, retro 5 laps ea TA  Gait training in parallel bars: fwd, lat, retro 5 laps ea  Gait Training, 0UE in hallway near wall 4x20ft  TA  L TKE with R LE step forward back x20 BTB TA  Gait training with unilat UE assist forward with focus on trunk extension and full step length   HEP: continue current HEP (lateral stepping, squats, marching); add heel slides, knee flex/ext on stair    Charges: 2 TE, 1 TA       Total Timed Treatment:  TA 12 min, TE 28 min  Total Treatment Time: 40 min

## 2023-08-31 ENCOUNTER — OFFICE VISIT (OUTPATIENT)
Dept: PHYSICAL THERAPY | Facility: HOSPITAL | Age: 88
End: 2023-08-31
Attending: ORTHOPAEDIC SURGERY
Payer: MEDICARE

## 2023-08-31 PROCEDURE — 97140 MANUAL THERAPY 1/> REGIONS: CPT

## 2023-08-31 PROCEDURE — 97110 THERAPEUTIC EXERCISES: CPT

## 2023-08-31 PROCEDURE — 97530 THERAPEUTIC ACTIVITIES: CPT

## 2023-09-05 ENCOUNTER — OFFICE VISIT (OUTPATIENT)
Dept: PHYSICAL THERAPY | Facility: HOSPITAL | Age: 88
End: 2023-09-05
Attending: ORTHOPAEDIC SURGERY
Payer: MEDICARE

## 2023-09-05 PROCEDURE — 97110 THERAPEUTIC EXERCISES: CPT

## 2023-09-05 PROCEDURE — 97140 MANUAL THERAPY 1/> REGIONS: CPT

## 2023-09-05 PROCEDURE — 97530 THERAPEUTIC ACTIVITIES: CPT

## 2023-09-05 NOTE — PROGRESS NOTES
Diagnosis:   Primary osteoarthritis of left knee (U09.31)      Referring Provider: Kandi Cavanaugh Date of Evaluation:    2023    Precautions:  L TKA (4/3/23) Next MD visit:   none scheduled  Date of Surgery: 4/3/23   Insurance Primary/Secondary: Jareth Belle / Chago Martinez PPO     # Auth Visits: na          Subjective: Patient reports the pain in his hip came back the day after his last visit. Not as intense but still feels it when walking. Pain: 0/10 - na    At IE:  Pt describes pain level current - no pain   Current functional limitations include walking, stairs, getting up from chair, squatting.      Objective:   AROM (PROM) R knee: ,2023   Flexion 110 deg (supine) start of visit, 112 deg following mobilization    Taken from IE:  Observation: B tibial ER (R>L); hyperkyphotic, scoliosis  Integumentary: fully closed incision with well approximated scar; mild redness globally  Palpation: posterior R knee,  TTP B calf (R>L)  Sensation: intact light touch BLE    AROM (PROM): (* denotes performed with pain)  Hip Knee   Flexion: R 110; L 100   Flexion: R 105 (110); L 90 (100)  Extension: R 0; L -15 (-10)        Accessory motion: hypomobile tib/fem     Flexibility:  Hip Flexor: R mild, L mild  Hamstrings: R severe; L severe    Strength/MMT: (* denotes performed with pain)  Hip Knee   Flexion: R 4+/5; L 4+/5  Extension: R 3-/5; L 3-/5  Abduction: R 3+/5; L 4+/5   Flexion: R 5/5; L 5/5  Extension: R 5/5; L 5/5        Special tests:   NA    Gait: pt ambulates on level ground with forward flexed posture, RW, decreased step length B  Balance: SLS: unable   Romber seconds      2023  Lumbar AROM screen, standing and sitting: no hip pain reproduction (no OP performed due to balance concerns)  PAIVM: L UPA L4-5 hypomobile and locally painful // no change in pain intensity while walking following mobilization    Assessment: L posterior hip pain still most consistent with gluteal mm related pain, responded positively to isometrics performed in-clinic. Added this to HEP to improve between-visit carryover. Goals: (to be met in 10 visits)  Pt will improve knee extension ROM to 0 deg to allow proper heel strike during gait and terminal knee extension in stance   Pt will improve knee AROM flexion to >+110 degrees to improve ability to navigate stairs   Pt will improve quad strength to 5/5 to ascend 1 flight of stairs reciprocally   Pt will be ambulate 200' without RW to improve household ambulation  Pt will be independent and compliant with comprehensive HEP to maintain progress achieved in PT       Plan: Progress LE strength, functional mobility, gait and balance.   Date: 8/7/2023   Tx#: 6/10 Date: 8/14/2023   Tx#: 7/10 Date: 8/17/2023   Tx#: 8/10 Date: 8/21/2023   Tx: 9/10   Date: 8/24/2023   Tx: 10/10   Date: 8/28/2023   Tx: 11/16  Date: 8/31/2023   Tx: 12/16  Date: 9/5/2023   Tx: 13/16    MT:   L tibiofemoral AP glide in flexion Gr IV+  L tibial IR PPM Gr IV+ in flexion, extension MT:   L tibiofemoral AP glide in flexion, extension Gr III++ MT  L tibiofemoral AP glide in flexion Gr III++  L tibial IR PPM Gr IV+ in flexion  MT:   L tibiofemoral AP glide in extension Gr III++  MT  STM L glute max, med MT  L UPA L4-5 Gr III       NR  Rhomberg 1 min x 2        TE  LAQ 5# 2 x 20 L  Standing TKE 2 x 20 BTB L  Squats at railing (focus on posterior weight shifting) 2 x 15 TE  Step up 6 inch 3 x 10 L  LAQ 7.5# 3 x 10 L  Sit <> stand from elevated mat table: anterior HHA 2 x 15 TE  Step up 6 inch 2 x 15 L  LAQ 7.5# 3 x 15 L  Sit <> stand from elevated mat table 2 x 15 TE:   Sit <> stand from elevated mat table 2 x 15  Double Leg Shuttle 42# (progress next session) 3x10  Single Leg Shuttle R/L 37# 2x15 ea  TE:   Sit <> stand from elevated mat table 2 x 15  Double Leg Shuttle 62# 2 x 15  Single Leg Shuttle R/L 50# 2x15 ea   Step up 6 inch x20 L TE:   Double Leg Shuttle 67# 2 x 15  Single Leg Shuttle R/L 50# 2x15 ea Step up 6 inch 2 x 15 L  Side stepping 1 lap down hallway with railing TE:   Step up 6 inch 2 x 15 L  Side stepping 1 lap down hallway with railing  Sit <> stand from elevated mat table 2 x 10 TE  Functional movement reassessment  Hooklying hip abduction isometrics 45s x 5 // 0/10  pain with walking   Edu: POC      TA  Gait training in parallel bars: fwd, lat, retro 5 laps ea TA  Gait training in parallel bars: fwd, lat, retro 5 laps ea  Gait Training, Britni Bending in hallway near wall 4x20ft  TA  L TKE with R LE step forward back x20 BTB TA  Gait training with unilat UE assist forward with focus on trunk extension and full step length TA  Gait training with unilat UE assist, forward with focus on trunk extension and full step length TA  Gait in parallel bars 8 laps (fwd only)   HEP: continue current HEP (lateral stepping, squats, marching); add heel slides, knee flex/ext on stair    Charges: 1 MT, 1 TA, 1 TE       Total Timed Treatment:  MT 10 min, TA 10 min, TE 19 min  Total Treatment Time: 39 min

## 2023-09-07 ENCOUNTER — OFFICE VISIT (OUTPATIENT)
Dept: PHYSICAL THERAPY | Facility: HOSPITAL | Age: 88
End: 2023-09-07
Attending: ORTHOPAEDIC SURGERY
Payer: MEDICARE

## 2023-09-07 PROCEDURE — 97530 THERAPEUTIC ACTIVITIES: CPT

## 2023-09-07 PROCEDURE — 97110 THERAPEUTIC EXERCISES: CPT

## 2023-09-07 PROCEDURE — 97112 NEUROMUSCULAR REEDUCATION: CPT

## 2023-09-07 NOTE — PROGRESS NOTES
Diagnosis:   Primary osteoarthritis of left knee (K44.26)      Referring Provider: Raman Moran Date of Evaluation:    2023    Precautions:  L TKA (4/3/23) Next MD visit:   none scheduled  Date of Surgery: 4/3/23   Insurance Primary/Secondary: Yesica Pascal / Zahcariah Santillan PPO     # Auth Visits: na          Subjective: Patient reports the hip pain seems to have resolved - he has not felt it since his session on Tuesday. Reports he is taking the stairs at home with his L leg now, feels his strength is improving. Pain: 0/10 - na    At IE:  Pt describes pain level current - no pain   Current functional limitations include walking, stairs, getting up from chair, squatting.      Objective:   AROM (PROM) R knee: ,2023   Flexion 110 deg (supine) start of visit, 112 deg following mobilization    Taken from IE:  Observation: B tibial ER (R>L); hyperkyphotic, scoliosis  Integumentary: fully closed incision with well approximated scar; mild redness globally  Palpation: posterior R knee,  TTP B calf (R>L)  Sensation: intact light touch BLE    AROM (PROM): (* denotes performed with pain)  Hip Knee   Flexion: R 110; L 100   Flexion: R 105 (110); L 90 (100)  Extension: R 0; L -15 (-10)        Accessory motion: hypomobile tib/fem     Flexibility:  Hip Flexor: R mild, L mild  Hamstrings: R severe; L severe    Strength/MMT: (* denotes performed with pain)  Hip Knee   Flexion: R 4+/5; L 4+/5  Extension: R 3-/5; L 3-/5  Abduction: R 3+/5; L 4+/5   Flexion: R 5/5; L 5/5  Extension: R 5/5; L 5/5        Special tests:   NA    Gait: pt ambulates on level ground with forward flexed posture, RW, decreased step length B  Balance: SLS: unable   Romber seconds      2023  Lumbar AROM screen, standing and sitting: no hip pain reproduction (no OP performed due to balance concerns)  PAIVM: L UPA L4-5 hypomobile and locally painful // no change in pain intensity while walking following mobilization    Assessment: Dynamic balance is impacted by R glute med weakness, incorporated additional hip mm strengthening to address this. Continue to note difficulty with postural control when weight is shifted posteriorly, though overall this has improved which benefits safety during sit to stand transfers. Goals: (to be met in 10 visits)  Pt will improve knee extension ROM to 0 deg to allow proper heel strike during gait and terminal knee extension in stance   Pt will improve knee AROM flexion to >+110 degrees to improve ability to navigate stairs   Pt will improve quad strength to 5/5 to ascend 1 flight of stairs reciprocally   Pt will be ambulate 200' without RW to improve household ambulation  Pt will be independent and compliant with comprehensive HEP to maintain progress achieved in PT       Plan: Progress LE strength, functional mobility, gait and balance.   Date: 8/14/2023   Tx#: 7/10 Date: 8/17/2023   Tx#: 8/10 Date: 8/21/2023   Tx: 9/10   Date: 8/24/2023   Tx: 10/10   Date: 8/28/2023   Tx: 11/16  Date: 8/31/2023   Tx: 12/16  Date: 9/5/2023   Tx: 13/16  Date: 9/7/2023   Tx: 14/16    MT:   L tibiofemoral AP glide in flexion, extension Gr III++ MT  L tibiofemoral AP glide in flexion Gr III++  L tibial IR PPM Gr IV+ in flexion  MT:   L tibiofemoral AP glide in extension Gr III++  MT  STM L glute max, med MT  L UPA L4-5 Gr III       NR  Rhomberg 1 min x 2      NR  DLS on airex 2 min  DLS on airex with head turns 2 x 10 ea horiz/vert   TE  Step up 6 inch 3 x 10 L  LAQ 7.5# 3 x 10 L  Sit <> stand from elevated mat table: anterior HHA 2 x 15 TE  Step up 6 inch 2 x 15 L  LAQ 7.5# 3 x 15 L  Sit <> stand from elevated mat table 2 x 15 TE:   Sit <> stand from elevated mat table 2 x 15  Double Leg Shuttle 42# (progress next session) 3x10  Single Leg Shuttle R/L 37# 2x15 ea  TE:   Sit <> stand from elevated mat table 2 x 15  Double Leg Shuttle 62# 2 x 15  Single Leg Shuttle R/L 50# 2x15 ea   Step up 6 inch x20 L TE:   Double Leg Shuttle 67# 2 x 15  Single Leg Shuttle R/L 50# 2x15 ea   Step up 6 inch 2 x 15 L  Side stepping 1 lap down hallway with railing TE:   Step up 6 inch 2 x 15 L  Side stepping 1 lap down hallway with railing  Sit <> stand from elevated mat table 2 x 10 TE  Functional movement reassessment  Hooklying hip abduction isometrics 45s x 5 // 0/10  pain with walking   Edu: POC  TE:   Step up 6 inch (one UE assist) 2 x 10 L  Side stepping 2 laps down hallway with railing  SLS with B UE support (glute med endurance training) 15s x 4 B    TA  Gait training in parallel bars: fwd, lat, retro 5 laps ea TA  Gait training in parallel bars: fwd, lat, retro 5 laps ea  Gait Training, Nora Smoke in hallway near wall 4x20ft  TA  L TKE with R LE step forward back x20 BTB TA  Gait training with unilat UE assist forward with focus on trunk extension and full step length TA  Gait training with unilat UE assist, forward with focus on trunk extension and full step length TA  Gait in parallel bars 8 laps (fwd only) TA  Gait training with no UE assist (SBA) 2 laps  Gait trainin deg turns with one UE support 2 x 8 B   HEP: continue current HEP (lateral stepping, squats, marching); add heel slides, knee flex/ext on stair    Charges: 1 NR, 1 TA, 1 TE       Total Timed Treatment:  NR 12 min, TA 14 min, TE 14 min  Total Treatment Time: 40 min

## 2023-09-12 ENCOUNTER — OFFICE VISIT (OUTPATIENT)
Dept: INTERNAL MEDICINE CLINIC | Facility: CLINIC | Age: 88
End: 2023-09-12

## 2023-09-12 VITALS
SYSTOLIC BLOOD PRESSURE: 134 MMHG | BODY MASS INDEX: 24.96 KG/M2 | HEART RATE: 56 BPM | WEIGHT: 157.19 LBS | DIASTOLIC BLOOD PRESSURE: 72 MMHG | HEIGHT: 66.5 IN

## 2023-09-12 DIAGNOSIS — I10 ESSENTIAL HYPERTENSION: Primary | ICD-10-CM

## 2023-09-12 PROCEDURE — 99213 OFFICE O/P EST LOW 20 MIN: CPT | Performed by: INTERNAL MEDICINE

## 2023-09-12 RX ORDER — FLUOCINONIDE 0.5 MG/G
1 OINTMENT TOPICAL 2 TIMES DAILY
COMMUNITY
Start: 2023-08-07

## 2023-09-14 ENCOUNTER — OFFICE VISIT (OUTPATIENT)
Dept: PHYSICAL THERAPY | Facility: HOSPITAL | Age: 88
End: 2023-09-14
Attending: ORTHOPAEDIC SURGERY
Payer: MEDICARE

## 2023-09-14 PROCEDURE — 97110 THERAPEUTIC EXERCISES: CPT

## 2023-09-14 PROCEDURE — 97112 NEUROMUSCULAR REEDUCATION: CPT

## 2023-09-18 ENCOUNTER — TELEPHONE (OUTPATIENT)
Dept: ORTHOPEDICS CLINIC | Facility: CLINIC | Age: 88
End: 2023-09-18

## 2023-09-18 NOTE — TELEPHONE ENCOUNTER
Please advise- patient had left TKA on 4/3/23.     Please advise on medication prior to dental appointment on 10/12/23

## 2023-09-19 RX ORDER — CLINDAMYCIN HYDROCHLORIDE 150 MG/1
150 CAPSULE ORAL DAILY
Qty: 4 CAPSULE | Refills: 3 | Status: SHIPPED | OUTPATIENT
Start: 2023-09-19

## 2023-09-19 NOTE — TELEPHONE ENCOUNTER
Brenton Noriega PA-C  You1 hour ago (3:33 PM)     Thelda Chol  Medication sent to pharmacy. Please call and let patient know to take all four pills one hour before dental appointment. It would not let me put that detailed of a message when ordering the pills. Thanks!

## 2023-09-19 NOTE — TELEPHONE ENCOUNTER
S/w patient and informed him of the medication that was sent. He verbalized understanding on the instructions.  No further questions at this time

## 2023-09-20 ENCOUNTER — OFFICE VISIT (OUTPATIENT)
Dept: PHYSICAL THERAPY | Facility: HOSPITAL | Age: 88
End: 2023-09-20
Attending: ORTHOPAEDIC SURGERY
Payer: MEDICARE

## 2023-09-20 PROCEDURE — 97112 NEUROMUSCULAR REEDUCATION: CPT

## 2023-09-20 PROCEDURE — 97110 THERAPEUTIC EXERCISES: CPT

## 2023-09-20 NOTE — PROGRESS NOTES
Diagnosis:   Primary osteoarthritis of left knee (N64.32)      Referring Provider: Shannen Hernandez Date of Evaluation:    7/12/2023    Precautions:  L TKA (4/3/23) Next MD visit:   none scheduled  Date of Surgery: 4/3/23    Progress Summary  Pt has attended 16 visits in Physical Therapy. Insurance Primary/Secondary: MEDICARE / [employer] PPO     # Auth Visits: na          Subjective: Patient reports his knee gives him very little trouble - he doesn't have pain and the strength has improved. He is now able to take a few stairs at home with his L leg and is doing a little more walking within his home without the walker like when bringing his plate of food to the table. He is still not comfortable without the walker outdoors as his balance is not back to normal when on uneven surfaces. Decreased balance is his only functional complaint at this time. Pain: 0/10 - na    At IE:  Pt describes pain level current - no pain   Current functional limitations include walking, stairs, getting up from chair, squatting.      Objective:   AROM (PROM) R knee: ,8/17/2023   Flexion 110 deg (supine) start of visit, 112 deg following mobilization    Taken from IE:  Observation: B tibial ER (R>L); hyperkyphotic, scoliosis  Integumentary: fully closed incision with well approximated scar; mild redness globally  Palpation: posterior R knee,  TTP B calf (R>L)  Sensation: intact light touch BLE    AROM (PROM): (* denotes performed with pain)  Hip Knee   Flexion: R 110; L 100   Flexion: R 105 (110); L 90 (100)  Extension: R 0; L -15 (-10)        Accessory motion: hypomobile tib/fem     Flexibility:  Hip Flexor: R mild, L mild  Hamstrings: R severe; L severe    Strength/MMT: (* denotes performed with pain)  Hip Knee   Flexion: R 4+/5; L 4+/5  Extension: R 3-/5; L 3-/5  Abduction: R 3+/5; L 4+/5   Flexion: R 5/5; L 5/5  Extension: R 5/5; L 5/5        Special tests:   NA    Gait: pt ambulates on level ground with forward flexed posture, RW, decreased step length B  Balance: SLS: unable   Romber seconds      2023  Lumbar AROM screen, standing and sitting: no hip pain reproduction (no OP performed due to balance concerns)  PAIVM: L UPA L4-5 hypomobile and locally painful // no change in pain intensity while walking following mobilization    2023  L knee AROM 0-115 deg    R LE MMT   Hip flexion 4+/5   Hip ER 4/5   Hip IR 4+/5   Knee extension 5/5   Knee flexion 5/5    mCTSIB   Rhomberg EC: LOB after 15 sec   DLS on compliant surface: LOB after 25 sec; with eyes closed LOB after 5 sec    Gait without AD (SBA): one LOB laterally to R in 40' of walking    Assessment: Patient has made good progress overall in physical therapy demonstrating improvements in knee ROM, LE strength, gait and balance, and functional ability. Patient has met goals for knee ROM and strength; his primary remaining limitation is decreased dynamic balance impacting his ability to safely ambulate over uneven surfaces and turn to change directions while walking. Given history of recent fall, recommend short duration of continued PT to further address static/dynamic balance deficits and progress LE strengthening to maximize patient's safety as he lives alone and is independent with most ADLs/IADLs. Goals: (to be met in 10 visits)  Pt will improve knee extension ROM to 0 deg to allow proper heel strike during gait and terminal knee extension in stance   - met  Pt will improve knee AROM flexion to >+110 degrees to improve ability to navigate stairs   - met  Pt will improve quad strength to 5/5 to ascend 1 flight of stairs reciprocally   - met  Pt will be ambulate 200' without RW to improve household ambulation - progressing  Pt will be independent and compliant with comprehensive HEP to maintain progress achieved in PT         Plan: Continue skilled Physical Therapy 1 x/week or a total of 4 visits over a 90 day period.  Treatment will include: balance training, gait training, LE strengthening       Patient/Family/Caregiver was advised of these findings, precautions, and treatment options and has agreed to actively participate in planning and for this course of care. Thank you for your referral. If you have any questions, please contact me at Dept: [phone]. Sincerely,  Electronically signed by therapist: Julio César Garcia PT     Physician's certification required:  Yes  Please co-sign or sign and return this letter via fax as soon as possible to [phone]. I certify the need for these services furnished under this plan of treatment and while under my care.     X___________________________________________________ Date____________________    Certification From: 7/87/1799  To:12/19/2023     Date: 8/21/2023   Tx: 9/10   Date: 8/24/2023   Tx: 10/10   Date: 8/28/2023   Tx: 11/16  Date: 8/31/2023   Tx: 12/16  Date: 9/5/2023   Tx: 13/16  Date: 9/7/2023   Tx: 14/16  Date: 9/14/2023   Tx: 15/16  Date: 9/20/2023   Tx: 16/16     MT:   L tibiofemoral AP glide in extension Gr III++  MT  STM L glute max, med MT  L UPA L4-5 Gr III             NR  DLS on airex 2 min  DLS on airex with head turns 2 x 10 ea horiz/vert NR  DLS on airex 2 min  DLS on/off airex with head turns 2 x 10 ea horiz/vert  Stride stance 1 min B  Standing march (slow) with unilat finger touch support 2 x 10 NR   DLS (varying EO/EC) on airex 3 min  Forward javon stepping 2 laps (unilat UE support)  Lateral javon stepping 2 laps (bilat UE support)   TE:   Sit <> stand from elevated mat table 2 x 15  Double Leg Shuttle 42# (progress next session) 3x10  Single Leg Shuttle R/L 37# 2x15 ea  TE:   Sit <> stand from elevated mat table 2 x 15  Double Leg Shuttle 62# 2 x 15  Single Leg Shuttle R/L 50# 2x15 ea   Step up 6 inch x20 L TE:   Double Leg Shuttle 67# 2 x 15  Single Leg Shuttle R/L 50# 2x15 ea   Step up 6 inch 2 x 15 L  Side stepping 1 lap down hallway with railing TE:   Step up 6 inch 2 x 15 L  Side stepping 1 lap down hallway with railing  Sit <> stand from elevated mat table 2 x 10 TE  Functional movement reassessment  Hooklying hip abduction isometrics 45s x 5 // 0/10  pain with walking   Edu: POC  TE:   Step up 6 inch (one UE assist) 2 x 10 L  Side stepping 2 laps down hallway with railing  SLS with B UE support (glute med endurance training) 15s x 4 B TE:   Step up 8 inch (B UE assist) 2 x 10 L  Standing heel/toe raises x15 ea  Sit <> stand from elevated mat table 2 x 10 TE  Reassessment for PN  HEP review (side-lying hip abduction)   TA  Gait training in parallel bars: fwd, lat, retro 5 laps ea  Gait Training, 0UE in hallway near wall 4x20ft  TA  L TKE with R LE step forward back x20 BTB TA  Gait training with unilat UE assist forward with focus on trunk extension and full step length TA  Gait training with unilat UE assist, forward with focus on trunk extension and full step length TA  Gait in parallel bars 8 laps (fwd only) TA  Gait training with no UE assist (SBA) 2 laps  Gait trainin deg turns with one UE support 2 x 8 B     HEP: continue current HEP (lateral stepping, squats, marching); add heel slides, knee flex/ext on stair    Charges: 1 NR, 2 TE       Total Timed Treatment:  NR 16 min, TE 24 min  Total Treatment Time: 40 min

## 2023-09-30 RX ORDER — METOPROLOL SUCCINATE 25 MG/1
25 TABLET, EXTENDED RELEASE ORAL DAILY
Qty: 90 TABLET | Refills: 3 | Status: SHIPPED | OUTPATIENT
Start: 2023-09-30

## 2023-10-11 ENCOUNTER — OFFICE VISIT (OUTPATIENT)
Dept: ORTHOPEDICS CLINIC | Facility: CLINIC | Age: 88
End: 2023-10-11

## 2023-10-11 ENCOUNTER — HOSPITAL ENCOUNTER (OUTPATIENT)
Dept: GENERAL RADIOLOGY | Facility: HOSPITAL | Age: 88
Discharge: HOME | End: 2023-10-11
Attending: ORTHOPAEDIC SURGERY
Payer: MEDICARE

## 2023-10-11 DIAGNOSIS — Z47.89 ORTHOPEDIC AFTERCARE: ICD-10-CM

## 2023-10-11 DIAGNOSIS — M17.12 PRIMARY OSTEOARTHRITIS OF LEFT KNEE: Primary | ICD-10-CM

## 2023-10-11 PROCEDURE — 99213 OFFICE O/P EST LOW 20 MIN: CPT | Performed by: ORTHOPAEDIC SURGERY

## 2023-10-11 PROCEDURE — 73562 X-RAY EXAM OF KNEE 3: CPT | Performed by: ORTHOPAEDIC SURGERY

## 2023-10-11 NOTE — PROGRESS NOTES
NURSING INTAKE COMMENTS: Patient presents with:  Knee Pain: Left TKA s/p sx on 4/03/2023- denies any pain at this time      HPI: This 81year old male presents today with complaints of left knee surgery follow-up. He is now 6 months postoperative from his total knee arthroplasty. Very pleased with his status. His preoperative symptoms have significantly improved. Is walking with a walker when outdoors. Around the house he is able to walk without any supports. No mechanical clicking or locking and no giving way sensations.     Past Medical History:   Diagnosis Date    Anxiety state     Aortic atherosclerosis ([address]     CXR 11-14    Aortic stenosis     Echo 3-23 with normal LV function, EF 55-60%, moderate LAE, moderate aortic stenosis with peak gradient 39 mmHg and mean gradient 25 mmHg, mild-moderate mitral stenosis, mild MR    BPH (benign prostatic hyperplasia)     COPD (chronic obstructive pulmonary disease) ([address]     CXR 11-14    Diverticulosis     Colonoscopy 2-12    Essential hypertension     History of blood transfusion 2003    no reactions    History of stomach ulcers     History of vertebral fracture 05/2023    T7, T8, T9, T11, T12, L1 secondary to fall; s/p kyphoplasty T7, T8, T9 6-23    Hx of adenomatous colonic polyps     Hx of melanoma of skin 1979    forehead    Iron deficiency anemia     Lumbar spinal stenosis     Macular degeneration     per NG    Primary osteoarthritis of knees, bilateral     Status post right TKA 11-14; s/p left TKA 4-23    Pulmonary hypertension ([address]     Echo 5-18 with pulmonary artery pressure 45 mmHg    Total knee replacement status, 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/03/2023    Visual impairment     glasses     Past Surgical History:   Procedure Laterality Date    APPENDECTOMY      per NG    ARTHROSCOPY, ANKLE, SURGICAL; W/ANKLE ARTHRODESIS Right     \"Arthrocentesis of the right ankle joint (2-3)\"; per NG    COLONOSCOPY  2005    per NG    HIP REPLACEMENT SURGERY Right 2002    per NG    IR KYPHOPLASTY 06/12/2023    T7, T8, T9    KNEE ARTHROSCOPY Bilateral     per NG    OTHER Right 07/15/2018    ORIF proximal right humerus fracture requiring a biceps tenodesis    OTHER  05/2018    Incision and drainage of right index finger complicated abscess, revision amputation of right index finger. TOTAL KNEE ARTHROPLASTY Left 04/03/2023    TOTAL KNEE REPLACEMENT Right 11/2014     Current Outpatient Medications   Medication Sig Dispense Refill    metoprolol succinate ER 25 MG Oral Tablet 24 Hr Take 1 tablet (25 mg total) by mouth daily. 90 tablet 3    clindamycin 150 MG Oral Cap Take 1 capsule (150 mg total) by mouth daily. 4 capsule 3    fluocinonide 0.05 % External Ointment Apply 1 Application topically 2 (two) times daily. traMADol 50 MG Oral Tab Take 1 tablet (50 mg total) by mouth every 8 (eight) hours as needed for Pain. 90 tablet 5    allopurinol 100 MG Oral Tab Take 1 tablet (100 mg total) by mouth daily with dinner. 90 tablet 1    ferrous sulfate 325 (65 FE) MG Oral Tab EC Take 1 tablet (325 mg total) by mouth daily with breakfast.      Multiple Vitamins-Minerals (PRESERVISION AREDS 2 OR) Take 1 capsule by mouth in the morning and 1 capsule before bedtime. cetirizine 10 MG Oral Tab Take 1 tablet (10 mg total) by mouth daily as needed for Allergies (allergies). senna-docusate 8.6-50 MG Oral Tab Take 2 tablets by mouth in the morning and 2 tablets before bedtime. HYDROcodone-acetaminophen 7.5-325 MG Oral Tab Take 1 tablet by mouth every 4 (four) hours as needed for Pain.      tamsulosin 0.4 MG Oral Cap TAKE 1 CAPSULE BY MOUTH ONE-HALF HOUR AFTER SUPPER DAILY 90 capsule 3    cholecalciferol 25 MCG (1000 UT) Oral Cap Take 1 capsule (1,000 Units total) by mouth nightly. Vitamin D3 1,000 unit capsule      Multiple Vitamin (MULTI-VITAMINS) Oral Tab Take 1 tablet by mouth nightly.  take 1 tablet by ORAL route  every day with food         Demerol [Meperidine]    SHORTNESS OF BREATH  Hylan G-F 20 SWELLING    Comment:Swelling to cristiano. knees             Swelling to cristiano. knees Swelling to cristiano. knees             Swelling to cristiano. knees Swelling to cristiano. knees  Ibuprofen               RASH, SHORTNESS OF BREATH  Penicillins             RASH, SHORTNESS OF BREATH  Valdecoxib              OTHER (SEE COMMENTS)  Celecoxib               RASH  Hydrochlorothiazide     RASH  Triamterene             RASH  Family History   Problem Relation Age of Onset    Arthritis Father         per NG    Other (Lung Cancer) Father          age 54    Hypertension Mother         per NG    Diabetes Neg     Glaucoma Neg     Macular degeneration Neg        Social History    Occupational History      Not on file    Tobacco Use      Smoking status: Former        Types: Pipe        Quit date: 1985        Years since quittin.8      Smokeless tobacco: Never    Vaping Use      Vaping Use: Never used    Substance and Sexual Activity      Alcohol use:  Yes        Alcohol/week: 5.8 standard drinks of alcohol        Types: 7 Glasses of wine per week        Comment: 2 drinks daily      Drug use: No      Sexual activity: Not on file       Review of Systems:  GENERAL: denies fevers, chills, night sweats, fatigue, unintentional weight loss/gain  SKIN: denies skin lesions, open sores, rash  HEENT:denies recent vision change, new nasal congestion,hearing loss, tinnitus, sore throat, headaches  RESPIRATORY: denies new shortness of breath, cough, asthma, wheezing  CARDIOVASCULAR: denies chest pain, leg cramps with exertion, palpitations, leg swelling  GI: denies abdominal pain, nausea, vomiting, diarrhea, constipation, hematochezia, worsening heartburn or stomach ulcers  : denies dysuria, hematuria, incontinence, increased frequency, urgency, difficulty urinating  MUSCULOSKELETAL: denies musculoskeletal complaints other than in HPI  NEURO: denies numbness, tingling, weakness, balance issues, dizziness, memory loss  PSYCHIATRIC: denies Hx of depression, anxiety, other psychiatric disorders  HEMATOLOGIC: denies blood clots, anemia, blood clotting disorders, blood transfusion  ENDOCRINE: denies autoimmune disease, thyroid issues, or diabetes  ALLERGY: denies asthma, seasonal allergies    Physical Examination:    There were no vitals taken for this visit. Constitutional: appears well hydrated, alert and responsive, no acute distress noted  Extremities: Left knee alignment unremarkable. Incision well-healed. Moderate induration along the fat pad. Range of motion is from 10 to 120 degrees. Good varus valgus stability in mid flexion. No calf tenderness or swelling. No erythema of the skin. Neurological: Unchanged    Imaging:   X-rays left knee obtained in the office today show good maintenance of alignment of the prosthesis. No periprosthetic lucency or fracture. Labs:  Lab Results   Component Value Date    WBC 7.2 05/12/2023    HGB 11.4 (L) 05/12/2023    .0 05/12/2023      Lab Results   Component Value Date     (H) 05/10/2023    BUN 19 (H) 05/10/2023    CREATSERUM 0.45 (L) 05/10/2023    GFRNAA 102 02/03/2022    GFRAA 118 02/03/2022        Assessment and Plan:  Diagnoses and all orders for this visit:    Primary osteoarthritis of left knee    Orthopedic aftercare  -     XR KNEE (3 VIEWS), LEFT (CPT=73562); Future        Assessment: Well-functioning left total knee arthroplasty    Plan: I discussed dental prophylaxis. I suggested continued extension stretching and quadricep strength training. Wean from the walker as tolerated. Follow-up with me again in 12 months with x-rays. Follow Up: Return in about 1 year (around 10/11/2024).     Stephen Vitale MD

## 2023-10-18 ENCOUNTER — TELEPHONE (OUTPATIENT)
Dept: PHYSICAL THERAPY | Facility: HOSPITAL | Age: 88
End: 2023-10-18

## 2023-10-19 ENCOUNTER — OFFICE VISIT (OUTPATIENT)
Dept: OPHTHALMOLOGY | Facility: CLINIC | Age: 88
End: 2023-10-19

## 2023-10-19 ENCOUNTER — APPOINTMENT (OUTPATIENT)
Dept: PHYSICAL THERAPY | Facility: HOSPITAL | Age: 88
End: 2023-10-19
Attending: ORTHOPAEDIC SURGERY
Payer: MEDICARE

## 2023-10-19 DIAGNOSIS — H35.30 AGE-RELATED MACULAR DEGENERATION: ICD-10-CM

## 2023-10-19 DIAGNOSIS — H43.393 VITREOUS FLOATERS OF BOTH EYES: ICD-10-CM

## 2023-10-19 DIAGNOSIS — H25.13 AGE-RELATED NUCLEAR CATARACT OF BOTH EYES: ICD-10-CM

## 2023-10-19 DIAGNOSIS — H40.003 GLAUCOMA SUSPECT OF BOTH EYES: Primary | ICD-10-CM

## 2023-10-19 NOTE — PATIENT INSTRUCTIONS
Glaucoma suspect  Discussed with patient that he is a glaucoma suspect based on increased cupping of the optic nerves in both eyes. . Will continue to observe. Patient verbalized understanding. NO FURTHER GLAUCOMA TESTING (no visual field, OCT or photos)    Will see patient in 1 year for a complete exam    Age-related nuclear cataract of both eyes  No treatment is recommended on moderate cataracts at this time. New glasses today; update as needed. Age-related macular degeneration  Stable; follow up with Dr. Marguerite Olivas every 6 months as directed. Vitreous floaters of both eyes   There is no evidence of retinal pathology. All signs and symptoms of retinal detachment/tears explained in detail. Patient instructed to call the office if they experience increase in floaters, increase in flashes of light, loss of vision or curtain or veil effect.

## 2023-10-19 NOTE — PROGRESS NOTES
Angy Busby is a 81year old male. HPI:     HPI    Pt in today for a complete eye exam and photos. Pt states vision has decreased since his last visit and wants an updated glasses Rx. Pt last saw Dr. Marty Ruiz on 7/28/23 and will follow up in January 2024.    Last edited by Airam Hooks OT on 10/19/2023  3:34 PM.        Patient History:  Past Medical History:   Diagnosis Date    Anxiety state     Aortic atherosclerosis ([address]     CXR 11-14    Aortic stenosis     Echo 3-23 with normal LV function, EF 55-60%, moderate LAE, moderate aortic stenosis with peak gradient 39 mmHg and mean gradient 25 mmHg, mild-moderate mitral stenosis, mild MR    BPH (benign prostatic hyperplasia)     COPD (chronic obstructive pulmonary disease) ([address]     CXR 11-14    Diverticulosis     Colonoscopy 2-12    Essential hypertension     History of blood transfusion 2003    no reactions    History of stomach ulcers     History of vertebral fracture 05/2023    T7, T8, T9, T11, T12, L1 secondary to fall; s/p kyphoplasty T7, T8, T9 6-23    Hx of adenomatous colonic polyps     Hx of melanoma of skin 1979    forehead    Iron deficiency anemia     Lumbar spinal stenosis     Macular degeneration     per NG    Primary osteoarthritis of knees, bilateral     Status post right TKA 11-14; s/p left TKA 4-23    Pulmonary hypertension ([address]     Echo 5-18 with pulmonary artery pressure 45 mmHg    Total knee replacement status, left 04/03/2023    Visual impairment     glasses       Surgical History: Angy Busby has a past surgical history that includes appendectomy (per NG); hip replacement surgery (Right, 2002) (per NG); arthroscopy, ankle, surgical; w/ankle arthrodesis (Right) (\"Arthrocentesis of the right ankle joint (2-3)\"; per NG); knee arthroscopy (Bilateral) (per NG); colonoscopy (2005) (per NG); other (Right, 07/15/2018) (ORIF proximal right humerus fracture requiring a biceps tenodesis); other (05/2018) (Incision and drainage of right index finger complicated abscess, revision amputation of right index finger.); total knee replacement (Right, 2014); Total knee arthroplasty (Left, 2023); and ir kyphoplasty (2023) (T7, T8, T9). Family History   Problem Relation Age of Onset    Arthritis Father         per NG    Other (Lung Cancer) Father          age 54    Hypertension Mother         per NG    Diabetes Neg     Glaucoma Neg     Macular degeneration Neg        Social History:   Social History     Socioeconomic History    Marital status:    Tobacco Use    Smoking status: Former     Types: Pipe     Quit date: 1985     Years since quittin.8    Smokeless tobacco: Never   Vaping Use    Vaping Use: Never used   Substance and Sexual Activity    Alcohol use: Yes     Alcohol/week: 5.8 standard drinks of alcohol     Types: 7 Glasses of wine per week     Comment: 2 drinks daily    Drug use: No   Other Topics Concern    Pt has a pacemaker No    Reaction to local anesthetic No       Medications:  Current Outpatient Medications   Medication Sig Dispense Refill    metoprolol succinate ER 25 MG Oral Tablet 24 Hr Take 1 tablet (25 mg total) by mouth daily. 90 tablet 3    clindamycin 150 MG Oral Cap Take 1 capsule (150 mg total) by mouth daily. 4 capsule 3    fluocinonide 0.05 % External Ointment Apply 1 Application topically 2 (two) times daily. traMADol 50 MG Oral Tab Take 1 tablet (50 mg total) by mouth every 8 (eight) hours as needed for Pain. 90 tablet 5    allopurinol 100 MG Oral Tab Take 1 tablet (100 mg total) by mouth daily with dinner. 90 tablet 1    ferrous sulfate 325 (65 FE) MG Oral Tab EC Take 1 tablet (325 mg total) by mouth daily with breakfast.      Multiple Vitamins-Minerals (PRESERVISION AREDS 2 OR) Take 1 capsule by mouth in the morning and 1 capsule before bedtime. cetirizine 10 MG Oral Tab Take 1 tablet (10 mg total) by mouth daily as needed for Allergies (allergies).       senna-docusate 8.6-50 MG Oral Tab Take 2 tablets by mouth in the morning and 2 tablets before bedtime. HYDROcodone-acetaminophen 7.5-325 MG Oral Tab Take 1 tablet by mouth every 4 (four) hours as needed for Pain.      tamsulosin 0.4 MG Oral Cap TAKE 1 CAPSULE BY MOUTH ONE-HALF HOUR AFTER SUPPER DAILY 90 capsule 3    cholecalciferol 25 MCG (1000 UT) Oral Cap Take 1 capsule (1,000 Units total) by mouth nightly. Vitamin D3 1,000 unit capsule      Multiple Vitamin (MULTI-VITAMINS) Oral Tab Take 1 tablet by mouth nightly.  take 1 tablet by ORAL route  every day with food         Allergies:    Demerol [Meperidine]    SHORTNESS OF BREATH  Hylan G-F 20            SWELLING    Comment:Swelling to cristiano. knees             Swelling to cristiano. knees Swelling to cristiano. knees             Swelling to cristiano. knees Swelling to cristiano. knees  Ibuprofen               RASH, SHORTNESS OF BREATH  Penicillins             RASH, SHORTNESS OF BREATH  Valdecoxib              OTHER (SEE COMMENTS)  Celecoxib               RASH  Hydrochlorothiazide     RASH  Triamterene             RASH    ROS:       PHYSICAL EXAM:     Base Eye Exam       Visual Acuity (Snellen - Linear)         Right Left    Dist cc 20/150 20/100 -1    Dist ph cc NI NI    Near cc 20/400 20/200      Correction: Glasses              Tonometry (Applanation, 3:48 PM)         Right Left    Pressure 17 20              Pachymetry (3/11/08)         Right Left    Thickness 600/-4 596/-4              Pupils         Pupils    Right PERRL    Left PERRL              Visual Fields         Left Right     Full Full              Extraocular Movement         Right Left     Full, Ortho Full, Ortho              Dilation       Both eyes: 1.0% Mydriacyl and 2.5% Edwin Synephrine @ 3:48 PM              Dilation #2       Both eyes: 1.0% Mydriacyl and 2.5% Edwin Synephrine @ 3:51 PM                  Slit Lamp and Fundus Exam       Slit Lamp Exam         Right Left    Lids/Lashes Dermatochalasis, Meibomian gland dysfunction Dermatochalasis, Meibomian gland dysfunction    Conjunctiva/Sclera Temp pinguecula Temp pinguecula    Cornea Clear Clear    Anterior Chamber Deep and quiet Deep and quiet    Iris Normal Normal    Lens 2-3+ Nuclear sclerosis, Trace Cortical cataract 2-3+ Nuclear sclerosis, Trace Cortical cataract    Vitreous Vitreous floaters Vitreous floaters              Fundus Exam         Right Left    Disc Sloping margin, Temporal crescent Sloping margin, Temporal crescent    C/D Ratio 0.7 0.7    Macula geographic atrophy, soft drusen temp geographic atrophy, soft drusen temp    Vessels Normal Normal    Periphery Normal Normal                  Refraction       Wearing Rx         Sphere Cylinder Axis Add    Right [place] +2.50 170 +4.00    Left -0.25 +1.75 180 +4.00      Type: Flat top bifocal              Manifest Refraction         Sphere Cylinder [place] Dist VA Add Near VA    Right [place] +2.50 170 20/150 +4.00 20/200    Left -0.25 +2.00 180 20/80 +4.00 20/100              Final Rx         Sphere Cylinder [place] Dist VA Add Near VA    Right [place] +2.50 170 20/150 +4.00 20/200    Left -0.25 +2.00 180 20/80 +4.00 20/100      Type: Flat top bifocal                     ASSESSMENT/PLAN:     Diagnoses and Plan:     Glaucoma suspect  Discussed with patient that he is a glaucoma suspect based on increased cupping of the optic nerves in both eyes. . Will continue to observe. Patient verbalized understanding. NO FURTHER GLAUCOMA TESTING (no visual field, OCT or photos)    Will see patient in 1 year for a complete exam    Age-related nuclear cataract of both eyes  No treatment is recommended on moderate cataracts at this time. New glasses today; update as needed. Age-related macular degeneration  Stable; follow up with Dr. Marguerite Olivas every 6 months as directed. Vitreous floaters of both eyes   There is no evidence of retinal pathology. All signs and symptoms of retinal detachment/tears explained in detail.     Patient instructed to call the office if they experience increase in floaters, increase in flashes of light, loss of vision or curtain or veil effect. No orders of the defined types were placed in this encounter.       Meds This Visit:  Requested Prescriptions      No prescriptions requested or ordered in this encounter        Follow up instructions:  Return in about 1 year (around 10/19/2024) for complete exam.    10/19/2023  Scribed by: Rashid Garcia MD

## 2023-10-19 NOTE — ASSESSMENT & PLAN NOTE
No treatment is recommended on moderate cataracts at this time. New glasses today; update as needed.

## 2023-10-19 NOTE — ASSESSMENT & PLAN NOTE
Discussed with patient that he is a glaucoma suspect based on increased cupping of the optic nerves in both eyes. . Will continue to observe. Patient verbalized understanding.     NO FURTHER GLAUCOMA TESTING (no visual field, OCT or photos)    Will see patient in 1 year for a complete exam

## 2023-10-23 ENCOUNTER — OFFICE VISIT (OUTPATIENT)
Dept: PHYSICAL THERAPY | Facility: HOSPITAL | Age: 88
End: 2023-10-23
Attending: ORTHOPAEDIC SURGERY
Payer: MEDICARE

## 2023-10-23 PROCEDURE — 97530 THERAPEUTIC ACTIVITIES: CPT

## 2023-10-23 PROCEDURE — 97112 NEUROMUSCULAR REEDUCATION: CPT

## 2023-10-23 NOTE — PROGRESS NOTES
Diagnosis:   Primary osteoarthritis of left knee (Q07.93)      Referring Provider: Angie Taylor Date of Evaluation:    2023    Precautions:  L TKA (4/3/23) Next MD visit:   none scheduled  Date of Surgery: 4/3/23   Insurance Primary/Secondary: Ivone Joseph / Marta Ku PPO     # Auth Visits: na          Subjective: Patient reports his balance is improving - he thought about using the cane to get to the clinic today but lost his nerve at the last minute. Still feels unsteady when making turns. Pain: 0/10 - na    At IE:  Pt describes pain level current - no pain   Current functional limitations include walking, stairs, getting up from chair, squatting.      Objective:   AROM (PROM) R knee: ,2023   Flexion 110 deg (supine) start of visit, 112 deg following mobilization    Taken from IE:  Observation: B tibial ER (R>L); hyperkyphotic, scoliosis  Integumentary: fully closed incision with well approximated scar; mild redness globally  Palpation: posterior R knee,  TTP B calf (R>L)  Sensation: intact light touch BLE    AROM (PROM): (* denotes performed with pain)  Hip Knee   Flexion: R 110; L 100   Flexion: R 105 (110); L 90 (100)  Extension: R 0; L -15 (-10)        Accessory motion: hypomobile tib/fem     Flexibility:  Hip Flexor: R mild, L mild  Hamstrings: R severe; L severe    Strength/MMT: (* denotes performed with pain)  Hip Knee   Flexion: R 4+/5; L 4+/5  Extension: R 3-/5; L 3-/5  Abduction: R 3+/5; L 4+/5   Flexion: R 5/5; L 5/5  Extension: R 5/5; L 5/5        Special tests:   NA    Gait: pt ambulates on level ground with forward flexed posture, RW, decreased step length B  Balance: SLS: unable   Romber seconds      2023  Lumbar AROM screen, standing and sitting: no hip pain reproduction (no OP performed due to balance concerns)  PAIVM: L UPA L4-5 hypomobile and locally painful // no change in pain intensity while walking following mobilization    2023  L knee AROM 0-115 deg    R LE MMT   Hip flexion 4+/5   Hip ER 4/5   Hip IR 4+/5   Knee extension 5/5   Knee flexion 5/5    mCTSIB   Rhomberg EC: LOB after 15 sec   DLS on compliant surface: LOB after 25 sec; with eyes closed LOB after 5 sec    Gait without AD (SBA): one LOB laterally to R in 40' of walking    Assessment: Patient with improved static balance in narrow TAMI both on/off compliant surface, more challenged with maintaining balance independently with added rotational movements of the head and trunk. Greatest challenge with turns was stability on the R LE when in SLS, most likely related to long-standing glute med weakness. Goals: (to be met in 10 visits)  Pt will improve knee extension ROM to 0 deg to allow proper heel strike during gait and terminal knee extension in stance   - met  Pt will improve knee AROM flexion to >+110 degrees to improve ability to navigate stairs   - met  Pt will improve quad strength to 5/5 to ascend 1 flight of stairs reciprocally   - met  Pt will be ambulate 200' without RW to improve household ambulation - progressing  Pt will be independent and compliant with comprehensive HEP to maintain progress achieved in PT         Plan: Progress static/dynamic balance, LE strength.   Date: 8/24/2023   Tx: 10/10   Date: 8/28/2023   Tx: 11/16  Date: 8/31/2023   Tx: 12/16  Date: 9/5/2023   Tx: 13/16  Date: 9/7/2023   Tx: 14/16  Date: 9/14/2023   Tx: 15/16  Date: 9/20/2023   Tx: 16/16  Date: 10/23/2023   Tx: 17/20   MT:   L tibiofemoral AP glide in extension Gr III++  MT  STM L glute max, med MT  L UPA L4-5 Gr III             NR  DLS on airex 2 min  DLS on airex with head turns 2 x 10 ea horiz/vert NR  DLS on airex 2 min  DLS on/off airex with head turns 2 x 10 ea horiz/vert  Stride stance 1 min B  Standing march (slow) with unilat finger touch support 2 x 10 NR   DLS (varying EO/EC) on airex 3 min  Forward javon stepping 2 laps (unilat UE support)  Lateral javon stepping 2 laps (bilat UE support) NR Rhomberg on floor, on airex 2 min ea  [place]homberg on airex with head turns x15  3/4 tandem on floor 30s x 2 B  1/2 tandem with cross-body reaching x10 B   TE:   Sit <> stand from elevated mat table 2 x 15  Double Leg Shuttle 62# 2 x 15  Single Leg Shuttle R/L 50# 2x15 ea   Step up 6 inch x20 L TE:   Double Leg Shuttle 67# 2 x 15  Single Leg Shuttle R/L 50# 2x15 ea   Step up 6 inch 2 x 15 L  Side stepping 1 lap down hallway with railing TE:   Step up 6 inch 2 x 15 L  Side stepping 1 lap down hallway with railing  Sit <> stand from elevated mat table 2 x 10 TE  Functional movement reassessment  Hooklying hip abduction isometrics 45s x 5 // 0/10  pain with walking   Edu: POC  TE:   Step up 6 inch (one UE assist) 2 x 10 L  Side stepping 2 laps down hallway with railing  SLS with B UE support (glute med endurance training) 15s x 4 B TE:   Step up 8 inch (B UE assist) 2 x 10 L  Standing heel/toe raises x15 ea  Sit <> stand from elevated mat table 2 x 10 TE  Reassessment for PN  HEP review (side-lying hip abduction) TE  SLS with B UE support (glute med endurance training) 15s x 6 B   TA  L TKE with R LE step forward back x20 BTB TA  Gait training with unilat UE assist forward with focus on trunk extension and full step length TA  Gait training with unilat UE assist, forward with focus on trunk extension and full step length TA  Gait in parallel bars 8 laps (fwd only) TA  Gait training with no UE assist (SBA) 2 laps  Gait trainin deg turns with one UE support 2 x 8 B   TA  90 deg turns (attempt for one step) no UE assist 2 x 10 B   HEP: continue current HEP (lateral stepping, squats, marching); add heel slides, knee flex/ext on stair    Charges: 2 NR, 1 TA       Total Timed Treatment:  NR 24 min, TA 10 min, TE 6 min  Total Treatment Time: 40 min

## 2023-10-30 ENCOUNTER — OFFICE VISIT (OUTPATIENT)
Dept: PHYSICAL THERAPY | Facility: HOSPITAL | Age: 88
End: 2023-10-30
Attending: ORTHOPAEDIC SURGERY
Payer: MEDICARE

## 2023-10-30 PROCEDURE — 97530 THERAPEUTIC ACTIVITIES: CPT

## 2023-10-30 PROCEDURE — 97112 NEUROMUSCULAR REEDUCATION: CPT

## 2023-10-30 NOTE — PROGRESS NOTES
Diagnosis:   Primary osteoarthritis of left knee (Z89.64)      Referring Provider: Benson Tellez Date of Evaluation:    2023    Precautions:  L TKA (4/3/23) Next MD visit:   none scheduled  Date of Surgery: 4/3/23   Insurance Primary/Secondary: Jacinta Clifton / Carol Montesinos PPO     # Auth Visits: na          Subjective: Patient reports he used his cane to go to [employer] yesterday and again to get to the clinic today. Feels steady walking outdoors but didn't have to walk over uneven ground. Pain: 0/10 - na    At IE:  Pt describes pain level current - no pain   Current functional limitations include walking, stairs, getting up from chair, squatting.      Objective:   AROM (PROM) R knee: ,2023   Flexion 110 deg (supine) start of visit, 112 deg following mobilization    Taken from IE:  Observation: B tibial ER (R>L); hyperkyphotic, scoliosis  Integumentary: fully closed incision with well approximated scar; mild redness globally  Palpation: posterior R knee,  TTP B calf (R>L)  Sensation: intact light touch BLE    AROM (PROM): (* denotes performed with pain)  Hip Knee   Flexion: R 110; L 100   Flexion: R 105 (110); L 90 (100)  Extension: R 0; L -15 (-10)        Accessory motion: hypomobile tib/fem     Flexibility:  Hip Flexor: R mild, L mild  Hamstrings: R severe; L severe    Strength/MMT: (* denotes performed with pain)  Hip Knee   Flexion: R 4+/5; L 4+/5  Extension: R 3-/5; L 3-/5  Abduction: R 3+/5; L 4+/5   Flexion: R 5/5; L 5/5  Extension: R 5/5; L 5/5        Special tests:   NA    Gait: pt ambulates on level ground with forward flexed posture, RW, decreased step length B  Balance: SLS: unable   Romber seconds      2023  Lumbar AROM screen, standing and sitting: no hip pain reproduction (no OP performed due to balance concerns)  PAIVM: L UPA L4-5 hypomobile and locally painful // no change in pain intensity while walking following mobilization    2023  L knee AROM 0-115 deg    R LE MMT   Hip flexion 4+/5   Hip ER 4/5   Hip IR 4+/5   Knee extension 5/5   Knee flexion 5/5    mCTSIB   Rhomberg EC: LOB after 15 sec   DLS on compliant surface: LOB after 25 sec; with eyes closed LOB after 5 sec    Gait without AD (SBA): one LOB laterally to R in 40' of walking    Assessment: Continued with narrow TAMI training, incorporating dynamic exercises that challenge this position; overall patients postural stability has improved in static positions as he requires very infrequent tactile cueing to maintain balance. Goals: (to be met in 10 visits)  Pt will improve knee extension ROM to 0 deg to allow proper heel strike during gait and terminal knee extension in stance   - met  Pt will improve knee AROM flexion to >+110 degrees to improve ability to navigate stairs   - met  Pt will improve quad strength to 5/5 to ascend 1 flight of stairs reciprocally   - met  Pt will be ambulate 200' without RW to improve household ambulation - progressing  Pt will be independent and compliant with comprehensive HEP to maintain progress achieved in PT         Plan: Progress static/dynamic balance, LE strength.   Date: 8/28/2023   Tx: 11/16  Date: 8/31/2023   Tx: 12/16  Date: 9/5/2023   Tx: 13/16  Date: 9/7/2023   Tx: 14/16  Date: 9/14/2023   Tx: 15/16  Date: 9/20/2023   Tx: 16/16  Date: 10/23/2023   Tx: 17/20 Date: 10/30/2023   Tx: 18/20    MT  STM L glute max, med MT  L UPA L4-5 Gr III             NR  DLS on airex 2 min  DLS on airex with head turns 2 x 10 ea horiz/vert NR  DLS on airex 2 min  DLS on/off airex with head turns 2 x 10 ea horiz/vert  Stride stance 1 min B  Standing march (slow) with unilat finger touch support 2 x 10 NR   DLS (varying EO/EC) on airex 3 min  Forward javon stepping 2 laps (unilat UE support)  Lateral javon stepping 2 laps (bilat UE support) NR   Rhomberg on floor, on airex 2 min ea  Rhomberg on airex with head turns x15  3/4 tandem on floor 30s x 2 B  1/2 tandem with cross-body reaching x10 B NR  Rhomberg on airex 1 min  1/2 > 3/4 tandem on airex 45s x 2 ea B   TE:   Double Leg Shuttle 67# 2 x 15  Single Leg Shuttle R/L 50# 2x15 ea   Step up 6 inch 2 x 15 L  Side stepping 1 lap down hallway with railing TE:   Step up 6 inch 2 x 15 L  Side stepping 1 lap down hallway with railing  Sit <> stand from elevated mat table 2 x 10 TE  Functional movement reassessment  Hooklying hip abduction isometrics 45s x 5 // 0/10  pain with walking   Edu: POC  TE:   Step up 6 inch (one UE assist) 2 x 10 L  Side stepping 2 laps down hallway with railing  SLS with B UE support (glute med endurance training) 15s x 4 B TE:   Step up 8 inch (B UE assist) 2 x 10 L  Standing heel/toe raises x15 ea  Sit <> stand from elevated mat table 2 x 10 TE  Reassessment for PN  HEP review (side-lying hip abduction) TE  SLS with B UE support (glute med endurance training) 15s x 6 B    TA  Gait training with unilat UE assist forward with focus on trunk extension and full step length TA  Gait training with unilat UE assist, forward with focus on trunk extension and full step length TA  Gait in parallel bars 8 laps (fwd only) TA  Gait training with no UE assist (SBA) 2 laps  Gait trainin deg turns with one UE support 2 x 8 B   TA  90 deg turns (attempt for one step) no UE assist 2 x 10 B TA  90 deg turns (attempt for one step) no UE assist 2 x 10 B  Gait: narrow TAMI in  bars (unilat UE assist) 6 laps  Gait: side step to Rhomberg in  bars (no UE assist) 6 laps  Gait: march in  bars (no UE assist) 6 laps   HEP: continue current HEP (lateral stepping, squats, marching); add heel slides, knee flex/ext on stair    Charges: 1 NR, 2 TA       Total Timed Treatment:  NR 16 min, TA 24 min   Total Treatment Time: 40 min

## 2023-11-07 ENCOUNTER — OFFICE VISIT (OUTPATIENT)
Dept: PHYSICAL THERAPY | Facility: HOSPITAL | Age: 88
End: 2023-11-07
Attending: ORTHOPAEDIC SURGERY
Payer: MEDICARE

## 2023-11-07 PROCEDURE — 97112 NEUROMUSCULAR REEDUCATION: CPT

## 2023-11-07 PROCEDURE — 97110 THERAPEUTIC EXERCISES: CPT

## 2023-11-07 PROCEDURE — 97530 THERAPEUTIC ACTIVITIES: CPT

## 2023-11-07 NOTE — PROGRESS NOTES
Diagnosis:   Primary osteoarthritis of left knee (O44.38)      Referring Provider: Georgi Basilio Date of Evaluation:    2023    Precautions:  L TKA (4/3/23) Next MD visit:   none scheduled  Date of Surgery: 4/3/23    Discharge Summary  Pt has attended 19 visits in Physical Therapy. Insurance Primary/Secondary: MEDICARE / [employer] PPO     # Auth Visits: na          Subjective: Patient reports he is using the cane for most household and community ambulation at this point and feels more steady when walking. Can carry a plate of food now when walking from the kitchen to the dining table. Reports L LE strength feels back to normal - he can take the steps using his L leg without issue. Pain: 0/10 - na    At IE:  Pt describes pain level current - no pain   Current functional limitations include walking, stairs, getting up from chair, squatting.      Objective:   AROM (PROM) R knee: ,2023   Flexion 110 deg (supine) start of visit, 112 deg following mobilization    Taken from IE:  Observation: B tibial ER (R>L); hyperkyphotic, scoliosis  Integumentary: fully closed incision with well approximated scar; mild redness globally  Palpation: posterior R knee,  TTP B calf (R>L)  Sensation: intact light touch BLE    AROM (PROM): (* denotes performed with pain)  Hip Knee   Flexion: R 110; L 100   Flexion: R 105 (110); L 90 (100)  Extension: R 0; L -15 (-10)        Accessory motion: hypomobile tib/fem     Flexibility:  Hip Flexor: R mild, L mild  Hamstrings: R severe; L severe    Strength/MMT: (* denotes performed with pain)  Hip Knee   Flexion: R 4+/5; L 4+/5  Extension: R 3-/5; L 3-/5  Abduction: R 3+/5; L 4+/5   Flexion: R 5/5; L 5/5  Extension: R 5/5; L 5/5        Special tests:   NA    Gait: pt ambulates on level ground with forward flexed posture, RW, decreased step length B  Balance: SLS: unable   Romber seconds      2023  Lumbar AROM screen, standing and sitting: no hip pain reproduction (no OP performed due to balance concerns)  PAIVM: L UPA L4-5 hypomobile and locally painful // no change in pain intensity while walking following mobilization    9/20/2023  L knee AROM 0-115 deg    R LE MMT   Hip flexion 4+/5   Hip ER 4/5   Hip IR 4+/5   Knee extension 5/5   Knee flexion 5/5    mCTSIB   Rhomberg EC: LOB after 15 sec   DLS on compliant surface: LOB after 25 sec; with eyes closed LOB after 5 sec    Gait without AD (SBA): one LOB laterally to R in 40' of walking    11/7/2023    L LE MMT   Hip flexion 4+/5   Hip ER 4+/5   Hip IR 4+/5   Knee extension 5/5   Knee flexion 5/5    mCTSIB   Rhomberg EC: no LOB in 30 sec, mild-mod sway   DLS on compliant surface: no LOB in 30 sec with eyes open; with eyes closed LOB after 10 sec    Gait without AD (SBA): no LOB in 100' of walking, use of railing to turn to change directions    Assessment: Patient has made good progress in physical therapy, demonstrating improvements in knee ROM, LE strength, gait and balance, and functional ability. Patient reports return to baseline of function and has met all goals set at initial evaluation, and is independent and compliant with a home program that will allow long-term maintenance of gains made in PT. Patient is appropriate for discharge.       Goals: (to be met in 10 visits)  Pt will improve knee extension ROM to 0 deg to allow proper heel strike during gait and terminal knee extension in stance   - met  Pt will improve knee AROM flexion to >+110 degrees to improve ability to navigate stairs   - met  Pt will improve quad strength to 5/5 to ascend 1 flight of stairs reciprocally   - met  Pt will be ambulate 200' without RW to improve household ambulation - met  Pt will be independent and compliant with comprehensive HEP to maintain progress achieved in PT  - met         Plan: D/C with continued compliance to HEP   Date: 8/31/2023   Tx: 12/16  Date: 9/5/2023   Tx: 13/16  Date: 9/7/2023   Tx: 14/16  Date: 9/14/2023   Tx: 15/16 Date: 2023   Tx:   Date: 10/23/2023   Tx:  Date: 10/30/2023   Tx:  Date: 2023   Tx:    MT  STM L glute max, med MT  L UPA L4-5 Gr III             NR  DLS on airex 2 min  DLS on airex with head turns 2 x 10 ea horiz/vert NR  DLS on airex 2 min  DLS on/off airex with head turns 2 x 10 ea horiz/vert  Stride stance 1 min B  Standing march (slow) with unilat finger touch support 2 x 10 NR   DLS (varying EO/EC) on airex 3 min  Forward javon stepping 2 laps (unilat UE support)  Lateral javon stepping 2 laps (bilat UE support) NR   Rhomberg on floor, on airex 2 min ea  Rhomberg on airex with head turns x15  3/4 tandem on floor 30s x 2 B  1/2 tandem with cross-body reaching x10 B NR  Rhomberg on airex 1 min  1/2 > 3/4 tandem on airex 45s x 2 ea B NR  Step taps on 4 inch 2 x 10 B   TE:   Step up 6 inch 2 x 15 L  Side stepping 1 lap down hallway with railing  Sit <> stand from elevated mat table 2 x 10 TE  Functional movement reassessment  Hooklying hip abduction isometrics 45s x 5 // 0/10  pain with walking   Edu: POC  TE:   Step up 6 inch (one UE assist) 2 x 10 L  Side stepping 2 laps down hallway with railing  SLS with B UE support (glute med endurance training) 15s x 4 B TE:   Step up 8 inch (B UE assist) 2 x 10 L  Standing heel/toe raises x15 ea  Sit <> stand from elevated mat table 2 x 10 TE  Reassessment for PN  HEP review (side-lying hip abduction) TE  SLS with B UE support (glute med endurance training) 15s x 6 B  TE  Reassessment for D/C   TA  Gait training with unilat UE assist, forward with focus on trunk extension and full step length TA  Gait in parallel bars 8 laps (fwd only) TA  Gait training with no UE assist (SBA) 2 laps  Gait trainin deg turns with one UE support 2 x 8 B   TA  90 deg turns (attempt for one step) no UE assist 2 x 10 B TA  90 deg turns (attempt for one step) no UE assist 2 x 10 B  Gait: narrow TAMI in  bars (unilat UE assist) 6 laps  Gait: side step to Rhomberg in  bars (no UE assist) 6 laps  Gait: march in  bars (no UE assist) 6 laps TA  Gait: agility ladder step to 3 laps   HEP: continue current HEP (lateral stepping, squats, marching); add heel slides, knee flex/ext on stair    Charges: 1 NR, 1 TE, 1 TA       Total Timed Treatment:  TE 15 min, NR 10 min, TA 15 min   Total Treatment Time: 40 min

## 2023-11-21 ENCOUNTER — OFFICE VISIT (OUTPATIENT)
Dept: RHEUMATOLOGY | Facility: CLINIC | Age: 88
End: 2023-11-21

## 2023-11-21 VITALS
DIASTOLIC BLOOD PRESSURE: 81 MMHG | WEIGHT: 155.5 LBS | HEART RATE: 65 BPM | RESPIRATION RATE: 16 BRPM | HEIGHT: 66.5 IN | SYSTOLIC BLOOD PRESSURE: 157 MMHG | BODY MASS INDEX: 24.69 KG/M2

## 2023-11-21 DIAGNOSIS — M15.9 OSTEOARTHRITIS OF MULTIPLE JOINTS, UNSPECIFIED OSTEOARTHRITIS TYPE: ICD-10-CM

## 2023-11-21 DIAGNOSIS — G89.29 CHRONIC LEFT SHOULDER PAIN: Primary | ICD-10-CM

## 2023-11-21 DIAGNOSIS — M25.512 CHRONIC LEFT SHOULDER PAIN: Primary | ICD-10-CM

## 2023-11-21 RX ORDER — TRIAMCINOLONE ACETONIDE 40 MG/ML
40 INJECTION, SUSPENSION INTRA-ARTICULAR; INTRAMUSCULAR ONCE
Status: COMPLETED | OUTPATIENT
Start: 2023-11-21 | End: 2023-11-21

## 2023-11-21 NOTE — PROCEDURES
With paitent's consent, I injected pt's left shoulder gh joint  with 1ml lidocaine 1 % and 1 ml kenalog 40. It was done under sterile technique using iodine and alcohol swabs and ethyl chloride was used as an anaesthetic spray. Pt.  tolerated it well.

## 2023-11-21 NOTE — PROGRESS NOTES
Lisa Mena is a 81year old male. HPI:     Chief Complaint   Patient presents with    Medication Follow-Up    Shoulder Pain     Left    Toe Pain       I had the pleasure of seeing Jimmy Torres. As you recall, he is a pleasant 36-year-old who's been having ongoing osteoarthritis of the knees. He also had a history of bilateral foot and ankle pain    He had synvisc reaction in both knees. He was hospitalized on 8/19/2014 - for it. He had steroid injection s to get rid of the pain. Recently  his right knee is hurting and he has more dfificutly walking now. He is taking tramadol every 6 hours. He has more pain right now b/c he forgot to take it. The pain increased after the mri. No constipation. He has a lot of pain in his left knee. He has a meniscal tear and a strain noted on his mri. He can't barely do his chores at home. 12/7/2016  His left knee is hurting again. He's her for a injection. He is thinking he will get left knee repalcement after 1st of the year. He can't kneel to light his fireplace. He has someone to do his snow. 8/28/2017  His left knee is bothering him. The calf is hurting and the thigh is hurting more. No pain with sitting. It's when he's walking. This weather is driving him nuts. He just saw his niece's family     8/3/2018   He fought his cat and he broke his left  Wrist. . 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 Then a few weeks later   He fell on 7/21/2018 and broke his right humerus - arm. communited fracture of proximal humerus - and he has no displaced fracture of the greater tuberosity. He is having knee pain -     He recovered after the the right knee replacement. 4/12/2019  He still has his cat. He is trying PT for his left shoulder after the recovery of his fracture. His knee didn't get better with his left knee injection. He doesn't want a knee replacement.    He is walking with his left knee. The change in weather bothers his knee. It doesn't hurt all the time. He has 5/10 pain in his hands. - he can't close hands compeltely anymore. He's taking trmaodl for the pain. He is taking tramadol mostly twice a day - occl three times. He does take it wafter going grocery     3/2/2020  He broke his right shoulder 2 years ago  He broke left calvicle 6/2019 -   He is planning a left knee surgery with dr. Farr Salvage - he is going to class. He's taking tramdol. He has no pain without using the arm. But using his arm - he has 5-6/10 pain. He has 4/10 pain. When he walks. He had diverticulitis in 12/24/2019.     3/10/2021  Here for his yearly follow up. He feels tramadol helps his hand and foot pain. He got one shot of the vaccine. He has the walker now b/c of back pain. He has pain in his thighs and calves. hes' getting sharp pains. He had an epidural injection but it only lasted 2 days. At night when he rolss over he has a sharp pain somewhere. The pain is in his butt and legs   He got out of bed one day - and it was more painfula nd then increased over a few days. His shoulders are ok. His right hand is concerning him but he feels some swelling happening in this. He is planning his left knee replacement. He posteponed it b/c of covid. 6/21/2021  Her left knee is bothering him more b/c of the weather. He feels more back pain. 2 epidural injections dind't help. He has b/l thigh pain and hips pain. When it sits for too long he has hip pain. When he has xrays and ct scans - he has problems in his back , he is going to try things on Friday. The back pain started on 2/2021 -   Tramadol is 50mg tid  - it's not helping her back. But teylnol adding it is not helping. He has good and bad days. Today is not bad. He' shaving discomfort sitting as well at times.    He feels he has something hard on his     12/21/2021  The left knee is bothering him - and he is retaining fluid. He has back pain and got 3 epidural injections. It only lasted 10days. He has so much pain in his buttocks and thighs. He can't sit or stand easily. Laying down is only what he is comfortable with. His left knee is aggravating. 4/12/2022 -   Early in December he was going to get a lower back epidural injection - that would be with an ablation. He got tses but by christoctavia pérez he couldn't walk. He was in [employer] and [facility] for weeks. He lost all the sternght in his legs and now hes' getting phsycial therapy to develop more muscle tone. He has lost sense of balance. And he has to use a walker. Tramadol is helping but it's not 100% . He was given norco at [facility] too. He has constipation but using stool softeners. The back pain is ok. He has pain in his hips with walking. He has PT set up and he has a shuttle can get picked up. He has a friend taking him back. 5/10/2022:  Saw dr. Arcenio Sanders  Patient presents for follow-up of knee pain. X-ray of the left knee has shown severe OA, bone-on-bone. He receives cortisone injections, last one was in December  Currently on tramadol and Norco as needed for his joint pain. Right knee replacement done in the past  He is here today for left knee cortisone injection    7/15/2022  He finsihed phsyical thearpy and is able to walk without the walker at times. He is much better than he was - he has 4 more therapies  His PT has bee great since his hostpialization in 12/2021 -  He doesn't have pain now. When he rolls over in bed he can get 6-7/10 pain. He has 2-3/10 pain with walking -     12/17/2022  He has increasing left knee pain and here for injection.      11/21/2023 -   Had left knee replacement in 4/2023 -   Did great - but a couple days later he fell and had thorcic   Post surgery   Then had thorcic pain from compression fractures and admitted - and got kyphoplasty in 5/2023  No pain   He's been home ok since then. He's migrated from a wlker to a cane 3 weeks ago. Had to learn to walk again - he finihsed PT for his left knee. Tramadol is helping from that. He tried stopped it but the left shoulder , ankles and toes hurt more. He's taking tramadol thre epills a day. - take s2 in the am and 1 in pm -       Having more left shoulder pain . He had a fracture in his left shoulder from a fall  3 years ago int he past and thinks it didn't heal right      His right arm fracture 2-3 years ago         HISTORY:  Past Medical History:   Diagnosis Date    Anxiety state     Aortic atherosclerosis ([address]     CXR 11-14    Aortic stenosis     Echo 3-23 with normal LV function, EF 55-60%, moderate LAE, moderate aortic stenosis with peak gradient 39 mmHg and mean gradient 25 mmHg, mild-moderate mitral stenosis, mild MR    BPH (benign prostatic hyperplasia)     COPD (chronic obstructive pulmonary disease) ([address]     CXR 11-14    Diverticulosis     Colonoscopy 2-12    Essential hypertension     History of blood transfusion 2003    no reactions    History of stomach ulcers     History of vertebral fracture 05/2023    T7, T8, T9, T11, T12, L1 secondary to fall; s/p kyphoplasty T7, T8, T9 6-23    Hx of adenomatous colonic polyps     Hx of melanoma of skin 1979    forehead    Iron deficiency anemia     Lumbar spinal stenosis     Macular degeneration     per NG    Primary osteoarthritis of knees, bilateral     Status post right TKA 11-14; s/p left TKA 4-23    Pulmonary hypertension ([address]     Echo 5-18 with pulmonary artery pressure 45 mmHg    Total knee replacement status, left 04/03/2023    Visual impairment     glasses      Social Hx Reviewed   Family Hx Reviewed     Medications (Active prior to today's visit):  Current Outpatient Medications   Medication Sig Dispense Refill    metoprolol succinate ER 25 MG Oral Tablet 24 Hr Take 1 tablet (25 mg total) by mouth daily.  90 tablet 3    clindamycin 150 MG Oral Cap Take 1 capsule (150 mg total) by mouth daily. 4 capsule 3    fluocinonide 0.05 % External Ointment Apply 1 Application topically 2 (two) times daily. traMADol 50 MG Oral Tab Take 1 tablet (50 mg total) by mouth every 8 (eight) hours as needed for Pain. 90 tablet 5    allopurinol 100 MG Oral Tab Take 1 tablet (100 mg total) by mouth daily with dinner. 90 tablet 1    ferrous sulfate 325 (65 FE) MG Oral Tab EC Take 1 tablet (325 mg total) by mouth daily with breakfast.      Multiple Vitamins-Minerals (PRESERVISION AREDS 2 OR) Take 1 capsule by mouth in the morning and 1 capsule before bedtime. cetirizine 10 MG Oral Tab Take 1 tablet (10 mg total) by mouth daily as needed for Allergies (allergies). senna-docusate 8.6-50 MG Oral Tab Take 2 tablets by mouth in the morning and 2 tablets before bedtime. HYDROcodone-acetaminophen 7.5-325 MG Oral Tab Take 1 tablet by mouth every 4 (four) hours as needed for Pain.      tamsulosin 0.4 MG Oral Cap TAKE 1 CAPSULE BY MOUTH ONE-HALF HOUR AFTER SUPPER DAILY 90 capsule 3    cholecalciferol 25 MCG (1000 UT) Oral Cap Take 1 capsule (1,000 Units total) by mouth nightly. Vitamin D3 1,000 unit capsule      Multiple Vitamin (MULTI-VITAMINS) Oral Tab Take 1 tablet by mouth nightly. take 1 tablet by ORAL route  every day with food         Allergies: Allergies   Allergen Reactions    Demerol [Meperidine] SHORTNESS OF BREATH    Hylan G-F 20 SWELLING     Swelling to cristiano. knees    Swelling to cristiano. knees   Swelling to cristiano. knees   Swelling to cristiano. knees   Swelling to cristiano. knees    Ibuprofen RASH and SHORTNESS OF BREATH    Penicillins RASH and SHORTNESS OF BREATH    Valdecoxib OTHER (SEE COMMENTS)    Celecoxib RASH    Hydrochlorothiazide RASH    Triamterene RASH         ROS:   All other ROS are negative.      PHYSICAL EXAM:   HEENT: EOM intact, clear sclear, PERRLA, pleasant, no acute distress, no CAD, no neck tendnerness, good ROM,   No rashes  CVS: RRR, no murmurs  RS: CTAB, no crackles, no rhonchi  ABD: Soft Non tender,   Joint exam:  left knee replaced  - not swelling and etnder. Left hip trochanteric bursitis - not tender. Tender in hands and feet - oa changes -   Lower    Left shoulder tender mild tender.    Using a walker   Calves no edema -     Component      Latest Ref Rng & Units 8/6/2018 7/30/2018   WBC      4.0 - 11.0 K/UL 6.0    RBC      4.50 - 5.90 M/UL 2.86 (L)    Hemoglobin      13.5 - 17.5 g/dL 8.8 (L)    Hematocrit      41.0 - 52.0 % 26.3 (L)    MCV      80.0 - 100.0 fL 92.3    MCH      27.0 - 32.0 pg 30.9    MCHC      32.0 - 37.0 g/dl 33.5    RDW      11.0 - 15.0 % 16.7 (H)    Platelet Count      005 - 400 K/    MEAN PLATELET VOLUME      7.4 - 10.3 fL 8.2    Neutrophils %      % 63    Lymphocytes %      % 21    Monocytes %      % 10    Eosinophils %      % 2    Basophils %      % 3    MYELOCYTE %      % 1    Neutrophils Absolute      1.8 - 7.7 K/UL 3.8    Lymphocytes Absolute      1.0 - 4.0 K/UL 1.3    Monocytes Absolute      0.0 - 1.0 K/UL 0.6    Eosinophils Absolute      0.0 - 0.7 K/UL 0.1    Basophils Absolute      0.0 - 0.2 K/UL 0.2    MYELOCYTE ABSOLUTE MANUAL      0 K/UL 0.06 (H)    Glucose      70 - 99 mg/dL  93   Sodium      136 - 144 mmol/L  134 (L)   Potassium      3.3 - 5.1 mmol/L  4.0   Chloride      95 - 110 mmol/L  100   Carbon Dioxide, Total      22 - 32 mmol/L  29   BUN      8 - 20 mg/dL  11   CREATININE      0.50 - 1.50 mg/dL  0.58   CALCIUM      8.5 - 10.5 mg/dL  8.6   ALT (SGPT)      17 - 63 U/L  35   AST (SGOT)      15 - 41 U/L  25   ALKALINE PHOSPHATASE      32 - 100 U/L  112 (H)   Total Bilirubin      0.3 - 1.2 mg/dL  0.7   TOTAL PROTEIN      5.9 - 8.4 g/dL  5.9   Albumin      3.5 - 4.8 g/dL  2.6 (L)   Globulin      2.5 - 3.7 g/dL  3.3   A/G Ratio      1.0 - 2.0  0.8 (L)   ANION GAP      0 - 18 mmol/L  5   BUN/CREAT Ratio      10.0 - 20.0  19.0   CALCULATED OSMOLALITY      275 - 295 mOsm/kg  277   GFR, Non-      >=60  >60 GFR, -American      >=60  >60     5/4/2016 left foot xray   1. Little change from January 22, 2007. 2. Degenerative arthritis more pronounced involving the tarsus. 3. Calcaneal  enthesophytes. 4. Soft tissue swelling. 5. Pes planus. ASSESSMENT/PLAN:     1. Osteoarthritis, L knee OA  Doing better with left knee arthroplasty in 4/2023 -   1. Generalized OA and Bilateral knee osteoarthritis-mod to severe,  -and severe djd of back -   Left kknee osteoarthritis -no longer  planning on sx - with dr. Sam Sims  -  Severe reaction after lower back ablation - hospitlized in 12/2021 - and now recovering    Left knee pain - steroid injection don't help any more   But askign for injection b/c of his lower back pain is not getting better  Last one is 6/2021 - a 12/21/2021 - and 5/10/2022 - dint' last as long as the other ones. 12/17/2022 s/p left knee injection     Physical therapy in June 2022   Increase Tramadol from 100mg every 12 hours - to 100mg every 8 hours   norco 5.325mg a day as needed. #30        Left trochanteric bursitis - 8/12/2015 -   Tramadol as needed 100mg bid prn. -  - rtc in 6 months. -   S/p right knee replacement  - bad reaction to synvisc in the pasts -    -  Stay on  tramadol  50 mg every 6 hours prn   2. Allergies to discharge on this or Celebrex vs. voltarenGEL   3.  ankle pain-following with podiatry, orthotics. 4.  History right hip replacement   5. Left ankle pain / left foot pain - on allopurinol 100mg a day, coldhicine didn't help him   Watching diet. 6. Left shoulder calvicle fracture in 9.2019 - today cont. To have pain -   S/p left shoulder injection in 3/2020 -   Today on 11/21/2023 -    With paitent's consent, I injected pt's left shoulder gh joint  with 1ml lidocaine 1 % and 1 ml kenalog 40. It was done under sterile technique using iodine and alcohol swabs and ethyl chloride was used as an anaesthetic spray. Pt.  tolerated it well. Summary:  1. Cont.  tramadol 50 every 8 hours as needed   2. norco 5/325mg a day as needed #30  3. Physical therapy -   4. Return to clinic in 2 months. 5. Rest left knee x 3 days      - ok to change tramadol to stronger meds - for back pain - will let pain management know - ok to take over -   Increased his tramadol to 50mg every 6 hours til then.      Mary Francois MD  11/21/2023   3:19 PM  - Reviewed [place]  through Epic

## 2023-11-24 RX ORDER — FLUOCINONIDE 0.5 MG/G
1 OINTMENT TOPICAL 2 TIMES DAILY
Qty: 60 G | Refills: 0 | OUTPATIENT
Start: 2023-11-24

## 2023-11-24 NOTE — TELEPHONE ENCOUNTER
Refill Request for medication(s):     Last Office Visit:    Last Refill:    Pharmacy, Dosage verified:     Condition Update (if applicable):     Rx pended and sent to provider for approval, please advise. Thank You!

## 2023-11-28 ENCOUNTER — TELEPHONE (OUTPATIENT)
Dept: OPHTHALMOLOGY | Facility: CLINIC | Age: 88
End: 2023-11-28

## 2023-11-30 NOTE — TELEPHONE ENCOUNTER
Patient states he got \"reading glasses at [facility] that are a 4\". Asked if Dr. Veronica Landaverde could update his rx to \"be a 4\". Stated he would need to come in for a glasses recheck, patient declined at this time.

## 2023-12-09 RX ORDER — ALLOPURINOL 100 MG/1
100 TABLET ORAL
Qty: 90 TABLET | Refills: 0 | Status: SHIPPED | OUTPATIENT
Start: 2023-12-09

## 2023-12-15 ENCOUNTER — OFFICE VISIT (OUTPATIENT)
Dept: INTERNAL MEDICINE CLINIC | Facility: CLINIC | Age: 88
End: 2023-12-15

## 2023-12-15 VITALS
BODY MASS INDEX: 25.25 KG/M2 | HEART RATE: 56 BPM | SYSTOLIC BLOOD PRESSURE: 132 MMHG | DIASTOLIC BLOOD PRESSURE: 70 MMHG | WEIGHT: 159 LBS | HEIGHT: 66.5 IN

## 2023-12-15 DIAGNOSIS — I10 ESSENTIAL HYPERTENSION: Primary | ICD-10-CM

## 2023-12-15 DIAGNOSIS — I35.0 AORTIC VALVE STENOSIS, ETIOLOGY OF CARDIAC VALVE DISEASE UNSPECIFIED: ICD-10-CM

## 2023-12-15 PROCEDURE — 99214 OFFICE O/P EST MOD 30 MIN: CPT | Performed by: INTERNAL MEDICINE

## 2023-12-15 NOTE — PATIENT INSTRUCTIONS
Your blood pressure today was good at 132/70. Please continue your current medications and schedule a Medicare physical in 3 months.

## 2024-01-31 ENCOUNTER — NURSE TRIAGE (OUTPATIENT)
Dept: INTERNAL MEDICINE CLINIC | Facility: CLINIC | Age: 89
End: 2024-01-31

## 2024-01-31 NOTE — TELEPHONE ENCOUNTER
Please reply to pool: EM RN TRIAGE  Action Requested: Summary for Provider     []  Critical Lab, Recommendations Needed  [] Need Additional Advice  [x]   FYI    []   Need Orders  [] Need Medications Sent to Pharmacy  []  Other     SUMMARY: Patient contacts clinic with high blood pressure readings.  167/87 today.  He checks in the am when he wakes up.  Currently on metoprolol 25 mg daily.  Denies symptoms, feels well.  RN offered acute visit today to check BP in office, patient declines.  He will monitor his BP twice per day and call back with readings.  He will report any systolic over 180 or physical symptoms.  No further action.,    Reason for call: Blood Pressure  Onset: Data Unavailable                       Reason for Disposition   Systolic BP >= 160 OR Diastolic >= 100    Protocols used: Blood Pressure - High-A-OH

## 2024-02-01 NOTE — PROGRESS NOTES
Constitutional: well-developed, normal communication ability.   Eyes: Conjunctivae and eyelids appear normal, no scleral icterus. Respiratory: symmetric expansion of chest wall, normal work of breathing   Gastrointestinal:  normoactive bowel sounds, soft, left upper, left lower abdominal tenderness, no rebound tenderness, no shifting dullness, no organomegaly.   Musculoskeletal: normal gait and station   Skin: no jaundice   Neurologic: Oriented to person, place, time. Short term memory intact.    Psychiatric: Normal mood and appropriate affect.  This is a pleasant 81-year-old male that is 13 weeks status post a left distal radius fracture that is minimally displaced. The patient reports that he had been doing range of motion exercises for his left fingers.   The patient reports he is doing fine un

## 2024-02-22 ENCOUNTER — OFFICE VISIT (OUTPATIENT)
Dept: DERMATOLOGY CLINIC | Facility: CLINIC | Age: 89
End: 2024-02-22

## 2024-02-22 DIAGNOSIS — L30.9 DERMATITIS: Primary | ICD-10-CM

## 2024-02-22 PROCEDURE — 1159F MED LIST DOCD IN RCRD: CPT | Performed by: STUDENT IN AN ORGANIZED HEALTH CARE EDUCATION/TRAINING PROGRAM

## 2024-02-22 PROCEDURE — 1126F AMNT PAIN NOTED NONE PRSNT: CPT | Performed by: STUDENT IN AN ORGANIZED HEALTH CARE EDUCATION/TRAINING PROGRAM

## 2024-02-22 PROCEDURE — 99214 OFFICE O/P EST MOD 30 MIN: CPT | Performed by: STUDENT IN AN ORGANIZED HEALTH CARE EDUCATION/TRAINING PROGRAM

## 2024-02-22 PROCEDURE — 1160F RVW MEDS BY RX/DR IN RCRD: CPT | Performed by: STUDENT IN AN ORGANIZED HEALTH CARE EDUCATION/TRAINING PROGRAM

## 2024-02-22 RX ORDER — FLUOCINONIDE 0.5 MG/G
OINTMENT TOPICAL
Qty: 60 G | Refills: 2 | Status: SHIPPED
Start: 2024-02-22

## 2024-02-22 RX ORDER — KETOCONAZOLE 20 MG/G
CREAM TOPICAL
Qty: 60 G | Refills: 5 | Status: SHIPPED
Start: 2024-02-22

## 2024-02-22 NOTE — PROGRESS NOTES
February 22, 2024    Established patient     Referred by:   No referring provider defined for this encounter.      CHIEF COMPLAINT: Rash     HISTORY OF PRESENT ILLNESS: Tha Jolly is a 88 year old male here for evaluation of rash.    Location: lower leg, ankles, and feet  Duration: on going 4 years started again 4 months   Signs and symptoms: itchy  Past treatments: fluocinonide 0.05 % Ointment and A & D     Personal Dermatologic History  History of chronic skin disease: No    Family History  History of chronic skin disease: No  History autoimmune diseases:  No    Past Medical History  Past Medical History:   Diagnosis Date    Anxiety state     Aortic atherosclerosis ([facility])     CXR 11-14    Aortic stenosis     Echo 3-23 with normal LV function, EF 55-60%, moderate LAE, moderate aortic stenosis with peak gradient 39 mmHg and mean gradient 25 mmHg, mild-moderate mitral stenosis, mild MR    BPH (benign prostatic hyperplasia)     COPD (chronic obstructive pulmonary disease) ([facility])     CXR 11-14    Diverticulosis     Colonoscopy 2-12    Essential hypertension     History of blood transfusion 2003    no reactions    History of stomach ulcers     History of vertebral fracture 05/2023    T7, T8, T9, T11, T12, L1 secondary to fall; s/p kyphoplasty T7, T8, T9 6-23    Hx of adenomatous colonic polyps     Hx of melanoma of skin 1979    forehead    Iron deficiency anemia     Lumbar spinal stenosis     Macular degeneration     per NG    Primary osteoarthritis of knees, bilateral     Status post right TKA 11-14; s/p left TKA 4-23    Pulmonary hypertension ([facility])     Echo 5-18 with pulmonary artery pressure 45 mmHg    Total knee replacement status, left 04/03/2023    Visual impairment     glasses       REVIEW OF SYSTEMS:  Constitutional: Denies fever, chills, unintentional weight loss.   Skin as per HPI    Medications  Current Outpatient Medications   Medication Sig Dispense Refill    allopurinol 100 MG Oral Tab Take 1 tablet  (100 mg total) by mouth daily with dinner. 90 tablet 0    metoprolol succinate ER 25 MG Oral Tablet 24 Hr Take 1 tablet (25 mg total) by mouth daily. 90 tablet 3    fluocinonide 0.05 % External Ointment Apply 1 Application topically 2 (two) times daily.      traMADol 50 MG Oral Tab Take 1 tablet (50 mg total) by mouth every 8 (eight) hours as needed for Pain. (Patient taking differently: Take 1 tablet (50 mg total) by mouth in the morning and 1 tablet (50 mg total) before bedtime.) 90 tablet 5    ferrous sulfate 325 (65 FE) MG Oral Tab EC Take 1 tablet (325 mg total) by mouth daily with breakfast.      Multiple Vitamins-Minerals (PRESERVISION AREDS 2 OR) Take 1 capsule by mouth in the morning and 1 capsule before bedtime.      cetirizine 10 MG Oral Tab Take 1 tablet (10 mg total) by mouth daily as needed for Allergies (allergies).      senna-docusate 8.6-50 MG Oral Tab Take 2 tablets by mouth in the morning and 2 tablets before bedtime.      tamsulosin 0.4 MG Oral Cap TAKE 1 CAPSULE BY MOUTH ONE-HALF HOUR AFTER SUPPER DAILY 90 capsule 3    cholecalciferol 25 MCG (1000 UT) Oral Cap Take 1 capsule (1,000 Units total) by mouth nightly. Vitamin D3 1,000 unit capsule      Multiple Vitamin (MULTI-VITAMINS) Oral Tab Take 1 tablet by mouth nightly. take 1 tablet by ORAL route  every day with food         PHYSICAL EXAM:  General: awake, alert, no acute distress  Skin: Skin exam was performed today including the following: Lower legs and feet. Pertinent findings include:   -With pink scaly plaques    ASSESSMENT & PLAN:  Pathophysiology of diagnoses discussed with patient.  Therapeutic options reviewed. Risks, benefits, and alternatives discussed with patient. Instructions reviewed at length.    #Rash and nonspecific skin eruption - flaring on legs   - Differential diagnosis includes tinea pedis versus eczematous dermatitis.  Given presence of onychomycosis recommended treatment with ketoconazole 2% twice daily for 4 weeks  between the toes, feet and on the lower legs.  Discussed that after this if any lesions present may treat with fluocinonide 0.05% twice daily until resolved.      Return to clinic:  2 months  or sooner if something concerning arises    Ben Benz MD

## 2024-03-04 RX ORDER — FLUOCINONIDE 0.5 MG/G
OINTMENT TOPICAL 2 TIMES DAILY
Qty: 60 G | Refills: 0 | OUTPATIENT
Start: 2024-03-04

## 2024-03-04 RX ORDER — FLUOCINONIDE 0.5 MG/G
OINTMENT TOPICAL
Qty: 60 G | Refills: 2 | Status: SHIPPED | OUTPATIENT
Start: 2024-03-04

## 2024-03-04 NOTE — TELEPHONE ENCOUNTER
RX for fluocinonide oint failed transmission to pharmacy on 2/22/2024 - RX resent as ordered by DM

## 2024-03-04 NOTE — TELEPHONE ENCOUNTER
Refill request has failed the Ambulatory Medication Refill Standing Order and is routed to the primary physician to review the following:    Requested Prescriptions     Refused Prescriptions Disp Refills    FLUOCINONIDE 0.05 % External Ointment [Pharmacy Med Name: Fluocinonide 0.05 % Oin Taro] 60 g 0     Sig: Apply 1 Application topically 2 (two) times daily.     Refused By: GAMAL ALEJANDRE     Reason for Refusal: Refill not appropriate           '

## 2024-03-06 ENCOUNTER — LAB ENCOUNTER (OUTPATIENT)
Dept: LAB | Age: 89
End: 2024-03-06
Attending: INTERNAL MEDICINE
Payer: MEDICARE

## 2024-03-06 ENCOUNTER — OFFICE VISIT (OUTPATIENT)
Dept: INTERNAL MEDICINE CLINIC | Facility: CLINIC | Age: 89
End: 2024-03-06

## 2024-03-06 VITALS
HEART RATE: 70 BPM | WEIGHT: 163.19 LBS | DIASTOLIC BLOOD PRESSURE: 72 MMHG | HEIGHT: 66.5 IN | SYSTOLIC BLOOD PRESSURE: 132 MMHG | BODY MASS INDEX: 25.92 KG/M2

## 2024-03-06 DIAGNOSIS — I70.0 AORTIC ATHEROSCLEROSIS (HCC): ICD-10-CM

## 2024-03-06 DIAGNOSIS — Z00.00 ANNUAL PHYSICAL EXAM: ICD-10-CM

## 2024-03-06 DIAGNOSIS — I35.0 AORTIC VALVE STENOSIS, ETIOLOGY OF CARDIAC VALVE DISEASE UNSPECIFIED: ICD-10-CM

## 2024-03-06 DIAGNOSIS — I27.20 PULMONARY HYPERTENSION (HCC): ICD-10-CM

## 2024-03-06 DIAGNOSIS — Z86.010 HX OF ADENOMATOUS COLONIC POLYPS: ICD-10-CM

## 2024-03-06 DIAGNOSIS — I10 ESSENTIAL HYPERTENSION: ICD-10-CM

## 2024-03-06 DIAGNOSIS — Z00.00 ENCOUNTER FOR ANNUAL HEALTH EXAMINATION: ICD-10-CM

## 2024-03-06 DIAGNOSIS — D50.9 IRON DEFICIENCY ANEMIA, UNSPECIFIED IRON DEFICIENCY ANEMIA TYPE: ICD-10-CM

## 2024-03-06 DIAGNOSIS — J44.9 CHRONIC OBSTRUCTIVE PULMONARY DISEASE, UNSPECIFIED COPD TYPE (HCC): ICD-10-CM

## 2024-03-06 DIAGNOSIS — Z00.00 ANNUAL PHYSICAL EXAM: Primary | ICD-10-CM

## 2024-03-06 LAB
ALBUMIN SERPL-MCNC: 3.8 G/DL (ref 3.2–4.8)
ALBUMIN/GLOB SERPL: 1.2 {RATIO} (ref 1–2)
ALP LIVER SERPL-CCNC: 149 U/L
ALT SERPL-CCNC: 16 U/L
ANION GAP SERPL CALC-SCNC: 2 MMOL/L (ref 0–18)
AST SERPL-CCNC: 24 U/L (ref ?–34)
BILIRUB SERPL-MCNC: 0.3 MG/DL (ref 0.2–1.1)
BUN BLD-MCNC: 22 MG/DL (ref 9–23)
BUN/CREAT SERPL: 30.1 (ref 10–20)
CALCIUM BLD-MCNC: 9.1 MG/DL (ref 8.7–10.4)
CHLORIDE SERPL-SCNC: 104 MMOL/L (ref 98–112)
CHOLEST SERPL-MCNC: 133 MG/DL (ref ?–200)
CO2 SERPL-SCNC: 34 MMOL/L (ref 21–32)
CREAT BLD-MCNC: 0.73 MG/DL
DEPRECATED RDW RBC AUTO: 49 FL (ref 35.1–46.3)
EGFRCR SERPLBLD CKD-EPI 2021: 88 ML/MIN/1.73M2 (ref 60–?)
ERYTHROCYTE [DISTWIDTH] IN BLOOD BY AUTOMATED COUNT: 14 % (ref 11–15)
FASTING PATIENT LIPID ANSWER: NO
FASTING STATUS PATIENT QL REPORTED: NO
GLOBULIN PLAS-MCNC: 3.2 G/DL (ref 2.8–4.4)
GLUCOSE BLD-MCNC: 88 MG/DL (ref 70–99)
HCT VFR BLD AUTO: 35.8 %
HDLC SERPL-MCNC: 65 MG/DL (ref 40–59)
HGB BLD-MCNC: 12 G/DL
LDLC SERPL CALC-MCNC: 55 MG/DL (ref ?–100)
MCH RBC QN AUTO: 32.1 PG (ref 26–34)
MCHC RBC AUTO-ENTMCNC: 33.5 G/DL (ref 31–37)
MCV RBC AUTO: 95.7 FL
NONHDLC SERPL-MCNC: 68 MG/DL (ref ?–130)
OSMOLALITY SERPL CALC.SUM OF ELEC: 293 MOSM/KG (ref 275–295)
PLATELET # BLD AUTO: 177 10(3)UL (ref 150–450)
POTASSIUM SERPL-SCNC: 4.8 MMOL/L (ref 3.5–5.1)
PROT SERPL-MCNC: 7 G/DL (ref 5.7–8.2)
RBC # BLD AUTO: 3.74 X10(6)UL
SODIUM SERPL-SCNC: 140 MMOL/L (ref 136–145)
TRIGL SERPL-MCNC: 63 MG/DL (ref 30–149)
VLDLC SERPL CALC-MCNC: 9 MG/DL (ref 0–30)
WBC # BLD AUTO: 6.2 X10(3) UL (ref 4–11)

## 2024-03-06 PROCEDURE — 80053 COMPREHEN METABOLIC PANEL: CPT

## 2024-03-06 PROCEDURE — 80061 LIPID PANEL: CPT

## 2024-03-06 PROCEDURE — 85027 COMPLETE CBC AUTOMATED: CPT

## 2024-03-06 PROCEDURE — 36415 COLL VENOUS BLD VENIPUNCTURE: CPT

## 2024-03-06 NOTE — PATIENT INSTRUCTIONS
Your blood pressure today was good at 132/72  Await results of blood tests  Continue current medication, healthy diet and regular physical activity  Return visit in 6 months  Tha Jolly's SCREENING SCHEDULE   Tests on this list are recommended by your physician but may not be covered, or covered at this frequency, by your insurer.   Please check with your insurance carrier before scheduling to verify coverage.   PREVENTATIVE SERVICES FREQUENCY &  COVERAGE DETAILS LAST COMPLETION DATE   Diabetes Screening    Fasting Blood Sugar / Glucose    One screening every 12 months if never tested or if previously tested but not diagnosed with pre-diabetes   One screening every 6 months if diagnosed with pre-diabetes Lab Results   Component Value Date     (H) 05/10/2023        Cardiovascular Disease Screening    Lipid Panel  Cholesterol  Lipoprotein (HDL)  Triglycerides Covered every 5 years for all Medicare beneficiaries without apparent signs or symptoms of cardiovascular disease Lab Results   Component Value Date    CHOLEST 177 07/08/2021     (H) 07/08/2021    LDL 69 07/08/2021    TRIG 35 07/08/2021         Electrocardiogram (EKG)   Covered if needed at Welcome to Medicare, and non-screening if indicated for medical reasons 03/13/2023      Ultrasound Screening for Abdominal Aortic Aneurysm (AAA) Covered once in a lifetime for one of the following risk factors   • Men who are 65-75 years old and have ever smoked   • Anyone with a family history -     Colorectal Cancer Screening  Covered for ages 50-85; only need ONE of the following:    Colonoscopy   Covered every 10 years    Covered every 2 years if patient is at high risk or previous colonoscopy was abnormal -    No recommendations at this time    Flexible Sigmoidoscopy   Covered every 4 years -    Fecal Occult Blood Test Covered annually -   Prostate Cancer Screening    Prostate-Specific Antigen (PSA) Annually No results found for: \"PSA\"  There are no  preventive care reminders to display for this patient.   Immunizations    Influenza Covered once per flu season  Please get every year -  Influenza Vaccine(1) due on 10/01/2023    Pneumococcal Each vaccine (Cshknju57 & Wpuxjavtx15) covered once after 65 Prevnar 13: -    Juolpmgtp55: 11/22/2021     Pneumococcal Vaccination(2 of 2 - PCV) due on 11/22/2022    Hepatitis B One screening covered for patients with certain risk factors   -  No recommendations at this time    Tetanus Toxoid Not covered by Medicare Part B unless medically necessary (cut with metal); may be covered with your pharmacy prescription benefits -    Tetanus, Diptheria and Pertusis TD and TDaP Not covered by Medicare Part B -  No recommendations at this time    Zoster Not covered by Medicare Part B; may be covered with your pharmacy  prescription benefits -  Zoster Vaccines(1 of 2) Never done     Chronic Obstructive Pulmonary Disease (COPD)    Spirometry Annually Spirometry date:

## 2024-03-06 NOTE — PROGRESS NOTES
Subjective:   Tha Jolly is an 88 year old male who presents this morning for a MA (Medicare Advantage) Supervisit (Once per calendar year) and scheduled follow up of multiple significant but stable problems including aortic stenosis, COPD and hypertension    His last physical was a preoperative physical March 2023.  He feels well.  No specific issues for discussion.  He lives alone and independently in his home.  He no longer drives.    In terms of his hypertension, BP checks typically 130-140/70s.  He tries to watch his diet and is active, walking occasionally with a cane.  Weight has been relatively stable.    History/Other:   Fall Risk Assessment:   He has been screened for Falls and is High Risk. Fall Prevention information provided to patient in After Visit Summary.    Do you feel unsteady when standing or walking?: No  Do you worry about falling?: Yes  Have you fallen in the past year?: No     Cognitive Assessment:   He had a completely normal cognitive assessment - see flowsheet entries     Functional Ability/Status:   Tha Jolly has some abnormal functions as listed below:  He has Driving difficulties based on screening of functional status. He has difficulties Shopping for Groceries based on screening of functional status. He has Hearing problems based on screening of functional status.He has Vision problems based on screening of functional status. He has Walking problems based on screening of functional status.       Depression Screening (PHQ-2/PHQ-9): PHQ-2 SCORE: 0  , done 3/6/2024        Advanced Directives:   He has a Living Will on file in [employer]; reviewed and discussed documents with patient (and family/surrogate if present).  He has a Power of  for Health Care on file in [facility].  Not discussed      Patient Active Problem List   Diagnosis    Osteoarthritis    Glaucoma suspect    Age-related nuclear cataract of both eyes    Age-related macular degeneration    History of knee replacement  procedure of right knee    History of hip replacement, total, right    Vitreous floaters of both eyes    Iron deficiency anemia    Essential hypertension with goal blood pressure less than 140/90    Benign prostatic hyperplasia with lower urinary tract symptoms    Panlobular emphysema (HCC)    Osteomyelitis (HCC)    Physical exam    Lumbar spinal stenosis    Aortic atherosclerosis (HCC)    Hx of adenomatous colonic polyps    Aortic stenosis    Pulmonary hypertension (HCC)    Diverticulosis    Essential hypertension    Primary osteoarthritis of knees, bilateral    BPH (benign prostatic hyperplasia)    COPD (chronic obstructive pulmonary disease) (HCC)    Intractable back pain    Primary osteoarthritis of left knee    Osteoarthritis of knee    Closed fracture of thoracic vertebra with routine healing, unspecified fracture morphology, unspecified thoracic vertebral level, subsequent encounter    Fall, initial encounter    History of vertebral fracture     Allergies:  He is allergic to demerol [meperidine], hylan g-f 20, ibuprofen, penicillins, valdecoxib, celecoxib, hydrochlorothiazide, and triamterene.    Current Medications:  Outpatient Medications Marked as Taking for the 3/6/24 encounter (Office Visit) with Guy Almanza MD   Medication Sig    fluocinonide 0.05 % External Ointment After using ketoconazole for 4 weeks use this ointment twice daily to any remaining areas until resolved.    ketoconazole 2 % External Cream Apply to affected and surrounding areas twice daily for 4 tweeks. Use twice daily 2 times weekly for maintenance therapy.    allopurinol 100 MG Oral Tab Take 1 tablet (100 mg total) by mouth daily with dinner.    metoprolol succinate ER 25 MG Oral Tablet 24 Hr Take 1 tablet (25 mg total) by mouth daily.    fluocinonide 0.05 % External Ointment Apply 1 Application topically 2 (two) times daily.    traMADol 50 MG Oral Tab Take 1 tablet (50 mg total) by mouth every 8 (eight) hours as needed for Pain.  (Patient taking differently: Take 1 tablet (50 mg total) by mouth in the morning and 1 tablet (50 mg total) before bedtime.)    ferrous sulfate 325 (65 FE) MG Oral Tab EC Take 1 tablet (325 mg total) by mouth daily with breakfast.    Multiple Vitamins-Minerals (PRESERVISION AREDS 2 OR) Take 1 capsule by mouth in the morning and 1 capsule before bedtime.    cetirizine 10 MG Oral Tab Take 1 tablet (10 mg total) by mouth daily as needed for Allergies (allergies).    senna-docusate 8.6-50 MG Oral Tab Take 2 tablets by mouth in the morning and 2 tablets before bedtime.    tamsulosin 0.4 MG Oral Cap TAKE 1 CAPSULE BY MOUTH ONE-HALF HOUR AFTER SUPPER DAILY    cholecalciferol 25 MCG (1000 UT) Oral Cap Take 1 capsule (1,000 Units total) by mouth nightly. Vitamin D3 1,000 unit capsule    Multiple Vitamin (MULTI-VITAMINS) Oral Tab Take 1 tablet by mouth nightly. take 1 tablet by ORAL route  every day with food       Medical History:  He  has a past medical history of Anxiety state, Aortic atherosclerosis (HCC), Aortic stenosis, BPH (benign prostatic hyperplasia), COPD (chronic obstructive pulmonary disease) (HCC), Diverticulosis, Essential hypertension, History of blood transfusion (2003), History of stomach ulcers, History of vertebral fracture (05/2023), adenomatous colonic polyps, melanoma of skin (1979), Iron deficiency anemia, Lumbar spinal stenosis, Macular degeneration, Primary osteoarthritis of knees, bilateral, Pulmonary hypertension (HCC), and Visual impairment.  Surgical History:  He  has a past surgical history that includes appendectomy; hip replacement surgery (Right, 2002); arthroscopy, ankle, surgical; w/ankle arthrodesis (Right); knee arthroscopy (Bilateral); colonoscopy (2005); other (Right, 07/15/2018); other (05/2018); total knee replacement (Right, 11/2014); Total knee arthroplasty (Left, 04/03/2023); and ir kyphoplasty (06/12/2023).   Family History:  His family history includes Arthritis in his father;  Hypertension in his mother; Lung Cancer in his father.  Social History:  He  reports that he quit smoking about 39 years ago. His smoking use included pipe. He has never used smokeless tobacco. He reports current alcohol use of about 5.8 standard drinks of alcohol per week. He reports that he does not use drugs.    Tobacco:  He smoked tobacco in the past but quit greater than 12 months ago.  Social History    Tobacco Use      Smoking status: Former        Types: Pipe        Quit date: 1985        Years since quittin.2      Smokeless tobacco: Never       CAGE Alcohol Screen:   CAGE screening score of 0 on 3/6/2024, showing low risk of alcohol abuse.      Patient Care Team:  Guy Almanza MD as PCP - General (Internal Medicine)  Nessa Moctezuma MD (RHEUMATOLOGY)  Francisco Norwood MD (SURGERY, ORTHOPEDIC)  Néstor Hopper MD (Anesthesiology)  Lesly Graves, PT as Physical Therapist (Physical Therapy)  Rajwinder Clifton, PT (Physical Therapy)  Brunner, Heather, PT as Physical Therapist (Physical Therapy)  Mary Perry, PT as Physical Therapist (Physical Therapy)    Review of Systems    REVIEW OF SYSTEMS:   GENERAL: No fever  LUNGS: No cough wheezing or shortness of breath  CARDIAC: No lightheadedness palpitations or chest pain  GI: No anorexia heartburn dysphagia nausea vomiting abdominal pain diarrhea constipation or rectal bleeding  : Chronic nocturia twice each night.  No urinary frequency dysuria or hematuria  MUSCULOSKELETAL: No leg swelling  NEURO: No headaches     Objective:   Physical Exam    EXAM:   GENERAL: Pleasant male appearing well in no distress  /72   Pulse 70   Ht 5' 6.5\" (1.689 m)   Wt 163 lb 3.2 oz (74 kg)   BMI 25.95 kg/m²   HEENT: Anicteric, conjunctiva pink, oropharynx normal  NECK: Supple without mass or thyromegaly  NODES: No peripheral adenopathy  LUNGS: Resonant to percussion and clear to auscultation without crackles or wheezes  CARDIAC: Rhythm regular S1 S2  normal.  Crescendo systolic murmur right upper and left lower sternal borders without diastolic murmurs.  No edema  ABDOMEN: Bowel sounds normal soft nontender without mass or hepatosplenomegaly  EXTREMITIES: Normal without cyanosis or clubbing  PULSES: Carotid upstrokes 2+ with bilateral carotid bruit bruits versus transmitted murmur, distal pulses 2+ bilateral dorsalis pedis and posterior tibial  NEURO: Reflexes 1-2+ bilaterally and symmetric     /72   Pulse 70   Ht 5' 6.5\" (1.689 m)   Wt 163 lb 3.2 oz (74 kg)   BMI 25.95 kg/m²  Estimated body mass index is 25.95 kg/m² as calculated from the following:    Height as of this encounter: 5' 6.5\" (1.689 m).    Weight as of this encounter: 163 lb 3.2 oz (74 kg).    Medicare Hearing Assessment:   Hearing Screening    Time taken: 3/6/2024 11:21 AM  Entry User: Laquita Madrigal LPN  Screening Method: Finger Rub  Finger Rub Result: Pass                     Assessment & Plan:   Tha Jolly is a 88 year old male who presents for a Medicare Assessment.     1. Annual physical exam (Primary)  Check CMP CBC and lipid profile.  Order sent  Continue current medications  Reinforced healthy diet and regular physical activity as much as tolerated  Annual Medicare physical  Return visit in 6 months for recheck.  -     Comp Metabolic Panel (14); Future; Expected date: 03/06/2024  -     CBC, Platelet; No Differential; Future; Expected date: 03/06/2024  -     Lipid Panel; Future; Expected date: 03/06/2024    2. Aortic valve stenosis, etiology of cardiac valve disease unspecified  Asymptomatic.  Anticipate rechecking Echo in 1 year  Overview:  Echo 5-18 with mild LVH, normal LV function, EF 55%, mild aortic stenosis with mean gradient 13 mmHg and peak gradient 24 mmHg, NARCISA, pulmonary hypertension 45 mmHg    3. Chronic obstructive pulmonary disease, unspecified COPD type (HCC)  Asymptomatic.  Ongoing abstinence from tobacco    4. Essential hypertension  Well-controlled.   Continue current medication    5. Iron deficiency anemia, unspecified iron deficiency anemia type  Await CBC     6. Hx of adenomatous colonic polyps  Deferring further colonoscopies given age and comorbidities    7. Pulmonary hypertension (HCC)  Asymptomatic.  Will monitor with next Echo  Overview:  Echo 5-18 with pulmonary artery pressure 45 mmHg    8. Aortic atherosclerosis (HCC)  Asymptomatic.  Risk factor control  Overview:  CXR 11-14      The patient indicates understanding of these issues and agrees to the plan.  Reinforced healthy diet, lifestyle, and exercise.      No follow-ups on file.     Guy Almanza MD, 3/6/2024     Supplementary Documentation:   General Health:  In the past six months, have you lost more than 10 pounds without trying?: 2 - No  Has your appetite been poor?: No  Type of Diet: Balanced  How does the patient maintain a good energy level?: Appropriate Exercise;Daily Walks  How would you describe your daily physical activity?: Light  How would you describe your current health state?: Good  How do you maintain positive mental well-being?: Social Interaction;Visiting Friends  On a scale of 0 to 10, with 0 being no pain and 10 being severe pain, what is your pain level?: 0 - (None)  In the past six months, have you experienced urine leakage?: 0-No  At any time do you feel concerned for the safety/well-being of yourself and/or your children, in your home or elsewhere?: No  Have you had any immunizations at another office such as Influenza, Hepatitis B, Tetanus, or Pneumococcal?: Yes        Tha Jolly's SCREENING SCHEDULE   Tests on this list are recommended by your physician but may not be covered, or covered at this frequency, by your insurer.   Please check with your insurance carrier before scheduling to verify coverage.   PREVENTATIVE SERVICES FREQUENCY &  COVERAGE DETAILS LAST COMPLETION DATE   Diabetes Screening    Fasting Blood Sugar / Glucose    One screening every 12 months if  never tested or if previously tested but not diagnosed with pre-diabetes   One screening every 6 months if diagnosed with pre-diabetes Lab Results   Component Value Date     (H) 05/10/2023        Cardiovascular Disease Screening    Lipid Panel  Cholesterol  Lipoprotein (HDL)  Triglycerides Covered every 5 years for all Medicare beneficiaries without apparent signs or symptoms of cardiovascular disease Lab Results   Component Value Date    CHOLEST 177 07/08/2021     (H) 07/08/2021    LDL 69 07/08/2021    TRIG 35 07/08/2021         Electrocardiogram (EKG)   Covered if needed at Welcome to Medicare, and non-screening if indicated for medical reasons 03/13/2023      Ultrasound Screening for Abdominal Aortic Aneurysm (AAA) Covered once in a lifetime for one of the following risk factors    Men who are 65-75 years old and have ever smoked    Anyone with a family history -     Colorectal Cancer Screening  Covered for ages 50-85; only need ONE of the following:    Colonoscopy   Covered every 10 years    Covered every 2 years if patient is at high risk or previous colonoscopy was abnormal -    No recommendations at this time    Flexible Sigmoidoscopy   Covered every 4 years -    Fecal Occult Blood Test Covered annually -   Prostate Cancer Screening    Prostate-Specific Antigen (PSA) Annually No results found for: \"PSA\"  There are no preventive care reminders to display for this patient.   Immunizations    Influenza Covered once per flu season  Please get every year -  Influenza Vaccine(1) due on 10/01/2023    Pneumococcal Each vaccine (Ibikrve96 & Shxizglmf89) covered once after 65 Prevnar 13: -    Snchrucss30: 11/22/2021     Pneumococcal Vaccination(2 of 2 - PCV) due on 11/22/2022    Hepatitis B One screening covered for patients with certain risk factors   -  No recommendations at this time    Tetanus Toxoid Not covered by Medicare Part B unless medically necessary (cut with metal); may be covered with your  pharmacy prescription benefits -    Tetanus, Diptheria and Pertusis TD and TDaP Not covered by Medicare Part B -  No recommendations at this time    Zoster Not covered by Medicare Part B; may be covered with your pharmacy  prescription benefits -  Zoster Vaccines(1 of 2) Never done     Chronic Obstructive Pulmonary Disease (COPD)    Spirometry Annually Spirometry date:

## 2024-03-10 RX ORDER — ALLOPURINOL 100 MG/1
100 TABLET ORAL
Qty: 90 TABLET | Refills: 3 | Status: SHIPPED | OUTPATIENT
Start: 2024-03-10

## 2024-03-10 NOTE — TELEPHONE ENCOUNTER
Protocol Failed/ No Protocol    Requested Prescriptions   Pending Prescriptions Disp Refills    ALLOPURINOL 100 MG Oral Tab [Pharmacy Med Name: Allopurinol 100 Mg Tab Nort] 90 tablet 0     Sig: Take 1 tablet (100 mg total) by mouth daily with dinner.       There is no refill protocol information for this order          Future Appointments         Provider Department Appt Notes    In 2 months Ben Benz MD [facility]           Recent Outpatient Visits              3 days ago Annual physical exam    [facility] uGy Almanza MD    Office Visit    2 weeks ago Dermatitis    [facility] Ben Benz MD    Office Visit    2 months ago Essential hypertension    [facility] Guy Almanza MD    Office Visit    3 months ago Chronic left shoulder pain    [facility] Nessa Moctezuma MD    Office Visit    4 months ago     [facility] Lesly Graves, PT    Office Visit

## 2024-03-14 DIAGNOSIS — M15.9 OSTEOARTHRITIS OF MULTIPLE JOINTS, UNSPECIFIED OSTEOARTHRITIS TYPE: Primary | ICD-10-CM

## 2024-03-14 NOTE — TELEPHONE ENCOUNTER
LOV: 11/27/23  Last Refilled:#90, 5rfs 8/14/23    Summary:  1. Cont. tramadol 50 every 8 hours as needed   2. norco 5/325mg a day as needed #30  3. Physical therapy -   4. Return to clinic in 2 months.   5. Rest left knee x 3 days      - ok to change tramadol to stronger meds - for back pain - will let pain management know - ok to take over -   Increased his tramadol to 50mg every 6 hours til then.      Nessa Moctezuma MD  11/21/2023   3:19 PM  Please advise.

## 2024-03-15 RX ORDER — TRAMADOL HYDROCHLORIDE 50 MG/1
50 TABLET ORAL EVERY 8 HOURS PRN
Qty: 90 TABLET | Refills: 5 | Status: SHIPPED | OUTPATIENT
Start: 2024-03-15

## 2024-04-02 ENCOUNTER — TELEPHONE (OUTPATIENT)
Dept: DERMATOLOGY CLINIC | Facility: CLINIC | Age: 89
End: 2024-04-02

## 2024-04-02 NOTE — TELEPHONE ENCOUNTER
Confirmed with DM and relayed to pt that both topicals prescribed (fluocinonide Oint. & ketoconazole cream) are okay to use with a penicillin allergy. Pt verbalized understanding and all questions/concerns were addressed.

## 2024-04-30 ENCOUNTER — OFFICE VISIT (OUTPATIENT)
Dept: PODIATRY CLINIC | Facility: CLINIC | Age: 89
End: 2024-04-30

## 2024-04-30 DIAGNOSIS — M79.671 PAIN IN BOTH FEET: ICD-10-CM

## 2024-04-30 DIAGNOSIS — M79.672 PAIN IN BOTH FEET: ICD-10-CM

## 2024-04-30 DIAGNOSIS — M19.071 ARTHRITIS OF BOTH FEET: Primary | ICD-10-CM

## 2024-04-30 DIAGNOSIS — M19.072 ARTHRITIS OF BOTH FEET: Primary | ICD-10-CM

## 2024-04-30 DIAGNOSIS — M21.6X2 PLANTAR FAT PAD ATROPHY OF LEFT FOOT: ICD-10-CM

## 2024-04-30 PROCEDURE — 1126F AMNT PAIN NOTED NONE PRSNT: CPT | Performed by: STUDENT IN AN ORGANIZED HEALTH CARE EDUCATION/TRAINING PROGRAM

## 2024-04-30 PROCEDURE — 99213 OFFICE O/P EST LOW 20 MIN: CPT | Performed by: STUDENT IN AN ORGANIZED HEALTH CARE EDUCATION/TRAINING PROGRAM

## 2024-04-30 PROCEDURE — 1159F MED LIST DOCD IN RCRD: CPT | Performed by: STUDENT IN AN ORGANIZED HEALTH CARE EDUCATION/TRAINING PROGRAM

## 2024-04-30 NOTE — PROGRESS NOTES
[facility] Podiatry  Progress Note      Tha Jolly is a 88 year old male.   Chief Complaint   Patient presents with    Toenail Care    Foot Pain     Bilateral foot pain- patient states he has intermittent toe pain. Patient states he has worn inserts in the past.              HPI:     Pt is pleasant 87 y/o male who PTC for evaluation of cristiano arthritic foot pain. He admits that his pain is currently controlled with Tramadol . Pt would like to to obtain new custom inserts.      Allergies: Demerol [meperidine], Hylan g-f 20, Ibuprofen, Penicillins, Valdecoxib, Celecoxib, Hydrochlorothiazide, and Triamterene    Current Outpatient Medications   Medication Sig Dispense Refill    TRAMADOL 50 MG Oral Tab Take 1 tablet (50 mg total) by mouth every 8 (eight) hours as needed for pain 90 tablet 5    allopurinol 100 MG Oral Tab Take 1 tablet (100 mg total) by mouth daily with dinner. 90 tablet 3    fluocinonide 0.05 % External Ointment After using ketoconazole for 4 weeks use this ointment twice daily to any remaining areas until resolved. 60 g 2    ketoconazole 2 % External Cream Apply to affected and surrounding areas twice daily for 4 tweeks. Use twice daily 2 times weekly for maintenance therapy. 60 g 5    metoprolol succinate ER 25 MG Oral Tablet 24 Hr Take 1 tablet (25 mg total) by mouth daily. 90 tablet 3    fluocinonide 0.05 % External Ointment Apply 1 Application topically 2 (two) times daily.      ferrous sulfate 325 (65 FE) MG Oral Tab EC Take 1 tablet (325 mg total) by mouth daily with breakfast.      Multiple Vitamins-Minerals (PRESERVISION AREDS 2 OR) Take 1 capsule by mouth in the morning and 1 capsule before bedtime.      cetirizine 10 MG Oral Tab Take 1 tablet (10 mg total) by mouth daily as needed for Allergies (allergies).      senna-docusate 8.6-50 MG Oral Tab Take 2 tablets by mouth in the morning and 2 tablets before bedtime.      tamsulosin 0.4 MG Oral Cap TAKE 1 CAPSULE BY MOUTH ONE-HALF HOUR  AFTER SUPPER DAILY 90 capsule 3    cholecalciferol 25 MCG (1000 UT) Oral Cap Take 1 capsule (1,000 Units total) by mouth nightly. Vitamin D3 1,000 unit capsule      Multiple Vitamin (MULTI-VITAMINS) Oral Tab Take 1 tablet by mouth nightly. take 1 tablet by ORAL route  every day with food        Past Medical History:    Anxiety state    Aortic atherosclerosis ([facility])    CXR 11-14    Aortic stenosis    Echo 3-23 with normal LV function, EF 55-60%, moderate LAE, moderate aortic stenosis with peak gradient 39 mmHg and mean gradient 25 mmHg, mild-moderate mitral stenosis, mild MR    BPH (benign prostatic hyperplasia)    COPD (chronic obstructive pulmonary disease) ([facility])    CXR 11-14    Diverticulosis    Colonoscopy 2-12    Essential hypertension    History of blood transfusion    no reactions    History of stomach ulcers    History of vertebral fracture    T7, T8, T9, T11, T12, L1 secondary to fall; s/p kyphoplasty T7, T8, T9 6-23    Hx of adenomatous colonic polyps    Hx of melanoma of skin    forehead    Iron deficiency anemia    Lumbar spinal stenosis    Macular degeneration    per NG    Primary osteoarthritis of knees, bilateral    Status post right TKA 11-14; s/p left TKA 4-23    Pulmonary hypertension ([facility])    Echo 5-18 with pulmonary artery pressure 45 mmHg    Visual impairment    glasses      Past Surgical History:   Procedure Laterality Date    Appendectomy      per NG    Arthroscopy, ankle, surgical; w/ankle arthrodesis Right     \"Arthrocentesis of the right ankle joint (2-3)\"; per NG    Colonoscopy  2005    per NG    Hip replacement surgery Right 2002    per NG    Ir kyphoplasty  06/12/2023    T7, T8, T9    Knee arthroscopy Bilateral     per NG    Other Right 07/15/2018    ORIF proximal right humerus fracture requiring a biceps tenodesis    Other  05/2018    Incision and drainage of right index finger complicated abscess, revision amputation of right index finger.    Total knee arthroplasty Left 04/03/2023     Total knee replacement Right 2014      Family History   Problem Relation Age of Onset    Arthritis Father         per NG    Other (Lung Cancer) Father          age 55    Hypertension Mother         per NG    Diabetes Neg     Glaucoma Neg     Macular degeneration Neg       Social History     Socioeconomic History    Marital status:    Tobacco Use    Smoking status: Former     Types: Pipe     Quit date: 1985     Years since quittin.3    Smokeless tobacco: Never   Vaping Use    Vaping status: Never Used   Substance and Sexual Activity    Alcohol use: Yes     Alcohol/week: 5.8 standard drinks of alcohol     Types: 7 Glasses of wine per week     Comment: 1-2 drinks daily    Drug use: No   Other Topics Concern    Pt has a pacemaker No    Pt has a defibrillator No    Reaction to local anesthetic No           REVIEW OF SYSTEMS:     Denies nause, fever, chills  No calf pain  Denies chest pain or SOB      EXAM:   There were no vitals taken for this visit.  GENERAL: well developed, well nourished, in no apparent distress  EXTREMITIES:   1. Integument: Normal skin temperature and turgor. Toenails x10 are elongated, thickened and discolored with subungal derbi.     2. Vascular: Dorsalis pedis two out of four bilateral and posterior tibial pulses two out of   four bilateral, capillary refill normal.   3. Musculoskeletal: All muscle groups are graded 5 out of 5 in the foot and ankle. Fat pad atrophy to left lateral foot side. Pain with palpation to cristiano feet   4. Neurological: Normal sharp dull sensation; reflexes normal.             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  -Discussed importance of proper pedal hygiene, regular foot checks, and tight glucose  control.  -Sharply debrided nails x10 with a sterile nail nipper achieving a 20% reduction in thickness and length, without incident.   -Ambulate with supportive shoes and inserts and avoid walking barefoot.  -DME provided for custom orthotics  -Educated patient on acute signs of infection advised patient to seek immediate medical attention if symptoms arise.    RTC in 3 months      The patient indicates understanding of these issues and agrees to the plan.        Maggy Chavez DPM

## 2024-05-08 ENCOUNTER — TELEPHONE (OUTPATIENT)
Dept: PODIATRY CLINIC | Facility: CLINIC | Age: 89
End: 2024-05-08

## 2024-05-08 NOTE — TELEPHONE ENCOUNTER
Patient calling to request If he could  inserts for his shoes in office because referral he was given for orthotics stated his insurance would not cover it. Please advise.

## 2024-05-09 ENCOUNTER — OFFICE VISIT (OUTPATIENT)
Dept: DERMATOLOGY CLINIC | Facility: CLINIC | Age: 89
End: 2024-05-09

## 2024-05-09 DIAGNOSIS — L85.3 XEROSIS CUTIS: ICD-10-CM

## 2024-05-09 DIAGNOSIS — L30.9 ECZEMA, UNSPECIFIED TYPE: Primary | ICD-10-CM

## 2024-05-09 PROCEDURE — 99213 OFFICE O/P EST LOW 20 MIN: CPT | Performed by: STUDENT IN AN ORGANIZED HEALTH CARE EDUCATION/TRAINING PROGRAM

## 2024-05-09 PROCEDURE — 1159F MED LIST DOCD IN RCRD: CPT | Performed by: STUDENT IN AN ORGANIZED HEALTH CARE EDUCATION/TRAINING PROGRAM

## 2024-05-09 PROCEDURE — 1160F RVW MEDS BY RX/DR IN RCRD: CPT | Performed by: STUDENT IN AN ORGANIZED HEALTH CARE EDUCATION/TRAINING PROGRAM

## 2024-05-09 NOTE — PROGRESS NOTES
May 9, 2024    Established patient     Referred by:   No referring provider defined for this encounter.      CHIEF COMPLAINT: Rash     HISTORY OF PRESENT ILLNESS: Tha Jolly is a 88 year old male here for evaluation of rash.    Location: Bilateral lower extremities  Duration: comes and goes  Signs and symptoms: itchy  Current treatment: Fluocinonide  Past treatments: Fluocinonide    Personal Dermatologic History  History of chronic skin disease: No    Family History  History of chronic skin disease: No  History autoimmune diseases:  No    Past Medical History  Past Medical History:    Anxiety state    Aortic atherosclerosis ([facility])    CXR 11-14    Aortic stenosis    Echo 3-23 with normal LV function, EF 55-60%, moderate LAE, moderate aortic stenosis with peak gradient 39 mmHg and mean gradient 25 mmHg, mild-moderate mitral stenosis, mild MR    BPH (benign prostatic hyperplasia)    COPD (chronic obstructive pulmonary disease) ([facility])    CXR 11-14    Diverticulosis    Colonoscopy 2-12    Essential hypertension    History of blood transfusion    no reactions    History of stomach ulcers    History of vertebral fracture    T7, T8, T9, T11, T12, L1 secondary to fall; s/p kyphoplasty T7, T8, T9 6-23    Hx of adenomatous colonic polyps    Hx of melanoma of skin    forehead    Iron deficiency anemia    Lumbar spinal stenosis    Macular degeneration    per NG    Primary osteoarthritis of knees, bilateral    Status post right TKA 11-14; s/p left TKA 4-23    Pulmonary hypertension ([facility])    Echo 5-18 with pulmonary artery pressure 45 mmHg    Visual impairment    glasses       REVIEW OF SYSTEMS:  Constitutional: Denies fever, chills, unintentional weight loss.   Skin as per HPI    Medications  Current Outpatient Medications   Medication Sig Dispense Refill    TRAMADOL 50 MG Oral Tab Take 1 tablet (50 mg total) by mouth every 8 (eight) hours as needed for pain 90 tablet 5    allopurinol 100 MG Oral Tab Take 1 tablet (100 mg  total) by mouth daily with dinner. 90 tablet 3    fluocinonide 0.05 % External Ointment After using ketoconazole for 4 weeks use this ointment twice daily to any remaining areas until resolved. 60 g 2    ketoconazole 2 % External Cream Apply to affected and surrounding areas twice daily for 4 tweeks. Use twice daily 2 times weekly for maintenance therapy. 60 g 5    metoprolol succinate ER 25 MG Oral Tablet 24 Hr Take 1 tablet (25 mg total) by mouth daily. 90 tablet 3    fluocinonide 0.05 % External Ointment Apply 1 Application topically 2 (two) times daily.      ferrous sulfate 325 (65 FE) MG Oral Tab EC Take 1 tablet (325 mg total) by mouth daily with breakfast.      Multiple Vitamins-Minerals (PRESERVISION AREDS 2 OR) Take 1 capsule by mouth in the morning and 1 capsule before bedtime.      cetirizine 10 MG Oral Tab Take 1 tablet (10 mg total) by mouth daily as needed for Allergies (allergies).      senna-docusate 8.6-50 MG Oral Tab Take 2 tablets by mouth in the morning and 2 tablets before bedtime.      tamsulosin 0.4 MG Oral Cap TAKE 1 CAPSULE BY MOUTH ONE-HALF HOUR AFTER SUPPER DAILY 90 capsule 3    cholecalciferol 25 MCG (1000 UT) Oral Cap Take 1 capsule (1,000 Units total) by mouth nightly. Vitamin D3 1,000 unit capsule      Multiple Vitamin (MULTI-VITAMINS) Oral Tab Take 1 tablet by mouth nightly. take 1 tablet by ORAL route  every day with food         PHYSICAL EXAM:  General: awake, alert, no acute distress  Skin: Skin exam was performed today including the following: Legs. Pertinent findings include:   - with xerosis    ASSESSMENT & PLAN:  Pathophysiology of diagnoses discussed with patient.  Therapeutic options reviewed. Risks, benefits, and alternatives discussed with patient. Instructions reviewed at length.    #Eczema  # Xerosis  - Ok to hold lidex 0.05%   - Recommended emolient use. Samples provided of aveeno.       Return to clinic: as needed or sooner if something concerning arises    Ben  MD Demar

## 2024-05-10 ENCOUNTER — NURSE TRIAGE (OUTPATIENT)
Dept: INTERNAL MEDICINE CLINIC | Facility: CLINIC | Age: 89
End: 2024-05-10

## 2024-05-10 NOTE — TELEPHONE ENCOUNTER
Action Requested: Summary for Provider     []  Critical Lab, Recommendations Needed  [] Need Additional Advice  [x]   FYI    []   Need Orders  [] Need Medications Sent to Pharmacy  []  Other     SUMMARY: Patient stated that for the past 2 weeks has noticed that his blood pressure has been around 161/80. Watching salt intake in diet and denies any stress/anxiety. Denies any symptoms. Taking metoprolol ER 25mg daily. Tried to schedule an appointment for today or Monday but declined. Stated that needs to get a ride to the office.  Appointment made for 5/14/2024 at 1pm with Dr Almanza at [facility]. Advised patient that if having any symptoms to give us a call and in the meantime to keep track of his blood pressure readings. Patient agreed.   Reason for call: Blood Pressure (High blood pressure)  Onset: Apr 27, 2024    Reason for Disposition   Systolic BP >= 160 OR Diastolic >= 100    Protocols used: Blood Pressure - High-A-OH

## 2024-05-14 ENCOUNTER — OFFICE VISIT (OUTPATIENT)
Dept: INTERNAL MEDICINE CLINIC | Facility: CLINIC | Age: 89
End: 2024-05-14

## 2024-05-14 VITALS
WEIGHT: 167.19 LBS | BODY MASS INDEX: 28.54 KG/M2 | HEIGHT: 64 IN | DIASTOLIC BLOOD PRESSURE: 64 MMHG | SYSTOLIC BLOOD PRESSURE: 148 MMHG | HEART RATE: 67 BPM

## 2024-05-14 DIAGNOSIS — I10 ESSENTIAL HYPERTENSION: Primary | ICD-10-CM

## 2024-05-14 PROBLEM — D69.6 THROMBOCYTOPENIA (HCC): Chronic | Status: ACTIVE | Noted: 2024-05-14

## 2024-05-14 PROBLEM — D69.6 THROMBOCYTOPENIA: Chronic | Status: ACTIVE | Noted: 2024-05-14

## 2024-05-14 PROCEDURE — 3077F SYST BP >= 140 MM HG: CPT | Performed by: INTERNAL MEDICINE

## 2024-05-14 PROCEDURE — 99213 OFFICE O/P EST LOW 20 MIN: CPT | Performed by: INTERNAL MEDICINE

## 2024-05-14 PROCEDURE — G2211 COMPLEX E/M VISIT ADD ON: HCPCS | Performed by: INTERNAL MEDICINE

## 2024-05-14 PROCEDURE — 1126F AMNT PAIN NOTED NONE PRSNT: CPT | Performed by: INTERNAL MEDICINE

## 2024-05-14 PROCEDURE — 1159F MED LIST DOCD IN RCRD: CPT | Performed by: INTERNAL MEDICINE

## 2024-05-14 PROCEDURE — 3008F BODY MASS INDEX DOCD: CPT | Performed by: INTERNAL MEDICINE

## 2024-05-14 PROCEDURE — 3078F DIAST BP <80 MM HG: CPT | Performed by: INTERNAL MEDICINE

## 2024-05-14 PROCEDURE — 1160F RVW MEDS BY RX/DR IN RCRD: CPT | Performed by: INTERNAL MEDICINE

## 2024-05-14 RX ORDER — AMLODIPINE BESYLATE 2.5 MG/1
2.5 TABLET ORAL DAILY
Qty: 30 TABLET | Refills: 2 | Status: SHIPPED | OUTPATIENT
Start: 2024-05-14

## 2024-05-14 NOTE — PROGRESS NOTES
Tha Jolly is a 88 year old male.   Chief Complaint   Patient presents with    Hypertension     HPI:   Mr. Jolly presents this afternoon with concerns about elevated blood pressure.    He checks his blood pressure every morning and readings have been somewhat higher recently.  A few days ago his blood pressure was 165/75.  He continues to check it and since then readings have been variable, 130-160/70-90s.  He feels well.  No headaches.  No lightheadedness or dizziness.  No palpitations or chest pain.  No shortness of breath.  Compliant with medications as listed below.  Of note, he was previously on amlodipine 5 mg daily, which was stopped about 1 year ago because of low normal blood pressure.  Current Outpatient Medications   Medication Sig Dispense Refill    TRAMADOL 50 MG Oral Tab Take 1 tablet (50 mg total) by mouth every 8 (eight) hours as needed for pain 90 tablet 5    allopurinol 100 MG Oral Tab Take 1 tablet (100 mg total) by mouth daily with dinner. 90 tablet 3    metoprolol succinate ER 25 MG Oral Tablet 24 Hr Take 1 tablet (25 mg total) by mouth daily. 90 tablet 3    ferrous sulfate 325 (65 FE) MG Oral Tab EC Take 1 tablet (325 mg total) by mouth daily with breakfast.      Multiple Vitamins-Minerals (PRESERVISION AREDS 2 OR) Take 1 capsule by mouth in the morning and 1 capsule before bedtime.      cetirizine 10 MG Oral Tab Take 1 tablet (10 mg total) by mouth daily as needed for Allergies (allergies).      senna-docusate 8.6-50 MG Oral Tab Take 2 tablets by mouth in the morning and 2 tablets before bedtime.      tamsulosin 0.4 MG Oral Cap TAKE 1 CAPSULE BY MOUTH ONE-HALF HOUR AFTER SUPPER DAILY 90 capsule 3    cholecalciferol 25 MCG (1000 UT) Oral Cap Take 1 capsule (1,000 Units total) by mouth nightly. Vitamin D3 1,000 unit capsule      Multiple Vitamin (MULTI-VITAMINS) Oral Tab Take 1 tablet by mouth nightly. take 1 tablet by ORAL route  every day with food       Allergies   Allergen Reactions     Demerol [Meperidine] SHORTNESS OF BREATH    Hylan G-F 20 SWELLING     Swelling to cristiano. knees    Swelling to cristiano. knees   Swelling to cristiano. knees   Swelling to cristiano. knees   Swelling to cristiano. knees    Ibuprofen RASH and SHORTNESS OF BREATH    Penicillins RASH and SHORTNESS OF BREATH    Valdecoxib OTHER (SEE COMMENTS)    Celecoxib RASH    Hydrochlorothiazide RASH    Triamterene RASH      Past Medical History:    Anxiety state    Aortic atherosclerosis ([facility])    CXR 11-14    Aortic stenosis    Echo 3-23 with normal LV function, EF 55-60%, moderate LAE, moderate aortic stenosis with peak gradient 39 mmHg and mean gradient 25 mmHg, mild-moderate mitral stenosis, mild MR    BPH (benign prostatic hyperplasia)    COPD (chronic obstructive pulmonary disease) ([facility])    CXR 11-14    Diverticulosis    Colonoscopy 2-12    Essential hypertension    History of blood transfusion    no reactions    History of stomach ulcers    History of vertebral fracture    T7, T8, T9, T11, T12, L1 secondary to fall; s/p kyphoplasty T7, T8, T9 6-23    Hx of adenomatous colonic polyps    Hx of melanoma of skin    forehead    Iron deficiency anemia    Lumbar spinal stenosis    Macular degeneration    per NG    Primary osteoarthritis of knees, bilateral    Status post right TKA 11-14; s/p left TKA 4-23    Pulmonary hypertension ([facility])    Echo 5-18 with pulmonary artery pressure 45 mmHg    Visual impairment    glasses     Past Surgical History:   Procedure Laterality Date    Appendectomy      per NG    Arthroscopy, ankle, surgical; w/ankle arthrodesis Right     \"Arthrocentesis of the right ankle joint (2-3)\"; per NG    Colonoscopy  2005    per NG    Hip replacement surgery Right 2002    per NG    Ir kyphoplasty  06/12/2023    T7, T8, T9    Knee arthroscopy Bilateral     per NG    Other Right 07/15/2018    ORIF proximal right humerus fracture requiring a biceps tenodesis    Other  05/2018    Incision and drainage of right index finger complicated abscess,  revision amputation of right index finger.    Total knee arthroplasty Left 2023    Total knee replacement Right 2014      Social History:  Social History     Socioeconomic History    Marital status:    Tobacco Use    Smoking status: Former     Types: Pipe     Quit date: 1985     Years since quittin.3     Passive exposure: Past    Smokeless tobacco: Never   Vaping Use    Vaping status: Never Used   Substance and Sexual Activity    Alcohol use: Yes     Alcohol/week: 5.8 standard drinks of alcohol     Types: 7 Glasses of wine per week     Comment: 1-2 drinks daily    Drug use: No   Other Topics Concern    Pt has a pacemaker No    Pt has a defibrillator No    Reaction to local anesthetic No        EXAM:   GENERAL: Pleasant male appearing well in no distress  /64   Pulse 67   Ht 5' 4\" (1.626 m)   Wt 167 lb 3.2 oz (75.8 kg)   BMI 28.70 kg/m²   LUNGS: Resonant to percussion and clear to auscultation  CARDIAC: Rhythm regular S1 S2 normal without murmur  ABDOMEN: Bowel sounds normal soft nontender       ASSESSMENT AND PLAN:   1. Essential hypertension  BP slightly elevated recently and today  Resume amlodipine at lower dose, 2.5 mg 1 tablet daily.  Prescription sent to pharmacy  Continue metoprolol  Return visit in 1-2 months for blood pressure check      The patient indicates understanding of these issues and agrees to the plan.  The patient is asked to return in 1-2 months.    Guy Almanza MD  2024  1:16 PM

## 2024-05-14 NOTE — PATIENT INSTRUCTIONS
Your blood pressure today was slightly high at 148/64  Continue metoprolol  Resume amlodipine at a lower dose, 2.5 mg 1 tablet daily  Return visit in 1-2 months for a blood pressure check  
ambulatory

## 2024-05-20 RX ORDER — TAMSULOSIN HYDROCHLORIDE 0.4 MG/1
CAPSULE ORAL
Qty: 90 CAPSULE | Refills: 3 | Status: SHIPPED | OUTPATIENT
Start: 2024-05-20

## 2024-05-20 NOTE — TELEPHONE ENCOUNTER
Please review. Protocol Failed; No Protocol    Requested Prescriptions   Pending Prescriptions Disp Refills    TAMSULOSIN 0.4 MG Oral Cap [Pharmacy Med Name: Tamsulosin Hydrochloride 0.4 Mg Cap Nort] 90 capsule 0     Sig: TAKE 1 CAPSULE BY MOUTH ONE-HALF HOUR AFTER SUPPER DAILY       Genitourinary Medications Failed - 5/16/2024  1:48 PM        Failed - Patient does not have pulmonary hypertension on problem list        Passed - In person appointment or virtual visit in the past 12 mos or appointment in next 3 mos     Recent Outpatient Visits              [facility]    [facility] Visit    1 week ago Eczema, unspecified type    [facility] [place]    Office Visit    2 weeks ago Arthritis of both feet    [facility] Maggy Chavez DPM    Office Visit    2 months ago Annual physical exam    [facility]    [facility]    2 months [facility]    [facility]          Future Appointments         Provider Department Appt Notes    In 2 weeks Maggy Chavez DPM [facility]    In 2 months Nessa Moctezuma MD [facility] Follow-up                           Future Appointments         Provider Department Appt Notes    In 2 weeks Maggy Chavez DPM [facility] nail [facility]    In 2 months Nessa Moctezuma MD [facility] Follow-up          Recent Outpatient Visits              6 days ago Essential hypertension    [facility]     Office Visit    1 week ago Eczema, unspecified type    [facility], Ben Dorantes MD    Office Visit    2 weeks ago Arthritis of both feet    [facility] [place] Maggy Lockwood DPM    Office Visit    2 months ago Annual physical exam    [facility], Guy Dean MD    Office Visit    2 months ago Dermatitis    [facility], Ben Dorantes MD    Office Visit

## 2024-05-21 NOTE — TELEPHONE ENCOUNTER
Spoke with patient. Indicated he has appointment with MD on June 6 and will speak with doctor about his questions at that time.

## 2024-06-06 ENCOUNTER — OFFICE VISIT (OUTPATIENT)
Dept: PODIATRY CLINIC | Facility: CLINIC | Age: 89
End: 2024-06-06

## 2024-06-06 DIAGNOSIS — B35.1 ONYCHOMYCOSIS: Primary | ICD-10-CM

## 2024-06-06 PROCEDURE — 99213 OFFICE O/P EST LOW 20 MIN: CPT | Performed by: STUDENT IN AN ORGANIZED HEALTH CARE EDUCATION/TRAINING PROGRAM

## 2024-06-06 PROCEDURE — 1159F MED LIST DOCD IN RCRD: CPT | Performed by: STUDENT IN AN ORGANIZED HEALTH CARE EDUCATION/TRAINING PROGRAM

## 2024-06-06 NOTE — PROGRESS NOTES
[facility]  Progress Note      Tha Jolly is a 88 year old male.   Chief Complaint   Patient presents with    Toe Pain     F/u Right hallux  toenail thickness and 2nd toe redness, also needs insoles to be reevaluated they are 10 yrs.             HPI:     Patient is an 88-year-old male who presents to clinic for follow-up of right hallux thickened toenail. Pt admits it's thickened and discolored .    Allergies: Demerol [meperidine], Hylan g-f 20, Ibuprofen, Penicillins, Valdecoxib, Celecoxib, Hydrochlorothiazide, and Triamterene    Current Outpatient Medications   Medication Sig Dispense Refill    tamsulosin 0.4 MG Oral Cap TAKE 1 CAPSULE BY MOUTH ONE-HALF HOUR AFTER SUPPER DAILY 90 capsule 3    amLODIPine 2.5 MG Oral Tab Take 1 tablet (2.5 mg total) by mouth daily. 30 tablet 2    TRAMADOL 50 MG Oral Tab Take 1 tablet (50 mg total) by mouth every 8 (eight) hours as needed for pain 90 tablet 5    allopurinol 100 MG Oral Tab Take 1 tablet (100 mg total) by mouth daily with dinner. 90 tablet 3    metoprolol succinate ER 25 MG Oral Tablet 24 Hr Take 1 tablet (25 mg total) by mouth daily. 90 tablet 3    ferrous sulfate 325 (65 FE) MG Oral Tab EC Take 1 tablet (325 mg total) by mouth daily with breakfast.      Multiple Vitamins-Minerals (PRESERVISION AREDS 2 OR) Take 1 capsule by mouth in the morning and 1 capsule before bedtime.      cetirizine 10 MG Oral Tab Take 1 tablet (10 mg total) by mouth daily as needed for Allergies (allergies).      senna-docusate 8.6-50 MG Oral Tab Take 2 tablets by mouth in the morning and 2 tablets before bedtime.      cholecalciferol 25 MCG (1000 UT) Oral Cap Take 1 capsule (1,000 Units total) by mouth nightly. Vitamin D3 1,000 unit capsule      Multiple Vitamin (MULTI-VITAMINS) Oral Tab Take 1 tablet by mouth nightly. take 1 tablet by ORAL route  every day with food        Past Medical History:    Anxiety state    Aortic atherosclerosis (HCC)    CXR 11-14    Aortic  stenosis    Echo 3-23 with normal LV function, EF 55-60%, moderate LAE, moderate aortic stenosis with peak gradient 39 mmHg and mean gradient 25 mmHg, mild-moderate mitral stenosis, mild MR    BPH (benign prostatic hyperplasia)    COPD (chronic obstructive pulmonary disease) ([facility])    CXR     Diverticulosis    Colonoscopy 2-12    Essential hypertension    History of blood transfusion    no reactions    History of stomach ulcers    History of vertebral fracture    T7, T8, T9, T11, T12, L1 secondary to fall; s/p kyphoplasty T7, T8, T9 6-    Hx of adenomatous colonic polyps    Hx of melanoma of skin    forehead    Iron deficiency anemia    Lumbar spinal stenosis    Macular degeneration    per NG    Primary osteoarthritis of knees, bilateral    Status post right TKA ; s/p left TKA     Pulmonary hypertension (HCC)    Echo  with pulmonary artery pressure 45 mmHg    Visual impairment    glasses      Past Surgical History:   Procedure Laterality Date    Appendectomy      per NG    Arthroscopy, ankle, surgical; w/ankle arthrodesis Right     \"Arthrocentesis of the right ankle joint (2-3)\"; per NG    Colonoscopy      per NG    Hip replacement surgery Right     per NG    Ir kyphoplasty  2023    T7, T8, T9    Knee arthroscopy Bilateral     per NG    Other Right 07/15/2018    ORIF proximal right humerus fracture requiring a biceps tenodesis    Other  2018    Incision and drainage of right index finger complicated abscess, revision amputation of right index finger.    Total knee arthroplasty Left 2023    Total knee replacement Right 2014      Family History   Problem Relation Age of Onset    Arthritis Father         per NG    Other (Lung Cancer) Father          age 55    Hypertension Mother         per NG    Diabetes Neg     Glaucoma Neg     Macular degeneration Neg       Social History     Socioeconomic History    Marital status:    Tobacco Use    Smoking status: Former      Types: Pipe     Quit date: 1985     Years since quittin.4     Passive exposure: Past    Smokeless tobacco: Never   Vaping Use    Vaping status: Never Used   Substance and Sexual Activity    Alcohol use: Yes     Alcohol/week: 5.8 standard drinks of alcohol     Types: 7 Glasses of wine per week     Comment: 1-2 drinks daily    Drug use: No   Other Topics Concern    Pt has a pacemaker No    Pt has a defibrillator No    Reaction to local anesthetic No           REVIEW OF SYSTEMS:     Denies nause, fever, chills  No calf pain  Denies chest pain or SOB      EXAM:   There were no vitals taken for this visit.  GENERAL: well developed, well nourished, in no apparent distress  EXTREMITIES:   1. Integument: Normal skin temperature and turgor. Right hallux toenail is thickened and discolored.   2. Vascular: Dorsalis pedis two out of four bilateral and posterior tibial pulses two out of   four bilateral, capillary refill normal.   3. Musculoskeletal: All muscle groups are graded 5 out of 5 in the foot and ankle.   4. Neurological: Normal sharp dull sensation; reflexes normal.             ASSESSMENT AND PLAN:   Diagnoses and all orders for this visit:    Onychomycosis        Plan:     Reviewed treatment options for nail dystrophy / onychomycosis including:   No treatment and monitoring, topical meds, oral meds, nail removal and laser treatment.  Advantages and disadvantages of each reviewed. Using oral agents would require regular   blood test monitoring due to risk of hepatotoxicity and other adverse reactions including drug interactions.   Topical meds are much safer yet carry very low efficacy.  Pt has opted for debridement of nails and monitoring.  Right hallux is short length with no debridement needed today.   RTC in 3 months for nail care.       The patient indicates understanding of these issues and agrees to the plan.        Maggy Chavez DPM

## 2024-06-29 ENCOUNTER — TELEPHONE (OUTPATIENT)
Facility: CLINIC | Age: 89
End: 2024-06-29

## 2024-06-29 NOTE — TELEPHONE ENCOUNTER
Patient calling about an  prescription that he needs a refill on. States that his preferred pharmacy [facility] in [place] had contacted us, however our records do not show any contact from them.     Patient did not know how to pronounce the name of medication, only gave the Rx number from the pharmacy, please call patient to clarify the medication he needs.     Patient is completely out of medication at this time.    Can we expedite this request for patient? Would like to  today if possible.    Call to discuss the needed medication and get script sent to [place].

## 2024-07-01 NOTE — TELEPHONE ENCOUNTER
Spoke to patient, full name and date of birth verified.   He obtained his medication bottle, it is the metoprolol he needs.     Per chart review, last prescription of metoprolol was sent 9/30/23 for 90 day supply and 3 refills.     RN called [place], spoke to Damari. Patient just received a 90 day refill on 6/8/24.   Damari will call the patient because he may have misplaced his newest bottle.     Pharmacy or patient to call us back if further assistance is needed.

## 2024-07-22 ENCOUNTER — OFFICE VISIT (OUTPATIENT)
Dept: INTERNAL MEDICINE CLINIC | Facility: CLINIC | Age: 89
End: 2024-07-22

## 2024-07-22 VITALS
DIASTOLIC BLOOD PRESSURE: 66 MMHG | WEIGHT: 166.38 LBS | BODY MASS INDEX: 26.42 KG/M2 | HEIGHT: 66.5 IN | HEART RATE: 55 BPM | OXYGEN SATURATION: 96 % | SYSTOLIC BLOOD PRESSURE: 134 MMHG

## 2024-07-22 DIAGNOSIS — G89.29 CHRONIC LEFT SHOULDER PAIN: Primary | ICD-10-CM

## 2024-07-22 DIAGNOSIS — M25.512 CHRONIC LEFT SHOULDER PAIN: Primary | ICD-10-CM

## 2024-07-22 DIAGNOSIS — I10 ESSENTIAL HYPERTENSION: ICD-10-CM

## 2024-07-22 PROCEDURE — 1126F AMNT PAIN NOTED NONE PRSNT: CPT | Performed by: INTERNAL MEDICINE

## 2024-07-22 PROCEDURE — 3078F DIAST BP <80 MM HG: CPT | Performed by: INTERNAL MEDICINE

## 2024-07-22 PROCEDURE — 3008F BODY MASS INDEX DOCD: CPT | Performed by: INTERNAL MEDICINE

## 2024-07-22 PROCEDURE — 1160F RVW MEDS BY RX/DR IN RCRD: CPT | Performed by: INTERNAL MEDICINE

## 2024-07-22 PROCEDURE — G2211 COMPLEX E/M VISIT ADD ON: HCPCS | Performed by: INTERNAL MEDICINE

## 2024-07-22 PROCEDURE — 1159F MED LIST DOCD IN RCRD: CPT | Performed by: INTERNAL MEDICINE

## 2024-07-22 PROCEDURE — 3075F SYST BP GE 130 - 139MM HG: CPT | Performed by: INTERNAL MEDICINE

## 2024-07-22 PROCEDURE — 99214 OFFICE O/P EST MOD 30 MIN: CPT | Performed by: INTERNAL MEDICINE

## 2024-07-22 NOTE — PROGRESS NOTES
Tha Jolly is a 88 year old male.   Chief Complaint   Patient presents with    Musculoskeletal Problem     Pt c/o LT shoulder pain x 2 years and is getting worse.      HPI:   Mr. Jolly presents this afternoon for follow-up.    For at least 2 years he has had persistent intermittent pain in his left shoulder.  Pain is worse in the last 6 months.  Symptoms are most noticeable in the morning and when he moves his left arm in certain ways.  No recent injury or unusual activity.  No visible bruising or swelling.  He has applied heat with some relief.  He takes tramadol regularly, 2-3 pills daily, without relief.  Allergic to multiple NSAIDs.  He sees Dr. Smith of Orthopedics regularly.    At his last visit in May, amlodipine was resumed.  BP checks recently 130-140/70s.  No headaches.  No lightheadedness or dizziness.  No palpitations or chest pain.  No shortness of breath.    Medications reviewed, as listed below.  Current Outpatient Medications   Medication Sig Dispense Refill    tamsulosin 0.4 MG Oral Cap TAKE 1 CAPSULE BY MOUTH ONE-HALF HOUR AFTER SUPPER DAILY 90 capsule 3    amLODIPine 2.5 MG Oral Tab Take 1 tablet (2.5 mg total) by mouth daily. 30 tablet 2    TRAMADOL 50 MG Oral Tab Take 1 tablet (50 mg total) by mouth every 8 (eight) hours as needed for pain 90 tablet 5    allopurinol 100 MG Oral Tab Take 1 tablet (100 mg total) by mouth daily with dinner. 90 tablet 3    metoprolol succinate ER 25 MG Oral Tablet 24 Hr Take 1 tablet (25 mg total) by mouth daily. 90 tablet 3    ferrous sulfate 325 (65 FE) MG Oral Tab EC Take 1 tablet (325 mg total) by mouth daily with breakfast.      Multiple Vitamins-Minerals (PRESERVISION AREDS 2 OR) Take 1 capsule by mouth in the morning and 1 capsule before bedtime.      cetirizine 10 MG Oral Tab Take 1 tablet (10 mg total) by mouth daily as needed for Allergies (allergies).      senna-docusate 8.6-50 MG Oral Tab Take 2 tablets by mouth in the morning and 2 tablets  before bedtime.      cholecalciferol 25 MCG (1000 UT) Oral Cap Take 1 capsule (1,000 Units total) by mouth nightly. Vitamin D3 1,000 unit capsule      Multiple Vitamin (MULTI-VITAMINS) Oral Tab Take 1 tablet by mouth nightly. take 1 tablet by ORAL route  every day with food       Allergies   Allergen Reactions    Demerol [Meperidine] SHORTNESS OF BREATH    Hylan G-F 20 SWELLING     Swelling to cristiano. knees    Swelling to cristiano. knees   Swelling to cristiano. knees   Swelling to cristiano. knees   Swelling to cristiano. knees    Ibuprofen RASH and SHORTNESS OF BREATH    Penicillins RASH and SHORTNESS OF BREATH    Valdecoxib OTHER (SEE COMMENTS)    Celecoxib RASH    Hydrochlorothiazide RASH    Triamterene RASH      Past Medical History:    Anxiety state    Aortic atherosclerosis ([facility])    CXR 11-14    Aortic stenosis    Echo 3-23 with normal LV function, EF 55-60%, moderate LAE, moderate aortic stenosis with peak gradient 39 mmHg and mean gradient 25 mmHg, mild-moderate mitral stenosis, mild MR    BPH (benign prostatic hyperplasia)    COPD (chronic obstructive pulmonary disease) ([facility])    CXR 11-14    Diverticulosis    Colonoscopy 2-12    Essential hypertension    History of blood transfusion    no reactions    History of stomach ulcers    History of vertebral fracture    T7, T8, T9, T11, T12, L1 secondary to fall; s/p kyphoplasty T7, T8, T9 6-23    Hx of adenomatous colonic polyps    Hx of melanoma of skin    forehead    Iron deficiency anemia    Lumbar spinal stenosis    Macular degeneration    per     Primary osteoarthritis of knees, bilateral    Status post right TKA 11-14; s/p left TKA 4-23    Pulmonary hypertension ([facility])    Echo 5-18 with pulmonary artery pressure 45 mmHg    Visual impairment    glasses     Past Surgical History:   Procedure Laterality Date    Appendectomy      per     Arthroscopy, ankle, surgical; w/ankle arthrodesis Right     \"Arthrocentesis of the right ankle joint (2-3)\"; per NG    Colonoscopy  2005    per NG     Hip replacement surgery Right     per NG    Ir kyphoplasty  2023    T7, T8, T9    Knee arthroscopy Bilateral     per NG    Other Right 07/15/2018    ORIF proximal right humerus fracture requiring a biceps tenodesis    Other  2018    Incision and drainage of right index finger complicated abscess, revision amputation of right index finger.    Total knee arthroplasty Left 2023    Total knee replacement Right 2014      Social History:  Social History     Socioeconomic History    Marital status:    Tobacco Use    Smoking status: Former     Types: Pipe     Quit date: 1985     Years since quittin.5     Passive exposure: Past    Smokeless tobacco: Never   Vaping Use    Vaping status: Never Used   Substance and Sexual Activity    Alcohol use: Yes     Alcohol/week: 5.8 standard drinks of alcohol     Types: 7 Glasses of wine per week     Comment: 1-2 drinks daily    Drug use: No    Sexual activity: Not Currently   Other Topics Concern    Pt has a pacemaker No    Pt has a defibrillator No    Reaction to local anesthetic No        EXAM:   GENERAL: Pleasant male appearing well in no distress  /66   Pulse 55   Ht 5' 6.5\" (1.689 m)   Wt 166 lb 6 oz (75.5 kg)   SpO2 96%   BMI 26.45 kg/m²   LUNGS: Resonant to percussion and clear to auscultation  CARDIAC: Rhythm regular S1 S2 normal without murmur   ABDOMEN: Bowel sounds normal soft nontender   EXTREMITIES: Decreased range of motion both actively and passively left shoulder.  He is unable to lift his left arm above horizontal.  No localizing tenderness or palpable abnormality      ASSESSMENT AND PLAN:   1. Chronic left shoulder pain  Suspect rotator cuff pathology  X-ray left shoulder.  Order sent  Refer to Orthopedics.  Referral done.  He will schedule.  - XR SHOULDER, COMPLETE (MIN 2 VIEWS), LEFT (CPT=73030); Future  - ORTHOPEDIC - INTERNAL    2. Essential hypertension  Well-controlled  Continue current medications  Return visit  in 6 months      The patient indicates understanding of these issues and agrees to the plan.  The patient is asked to return in 6 months.    Guy Almanza MD  7/22/2024  2:46 PM

## 2024-08-07 ENCOUNTER — HOSPITAL ENCOUNTER (OUTPATIENT)
Dept: GENERAL RADIOLOGY | Facility: HOSPITAL | Age: 89
Discharge: HOME | End: 2024-08-07
Attending: INTERNAL MEDICINE
Payer: MEDICARE

## 2024-08-07 DIAGNOSIS — G89.29 CHRONIC LEFT SHOULDER PAIN: ICD-10-CM

## 2024-08-07 DIAGNOSIS — M25.512 CHRONIC LEFT SHOULDER PAIN: ICD-10-CM

## 2024-08-07 PROCEDURE — 73030 X-RAY EXAM OF SHOULDER: CPT | Performed by: INTERNAL MEDICINE

## 2024-08-11 ENCOUNTER — HOSPITAL ENCOUNTER (EMERGENCY)
Facility: HOSPITAL | Age: 89
Discharge: HOME | End: 2024-08-11
Attending: EMERGENCY MEDICINE
Payer: MEDICARE

## 2024-08-11 ENCOUNTER — APPOINTMENT (OUTPATIENT)
Dept: GENERAL RADIOLOGY | Facility: HOSPITAL | Age: 89
End: 2024-08-11
Attending: EMERGENCY MEDICINE
Payer: MEDICARE

## 2024-08-11 VITALS
RESPIRATION RATE: 13 BRPM | SYSTOLIC BLOOD PRESSURE: 158 MMHG | DIASTOLIC BLOOD PRESSURE: 70 MMHG | HEART RATE: 51 BPM | OXYGEN SATURATION: 95 % | TEMPERATURE: 98 F

## 2024-08-11 DIAGNOSIS — M25.512 CHRONIC LEFT SHOULDER PAIN: ICD-10-CM

## 2024-08-11 DIAGNOSIS — R53.81 MALAISE: Primary | ICD-10-CM

## 2024-08-11 DIAGNOSIS — G89.29 CHRONIC LEFT SHOULDER PAIN: ICD-10-CM

## 2024-08-11 LAB
ANION GAP SERPL CALC-SCNC: 6 MMOL/L (ref 0–18)
ATRIAL RATE: 85 BPM
BASOPHILS # BLD AUTO: 0.02 X10(3) UL (ref 0–0.2)
BASOPHILS NFR BLD AUTO: 0.4 %
BNP SERPL-MCNC: 267 PG/ML
BUN BLD-MCNC: 17 MG/DL (ref 9–23)
BUN/CREAT SERPL: 19.5 (ref 10–20)
CALCIUM BLD-MCNC: 9.3 MG/DL (ref 8.7–10.4)
CHLORIDE SERPL-SCNC: 102 MMOL/L (ref 98–112)
CO2 SERPL-SCNC: 32 MMOL/L (ref 21–32)
CREAT BLD-MCNC: 0.87 MG/DL
DEPRECATED RDW RBC AUTO: 49.6 FL (ref 35.1–46.3)
EGFRCR SERPLBLD CKD-EPI 2021: 83 ML/MIN/1.73M2 (ref 60–?)
EOSINOPHIL # BLD AUTO: 0.04 X10(3) UL (ref 0–0.7)
EOSINOPHIL NFR BLD AUTO: 0.7 %
ERYTHROCYTE [DISTWIDTH] IN BLOOD BY AUTOMATED COUNT: 14 % (ref 11–15)
GLUCOSE BLD-MCNC: 147 MG/DL (ref 70–99)
GLUCOSE BLDC GLUCOMTR-MCNC: 152 MG/DL (ref 70–99)
HCT VFR BLD AUTO: 36.6 %
HGB BLD-MCNC: 12.4 G/DL
IMM GRANULOCYTES # BLD AUTO: 0.05 X10(3) UL (ref 0–1)
IMM GRANULOCYTES NFR BLD: 0.9 %
LYMPHOCYTES # BLD AUTO: 0.89 X10(3) UL (ref 1–4)
LYMPHOCYTES NFR BLD AUTO: 15.7 %
MCH RBC QN AUTO: 32.8 PG (ref 26–34)
MCHC RBC AUTO-ENTMCNC: 33.9 G/DL (ref 31–37)
MCV RBC AUTO: 96.8 FL
MONOCYTES # BLD AUTO: 0.36 X10(3) UL (ref 0.1–1)
MONOCYTES NFR BLD AUTO: 6.4 %
NEUTROPHILS # BLD AUTO: 4.3 X10 (3) UL (ref 1.5–7.7)
NEUTROPHILS # BLD AUTO: 4.3 X10(3) UL (ref 1.5–7.7)
NEUTROPHILS NFR BLD AUTO: 75.9 %
OSMOLALITY SERPL CALC.SUM OF ELEC: 294 MOSM/KG (ref 275–295)
P AXIS: 64 DEGREES
P-R INTERVAL: 198 MS
PLATELET # BLD AUTO: 170 10(3)UL (ref 150–450)
POTASSIUM SERPL-SCNC: 4.4 MMOL/L (ref 3.5–5.1)
Q-T INTERVAL: 374 MS
QRS DURATION: 80 MS
QTC CALCULATION (BEZET): 445 MS
R AXIS: 19 DEGREES
RBC # BLD AUTO: 3.78 X10(6)UL
SARS-COV-2 RNA RESP QL NAA+PROBE: NOT DETECTED
SODIUM SERPL-SCNC: 140 MMOL/L (ref 136–145)
T AXIS: 42 DEGREES
TROPONIN I SERPL HS-MCNC: 11 NG/L
VENTRICULAR RATE: 85 BPM
WBC # BLD AUTO: 5.7 X10(3) UL (ref 4–11)

## 2024-08-11 PROCEDURE — 80048 BASIC METABOLIC PNL TOTAL CA: CPT | Performed by: EMERGENCY MEDICINE

## 2024-08-11 PROCEDURE — 82962 GLUCOSE BLOOD TEST: CPT

## 2024-08-11 PROCEDURE — 83880 ASSAY OF NATRIURETIC PEPTIDE: CPT | Performed by: EMERGENCY MEDICINE

## 2024-08-11 PROCEDURE — 99285 EMERGENCY DEPT VISIT HI MDM: CPT

## 2024-08-11 PROCEDURE — 85025 COMPLETE CBC W/AUTO DIFF WBC: CPT | Performed by: EMERGENCY MEDICINE

## 2024-08-11 PROCEDURE — 99284 EMERGENCY DEPT VISIT MOD MDM: CPT

## 2024-08-11 PROCEDURE — 36415 COLL VENOUS BLD VENIPUNCTURE: CPT

## 2024-08-11 PROCEDURE — 84484 ASSAY OF TROPONIN QUANT: CPT | Performed by: EMERGENCY MEDICINE

## 2024-08-11 PROCEDURE — 93005 ELECTROCARDIOGRAM TRACING: CPT

## 2024-08-11 PROCEDURE — 93010 ELECTROCARDIOGRAM REPORT: CPT

## 2024-08-11 PROCEDURE — 71045 X-RAY EXAM CHEST 1 VIEW: CPT | Performed by: EMERGENCY MEDICINE

## 2024-08-11 RX ORDER — HYDROCODONE BITARTRATE AND ACETAMINOPHEN 5; 325 MG/1; MG/1
1 TABLET ORAL ONCE
Status: COMPLETED | OUTPATIENT
Start: 2024-08-11 | End: 2024-08-11

## 2024-08-11 RX ORDER — HYDROCODONE BITARTRATE AND ACETAMINOPHEN 5; 325 MG/1; MG/1
1 TABLET ORAL EVERY 12 HOURS PRN
Qty: 8 TABLET | Refills: 0 | Status: SHIPPED | OUTPATIENT
Start: 2024-08-11

## 2024-08-11 RX ORDER — AMLODIPINE BESYLATE 2.5 MG/1
2.5 TABLET ORAL DAILY
Qty: 90 TABLET | Refills: 3 | Status: SHIPPED | OUTPATIENT
Start: 2024-08-11

## 2024-08-11 NOTE — TELEPHONE ENCOUNTER
Refill passed per [facility] protocol.    Requested Prescriptions   Pending Prescriptions Disp Refills    AMLODIPINE 2.5 MG Oral Tab [Pharmacy Med Name: Amlodipine Besylate 2.5 Mg Tab Asce] 30 tablet 0     Sig: Take 1 tablet (2.5 mg total) by mouth daily.       Hypertension Medications Protocol Passed - 8/8/2024  3:03 AM        Passed - CMP or BMP in past 12 months        Passed - Last BP reading less than 140/90     BP Readings from Last 1 Encounters:   07/22/24 134/66               Passed - In person appointment or virtual visit in the past 12 mos or appointment in next 3 mos     Recent Outpatient Visits              2 weeks ago [facility]    Office Visit    2 months [facility]    [facility] Visit    2 months ago Essential hypertension    [facility]    Office Visit    3 months ago [facility]czema, unspecified type    [facility]    [facility] Visit    3 months ago [facility]thritis of both feet    [facility]    [facility]          Future Appointments         Provider Department Appt Notes    In 1 week Prosper Smith MD [facility] pain and discomfort left shoulder / will be getting xrays before appt    In 1 month Rosa Perez DO [facility] follow up for arthritis, in the last month pain in ankles, wrists and fingers    In 1 month Maggy Chavez DPM [facility] toes are crossing                    Passed - EGFRCR or GFRNAA > 50     GFR Evaluation  EGFRCR: 88 , resulted on 3/6/2024               Future Appointments          Provider Department Appt Notes    In 1 week Prosper Smith MD [facility] pain and discomfort left shoulder / will be getting xrays before appt    In 1 month Rosa Perez DO [facility] follow up for arthritis, in the last month pain in ankles, wrists and fingers    In 1 month Maggy Chavez DPM [facility] toes are crossing            Recent Outpatient Visits              [facility] weeks [facility]    2 months [facility]    [facility]    2 months ago [facility]ential hypertension    [facility]    [facility]    3 months ago [facility]czema, unspecified type    [facility]    [facility]    3 months ago [facility]th[facility] of both feet    [facility]    [facility] Visit

## 2024-08-11 NOTE — ED QUICK NOTES
Patient ambulatory with walker with steady gait. Denies dizziness. Patient's neighbor at bedside to transport patient home. All questions/concerns addressed by this RN.

## 2024-08-11 NOTE — DISCHARGE INSTRUCTIONS
Take Tylenol as needed for pain.  If your pain is not relieved with Tylenol  you can take Norco as needed for severe pain.  Each tablet of Norco has 325 milligrams acetaminophen (Tylenol).  Do not take more than 3900 mg acetaminophen (Tylenol) in 1 day.  Do not drink alcohol or drive while taking Norco.  Take an over-the-counter stool softener such as Colace while taking Norco.  Risk of addiction to pain medication increases after 3 days, make sure to use this for shortest duration as possible and only for severe pain.     Do not take Norco with tramadol.    Continue your home medications.    See primary care within the next week for follow-up appointment.    Return to the ER if you develop worsening symptoms or any emergent concerns.

## 2024-08-11 NOTE — ED QUICK NOTES
Fabricio notified to pick patient up.    Benson (POA) updated on patient care and discharge instructions.

## 2024-08-11 NOTE — ED PROVIDER NOTES
Patient Seen in: [facility] Emergency Department      History     Chief Complaint   Patient presents with    Dizziness     Stated Complaint: DIZZINESS    Subjective:   HPI    89-year-old male with multiple medical problems presents for evaluation from EMS.  Patient reports this morning when he woke up he felt \"flaky,\" describes this as feeling as if he had been quite woken up, almost as if he had his head in the clouds.  Initially described this as dizziness, however when asked to describe the feeling states he did not have any dizziness, lightheadedness, or vertigo.  When he was feeling this way he went upstairs and checked his blood pressure at home, noted a systolic in the 80s.  He rechecked and systolic was still in the 80s.  He called EMS and when they checked his blood pressure his systolic was 160s.  He states he feels back to his baseline now, denies any recent chest pain or pressure, shortness of breath, vertigo, focal weakness or numbness, vomiting, diarrhea, diplopia.    Objective:   Past Medical History:    Anxiety state    Aortic atherosclerosis ([facility])    CXR 11-14    Aortic stenosis    Echo 3-23 with normal LV function, EF 55-60%, moderate LAE, moderate aortic stenosis with peak gradient 39 mmHg and mean gradient 25 mmHg, mild-moderate mitral stenosis, mild MR    BPH (benign prostatic hyperplasia)    COPD (chronic obstructive pulmonary disease) ([facility])    CXR 11-14    Diverticulosis    Colonoscopy 2-12    Essential hypertension    History of blood transfusion    no reactions    History of stomach ulcers    History of vertebral fracture    T7, T8, T9, T11, T12, L1 secondary to fall; s/p kyphoplasty T7, T8, T9 6-23    Hx of adenomatous colonic polyps    Hx of melanoma of skin    forehead    Iron deficiency anemia    Lumbar spinal stenosis    Macular degeneration    per NG    Primary osteoarthritis of knees, bilateral    Status post right TKA 11-14; s/p left TKA 4-23    Pulmonary hypertension (HCC)     Echo 5-18 with pulmonary artery pressure 45 mmHg    Visual impairment    glasses              No pertinent past surgical history.              No pertinent social history.            Review of Systems    Positive for stated Chief Complaint: Dizziness    Other systems are as noted in HPI.  Constitutional and vital signs reviewed.      All other systems reviewed and negative except as noted above.    Physical Exam     ED Triage Vitals [08/11/24 1122]   /64   Pulse 79   Resp 17   Temp 97.7 °F (36.5 °C)   Temp src Temporal   SpO2 90 %   O2 Device None (Room air)       Current Vitals:   Vital Signs  BP: 157/64  Pulse: 59  Resp: 17  Temp: 97.7 °F (36.5 °C)  Temp src: Temporal  MAP (mmHg): 92    Oxygen Therapy  SpO2: 97 %  O2 Device: None (Room air)            Physical Exam  Vitals and nursing note reviewed.   Constitutional:       General: He is not in acute distress.     Appearance: He is well-developed.   HENT:      Head: Normocephalic and atraumatic.   Eyes:      Conjunctiva/sclera: Conjunctivae normal.   Cardiovascular:      Rate and Rhythm: Normal rate and regular rhythm.      Heart sounds: Normal heart sounds.   Pulmonary:      Effort: Pulmonary effort is normal. No respiratory distress.      Breath sounds: Normal breath sounds.   Abdominal:      General: Bowel sounds are normal.      Palpations: Abdomen is soft.      Tenderness: There is no abdominal tenderness.   Musculoskeletal:         General: Normal range of motion.      Cervical back: Normal range of motion and neck supple.   Skin:     General: Skin is warm and dry.      Findings: No rash.   Neurological:      General: No focal deficit present.      Mental Status: He is alert and oriented to person, place, and time.               ED Course     Labs Reviewed   CBC WITH DIFFERENTIAL WITH PLATELET - Abnormal; Notable for the following components:       Result Value    RBC 3.78 (*)     HGB 12.4 (*)     HCT 36.6 (*)     RDW-SD 49.6 (*)     Lymphocyte  Absolute 0.89 (*)     All other components within normal limits   BASIC METABOLIC PANEL (8) - Abnormal; Notable for the following components:    Glucose 147 (*)     All other components within normal limits   BNP (B TYPE NATRIURETIC PEPTIDE) - Abnormal; Notable for the following components:    Beta Natriuretic Peptide 267 (*)     All other components within normal limits   POCT GLUCOSE - Abnormal; Notable for the following components:    POC Glucose  152 (*)     All other components within normal limits   TROPONIN I HIGH SENSITIVITY - Normal   RAPID SARS-COV-2 BY PCR - Normal   RAINBOW DRAW LAVENDER   RAINBOW DRAW LIGHT GREEN   RAINBOW DRAW BLUE     EKG    Rate, intervals and axes as noted on EKG Report.  Rate: 85  Rhythm: Sinus Rhythm  Reading: Sinus rhythm, no STEMI                 Imaging Results Available and Reviewed while in ED:   XR CHEST AP PORTABLE  (CPT=71045)   Final Result   PROCEDURE: XR CHEST AP PORTABLE  (CPT=71045)   TIME: 1245.         COMPARISON: [facility], XR SHOULDER, COMPLETE (MIN 2    VIEWS), LEFT (CPT=73030), 7/29/2019, 5:02 AM.  CT CHEST PE AORTA (IV ONLY)    (CPT=71260), 5/08/2023, 10:37 PM.  [facility], XR    SHOULDER, COMPLETE (MIN 2 VIEWS),    LEFT (CPT=73030), 8/07/2024, 1:51 PM.  [facility], X CHEST PA LAT ROUTINE, 11/11/2014, 2:10 PM.       INDICATIONS: Shortness of breath and dizziness ongoing for several days       TECHNIQUE:   Single view.         FINDINGS:    POSITION: The patient is moderately rotated to the left.   CARDIAC/VASC: The cardiomediastinal silhouette is not enlarged. There is    mild tortuosity of the thoracic aorta with peripheral atherosclerotic    vascular calcification. The pulmonary vascularity is within normal limits.       MEDIAST/ZAKIYA: No visible mass or adenopathy.    LUNGS/PLEURA: Scattered basilar reticular opacities are present. No    airspace consolidation, pleural effusion, or  pneumothorax is evident.     BONES: Mild degenerative changes of the thoracic spine are apparent. There    is a contoured lateral fixation plate of proximal right humerus with    numerous threaded screws. A right clavicular fracture deformity is noted.    A left humeral bone anchor is    present.   OTHER: Leads overlie the chest and obscure underlying detail.                         =====   CONCLUSION:    1. Scattered basilar probable atelectasis without further radiographic    evidence of acute intrathoracic process.       2. Remote fracture deformity of the right midclavicle is well as open    reduction/internal fixation of the proximal right humerus.               Dictated by (CST): Chandler Arambula MD on 8/11/2024 at 2:39 PM        Finalized by (CST): Chandler Arambula MD on 8/11/2024 at 2:41 PM                   ED Medications Administered:   Medications   HYDROcodone-acetaminophen (Norco) 5-325 MG per tab 1 tablet (1 tablet Oral Given 8/11/24 1201)       Vitals:    08/11/24 1215 08/11/24 1300 08/11/24 1315 08/11/24 1400   BP: 126/73 147/71 147/76 157/64   Pulse: 54 52 59 59   Resp: 19 14 16 17   Temp:       TempSrc:       SpO2: 93% 94% 95% 97%     *I personally reviewed and interpreted all ED vitals.           MDM      Medical Decision Making  Differential diagnosis includes but is not limited to arrhythmia, electrolyte abnormality, infection, pneumonia, TIA, stroke, arrhythmia    Well-appearing patient, unrevealing emergency department evaluation.  Patient states he is feeling fine and would like to be discharged home.  He reports chronic pain in his left shoulder greatly improved after Norco, requesting prescription, advises he has difficulty sleeping at night due to pain.  Discussed risk of sedation and constipation with Norco, will give 8 tabs as needed for severe pain.  He does agree to outpatient follow-up as well as return precautions.  He verbalizes understanding of and agreement with this  plan.    Problems Addressed:  Chronic left shoulder pain: acute illness or injury  Malaise: acute illness or injury    Amount and/or Complexity of Data Reviewed  Independent Historian: EMS  External Data Reviewed: labs.     Details: CBC and BMP stable compared to 3/6/2020  Labs: ordered.  Radiology: ordered and independent interpretation performed.     Details: Chest x-ray image reviewed, no obvious pneumothorax, other incidental findings deferred to radiology  ECG/medicine tests: ordered and independent interpretation performed. Decision-making details documented in ED Course.    Risk  Prescription drug management.        Disposition and Plan     Clinical Impression:  1. Malaise    2. Chronic left shoulder pain         Disposition:  Discharge  8/11/2024  2:51 pm    Follow-up:  Guy Almanza MD  [address] [place] [id number]  [phone]    Schedule an appointment as soon as possible for a visit in 1 week(s)  For follow up    We recommend that you schedule follow up care with a primary care provider within the next three months to obtain basic health screening including reassessment of your blood pressure.      Medications Prescribed:  Current Discharge Medication List        START taking these medications    Details   HYDROcodone-acetaminophen 5-325 MG Oral Tab Take 1 tablet by mouth every 12 (twelve) hours as needed for Pain (Do not exceed 8 tabs per day.).  Qty: 8 tablet, Refills: 0    Associated Diagnoses: Chronic left shoulder pain

## 2024-08-22 ENCOUNTER — OFFICE VISIT (OUTPATIENT)
Dept: ORTHOPEDICS CLINIC | Facility: CLINIC | Age: 89
End: 2024-08-22

## 2024-08-22 VITALS — DIASTOLIC BLOOD PRESSURE: 61 MMHG | SYSTOLIC BLOOD PRESSURE: 103 MMHG | HEART RATE: 68 BPM

## 2024-08-22 DIAGNOSIS — M12.812 LEFT ROTATOR CUFF TEAR ARTHROPATHY: Primary | ICD-10-CM

## 2024-08-22 DIAGNOSIS — M75.102 LEFT ROTATOR CUFF TEAR ARTHROPATHY: Primary | ICD-10-CM

## 2024-08-22 PROCEDURE — 3078F DIAST BP <80 MM HG: CPT | Performed by: ORTHOPAEDIC SURGERY

## 2024-08-22 PROCEDURE — 20610 DRAIN/INJ JOINT/BURSA W/O US: CPT | Performed by: ORTHOPAEDIC SURGERY

## 2024-08-22 PROCEDURE — 3074F SYST BP LT 130 MM HG: CPT | Performed by: ORTHOPAEDIC SURGERY

## 2024-08-22 PROCEDURE — 1159F MED LIST DOCD IN RCRD: CPT | Performed by: ORTHOPAEDIC SURGERY

## 2024-08-22 PROCEDURE — 1160F RVW MEDS BY RX/DR IN RCRD: CPT | Performed by: ORTHOPAEDIC SURGERY

## 2024-08-22 PROCEDURE — 99214 OFFICE O/P EST MOD 30 MIN: CPT | Performed by: ORTHOPAEDIC SURGERY

## 2024-08-22 RX ORDER — TRIAMCINOLONE ACETONIDE 40 MG/ML
40 INJECTION, SUSPENSION INTRA-ARTICULAR; INTRAMUSCULAR ONCE
Status: COMPLETED | OUTPATIENT
Start: 2024-08-22 | End: 2024-08-22

## 2024-08-22 RX ADMIN — TRIAMCINOLONE ACETONIDE 40 MG: 40 INJECTION, SUSPENSION INTRA-ARTICULAR; INTRAMUSCULAR at 13:53:00

## 2024-08-22 NOTE — PROGRESS NOTES
NURSING INTAKE COMMENTS:   Chief Complaint   Patient presents with    Shoulder Pain     L shoulder -  Pt states pain for about 6 weeks. ROM limited due to pain. Pain with certain movements. Rates pain 1-2/10. No injury. Had xray done 08/07/24       HPI: This 89 year old male presents today with complaints of left shoulder pain.  He has noted pain over the lateral and anterior shoulder and arm over the last 4 to 5 months.  No history of injury.  He did have a left rotator cuff repair 25 years ago.  He fractured his clavicle about 5 or 6 years ago and has had some discomfort since then.  He takes tramadol and Norco intermittently for pain.  Tends to sleep in an upright position with some relief of symptoms.    Past Medical History:    Anxiety state    Aortic atherosclerosis ([facility])    CXR 11-14    Aortic stenosis    Echo 3-23 with normal LV function, EF 55-60%, moderate LAE, moderate aortic stenosis with peak gradient 39 mmHg and mean gradient 25 mmHg, mild-moderate mitral stenosis, mild MR    BPH (benign prostatic hyperplasia)    COPD (chronic obstructive pulmonary disease) ([facility])    CXR 11-14    Diverticulosis    Colonoscopy 2-12    Essential hypertension    History of blood transfusion    no reactions    History of stomach ulcers    History of vertebral fracture    T7, T8, T9, T11, T12, L1 secondary to fall; s/p kyphoplasty T7, T8, T9 6-23    Hx of adenomatous colonic polyps    Hx of melanoma of skin    forehead    Iron deficiency anemia    Lumbar spinal stenosis    Macular degeneration    per     Primary osteoarthritis of knees, bilateral    Status post right TKA 11-14; s/p left TKA 4-23    Pulmonary hypertension ([facility])    Echo 5-18 with pulmonary artery pressure 45 mmHg    Visual impairment    glasses     Past Surgical History:   Procedure Laterality Date    Appendectomy      per     Arthroscopy, ankle, surgical; w/ankle arthrodesis Right     \"Arthrocentesis of the right ankle joint (2-3)\"; per     Colonoscopy   2005    per NG    Hip replacement surgery Right 2002    per NG    Ir kyphoplasty  06/12/2023    T7, T8, T9    Knee arthroscopy Bilateral     per NG    Other Right 07/15/2018    ORIF proximal right humerus fracture requiring a biceps tenodesis    Other  05/2018    Incision and drainage of right index finger complicated abscess, revision amputation of right index finger.    Total knee arthroplasty Left 04/03/2023    Total knee replacement Right 11/2014     Current Outpatient Medications   Medication Sig Dispense Refill    amLODIPine 2.5 MG Oral Tab Take 1 tablet (2.5 mg total) by mouth daily. 90 tablet 3    HYDROcodone-acetaminophen 5-325 MG Oral Tab Take 1 tablet by mouth every 12 (twelve) hours as needed for Pain (Do not exceed 8 tabs per day.). 8 tablet 0    tamsulosin 0.4 MG Oral Cap TAKE 1 CAPSULE BY MOUTH ONE-HALF HOUR AFTER SUPPER DAILY 90 capsule 3    TRAMADOL 50 MG Oral Tab Take 1 tablet (50 mg total) by mouth every 8 (eight) hours as needed for pain 90 tablet 5    allopurinol 100 MG Oral Tab Take 1 tablet (100 mg total) by mouth daily with dinner. 90 tablet 3    metoprolol succinate ER 25 MG Oral Tablet 24 Hr Take 1 tablet (25 mg total) by mouth daily. 90 tablet 3    ferrous sulfate 325 (65 FE) MG Oral Tab EC Take 1 tablet (325 mg total) by mouth daily with breakfast.      Multiple Vitamins-Minerals (PRESERVISION AREDS 2 OR) Take 1 capsule by mouth in the morning and 1 capsule before bedtime.      cetirizine 10 MG Oral Tab Take 1 tablet (10 mg total) by mouth daily as needed for Allergies (allergies).      senna-docusate 8.6-50 MG Oral Tab Take 2 tablets by mouth in the morning and 2 tablets before bedtime.      cholecalciferol 25 MCG (1000 UT) Oral Cap Take 1 capsule (1,000 Units total) by mouth nightly. Vitamin D3 1,000 unit capsule      Multiple Vitamin (MULTI-VITAMINS) Oral Tab Take 1 tablet by mouth nightly. take 1 tablet by ORAL route  every day with food       Allergies   Allergen Reactions    Demerol  [Meperidine] SHORTNESS OF BREATH    Hylan G-F 20 SWELLING     Swelling to cristiano. knees    Swelling to cristiano. knees   Swelling to cristiano. knees   Swelling to cristiano. knees   Swelling to cristiano. knees    Ibuprofen RASH and SHORTNESS OF BREATH    Penicillins RASH and SHORTNESS OF BREATH    Valdecoxib OTHER (SEE COMMENTS)    Celecoxib RASH    Hydrochlorothiazide RASH    Triamterene RASH     Family History   Problem Relation Age of Onset    Arthritis Father         per NG    Other (Lung Cancer) Father          age 55    Hypertension Mother         per NG    Diabetes Neg     Glaucoma Neg     Macular degeneration Neg        Social History     Occupational History    Not on file   Tobacco Use    Smoking status: Former     Types: Pipe     Quit date: 1985     Years since quittin.6     Passive exposure: Past    Smokeless tobacco: Never   Vaping Use    Vaping status: Never Used   Substance and Sexual Activity    Alcohol use: Yes     Alcohol/week: 5.8 standard drinks of alcohol     Types: 7 Glasses of wine per week     Comment: 1-2 drinks daily    Drug use: No    Sexual activity: Not Currently        Review of Systems:  GENERAL: denies fevers, chills, night sweats, fatigue, unintentional weight loss/gain  SKIN: denies skin lesions, open sores, rash  HEENT:denies recent vision change, new nasal congestion,hearing loss, tinnitus, sore throat, headaches  RESPIRATORY: denies new shortness of breath, cough, asthma, wheezing  CARDIOVASCULAR: denies chest pain, leg cramps with exertion, palpitations, leg swelling  GI: denies abdominal pain, nausea, vomiting, diarrhea, constipation, hematochezia, worsening heartburn or stomach ulcers  : denies dysuria, hematuria, incontinence, increased frequency, urgency, difficulty urinating  MUSCULOSKELETAL: denies musculoskeletal complaints other than in HPI  NEURO: denies numbness, tingling, weakness, balance issues, dizziness, memory loss  PSYCHIATRIC: denies Hx of depression, anxiety, other  psychiatric disorders  HEMATOLOGIC: denies blood clots, anemia, blood clotting disorders, blood transfusion  ENDOCRINE: denies autoimmune disease, thyroid issues, or diabetes  ALLERGY: denies asthma, seasonal allergies    Physical Examination:    There were no vitals taken for this visit.  Constitutional: appears well hydrated, alert and responsive, no acute distress noted  Extremities: Left shoulder active forward flexion to 70 degrees.  Passive flexion to 80 degrees.  Active and passive external rotation 45 degrees.  Abduction strength 3-5.  External rotation strength 4-5.  All provocative testing produces mild pain.  Neurological: Left hand light touch and pinprick sensory exam normal. Lateral shoulder light touch and pinprick sensory examination normal.  strength,wrist extension, biceps, triceps, and shoulder abduction strength 5 out of 5. Chay sign negative. No obvious atrophy of the hand.        Imaging:   XR CHEST AP PORTABLE  (CPT=71045)    Result Date: 8/11/2024  PROCEDURE: XR CHEST AP PORTABLE  (CPT=71045) TIME: 1245.   COMPARISON: [facility], XR SHOULDER, COMPLETE (MIN 2 VIEWS), LEFT (CPT=73030), 7/29/2019, 5:02 AM.  CT CHEST PE AORTA (IV ONLY) (CPT=71260), 5/08/2023, 10:37 PM.  [facility], XR SHOULDER, COMPLETE (MIN 2 VIEWS), LEFT (CPT=73030), 8/07/2024, 1:51 PM.  [facility], X CHEST PA LAT ROUTINE, 11/11/2014, 2:10 PM.  INDICATIONS: Shortness of breath and dizziness ongoing for several days  TECHNIQUE:   Single view.   FINDINGS:  POSITION: The patient is moderately rotated to the left. CARDIAC/VASC: The cardiomediastinal silhouette is not enlarged. There is mild tortuosity of the thoracic aorta with peripheral atherosclerotic vascular calcification. The pulmonary vascularity is within normal limits. MEDIAST/ZAKIYA: No visible mass or adenopathy. LUNGS/PLEURA: Scattered basilar reticular opacities are present. No airspace  consolidation, pleural effusion, or pneumothorax is evident.  BONES: Mild degenerative changes of the thoracic spine are apparent. There is a contoured lateral fixation plate of proximal right humerus with numerous threaded screws. A right clavicular fracture deformity is noted. A left humeral bone anchor is present. OTHER: Leads overlie the chest and obscure underlying detail.           CONCLUSION:  1. Scattered basilar probable atelectasis without further radiographic evidence of acute intrathoracic process.  2. Remote fracture deformity of the right midclavicle is well as open reduction/internal fixation of the proximal right humerus.    Dictated by (CST): Chandler Arambula MD on 8/11/2024 at 2:39 PM     Finalized by (CST): Chandler Arambula MD on 8/11/2024 at 2:41 PM          XR SHOULDER, COMPLETE (MIN 2 VIEWS), LEFT (CPT=73030)    Result Date: 8/7/2024  PROCEDURE: XR SHOULDER, COMPLETE (MIN 2 VIEWS), LEFT (CPT=73030)  COMPARISON: [facility], XR SHOULDER, COMPLETE (MIN 2 VIEWS), LEFT (CPT=73030), 7/29/2019, 5:02 AM.  INDICATIONS: Chronic left shoulder pain for serveral weeks no injury limted range of motion.  TECHNIQUE: 3 views were obtained.   FINDINGS:  BONES:   Severe narrowing of the glenohumeral joint with subchondral sclerosis within the glenoid and humeral head. Large drooping inferior humeral head osteophyte. There is a prominent subacromial osteophyte. Mild degenerative change at the acromioclavicular joint. There has been loss of the acromiohumeral interval with a pseudoarthrosis between the superior humeral head and acromion indicating chronic rotator cuff tear. Postoperative changes of rotator cuff anchors within the greater tuberosity.  There is osteopenia. SOFT TISSUES: Negative.  No visible soft tissue swelling.  EFFUSION: None visible.  OTHER: Degenerative disc, facet, uncovertebral disease is partially visualized in the lower cervical spine.  Kyphoplasty seen within the thoracic  spine.         CONCLUSION:   Severe glenohumeral osteoarthritis.  Chronic rotator cuff tear.   Dictated by (CST): Sergey Gomez MD on 8/07/2024 at 4:22 PM     Finalized by (CST): Sergey Gomez MD on 8/07/2024 at 4:23 PM             Labs:  Lab Results   Component Value Date    WBC 5.7 08/11/2024    HGB 12.4 (L) 08/11/2024    .0 08/11/2024      Lab Results   Component Value Date     (H) 08/11/2024    BUN 17 08/11/2024    CREATSERUM 0.87 08/11/2024    GFRNAA 102 02/03/2022    GFRAA 118 02/03/2022        Assessment and Plan:  Diagnoses and all orders for this visit:    Left rotator cuff tear arthropathy  -     arthrocentesis major joint  -     triamcinolone acetonide (Kenalog-40) 40 MG/ML injection 40 mg        Assessment: Left shoulder cuff tear arthropathy    Plan: I discussed operative and nonoperative treatment options.  I advised nonoperative care.  Advised him to discontinue or wean from the Norco.  Suggested minimal use of tramadol.  Continue oral Tylenol as needed.  The left subacromial space was injected with 40 mg of Kenalog using a lateral approach.  Advised icing and topical Voltaren gel.  Follow-up again as needed.    Follow Up: Return if symptoms worsen or fail to improve.    MINE RANDHAWA MD

## 2024-08-22 NOTE — PROGRESS NOTES
Per verbal order from Dr. Smith draw up 3ml of 0.5% Marcaine & 2ml 1% lidocaine and 1ml of Kenalog 40 for cortisone injection to left  shoulder. Samaria HUI MA    Patient provided education handout for cortisone injection.

## 2024-08-26 ENCOUNTER — TELEPHONE (OUTPATIENT)
Dept: ORTHOPEDICS CLINIC | Facility: CLINIC | Age: 89
End: 2024-08-26

## 2024-08-26 NOTE — TELEPHONE ENCOUNTER
Per patient had shoulder injection last week, states injection wore off on Saturday, states pain is 7/10 and has trouble sleeping because of the pain, patient has appointment on 9/4 but asking if pain medication could be prescribed for pain management until 9/4 appointment. Please call thank you.

## 2024-08-26 NOTE — TELEPHONE ENCOUNTER
Spoke with patient. Explained about the nerve block and that cortisone may take a week or so to have its full effect. He decided to cancel the follow up he made and will call back if no improvement in a few weeks.

## 2024-08-29 ENCOUNTER — TELEPHONE (OUTPATIENT)
Dept: ORTHOPEDICS CLINIC | Facility: CLINIC | Age: 89
End: 2024-08-29

## 2024-08-29 NOTE — TELEPHONE ENCOUNTER
Per patient asking he can get a prescription for pain to hold him over until his follow up on 9/18. Please adivse

## 2024-08-29 NOTE — TELEPHONE ENCOUNTER
S/w patient and offered open appointment on 9/5/24 to follow up on shoulder pain. He accepted.     ERICA

## 2024-08-29 NOTE — TELEPHONE ENCOUNTER
Patient seen for left shoulder on 8/22/24 for left shoulder pain. Patient was given cortisone injection. Per TE on 8/26/24 he was having pain and it was not improving. Patient was wondering what medications he could take for pain and discomfort. He takes tramadol from Rheumatologist and is allergiec to Ibuprofen/NSAIDS. I advised that he focus on icing and tylenol use in addition to medications that he is taking at home. He has appointment on 9/18/24, but I was working on offering sooner appointment to assess shoulder. He requested call back later in the day for appointment.    Left message this afternoon for patient to call back

## 2024-09-05 ENCOUNTER — OFFICE VISIT (OUTPATIENT)
Dept: ORTHOPEDICS CLINIC | Facility: CLINIC | Age: 89
End: 2024-09-05

## 2024-09-05 DIAGNOSIS — M12.812 LEFT ROTATOR CUFF TEAR ARTHROPATHY: Primary | ICD-10-CM

## 2024-09-05 DIAGNOSIS — M75.102 LEFT ROTATOR CUFF TEAR ARTHROPATHY: Primary | ICD-10-CM

## 2024-09-05 PROCEDURE — 1159F MED LIST DOCD IN RCRD: CPT | Performed by: ORTHOPAEDIC SURGERY

## 2024-09-05 PROCEDURE — 99213 OFFICE O/P EST LOW 20 MIN: CPT | Performed by: ORTHOPAEDIC SURGERY

## 2024-09-05 PROCEDURE — 1160F RVW MEDS BY RX/DR IN RCRD: CPT | Performed by: ORTHOPAEDIC SURGERY

## 2024-09-05 NOTE — PROGRESS NOTES
NURSING INTAKE COMMENTS:   Chief Complaint   Patient presents with    Shoulder Pain     L shoulder f/u- had injection on 8/22/2024 that helped for 4 days- rates pain 2-7/10 most of the time- worse at night       HPI: This 89 year old male presents today with complaints of left shoulder pain follow-up.  He had an injection into the shoulder little more than a month ago which helped for a few days.  He continues to feel as though the shoulder is better than it was prior to the injection.  He is found sleeping in the upright position has helped his pain.  He takes tramadol intermittently for pain.    Past Medical History:    Anxiety state    Aortic atherosclerosis ([facility])    CXR 11-14    Aortic stenosis    Echo 3-23 with normal LV function, EF 55-60%, moderate LAE, moderate aortic stenosis with peak gradient 39 mmHg and mean gradient 25 mmHg, mild-moderate mitral stenosis, mild MR    BPH (benign prostatic hyperplasia)    COPD (chronic obstructive pulmonary disease) ([facility])    CXR 11-14    Diverticulosis    Colonoscopy 2-12    Essential hypertension    History of blood transfusion    no reactions    History of stomach ulcers    History of vertebral fracture    T7, T8, T9, T11, T12, L1 secondary to fall; s/p kyphoplasty T7, T8, T9 6-23    Hx of adenomatous colonic polyps    Hx of melanoma of skin    forehead    Iron deficiency anemia    Lumbar spinal stenosis    Macular degeneration    per     Primary osteoarthritis of knees, bilateral    Status post right TKA 11-14; s/p left TKA 4-23    Pulmonary hypertension ([facility])    Echo 5-18 with pulmonary artery pressure 45 mmHg    Visual impairment    glasses     Past Surgical History:   Procedure Laterality Date    Appendectomy      per     Arthroscopy, ankle, surgical; w/ankle arthrodesis Right     \"Arthrocentesis of the right ankle joint (2-3)\"; per NG    Colonoscopy  2005    per NG    Hip replacement surgery Right 2002    per NG    Ir kyphoplasty  06/12/2023    T7, T8, T9     Knee arthroscopy Bilateral     per NG    Other Right 07/15/2018    ORIF proximal right humerus fracture requiring a biceps tenodesis    Other  05/2018    Incision and drainage of right index finger complicated abscess, revision amputation of right index finger.    Total knee arthroplasty Left 04/03/2023    Total knee replacement Right 11/2014     Current Outpatient Medications   Medication Sig Dispense Refill    amLODIPine 2.5 MG Oral Tab Take 1 tablet (2.5 mg total) by mouth daily. 90 tablet 3    HYDROcodone-acetaminophen 5-325 MG Oral Tab Take 1 tablet by mouth every 12 (twelve) hours as needed for Pain (Do not exceed 8 tabs per day.). 8 tablet 0    tamsulosin 0.4 MG Oral Cap TAKE 1 CAPSULE BY MOUTH ONE-HALF HOUR AFTER SUPPER DAILY 90 capsule 3    TRAMADOL 50 MG Oral Tab Take 1 tablet (50 mg total) by mouth every 8 (eight) hours as needed for pain 90 tablet 5    allopurinol 100 MG Oral Tab Take 1 tablet (100 mg total) by mouth daily with dinner. 90 tablet 3    metoprolol succinate ER 25 MG Oral Tablet 24 Hr Take 1 tablet (25 mg total) by mouth daily. 90 tablet 3    ferrous sulfate 325 (65 FE) MG Oral Tab EC Take 1 tablet (325 mg total) by mouth daily with breakfast.      Multiple Vitamins-Minerals (PRESERVISION AREDS 2 OR) Take 1 capsule by mouth in the morning and 1 capsule before bedtime.      cetirizine 10 MG Oral Tab Take 1 tablet (10 mg total) by mouth daily as needed for Allergies (allergies).      senna-docusate 8.6-50 MG Oral Tab Take 2 tablets by mouth in the morning and 2 tablets before bedtime.      cholecalciferol 25 MCG (1000 UT) Oral Cap Take 1 capsule (1,000 Units total) by mouth nightly. Vitamin D3 1,000 unit capsule      Multiple Vitamin (MULTI-VITAMINS) Oral Tab Take 1 tablet by mouth nightly. take 1 tablet by ORAL route  every day with food       Allergies   Allergen Reactions    Demerol [Meperidine] SHORTNESS OF BREATH    Hylan G-F 20 SWELLING     Swelling to cristiano. knees    Swelling to cristiano.  knees   Swelling to cristiano. knees   Swelling to cristiano. knees   Swelling to cristiano. knees    Ibuprofen RASH and SHORTNESS OF BREATH    Penicillins RASH and SHORTNESS OF BREATH    Valdecoxib OTHER (SEE COMMENTS)    Celecoxib RASH    Hydrochlorothiazide RASH    Triamterene RASH     Family History   Problem Relation Age of Onset    Arthritis Father         per NG    Other (Lung Cancer) Father          age 55    Hypertension Mother         per NG    Diabetes Neg     Glaucoma Neg     Macular degeneration Neg        Social History     Occupational History    Not on file   Tobacco Use    Smoking status: Former     Types: Pipe     Quit date: 1985     Years since quittin.7     Passive exposure: Past    Smokeless tobacco: Never   Vaping Use    Vaping status: Never Used   Substance and Sexual Activity    Alcohol use: Yes     Alcohol/week: 5.8 standard drinks of alcohol     Types: 7 Glasses of wine per week     Comment: 1-2 drinks daily    Drug use: No    Sexual activity: Not Currently        Review of Systems:  GENERAL: denies fevers, chills, night sweats, fatigue, unintentional weight loss/gain  SKIN: denies skin lesions, open sores, rash  HEENT:denies recent vision change, new nasal congestion,hearing loss, tinnitus, sore throat, headaches  RESPIRATORY: denies new shortness of breath, cough, asthma, wheezing  CARDIOVASCULAR: denies chest pain, leg cramps with exertion, palpitations, leg swelling  GI: denies abdominal pain, nausea, vomiting, diarrhea, constipation, hematochezia, worsening heartburn or stomach ulcers  : denies dysuria, hematuria, incontinence, increased frequency, urgency, difficulty urinating  MUSCULOSKELETAL: denies musculoskeletal complaints other than in HPI  NEURO: denies numbness, tingling, weakness, balance issues, dizziness, memory loss  PSYCHIATRIC: denies Hx of depression, anxiety, other psychiatric disorders  HEMATOLOGIC: denies blood clots, anemia, blood clotting disorders, blood  transfusion  ENDOCRINE: denies autoimmune disease, thyroid issues, or diabetes  ALLERGY: denies asthma, seasonal allergies    Physical Examination:    There were no vitals taken for this visit.  Constitutional: appears well hydrated, alert and responsive, no acute distress noted  Extremities: Left shoulder examination unchanged  Neurological: Unchanged    Imaging:   XR CHEST AP PORTABLE  (CPT=71045)    Result Date: 8/11/2024  PROCEDURE: XR CHEST AP PORTABLE  (CPT=71045) TIME: 1245.   COMPARISON: [facility], XR SHOULDER, COMPLETE (MIN 2 VIEWS), LEFT (CPT=73030), 7/29/2019, 5:02 AM.  CT CHEST PE AORTA (IV ONLY) (CPT=71260), 5/08/2023, 10:37 PM.  [facility], XR SHOULDER, COMPLETE (MIN 2 VIEWS), LEFT (CPT=73030), 8/07/2024, 1:51 PM.  [facility], X CHEST PA LAT ROUTINE, 11/11/2014, 2:10 PM.  INDICATIONS: Shortness of breath and dizziness ongoing for several days  TECHNIQUE:   Single view.   FINDINGS:  POSITION: The patient is moderately rotated to the left. CARDIAC/VASC: The cardiomediastinal silhouette is not enlarged. There is mild tortuosity of the thoracic aorta with peripheral atherosclerotic vascular calcification. The pulmonary vascularity is within normal limits. MEDIAST/ZAKIYA: No visible mass or adenopathy. LUNGS/PLEURA: Scattered basilar reticular opacities are present. No airspace consolidation, pleural effusion, or pneumothorax is evident.  BONES: Mild degenerative changes of the thoracic spine are apparent. There is a contoured lateral fixation plate of proximal right humerus with numerous threaded screws. A right clavicular fracture deformity is noted. A left humeral bone anchor is present. OTHER: Leads overlie the chest and obscure underlying detail.           CONCLUSION:  1. Scattered basilar probable atelectasis without further radiographic evidence of acute intrathoracic process.  2. Remote fracture deformity of the right midclavicle is  well as open reduction/internal fixation of the proximal right humerus.    Dictated by (CST): Chandler Arambula MD on 8/11/2024 at 2:39 PM     Finalized by (CST): Chandler Arambula MD on 8/11/2024 at 2:41 PM          XR SHOULDER, COMPLETE (MIN 2 VIEWS), LEFT (CPT=73030)    Result Date: 8/7/2024  PROCEDURE: XR SHOULDER, COMPLETE (MIN 2 VIEWS), LEFT (CPT=73030)  COMPARISON: [facility], XR SHOULDER, COMPLETE (MIN 2 VIEWS), LEFT (CPT=73030), 7/29/2019, 5:02 AM.  INDICATIONS: Chronic left shoulder pain for serveral weeks no injury limted range of motion.  TECHNIQUE: 3 views were obtained.   FINDINGS:  BONES:   Severe narrowing of the glenohumeral joint with subchondral sclerosis within the glenoid and humeral head. Large drooping inferior humeral head osteophyte. There is a prominent subacromial osteophyte. Mild degenerative change at the acromioclavicular joint. There has been loss of the acromiohumeral interval with a pseudoarthrosis between the superior humeral head and acromion indicating chronic rotator cuff tear. Postoperative changes of rotator cuff anchors within the greater tuberosity.  There is osteopenia. SOFT TISSUES: Negative.  No visible soft tissue swelling.  EFFUSION: None visible.  OTHER: Degenerative disc, facet, uncovertebral disease is partially visualized in the lower cervical spine.  Kyphoplasty seen within the thoracic spine.         CONCLUSION:   Severe glenohumeral osteoarthritis.  Chronic rotator cuff tear.   Dictated by (CST): Sergey Gomez MD on 8/07/2024 at 4:22 PM     Finalized by (CST): Sergey Gomez MD on 8/07/2024 at 4:23 PM             Labs:  Lab Results   Component Value Date    WBC 5.7 08/11/2024    HGB 12.4 (L) 08/11/2024    .0 08/11/2024      Lab Results   Component Value Date     (H) 08/11/2024    BUN 17 08/11/2024    CREATSERUM 0.87 08/11/2024    GFRNAA 102 02/03/2022    GFRAA 118 02/03/2022        Assessment and Plan:  Diagnoses and all orders for this  visit:    Left rotator cuff tear arthropathy        Assessment: Left shoulder cuff tear arthropathy    Plan: I again discussed treatment options including surgery.  Is not interested in surgery at this stage in his life.  He will continue with activity modifications, home exercises, upright positioning during sleep.  Continue with his oral tramadol and follow-up with me again as needed.    Follow Up: Return if symptoms worsen or fail to improve.    MINE RANDHAWA MD

## 2024-09-06 ENCOUNTER — TELEPHONE (OUTPATIENT)
Dept: RHEUMATOLOGY | Facility: CLINIC | Age: 89
End: 2024-09-06

## 2024-09-10 ENCOUNTER — TELEPHONE (OUTPATIENT)
Dept: RHEUMATOLOGY | Facility: CLINIC | Age: 89
End: 2024-09-10

## 2024-09-10 ENCOUNTER — TELEPHONE (OUTPATIENT)
Dept: ORTHOPEDICS CLINIC | Facility: CLINIC | Age: 89
End: 2024-09-10

## 2024-09-10 DIAGNOSIS — M15.9 OSTEOARTHRITIS OF MULTIPLE JOINTS, UNSPECIFIED OSTEOARTHRITIS TYPE: ICD-10-CM

## 2024-09-10 RX ORDER — TRAMADOL HYDROCHLORIDE 50 MG/1
50 TABLET ORAL EVERY 6 HOURS PRN
Qty: 120 TABLET | Refills: 5 | Status: SHIPPED | OUTPATIENT
Start: 2024-09-10

## 2024-09-10 NOTE — TELEPHONE ENCOUNTER
Patient returned call, declined below offered appointment, for 9-21, someone takes patient grocery shopping Saturday mornings.    He took the first available appointment with Dr Moctezuma 10-9-24.If we could find him something else, please call patient.

## 2024-09-10 NOTE — TELEPHONE ENCOUNTER
Patient is experiencing shoulder pain that he can't sleep due to pain would like to know if Dr Wells could prescribe medication for the pain.Please advise

## 2024-09-10 NOTE — TELEPHONE ENCOUNTER
Mitra Egan PA-C  [facility] Clinical Staff14 minutes ago (2:31 PM)       \"Advise to follow up with Rheumatologist in regards to Tramadol. Would consider taking one of the tramadol right before bed time for pain. Advise icing, Tylenol, and sleeping propped up as needed for pain relief. Continue focusing on stretching exercises for the shoulder at this time and icing the shoulder after he does the exercises every time.\"     Called patient and informed him per Mitra message. He verbalized understanding and had n ofurther questions.

## 2024-09-10 NOTE — TELEPHONE ENCOUNTER
See message and advice      Future Appointments   Date Time Provider Department Center   9/12/2024  3:30 PM Maggy Chavez DPM [facility]   10/9/2024 11:40 AM Nessa Moctezuma MD [facility]

## 2024-09-10 NOTE — TELEPHONE ENCOUNTER
Patient is having shoulder arthritis pain in both shoulders.  Pain keeps him up at night.    He takes Tramadol 3 times a day.  Could Dr Moctezuma prescribe him Tramadol 4 times a day so he could take one before bed to maybe be able to sleep at night.

## 2024-09-10 NOTE — TELEPHONE ENCOUNTER
Patient has an appt. Scheduled for 10/9      Future Appointments   Date Time Provider Department Center   9/12/2024  3:30 PM [facility]   10/9/2024 11:40 AM Nessa Moctezuma MD [facility]

## 2024-09-10 NOTE — TELEPHONE ENCOUNTER
Spoke with patient. Seen on 9/5 and has been doing the exercises and is still experiencing left shoulder pain that wakes him up multiple times a night. He is using ice and heat. He takes Tramadol 3x a day from his rheumatologist which works well during the day but does not get him through the night. He inquired about taking it four times a day and I advised that he reach out to the doctor who prescribed it for him as it is unlikely that a patient can have two prescriptions for the same controlled medication. He has an appointment tomorrow with his rheumatologist and will do so then. He did ask that I reach out to provider for any other recommendations of pain medication for the nighttime.. He states he is not looking for narcotics like norco, he does not want to get addicted to them. I did advise sleeping in an upright position which he stated he would try but his house is not set up to optimally do that.

## 2024-09-12 ENCOUNTER — OFFICE VISIT (OUTPATIENT)
Dept: PODIATRY CLINIC | Facility: CLINIC | Age: 89
End: 2024-09-12

## 2024-09-12 DIAGNOSIS — M19.071 ARTHRITIS OF BOTH FEET: ICD-10-CM

## 2024-09-12 DIAGNOSIS — M79.672 PAIN IN BOTH FEET: ICD-10-CM

## 2024-09-12 DIAGNOSIS — M79.671 PAIN IN BOTH FEET: ICD-10-CM

## 2024-09-12 DIAGNOSIS — M20.5X1 CROSSOVER TOE DEFORMITY OF RIGHT FOOT: ICD-10-CM

## 2024-09-12 DIAGNOSIS — B35.1 ONYCHOMYCOSIS: Primary | ICD-10-CM

## 2024-09-12 DIAGNOSIS — M19.072 ARTHRITIS OF BOTH FEET: ICD-10-CM

## 2024-09-12 PROCEDURE — 99213 OFFICE O/P EST LOW 20 MIN: CPT | Performed by: STUDENT IN AN ORGANIZED HEALTH CARE EDUCATION/TRAINING PROGRAM

## 2024-09-12 PROCEDURE — 1159F MED LIST DOCD IN RCRD: CPT | Performed by: STUDENT IN AN ORGANIZED HEALTH CARE EDUCATION/TRAINING PROGRAM

## 2024-09-12 NOTE — TELEPHONE ENCOUNTER
[facility]  contacted pt.      Future Appointments   Date Time Provider Department Center   9/12/2024  3:30 PM [facility]   10/9/2024 11:40 AM Nessa Moctezuma MD [facility]

## 2024-09-12 NOTE — PROGRESS NOTES
[facility]  Progress Note      Tha Jolly is a 89 year old male.   Chief Complaint   Patient presents with    Toenail Care     Pt here for nail trim and foot check.  Pt states R 2nd toe is going into hallux.              HPI:     Pt is a pleasant 89 year old male who PTC for cristiano foot evaluation. Admits to elongate toenails he is unable to trim. Admits that his right 2nd digit rolls under his great toe.     Allergies: Demerol [meperidine], Hylan g-f 20, Ibuprofen, Penicillins, Valdecoxib, Celecoxib, Hydrochlorothiazide, and Triamterene    Current Outpatient Medications   Medication Sig Dispense Refill    traMADol 50 MG Oral Tab Take 1 tablet (50 mg total) by mouth every 6 (six) hours as needed for Pain. 120 tablet 5    amLODIPine 2.5 MG Oral Tab Take 1 tablet (2.5 mg total) by mouth daily. 90 tablet 3    HYDROcodone-acetaminophen 5-325 MG Oral Tab Take 1 tablet by mouth every 12 (twelve) hours as needed for Pain (Do not exceed 8 tabs per day.). 8 tablet 0    tamsulosin 0.4 MG Oral Cap TAKE 1 CAPSULE BY MOUTH ONE-HALF HOUR AFTER SUPPER DAILY 90 capsule 3    allopurinol 100 MG Oral Tab Take 1 tablet (100 mg total) by mouth daily with dinner. 90 tablet 3    metoprolol succinate ER 25 MG Oral Tablet 24 Hr Take 1 tablet (25 mg total) by mouth daily. 90 tablet 3    ferrous sulfate 325 (65 FE) MG Oral Tab EC Take 1 tablet (325 mg total) by mouth daily with breakfast.      Multiple Vitamins-Minerals (PRESERVISION AREDS 2 OR) Take 1 capsule by mouth in the morning and 1 capsule before bedtime.      cetirizine 10 MG Oral Tab Take 1 tablet (10 mg total) by mouth daily as needed for Allergies (allergies).      senna-docusate 8.6-50 MG Oral Tab Take 2 tablets by mouth in the morning and 2 tablets before bedtime.      cholecalciferol 25 MCG (1000 UT) Oral Cap Take 1 capsule (1,000 Units total) by mouth nightly. Vitamin D3 1,000 unit capsule      Multiple Vitamin (MULTI-VITAMINS) Oral Tab Take 1 tablet by mouth  nightly. take 1 tablet by ORAL route  every day with food        Past Medical History:    Anxiety state    Aortic atherosclerosis ([facility])    CXR 11-14    Aortic stenosis    Echo 3-23 with normal LV function, EF 55-60%, moderate LAE, moderate aortic stenosis with peak gradient 39 mmHg and mean gradient 25 mmHg, mild-moderate mitral stenosis, mild MR    BPH (benign prostatic hyperplasia)    COPD (chronic obstructive pulmonary disease) ([facility])    CXR 11-    Diverticulosis    Colonoscopy 2-12    Essential hypertension    History of blood transfusion    no reactions    History of stomach ulcers    History of vertebral fracture    T7, T8, T9, T11, T12, L1 secondary to fall; s/p kyphoplasty T7, T8, T9 6-    Hx of adenomatous colonic polyps    Hx of melanoma of skin    forehead    Iron deficiency anemia    Lumbar spinal stenosis    Macular degeneration    per NG    Primary osteoarthritis of knees, bilateral    Status post right TKA ; s/p left TKA -    Pulmonary hypertension ([facility])    Echo -18 with pulmonary artery pressure 45 mmHg    Visual impairment    glasses      Past Surgical History:   Procedure Laterality Date    Appendectomy      per NG    Arthroscopy, ankle, surgical; w/ankle arthrodesis Right     \"Arthrocentesis of the right ankle joint (2-3)\"; per NG    Colonoscopy  2005    per NG    Hip replacement surgery Right     per NG    Ir kyphoplasty  2023    T7, T8, T9    Knee arthroscopy Bilateral     per NG    Other Right 07/15/2018    ORIF proximal right humerus fracture requiring a biceps tenodesis    Other  2018    Incision and drainage of right index finger complicated abscess, revision amputation of right index finger.    Total knee arthroplasty Left 2023    Total knee replacement Right 2014      Family History   Problem Relation Age of Onset    Arthritis Father         per NG    Other (Lung Cancer) Father          age 55    Hypertension Mother         per NG    Diabetes Neg      Glaucoma Neg     Macular degeneration Neg       Social History     Socioeconomic History    Marital status:    Tobacco Use    Smoking status: Former     Types: Pipe     Quit date: 1985     Years since quittin.7     Passive exposure: Past    Smokeless tobacco: Never   Vaping Use    Vaping status: Never Used   Substance and Sexual Activity    Alcohol use: Yes     Alcohol/week: 5.8 standard drinks of alcohol     Types: 7 Glasses of wine per week     Comment: 1-2 drinks daily    Drug use: No    Sexual activity: Not Currently   Other Topics Concern    Pt has a pacemaker No    Pt has a defibrillator No    Reaction to local anesthetic No           REVIEW OF SYSTEMS:     Denies nause, fever, chills  No calf pain  Denies chest pain or SOB      EXAM:   There were no vitals taken for this visit.  GENERAL: well developed, well nourished, in no apparent distress  EXTREMITIES:   1. Integument: Normal skin temperature and turgor. Toenails x10 are elongated, thickened and discolored with subungal derbi. Right 2nd digit crossover right hallux. No wounds or signs of infection.     2. Vascular: Dorsalis pedis two out of four bilateral and posterior tibial pulses two out of   four bilateral, capillary refill normal.   3. Musculoskeletal: All muscle groups are graded 5 out of 5 in the foot and ankle.   4. Neurological: Normal sharp dull sensation; reflexes normal.             ASSESSMENT AND PLAN:   Diagnoses and all orders for this visit:    Onychomycosis    Arthritis of both feet    Pain in both feet    Crossover toe deformity of right foot        Plan:       -Patient examined, chart history reviewed.  -Discussed importance of proper pedal hygiene, regular foot checks, and tight glucose control.  -Sharply debrided nails x10 with a sterile nail nipper achieving a 20% reduction in thickness and length, without incident.   -advised pt to purchase medium sized silicone toe spacer to place between right 2nd digit and hallux.    -Ambulate with supportive shoes and inserts and avoid walking barefoot.  -Educated patient on acute signs of infection advised patient to seek immediate medical attention if symptoms arise.    RTC in 3 months      The patient indicates understanding of these issues and agrees to the plan.        Maggy Chavez DPM

## 2025-01-02 ENCOUNTER — OFFICE VISIT (OUTPATIENT)
Dept: DERMATOLOGY CLINIC | Facility: CLINIC | Age: 89
End: 2025-01-02

## 2025-01-02 DIAGNOSIS — I87.2 STASIS DERMATITIS: Primary | ICD-10-CM

## 2025-01-02 DIAGNOSIS — L85.9 HYPERKERATOSIS: ICD-10-CM

## 2025-01-02 PROCEDURE — 1159F MED LIST DOCD IN RCRD: CPT | Performed by: STUDENT IN AN ORGANIZED HEALTH CARE EDUCATION/TRAINING PROGRAM

## 2025-01-02 PROCEDURE — 1160F RVW MEDS BY RX/DR IN RCRD: CPT | Performed by: STUDENT IN AN ORGANIZED HEALTH CARE EDUCATION/TRAINING PROGRAM

## 2025-01-02 PROCEDURE — 99214 OFFICE O/P EST MOD 30 MIN: CPT | Performed by: STUDENT IN AN ORGANIZED HEALTH CARE EDUCATION/TRAINING PROGRAM

## 2025-01-02 RX ORDER — CLOBETASOL PROPIONATE 0.5 MG/G
OINTMENT TOPICAL
Qty: 60 G | Refills: 3 | Status: SHIPPED | OUTPATIENT
Start: 2025-01-02

## 2025-01-02 NOTE — PROGRESS NOTES
Established patient     Referred by:   No referring provider defined for this encounter.      CHIEF COMPLAINT: Rash     HISTORY OF PRESENT ILLNESS: Tha Jolly is a 89 year old male here for evaluation of rash.    Location: Bilateral ankles, lower legs  Duration: 2 months   Signs and symptoms: Scaly, raw, itchy, tender, oozes yellow/ oily liquid   Current treatment: None   Past treatments: A&D ointment, lotions     Personal Dermatologic History  History of chronic skin disease: No    Family History  History of chronic skin disease: No  History autoimmune diseases:  No    Past Medical History  Past Medical History:    Anxiety state    Aortic atherosclerosis ([facility])    CXR 11-14    Aortic stenosis    Echo 3-23 with normal LV function, EF 55-60%, moderate LAE, moderate aortic stenosis with peak gradient 39 mmHg and mean gradient 25 mmHg, mild-moderate mitral stenosis, mild MR    BPH (benign prostatic hyperplasia)    COPD (chronic obstructive pulmonary disease) ([facility])    CXR 11-14    Diverticulosis    Colonoscopy 2-12    Essential hypertension    History of blood transfusion    no reactions    History of stomach ulcers    History of vertebral fracture    T7, T8, T9, T11, T12, L1 secondary to fall; s/p kyphoplasty T7, T8, T9 6-23    Hx of adenomatous colonic polyps    Hx of melanoma of skin    forehead    Iron deficiency anemia    Lumbar spinal stenosis    Macular degeneration    per NG    Primary osteoarthritis of knees, bilateral    Status post right TKA 11-14; s/p left TKA 4-23    Pulmonary hypertension ([facility])    Echo 5-18 with pulmonary artery pressure 45 mmHg    Visual impairment    glasses       REVIEW OF SYSTEMS:  Constitutional: Denies fever, chills, unintentional weight loss.   Skin as per HPI    Medications  Current Outpatient Medications   Medication Sig Dispense Refill    traMADol 50 MG Oral Tab Take 1 tablet (50 mg total) by mouth every 6 (six) hours as needed for Pain. 120 tablet 5    amLODIPine 2.5 MG Oral  Tab Take 1 tablet (2.5 mg total) by mouth daily. 90 tablet 3    HYDROcodone-acetaminophen 5-325 MG Oral Tab Take 1 tablet by mouth every 12 (twelve) hours as needed for Pain (Do not exceed 8 tabs per day.). 8 tablet 0    tamsulosin 0.4 MG Oral Cap TAKE 1 CAPSULE BY MOUTH ONE-HALF HOUR AFTER SUPPER DAILY 90 capsule 3    allopurinol 100 MG Oral Tab Take 1 tablet (100 mg total) by mouth daily with dinner. 90 tablet 3    metoprolol succinate ER 25 MG Oral Tablet 24 Hr Take 1 tablet (25 mg total) by mouth daily. 90 tablet 3    ferrous sulfate 325 (65 FE) MG Oral Tab EC Take 1 tablet (325 mg total) by mouth daily with breakfast.      Multiple Vitamins-Minerals (PRESERVISION AREDS 2 OR) Take 1 capsule by mouth in the morning and 1 capsule before bedtime.      cetirizine 10 MG Oral Tab Take 1 tablet (10 mg total) by mouth daily as needed for Allergies (allergies).      senna-docusate 8.6-50 MG Oral Tab Take 2 tablets by mouth in the morning and 2 tablets before bedtime.      cholecalciferol 25 MCG (1000 UT) Oral Cap Take 1 capsule (1,000 Units total) by mouth nightly. Vitamin D3 1,000 unit capsule      Multiple Vitamin (MULTI-VITAMINS) Oral Tab Take 1 tablet by mouth nightly. take 1 tablet by ORAL route  every day with food         PHYSICAL EXAM:  General: awake, alert, no acute distress  Skin: Skin exam was performed today including the following: Legs. Pertinent findings include:   - with xerosis    ASSESSMENT & PLAN:  Pathophysiology of diagnoses discussed with patient.  Therapeutic options reviewed. Risks, benefits, and alternatives discussed with patient. Instructions reviewed at length.    #Stasis dermatitis  #Hyperkeratosis  - clobetasol 0.05% twice daily to affected areas Monday-Friday. Take weekends off. Avoid use on face, breasts, groin, or axillae.      Return to clinic: 4 weeks or sooner if something concerning arises    Ben Benz MD

## 2025-01-08 ENCOUNTER — OFFICE VISIT (OUTPATIENT)
Dept: RHEUMATOLOGY | Facility: CLINIC | Age: 89
End: 2025-01-08

## 2025-01-08 VITALS
HEART RATE: 66 BPM | BODY MASS INDEX: 25.94 KG/M2 | HEIGHT: 66.5 IN | WEIGHT: 163.31 LBS | RESPIRATION RATE: 16 BRPM | SYSTOLIC BLOOD PRESSURE: 131 MMHG | DIASTOLIC BLOOD PRESSURE: 55 MMHG

## 2025-01-08 DIAGNOSIS — M15.9 OSTEOARTHRITIS OF MULTIPLE JOINTS, UNSPECIFIED OSTEOARTHRITIS TYPE: Primary | ICD-10-CM

## 2025-01-08 DIAGNOSIS — G89.29 CHRONIC LEFT SHOULDER PAIN: ICD-10-CM

## 2025-01-08 DIAGNOSIS — M25.512 CHRONIC LEFT SHOULDER PAIN: ICD-10-CM

## 2025-01-08 PROCEDURE — 3008F BODY MASS INDEX DOCD: CPT | Performed by: INTERNAL MEDICINE

## 2025-01-08 PROCEDURE — 99214 OFFICE O/P EST MOD 30 MIN: CPT | Performed by: INTERNAL MEDICINE

## 2025-01-08 PROCEDURE — 1125F AMNT PAIN NOTED PAIN PRSNT: CPT | Performed by: INTERNAL MEDICINE

## 2025-01-08 PROCEDURE — 20610 DRAIN/INJ JOINT/BURSA W/O US: CPT | Performed by: INTERNAL MEDICINE

## 2025-01-08 PROCEDURE — 1159F MED LIST DOCD IN RCRD: CPT | Performed by: INTERNAL MEDICINE

## 2025-01-08 PROCEDURE — 3078F DIAST BP <80 MM HG: CPT | Performed by: INTERNAL MEDICINE

## 2025-01-08 PROCEDURE — 3075F SYST BP GE 130 - 139MM HG: CPT | Performed by: INTERNAL MEDICINE

## 2025-01-08 RX ORDER — TRIAMCINOLONE ACETONIDE 40 MG/ML
40 INJECTION, SUSPENSION INTRA-ARTICULAR; INTRAMUSCULAR ONCE
Status: COMPLETED | OUTPATIENT
Start: 2025-01-08 | End: 2025-01-08

## 2025-01-08 NOTE — PROGRESS NOTES
Tha Jolly is a 89 year old male.    HPI:     Chief Complaint   Patient presents with    Medication Follow-Up       I had the pleasure of seeing Tha Jolly. As you recall, he is a pleasant 85-year-old who's been having ongoing osteoarthritis of the knees.  He also had a history of bilateral foot and ankle pain    He had synvisc reaction in both knees. He was hospitalized on 8/19/2014 - for it. He had steroid injection s to get rid of the pain. Recently  his right knee is hurting and he has more dfificutly walking now.      He is taking tramadol every 6 hours. He has more pain right now b/c he forgot to take it.   The pain increased after the mri. No constipation. He has a lot of pain in his left knee. He has a meniscal tear and a strain noted on his mri.   He can't barely do his chores at home.     12/7/2016  His left knee is hurting again. He's her for a injection. He is thinking he will get left knee repalcement after 1st of the year.   He can't kneel to light his fireplace.   He has someone to do his snow.     8/28/2017  His left knee is bothering him. The calf is hurting and the thigh is hurting more. No pain with sitting. It's when he's walking.   This weather is driving him nuts.   He just saw his niece's family     8/3/2018   He fought his cat and he broke his left  Wrist. .   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   Then a few weeks later   He fell on 7/21/2018 and broke his right humerus - arm. communited fracture of proximal humerus - and he has no displaced fracture of the greater tuberosity.   He is having knee pain -     He recovered after the the right knee replacement.     4/12/2019  He still has his cat. He is trying PT for his left shoulder after the recovery of his fracture.   His knee didn't get better with his left knee injection.   He doesn't want a knee replacement.   He is walking with his left knee. The  change in weather bothers his knee. It doesn't hurt all the time.   He has 5/10 pain in his hands. - he can't close hands compeltely anymore.   He's taking trmaodl for the pain. He is taking tramadol mostly twice a day - occl three times.   He does take it wafter going grocery     3/2/2020  He broke his right shoulder 2 years ago  He broke left calvicle 6/2019 -   He is planning a left knee surgery with dr. beckwith - he is going to class.   He's taking tramdol.   He has no pain without using the arm. But using his arm - he has 5-6/10 pain.   He has 4/10 pain. When he walks.     He had diverticulitis in 12/24/2019.     3/10/2021  Here for his yearly follow up. He feels tramadol helps his hand and foot pain.   He got one shot of the vaccine.   He has the walker now b/c of back pain.   He has pain in his thighs and calves. hes' getting sharp pains.   He had an epidural injection but it only lasted 2 days.   At night when he rolss over he has a sharp pain somewhere. The pain is in his butt and legs   He got out of bed one day - and it was more painfula nd then increased over a few days.   His shoulders are ok.   His right hand is concerning him but he feels some swelling happening in this.   He is planning his left knee replacement. He posteponed it b/c of covid.     6/21/2021  Her left knee is bothering him more b/c of the weather.   He feels more back pain. 2 epidural injections dind't help.   He has b/l thigh pain and hips pain. When it sits for too long he has hip pain.   When he has xrays and ct scans - he has problems in his back , he is going to try things on Friday.   The back pain started on 2/2021 -   Tramadol is 50mg tid  - it's not helping her back.   But teylnol adding it is not helping.   He has good and bad days.   Today is not bad.   He' shaving discomfort sitting as well at times.   He feels he has something hard on his     12/21/2021  The left knee is bothering him - and he is retaining fluid.   He has  back pain and got 3 epidural injections. It only lasted 10days.   He has so much pain in his buttocks and thighs.   He can't sit or stand easily.   Laying down is only what he is comfortable with.   His left knee is aggravating.     4/12/2022 -   Early in December he was going to get a lower back epidural injection - that would be with an ablation. He got tses but by christoctavia rivase he couldn't walk.   He was in [facility] and [facility] for weeks.   He lost all the sternght in his legs and now hes' getting phsycial therapy to develop more muscle tone.   He has lost sense of balance. And he has to use a walker.   Tramadol is helping but it's not 100% .   He was given norco at [facility] too.   He has constipation but using stool softeners.   The back pain is ok.   He has pain in his hips with walking.   He has PT set up and he has a shuttle can get picked up. He has a friend taking him back.     5/10/2022:  Saw dr. warner  Patient presents for follow-up of knee pain.  X-ray of the left knee has shown severe OA, bone-on-bone.  He receives cortisone injections, last one was in December  Currently on tramadol and Norco as needed for his joint pain.  Right knee replacement done in the past  He is here today for left knee cortisone injection    7/15/2022  He finsihed phsyical thearpy and is able to walk without the walker at times.   He is much better than he was - he has 4 more therapies  His PT has bee great since his hostpialization in 12/2021 -  He doesn't have pain now. When he rolls over in bed he can get 6-7/10 pain.     He has 2-3/10 pain with walking -     12/17/2022  He has increasing left knee pain and here for injection.     11/21/2023 -   Had left knee replacement in 4/2023 -   Did great - but a couple days later he fell and had thorcic   Post surgery   Then had thorcic pain from compression fractures and admitted - and got kyphoplasty in 5/2023  No pain   He's been home ok since then.   He's migrated from  a wlker to a cane 3 weeks ago.   Had to learn to walk again - he finihsed PT for his left knee.   Tramadol is helping from that.   He tried stopped it but the left shoulder , ankles and toes hurt more.   He's taking tramadol thre epills a day. - take s2 in the am and 1 in pm -       Having more left shoulder pain . He had a fracture in his left shoulder from a fall  3 years ago int he past and thinks it didn't heal right      His right arm fracture 2-3 years ago     1/8/2025  His left shoulder is hurting a lot.   He has problems with fingers and toes and aching with the weather.     He's still having tramadol for pain - three times a day - uses pillows to help postiion his left shoulder   He does feel a constricting feeling in his left shoulder   He has about - 6-7/10 pain.   Right now he has no pain - his arm is supported -   When using his walker it hurts sometimes.   He uses a cane - it's slick out there today -   The tramadol helps - to zero -   Takes one int he morning -     The patient, with a history of bilateral knee replacements and shoulder issues, presents with left shoulder pain. The pain is described as a constricting sensation, similar to the feeling of a blood pressure cuff. The discomfort is exacerbated when the arm is hanging down, such as during long walks, and is relieved by elevating the arm and taking Tramadol. The patient reports that the Tramadol is effective in managing the pain, reducing it to zero, but causes drowsiness. The patient also mentions a problem with his ankles, which was addressed by a dermatologist.  HISTORY:  Past Medical History:    Anxiety state    Aortic atherosclerosis (HCC)    CXR 11-14    Aortic stenosis    Echo 3-23 with normal LV function, EF 55-60%, moderate LAE, moderate aortic stenosis with peak gradient 39 mmHg and mean gradient 25 mmHg, mild-moderate mitral stenosis, mild MR    BPH (benign prostatic hyperplasia)    COPD (chronic obstructive pulmonary disease)  (HCC)    CXR 11-14    Diverticulosis    Colonoscopy 2-12    Essential hypertension    History of blood transfusion    no reactions    History of stomach ulcers    History of vertebral fracture    T7, T8, T9, T11, T12, L1 secondary to fall; s/p kyphoplasty T7, T8, T9 6-23    Hx of adenomatous colonic polyps    Hx of melanoma of skin    forehead    Iron deficiency anemia    Lumbar spinal stenosis    Macular degeneration    per NG    Primary osteoarthritis of knees, bilateral    Status post right TKA 11-14; s/p left TKA 4-23    Pulmonary hypertension (HCC)    Echo 5-18 with pulmonary artery pressure 45 mmHg    Visual impairment    glasses      Social Hx Reviewed   Family Hx Reviewed     Medications (Active prior to today's visit):  Current Outpatient Medications   Medication Sig Dispense Refill    clobetasol 0.05 % External Ointment Apply to the affected areas twice daily Monday-Friday. Take weekends off. Advised to AVOID on face, under breasts, underarms and groin. 60 g 3    traMADol 50 MG Oral Tab Take 1 tablet (50 mg total) by mouth every 6 (six) hours as needed for Pain. 120 tablet 5    amLODIPine 2.5 MG Oral Tab Take 1 tablet (2.5 mg total) by mouth daily. 90 tablet 3    HYDROcodone-acetaminophen 5-325 MG Oral Tab Take 1 tablet by mouth every 12 (twelve) hours as needed for Pain (Do not exceed 8 tabs per day.). 8 tablet 0    tamsulosin 0.4 MG Oral Cap TAKE 1 CAPSULE BY MOUTH ONE-HALF HOUR AFTER SUPPER DAILY 90 capsule 3    allopurinol 100 MG Oral Tab Take 1 tablet (100 mg total) by mouth daily with dinner. 90 tablet 3    metoprolol succinate ER 25 MG Oral Tablet 24 Hr Take 1 tablet (25 mg total) by mouth daily. 90 tablet 3    ferrous sulfate 325 (65 FE) MG Oral Tab EC Take 1 tablet (325 mg total) by mouth daily with breakfast.      Multiple Vitamins-Minerals (PRESERVISION AREDS 2 OR) Take 1 capsule by mouth in the morning and 1 capsule before bedtime.      cetirizine 10 MG Oral Tab Take 1 tablet (10 mg total) by  mouth daily as needed for Allergies (allergies).      senna-docusate 8.6-50 MG Oral Tab Take 2 tablets by mouth in the morning and 2 tablets before bedtime.      cholecalciferol 25 MCG (1000 UT) Oral Cap Take 1 capsule (1,000 Units total) by mouth nightly. Vitamin D3 1,000 unit capsule      Multiple Vitamin (MULTI-VITAMINS) Oral Tab Take 1 tablet by mouth nightly. take 1 tablet by ORAL route  every day with food         Allergies:  Allergies   Allergen Reactions    Demerol [Meperidine] SHORTNESS OF BREATH    Hylan G-F 20 SWELLING     Swelling to cristiano. knees    Swelling to cristiano. knees   Swelling to cristiano. knees   Swelling to cristiano. knees   Swelling to cristiano. knees    Ibuprofen RASH and SHORTNESS OF BREATH    Penicillins RASH and SHORTNESS OF BREATH    Valdecoxib OTHER (SEE COMMENTS)    Celecoxib RASH    Hydrochlorothiazide RASH    Triamterene RASH         ROS:   All other ROS are negative.     PHYSICAL EXAM:   HEENT: EOM intact, clear sclear, PERRLA, pleasant, no acute distress, no CAD, no neck tendnerness, good ROM,   No rashes  CVS: RRR, no murmurs  RS: CTAB, no crackles, no rhonchi  ABD: Soft Non tender,   Joint exam:  left knee replaced  - not swelling and etnder.    Left hip trochanteric bursitis - not tender.   Tender in hands and feet - oa changes -   Lower    Left shoulder tender   Using a walker   Calves no edema -     Component      Latest Ref Rng & Units 8/6/2018 7/30/2018   WBC      4.0 - 11.0 K/UL 6.0    RBC      4.50 - 5.90 M/UL 2.86 (L)    Hemoglobin      13.5 - 17.5 g/dL 8.8 (L)    Hematocrit      41.0 - 52.0 % 26.3 (L)    MCV      80.0 - 100.0 fL 92.3    MCH      27.0 - 32.0 pg 30.9    MCHC      32.0 - 37.0 g/dl 33.5    RDW      11.0 - 15.0 % 16.7 (H)    Platelet Count      140 - 400 K/    MEAN PLATELET VOLUME      7.4 - 10.3 fL 8.2    Neutrophils %      % 63    Lymphocytes %      % 21    Monocytes %      % 10    Eosinophils %      % 2    Basophils %      % 3    MYELOCYTE %      % 1     Neutrophils Absolute      1.8 - 7.7 K/UL 3.8    Lymphocytes Absolute      1.0 - 4.0 K/UL 1.3    Monocytes Absolute      0.0 - 1.0 K/UL 0.6    Eosinophils Absolute      0.0 - 0.7 K/UL 0.1    Basophils Absolute      0.0 - 0.2 K/UL 0.2    MYELOCYTE ABSOLUTE MANUAL      0 K/UL 0.06 (H)    Glucose      70 - 99 mg/dL  93   Sodium      136 - 144 mmol/L  134 (L)   Potassium      3.3 - 5.1 mmol/L  4.0   Chloride      95 - 110 mmol/L  100   Carbon Dioxide, Total      22 - 32 mmol/L  29   BUN      8 - 20 mg/dL  11   CREATININE      0.50 - 1.50 mg/dL  0.58   CALCIUM      8.5 - 10.5 mg/dL  8.6   ALT (SGPT)      17 - 63 U/L  35   AST (SGOT)      15 - 41 U/L  25   ALKALINE PHOSPHATASE      32 - 100 U/L  112 (H)   Total Bilirubin      0.3 - 1.2 mg/dL  0.7   TOTAL PROTEIN      5.9 - 8.4 g/dL  5.9   Albumin      3.5 - 4.8 g/dL  2.6 (L)   Globulin      2.5 - 3.7 g/dL  3.3   A/G Ratio      1.0 - 2.0  0.8 (L)   ANION GAP      0 - 18 mmol/L  5   BUN/CREAT Ratio      10.0 - 20.0  19.0   CALCULATED OSMOLALITY      275 - 295 mOsm/kg  277   GFR, Non-      >=60  >60   GFR, -American      >=60  >60     5/4/2016 left foot xray   1. Little change from January 22, 2007.  2. Degenerative arthritis more pronounced involving the tarsus.  3. Calcaneal  enthesophytes.  4. Soft tissue swelling.  5. Pes planus.    ASSESSMENT/PLAN:     1.  Left shoulder calvicle fracture in 9.2019 - today cont. To have pain -   S/p left shoulder injection in 3/2020 -   Today on 11/21/2023 - and follows up with dr. Griffiths -   S/p 8/22/2024  - left shoulder injection with dr. Griffiths.   Taking tramadol 50mg a day - three times a day -   Hx of right shoulder surgery     Procedure: Shoulder Injection  Description: Injection administered to the left shoulder. Patient reported tenderness in the shoulder area, particularly when the arm hangs down. Injection site identified by palpation of the tender area.  Informed Consent: Consent obtained for  shoulder injection. Patient was informed about the potential benefits of pain relief and the risks of temporary discomfort. Alternatives such as physical therapy and surgery were discussed.    With paitent's consent, I injected pt's left shoulder gh area with 1ml lidocaine 1 % and 1 ml kenalog 40. It was done under sterile technique using iodine and alcohol swabs and ethyl chloride was used as an anaesthetic spray. Pt.  tolerated it well.      2. Osteoarthritis, L knee OA  Doing better with left knee arthroplasty in 4/2023 -   1. Generalized OA and Bilateral knee osteoarthritis-mod to severe,  -and severe djd of back -   Left kknee osteoarthritis -no longer  planning on sx - with dr. Garcia  -  Severe reaction after lower back ablation - hospitlized in 12/2021 - and now recovering    Left knee pain - steroid injection don't help any more   But askign for injection b/c of his lower back pain is not getting better  Last one is 6/2021 - a 12/21/2021 - and 5/10/2022 - dint' last as long as the other ones.   12/17/2022 s/p left knee injection   S/p right knee 10 years ago     Physical therapy in June 2022   Increase Tramadol from 100mg every 12 hours - to 100mg every 8 hours   norco 5.325mg a day as needed. #30        Left trochanteric bursitis - 8/12/2015 -   Tramadol as needed 100mg bid prn. -  - rtc in 6 months. -   S/p right knee replacement  - bad reaction to synvisc in the pasts -    -  Stay on  tramadol  50 mg every 6 hours prn ==  3.  ankle pain-following with podiatry, orthotics.   4.  History right hip replacement   5. Left ankle pain / left foot pain - on allopurinol 100mg a day, coldhicine didn't help him   Watching diet.   6.  Allergies to discharge on this or Celebrex vs. voltarenGEL      Left Shoulder Pain  Pain described as constriction, worse with arm hanging down and relieved by Tramadol. Previous injection by Dr. Smith provided only 2 days of relief. Patient prefers to avoid surgery if  possible.  -Administered shoulder injection in a different location than previous injection.  -Order physical therapy to strengthen shoulder and potentially alleviate pain.  -Return for follow-up in 6 months to 1 year, or sooner if pain worsens.    Chronic Pain Management  Tramadol effectively manages pain, but causes drowsiness. Patient takes it 3 times a day, less frequently than prescribed due to sleepiness.  -Continue Tramadol as currently taken by patient, reassess at next visit.    Bilateral Knee Replacements  Both knees replaced, most recent in April 2023. Patient reports knees are doing well.  -No changes to current management, continue to monitor.    General Health Maintenance  Patient is independent and active, uses a walker or cane for mobility as needed.  -Encourage continued activity and safety precautions, especially in icy conditions.     Summary:  1. Cont. tramadol 50 every 8 hours as needed   2. Return to clinic in 2 months.   3. Rest left shoulder. x 3 days   4. Physical therapy for left shoulder     - ok to change tramadol to stronger meds - for back pain - will let pain management know - ok to take over -   Increased his tramadol to 50mg every 6 hours til then.     Nessa Moctezuma MD  1/8/2025   11:51 AM        The following individual(s) verbally consented to be recorded using ambient AI listening technology and understand that they can each withdraw their consent to this listening technology at any point by asking the clinician to turn off or pause the recording: yes

## 2025-01-08 NOTE — PATIENT INSTRUCTIONS
1. Cont. tramadol 50 every 8 hours as needed   2. Return to clinic in 6-12 months.   3. Rest left shoulder. x 3 days   4. Physical therapy for left shoulder -

## 2025-01-08 NOTE — PROCEDURES
With paitent's consent, I injected pt's left shoulder gh area with 1ml lidocaine 1 % and 1 ml kenalog 40. It was done under sterile technique using iodine and alcohol swabs and ethyl chloride was used as an anaesthetic spray. Pt.  tolerated it well.

## 2025-02-06 ENCOUNTER — OFFICE VISIT (OUTPATIENT)
Dept: PHYSICAL THERAPY | Facility: HOSPITAL | Age: 89
End: 2025-02-06
Attending: INTERNAL MEDICINE
Payer: MEDICARE

## 2025-02-06 ENCOUNTER — TELEPHONE (OUTPATIENT)
Dept: PHYSICAL THERAPY | Facility: HOSPITAL | Age: 89
End: 2025-02-06

## 2025-02-06 DIAGNOSIS — M25.512 CHRONIC LEFT SHOULDER PAIN: ICD-10-CM

## 2025-02-06 DIAGNOSIS — M15.9 OSTEOARTHRITIS OF MULTIPLE JOINTS, UNSPECIFIED OSTEOARTHRITIS TYPE: Primary | ICD-10-CM

## 2025-02-06 DIAGNOSIS — G89.29 CHRONIC LEFT SHOULDER PAIN: ICD-10-CM

## 2025-02-06 PROCEDURE — 97163 PT EVAL HIGH COMPLEX 45 MIN: CPT

## 2025-02-06 PROCEDURE — 97110 THERAPEUTIC EXERCISES: CPT

## 2025-02-06 NOTE — PROGRESS NOTES
UPPER EXTREMITY EVALUATION:     Diagnosis:   Osteoarthritis of multiple joints, unspecified osteoarthritis type (M15.9)  Chronic left shoulder pain (M25.512,G89.29) Patient:  Tha Jolly (89 year old, male)        Referring Provider: Nessa Moctezuma  Today's Date   2/6/2025    Precautions:  Fall Risk; Hearing Impairment; Visual Impairment   Date of Evaluation: 02/06/25  Next MD visit: uncertain  Date of Surgery: na     PATIENT SUMMARY   Summary of chief complaints: chronic L shoulder/upper arm pain from elbow to the shldr  with increasing severity and frequency over the past 6 months.  History of current condition: hx of OA in the L shoulder with chronic pain with increasing severity and frequency over the past 6 months. Hx of fx clavicle on the L 2 years ago. Also reports hx of  dislocated L shoulder about 15 years ago.   Pain level: current 0 /10, at best 0 /10, at worst 8 /10  Description of symptoms: Dull achy pain with intermittent sharp pains   Occupation: retired   Leisure activities/Hobbies:     Prior level of function: Able to use arm a bit more for ADL.  Current limitations: using RW, lying on L side (not always), letting arm hang at side, attempting to reach for railing when ascending stairs, dressing, bathing, grooming, and donning/doffing jacket/shirt.  Pt goals: Decrease pain and be able to use arm more with ADL.  Hand Dominance:  Right   Past medical history was reviewed with hTa.  Significant findings include:    Imaging/Tests: 8/7/24: Severe glenohumeral osteoarthritis. Chronic rotator cuff tear.     Tha  has a past medical history of Anxiety state, Aortic atherosclerosis, Aortic stenosis, BPH (benign prostatic hyperplasia), COPD (chronic obstructive pulmonary disease) (HCC), Diverticulosis, Essential hypertension, History of blood transfusion (2003), History of stomach ulcers, History of vertebral fracture (05/2023), adenomatous colonic polyps, melanoma of skin (1979), Iron  deficiency anemia, Lumbar spinal stenosis, Macular degeneration, Primary osteoarthritis of knees, bilateral, Pulmonary hypertension (HCC), and Visual impairment.  He  has a past surgical history that includes appendectomy; hip replacement surgery (Right, 2002); arthroscopy, ankle, surgical; w/ankle arthrodesis (Right); knee arthroscopy (Bilateral); colonoscopy (2005); other (Right, 07/15/2018); other (05/2018); total knee replacement (Right, 11/2014); Total knee arthroplasty (Left, 04/03/2023); and ir kyphoplasty (06/12/2023).       ASSESSMENT  Tha presents to physical therapy evaluation with primary c/o chronic L shoulder/upper arm pain from elbow to the shldr  with increasing severity and frequency over the past 6 months.. The results of the objective tests and measures show Pt exhibits postural deviations, as well as decreased ROM, flexibility, and strength of Praful shoulder girdles.  Pt would benefit with skilled PT to reduce pain and restore ROM, flexibility, and strength to achieve goals as outlined and promote functional task performance.. Functional deficits include but are not limited to using RW, lying on L side (not always), letting arm hang at side, attempting to reach for railing when ascending stairs, dressing, bathing, grooming, and donning/doffing jacket/shirt.. Signs and symptoms are consistent with diagnosis of Osteoarthritis of multiple joints, unspecified osteoarthritis type (M15.9)  Chronic left shoulder pain (M25.512,G89.29). Pt and PT discussed evaluation findings, pathology, POC and HEP.  Pt voiced understanding and performs HEP correctly without reported pain. Skilled Physical Therapy is medically necessary to address the above impairments and reach functional goals.  OBJECTIVE:   Musculoskeletal    Misc.  Pain better with: arm supported on arm rest  Pain worse  with: using RW, lying on L side (not always), letting arm hang at side, attempting to reach for railing when ascending stairs,  dressing, bathing, grooming, and donning/doffing jacket/shirt.  Night pain: mostly not due to taking tramadol 3x/day, one is before bed  Sleep position: R>L side  Day pattern of pain: worse with activity but is controlled with tramadol and avoiding using the arm too much.    Occupation: retired  Living situation: lives along in 2 story home; bedroom upstairs using R  rail and L hand on step (1/2 crawling up stairs) ; descends using rails.        Posture: overall flexed posture,   Palpation: ttp L anterior shoulder and middle deltoid.     Cervical Screen:   Cervical AROM: Key:  NE=no effect     AROM:  Pain (+/-)   R rotation 45% wnl NE   L rotation 45% wnl NE   Flexion 80% wnl NE   Extension 25% wnl NE     AROM: (* denotes performed with pain)  Shoulder  Elbow   Flexion: R 30; L 15  Abduction: R 50; L 0  Extension:  R 40; L 15    Seated ER in neutral:  R 0 deg;   L neg 55 deg  Seated IR in neutral:  R to stomach; L to stomach   Flexion: R 130; L 120  Extension: R 0; L 0       Strength/MMT:   Shoulder tested isometric in neutral with arm at side  Elbow Scapula:  unable to accurately assess   Flexion: R 3+/5; L 3-/5  Abduction: R 3/5; L 2+/5  Extension: R 4-/5; L 4-/5  ER: R 4-/5; L 2+/5  IR: R 4/5; L 3+/5 Flexion: R 4/5; L 4/5  Extension: R 4+/5; L 4+/5   Rhomboids:  NA  Mid trap:  NA  SA:  NA      Low trap:  NA  Upper trap:  NA     PROM L shldr   Shoulder    Flexion: ; L 20  Abduction: ; L 10  Extension: ; L 20  Seated ER in neutral: - 45  Seated IR in neutral: to stomach         Accessory motion: g-h jt; restricted distraction, post glides, inf glides;   Restricted A/C and S/C jts; Scapula: restricted all planes;  CTJ  restricted PA glides;        Today's Treatment and Response:   Pt education was provided on exam findings, treatment diagnosis, treatment plan, expectations, and prognosis.  Today's Treatment       2/6/2025   PT Treatment   Therapeutic Exercise TE  -Posture  ED: \"think tall\" or do sternal lift rather  than shldr/scap retraction; avoid chin up position (OA ex),  -Shldr rolls backwards 5x2  -Scapula squeezes 5x2 (3 sec holds)  -Pendulum ex: circles cw/ccw; flex/ext; abd/add  -Seated arm slides on table 10x    Therapeutic Exercise Min 10   Evaluation Min 35   Total of Timed Procedures 10   Total of Service Based 35   Total Treatment Time 45         Patient was instructed in and issued a HEP for:   Shldr rolls  Scapula squeezes  Pendulum ex   Seated arm slides on table    Charges:  PT EVAL: High Complexity, 1TE  In agreement with evaluation findings and clinical rationale, this evaluation involved HIGH COMPLEXITY decision making due to 3 or more personal factors/comorbidities, 4 or more body structures involved/activity limitations, and unstable symptoms as documented in the evaluation.                                                          PLAN OF CARE:    Goals: (to be met in 12 visits)   Goals       Therapy Goals     1. Patient will be independent with HEP and it's progression  2. Patient will improve L shoulder AROM by 10-20 deg in all planes to promote use of L arm with functional ADL with dressing, grooming, and bathing.    3. Patient will improve L shoulder strength by 1/2- 1 muscle grade to promote use of L arm with functional ADL with dressing, grooming, and bathing.                 Frequency / Duration: Patient will be seen 1-2x/week or a total of 12  visits over a 90 day period. Treatment will include: Manual Therapy; Neuromuscular Re-education; Therapeutic Activities; Therapeutic Exercise; Home Exercise Program instruction    Education or treatment limitation: None   Rehab Potential: good     QuickDASH Outcome Score  Score: 68.18 % (2/6/2025  8:54 PM)    Patient was advised of these findings, precautions, and treatment options and has agreed to actively participate in planning and for this course of care.    Thank you for your referral. Please co-sign or sign and return this letter via fax as soon as  possible to [phone]. If you have any questions, please contact me at Dept: [phone]    Sincerely,  Electronically signed by therapist: Hortencia Vega PT  Physician's certification required: Yes  I certify the need for these services furnished under this plan of treatment and while under my care.    X___________________________________________________ Date____________________    Certification From: 2/6/2025  To:5/7/2025

## 2025-02-12 ENCOUNTER — OFFICE VISIT (OUTPATIENT)
Dept: DERMATOLOGY CLINIC | Facility: CLINIC | Age: 89
End: 2025-02-12

## 2025-02-12 DIAGNOSIS — D48.5 NEOPLASM OF UNCERTAIN BEHAVIOR OF SKIN: Primary | ICD-10-CM

## 2025-02-12 DIAGNOSIS — L85.9 HYPERKERATOSIS: ICD-10-CM

## 2025-02-12 DIAGNOSIS — I87.2 STASIS DERMATITIS: ICD-10-CM

## 2025-02-12 DIAGNOSIS — L57.0 MULTIPLE ACTINIC KERATOSES: ICD-10-CM

## 2025-02-12 PROCEDURE — 17000 DESTRUCT PREMALG LESION: CPT | Performed by: STUDENT IN AN ORGANIZED HEALTH CARE EDUCATION/TRAINING PROGRAM

## 2025-02-12 PROCEDURE — 1159F MED LIST DOCD IN RCRD: CPT | Performed by: STUDENT IN AN ORGANIZED HEALTH CARE EDUCATION/TRAINING PROGRAM

## 2025-02-12 PROCEDURE — 99214 OFFICE O/P EST MOD 30 MIN: CPT | Performed by: STUDENT IN AN ORGANIZED HEALTH CARE EDUCATION/TRAINING PROGRAM

## 2025-02-12 PROCEDURE — 1160F RVW MEDS BY RX/DR IN RCRD: CPT | Performed by: STUDENT IN AN ORGANIZED HEALTH CARE EDUCATION/TRAINING PROGRAM

## 2025-02-12 PROCEDURE — 17003 DESTRUCT PREMALG LES 2-14: CPT | Performed by: STUDENT IN AN ORGANIZED HEALTH CARE EDUCATION/TRAINING PROGRAM

## 2025-02-12 PROCEDURE — 1126F AMNT PAIN NOTED NONE PRSNT: CPT | Performed by: STUDENT IN AN ORGANIZED HEALTH CARE EDUCATION/TRAINING PROGRAM

## 2025-02-12 PROCEDURE — 88305 TISSUE EXAM BY PATHOLOGIST: CPT | Performed by: STUDENT IN AN ORGANIZED HEALTH CARE EDUCATION/TRAINING PROGRAM

## 2025-02-12 PROCEDURE — 11102 TANGNTL BX SKIN SINGLE LES: CPT | Performed by: STUDENT IN AN ORGANIZED HEALTH CARE EDUCATION/TRAINING PROGRAM

## 2025-02-12 RX ORDER — AMMONIUM LACTATE 12 G/100G
1 LOTION TOPICAL 2 TIMES DAILY
Qty: 225 G | Refills: 11 | Status: SHIPPED | OUTPATIENT
Start: 2025-02-12 | End: 2025-03-14

## 2025-02-12 NOTE — PROGRESS NOTES
Established patient      CHIEF COMPLAINT: Rash     HISTORY OF PRESENT ILLNESS: Tha Jolly is a 89 year old male here for evaluation of rash.    Location: Bilateral ankles, lower legs  Duration: 2 months   Signs and symptoms: Scaly,  notes improvement but has some rough spot  Current treatment:   Past treatments: Clobetasol     Personal Dermatologic History  History of chronic skin disease: No    Family History  History of chronic skin disease: No  History autoimmune diseases:  No    Past Medical History  Past Medical History:    Anxiety state    Aortic atherosclerosis    CXR 11-14    Aortic stenosis    Echo 3-23 with normal LV function, EF 55-60%, moderate LAE, moderate aortic stenosis with peak gradient 39 mmHg and mean gradient 25 mmHg, mild-moderate mitral stenosis, mild MR    BPH (benign prostatic hyperplasia)    COPD (chronic obstructive pulmonary disease) ([facility])    CXR 11-14    Diverticulosis    Colonoscopy 2-12    Essential hypertension    History of blood transfusion    no reactions    History of stomach ulcers    History of vertebral fracture    T7, T8, T9, T11, T12, L1 secondary to fall; s/p kyphoplasty T7, T8, T9 6-23    Hx of adenomatous colonic polyps    Hx of melanoma of skin    forehead    Iron deficiency anemia    Lumbar spinal stenosis    Macular degeneration    per NG    Primary osteoarthritis of knees, bilateral    Status post right TKA 11-14; s/p left TKA 4-23    Pulmonary hypertension (HCC)    Echo 5-18 with pulmonary artery pressure 45 mmHg    Visual impairment    glasses       REVIEW OF SYSTEMS:  Constitutional: Denies fever, chills, unintentional weight loss.   Skin as per HPI    Medications  Current Outpatient Medications   Medication Sig Dispense Refill    clobetasol 0.05 % External Ointment Apply to the affected areas twice daily Monday-Friday. Take weekends off. Advised to AVOID on face, under breasts, underarms and groin. 60 g 3    traMADol 50 MG Oral Tab Take 1 tablet (50 mg total)  by mouth every 6 (six) hours as needed for Pain. 120 tablet 5    amLODIPine 2.5 MG Oral Tab Take 1 tablet (2.5 mg total) by mouth daily. 90 tablet 3    HYDROcodone-acetaminophen 5-325 MG Oral Tab Take 1 tablet by mouth every 12 (twelve) hours as needed for Pain (Do not exceed 8 tabs per day.). 8 tablet 0    tamsulosin 0.4 MG Oral Cap TAKE 1 CAPSULE BY MOUTH ONE-HALF HOUR AFTER SUPPER DAILY 90 capsule 3    allopurinol 100 MG Oral Tab Take 1 tablet (100 mg total) by mouth daily with dinner. 90 tablet 3    metoprolol succinate ER 25 MG Oral Tablet 24 Hr Take 1 tablet (25 mg total) by mouth daily. 90 tablet 3    ferrous sulfate 325 (65 FE) MG Oral Tab EC Take 1 tablet (325 mg total) by mouth daily with breakfast.      Multiple Vitamins-Minerals (PRESERVISION AREDS 2 OR) Take 1 capsule by mouth in the morning and 1 capsule before bedtime.      cetirizine 10 MG Oral Tab Take 1 tablet (10 mg total) by mouth daily as needed for Allergies (allergies).      senna-docusate 8.6-50 MG Oral Tab Take 2 tablets by mouth in the morning and 2 tablets before bedtime.      cholecalciferol 25 MCG (1000 UT) Oral Cap Take 1 capsule (1,000 Units total) by mouth nightly. Vitamin D3 1,000 unit capsule      Multiple Vitamin (MULTI-VITAMINS) Oral Tab Take 1 tablet by mouth nightly. take 1 tablet by ORAL route  every day with food         PHYSICAL EXAM:  General: awake, alert, no acute distress  Skin: Skin exam was performed today including the following: Legs. Pertinent findings include:   - with xerosis  - Lower legs with pink patches   - Lateral foot with hyperkeratosis   - face with pink gritty papules   - R lat shin pink scaly papule    ASSESSMENT & PLAN:  Pathophysiology of diagnoses discussed with patient.  Therapeutic options reviewed. Risks, benefits, and alternatives discussed with patient. Instructions reviewed at length.    #Stasis dermatitis  #Hyperkeratosis  - clobetasol 0.05% twice daily to affected areas Monday-Friday. Take  weekends off. Avoid use on face, breasts, groin, or axillae. Mix with ammonium lactate    #Multiple actinic keratoses  - Discussed premalignant etiology and possibility of transformation to SCC  - Recommended cryotherapy today   - Discussed side effects including redness, swelling, crusting, and discolortion after treatment, wound care with soap/water and vaseline   - Recommend sun protection with spf 30 or higher, sun protective clothing such as wide brimmed hats and long sleeves. Recommend avoiding midday sun (10 am- 3 pm).     - Procedure Note Cryosurgery of pre-malignant lesion(s)  Risks, benefits, alternatives, complications, and personnel required for cryosurgery reviewed with patient. Patient verbalizes understanding and wishes to proceed.   - Cryosurgery performed with Liquid Nitrogen via cryostat spray gun to Actinic Keratosis . 3 lesion(s) treated.   - Patient tolerated well and wound care discussed. Return if lesions fail to fully resolve.    #Neoplasm(s) of uncertain behavior of skin  - Shave biopsy performed today   - Will notify patient with results and arrange for appropriate definitive treatment, if indicated.      Shave of lesion to establish and confirm diagnosis:  Photo taken: Yes    Risks, benefits, alternatives and personnel required for shave biopsy reviewed with patient. Risks discussed include, but not limited to: pain, bleeding, infection, scar, reaction to anesthetic, and recurrence/need for further treatment.  Patient and physician agree as to site(s) to be biopsied. Patient verbalizes understanding and wishes to proceed.     Site(s) prepped with alcohol and anesthetized with 1% lidocaine with epinephrine.   Shave of lesion(s) performed to the level of the dermis. Specimen(s) from [facility]   sent for pathology to r/o NMSC 50% ALCL and bandaging applied.   Written and verbal wound care instructions provided to patient, understanding verbalized.    Return to clinic: 4 weeks or sooner if  something concerning arises    Ben Benz MD

## 2025-02-13 ENCOUNTER — OFFICE VISIT (OUTPATIENT)
Dept: PHYSICAL THERAPY | Facility: HOSPITAL | Age: 89
End: 2025-02-13
Attending: INTERNAL MEDICINE
Payer: MEDICARE

## 2025-02-13 PROCEDURE — 97140 MANUAL THERAPY 1/> REGIONS: CPT

## 2025-02-13 PROCEDURE — 97110 THERAPEUTIC EXERCISES: CPT

## 2025-02-13 NOTE — PROGRESS NOTES
Patient: Tha Jolly (89 year old, male) Referring Provider:  Insurance:   Diagnosis: Osteoarthritis of multiple joints, unspecified osteoarthritis type (M15.9)  Chronic left shoulder pain (M25.512,G89.29) Purani Palaniswami [employer]   Date of Surgery: na Next MD visit:  N/A   Precautions:  Fall Risk; Hearing Impairment; Visual Impairment uncertain Referral Information:    Date of Evaluation: Req: 12, Auth: 12, Exp: 5/1/2025 02/06/25 POC Auth Visits:          Today's Date   2/13/2025    Subjective  Pt reports continued pain and decreased ROM in the L shoulder. Reports pain 8/10 with movement       Pain: 8/10     Objective    PER IE:  Misc.  Pain better with: arm supported on arm rest  Pain worse  with: using RW, lying on L side (not always), letting arm hang at side, attempting to reach for railing when ascending stairs, dressing, bathing, grooming, and donning/doffing jacket/shirt.  Night pain: mostly not due to taking tramadol 3x/day, one is before bed  Sleep position: R>L side  Day pattern of pain: worse with activity but is controlled with tramadol and avoiding using the arm too much.    Occupation: retired  Living situation: lives along in 2 story home; bedroom upstairs using R  rail and L hand on step (1/2 crawling up stairs) ; descends using rails.          Posture: overall flexed posture,   Palpation: ttp L anterior shoulder and middle deltoid.     Cervical Screen:   Cervical AROM: Key:  NE=no effect     AROM:   Pain (+/-)   R rotation 45% wnl NE   L rotation 45% wnl NE   Flexion 80% wnl NE   Extension 25% wnl NE      AROM: (* denotes performed with pain)  Shoulder  Elbow   Flexion: R 30; L 15  Abduction: R 50; L 0  Extension:  R 40; L 15    Seated ER in neutral:  R 0 deg;   L neg 55 deg  Seated IR in neutral:  R to stomach; L to stomach    Flexion: R 130; L 120  Extension: R 0; L 0         Strength/MMT:   Shoulder tested isometric in neutral with arm at side  Elbow Scapula:  unable  to accurately assess   Flexion: R 3+/5; L 3-/5  Abduction: R 3/5; L 2+/5  Extension: R 4-/5; L 4-/5  ER: R 4-/5; L 2+/5  IR: R 4/5; L 3+/5 Flexion: R 4/5; L 4/5  Extension: R 4+/5; L 4+/5    Rhomboids:  NA  Mid trap:  NA  SA:  NA      Low trap:  NA  Upper trap:  NA      PROM L shldr   Shoulder    Flexion: ; L 20  Abduction: ; L 10  Extension: ; L 20  Seated ER in neutral: - 45  Seated IR in neutral: to stomach            Accessory motion: g-h jt; restricted distraction, post glides, inf glides;   Restricted A/C and S/C jts; Scapula: restricted all planes;  CTJ  restricted PA glides;        Assessment  Noted improved ease of motion and improve ROM after doing pulley ex.    Goals (to be met in 12 visits)   Goals       Therapy Goals     1. Patient will be independent with HEP and it's progression  2. Patient will improve L shoulder AROM by 10-20 deg in all planes to promote use of L arm with functional ADL with dressing, grooming, and bathing.    3. Patient will improve L shoulder strength by 1/2- 1 muscle grade to promote use of L arm with functional ADL with dressing, grooming, and bathing.                 Plan  Plan to continue skilled PT to address jt restrictions, restore ROM, and progress with strengthening to achieve goals as outlined and to promote functional ADL.    Treatment Last 4 Visits       2/6/2025 2/13/2025   PT Treatment   Treatment Day  2   Therapeutic Exercise TE  -Posture  ED: \"think tall\" or do sternal lift rather than shldr/scap retraction; avoid chin up position (OA ex),  -Shldr rolls backwards 5x2  -Scapula squeezes 5x2 (3 sec holds)  -Pendulum ex: circles cw/ccw; flex/ext; abd/add  -Seated arm slides on table 10x  TE  -Posture  ED: \"think tall\" or do sternal lift rather than shldr/scap retraction; avoid chin up position (OA ex),  -Shldr rolls backwards 5x2  -Scapula squeezes 5x2 (3 sec holds)  -Pendulum ex: circles cw/ccw; flex/ext; abd/add  -OH pulley: facing wall 25x;   -OH pulley: shldr  unloading (2.5#)  20x;     -AAROM L shldr push outs fwd (SA)  AAROM L sldr abd  AAROM L shldr flex  Seated table slides fwd 20x  Seated table slides lateral 20x  Seated shldr isometrics with ball on wall: Flex (difficult, ball moves) 5x; Abd: ball at elbow 5x;  ER 5x   -seated swiss ball roll outs 20x           Manual Therapy  MT    PROM L shldr all planes  L shldr jt mob: inf/post glides  jt mob L  A/C and S/C jts  L scap mob   Therapeutic Exercise Min 10 30   Neuro Re-Ed Min  15   Evaluation Min 35    Total of Timed Procedures 10 45   Total of Service Based 35 0   Total Treatment Time 45 45         HEP    Shldr rolls  Scapula squeezes  Pendulum ex   Seated arm slides on table      Charges     2TE; 1 NMR

## 2025-02-17 ENCOUNTER — APPOINTMENT (OUTPATIENT)
Dept: PHYSICAL THERAPY | Facility: HOSPITAL | Age: 89
End: 2025-02-17
Attending: INTERNAL MEDICINE
Payer: MEDICARE

## 2025-02-17 ENCOUNTER — TELEPHONE (OUTPATIENT)
Dept: DERMATOLOGY CLINIC | Facility: CLINIC | Age: 89
End: 2025-02-17

## 2025-02-17 ENCOUNTER — TELEPHONE (OUTPATIENT)
Dept: PHYSICAL THERAPY | Facility: HOSPITAL | Age: 89
End: 2025-02-17

## 2025-02-17 NOTE — TELEPHONE ENCOUNTER
Pt called saying someone called him with his pathology results . Pt was not home and would like a call back

## 2025-02-18 ENCOUNTER — MED REC SCAN ONLY (OUTPATIENT)
Dept: INTERNAL MEDICINE CLINIC | Facility: CLINIC | Age: 89
End: 2025-02-18

## 2025-02-18 NOTE — PROGRESS NOTES
Spoke to patient and relayed biopsy results and MD recommendation. Pt verbalized understanding and will call back tomorrow to schedule 3-month follow up.

## 2025-02-20 ENCOUNTER — APPOINTMENT (OUTPATIENT)
Dept: PHYSICAL THERAPY | Facility: HOSPITAL | Age: 89
End: 2025-02-20
Attending: INTERNAL MEDICINE
Payer: MEDICARE

## 2025-02-24 ENCOUNTER — OFFICE VISIT (OUTPATIENT)
Dept: PHYSICAL THERAPY | Facility: HOSPITAL | Age: 89
End: 2025-02-24
Attending: INTERNAL MEDICINE
Payer: MEDICARE

## 2025-02-24 PROCEDURE — 97140 MANUAL THERAPY 1/> REGIONS: CPT

## 2025-02-24 PROCEDURE — 97110 THERAPEUTIC EXERCISES: CPT

## 2025-02-24 NOTE — PROGRESS NOTES
Patient: Tha Jolly (89 year old, male) Referring Provider:  Insurance:   Diagnosis: Osteoarthritis of multiple joints, unspecified osteoarthritis type (M15.9)  Chronic left shoulder pain (M25.512,G89.29) Purani Palaniswami [employer]   Date of Surgery: na Next MD visit:  N/A   Precautions:  Fall Risk; Hearing Impairment; Visual Impairment uncertain Referral Information:    Date of Evaluation: Req: 12, Auth: 12, Exp: 5/1/2025 02/06/25 POC Auth Visits:  12       Today's Date   2/24/2025    Subjective  Pt reports no pain in the L shldr at rest but has pain with end range flexion at [facility] today, 4/10.       Pain:       Objective    AROM: (* denotes performed with pain)  Shoulder    Flexion: R 30; L 40  Abduction: R 50; L 30  Seated ER in neutral:  R 0 deg;   L  to - 45 from neutral     Seated IR in neutral:  R to stomach; L to stomach         PROM L shldr   Shoulder    Flexion: ; L 80  Abduction: ; L 100 in pos  Seated ER in neutral: - 45  Seated IR in neutral: to stomach            Accessory motion: g-h jt; restricted distraction, post glides, inf glides;   Restricted A/C and S/C jts; Scapula: restricted all planes;  CTJ  restricted PA glides;         Assessment  slight improvement with both PROM and AROM L shldr.    Goals (to be met in 12 visits)   Goals       Therapy Goals     1. Patient will be independent with HEP and it's progression  2. Patient will improve L shoulder AROM by 10-20 deg in all planes to promote use of L arm with functional ADL with dressing, grooming, and bathing.    3. Patient will improve L shoulder strength by 1/2- 1 muscle grade to promote use of L arm with functional ADL with dressing, grooming, and bathing.                 Plan  Plan to continue skilled PT to address jt restrictions, restore ROM, and progress with strengthening to achieve goals as outlined and to promote functional ADL.    Treatment Last 4 Visits       2/6/2025 2/13/2025 2/24/2025   PT Treatment    Treatment Day  2 3   Therapeutic Exercise TE  -Posture  ED: \"think tall\" or do sternal lift rather than shldr/scap retraction; avoid chin up position (OA ex),  -Shldr rolls backwards 5x2  -Scapula squeezes 5x2 (3 sec holds)  -Pendulum ex: circles cw/ccw; flex/ext; abd/add  -Seated arm slides on table 10x  TE  -Posture  ED: \"think tall\" or do sternal lift rather than shldr/scap retraction; avoid chin up position (OA ex),  -Shldr rolls backwards 5x2  -Scapula squeezes 5x2 (3 sec holds)  -Pendulum ex: circles cw/ccw; flex/ext; abd/add  -OH pulley: facing wall 25x;   -OH pulley: shldr unloading (2.5#)  20x;     -AAROM L shldr push outs fwd (SA)  AAROM L sldr abd  AAROM L shldr flex  Seated table slides fwd 20x  Seated table slides lateral 20x  Seated shldr isometrics with ball on wall: Flex (difficult, ball moves) 5x; Abd: ball at elbow 5x;  ER 5x   -seated swiss ball roll outs 20x         TE  -Shldr rolls backwards 5x2  -Scapula squeezes 5x2 (3 sec holds)  -OH pulley: facing wall 20x2;   -OH pulley: shldr unloading (2.5#)  20x;     -AROM L shldr punch outs fwd (SA) 10x;   -Seated table slides fwd 20x  -Seated table slides lateral 20x  -Seated place and hold L arm flexion: 3 sec x5 with assist of PT  -Seated AAROM L shldr ER 10x  -Seated AAROM L shldr Abd in POS 5x2;   -Seated AAROM L shldr Flex in 5x2;   -Seated shldr isometrics with ball on wall: Flex (difficult, ball moves) 5x; Abd: ball at elbow 5x;  ER 5x   -seated swiss ball roll outs 20x      Manual Therapy  MT    PROM L shldr all planes  L shldr jt mob: inf/post glides  jt mob L  A/C and S/C jts  L scap mob MT     PROM L shldr all planes  L shldr jt mob: inf/post glides  L scap mob     Therapeutic Exercise Min 10 30 30   Neuro Re-Ed Min  15    Manual Therapy Min   15   Evaluation Min 35     Total of Timed Procedures 10 45 45   Total of Service Based 35 0 0   Total Treatment Time 45 45 45         HEP  Shldr rolls  Scapula squeezes  Pendulum ex   Seated arm  slides on table      Charges     2TE; 1 MT

## 2025-02-26 ENCOUNTER — OFFICE VISIT (OUTPATIENT)
Dept: DERMATOLOGY CLINIC | Facility: CLINIC | Age: 89
End: 2025-02-26

## 2025-02-26 DIAGNOSIS — L57.0 ACTINIC KERATOSIS: ICD-10-CM

## 2025-02-26 DIAGNOSIS — L81.4 LENTIGINES: Primary | ICD-10-CM

## 2025-02-26 PROCEDURE — 17000 DESTRUCT PREMALG LESION: CPT | Performed by: STUDENT IN AN ORGANIZED HEALTH CARE EDUCATION/TRAINING PROGRAM

## 2025-02-26 PROCEDURE — 99214 OFFICE O/P EST MOD 30 MIN: CPT | Performed by: STUDENT IN AN ORGANIZED HEALTH CARE EDUCATION/TRAINING PROGRAM

## 2025-02-26 PROCEDURE — 1160F RVW MEDS BY RX/DR IN RCRD: CPT | Performed by: STUDENT IN AN ORGANIZED HEALTH CARE EDUCATION/TRAINING PROGRAM

## 2025-02-26 PROCEDURE — 1159F MED LIST DOCD IN RCRD: CPT | Performed by: STUDENT IN AN ORGANIZED HEALTH CARE EDUCATION/TRAINING PROGRAM

## 2025-02-26 NOTE — PROGRESS NOTES
Established patient      CHIEF COMPLAINT: AK follow up    HISTORY OF PRESENT ILLNESS: Tha Jolly is a 89 year old male here for re-evaluation pre-cancer.    AK  Location: Right Lateral Johnson  Duration: 2 months   Condition Update: Post biopsy- lesion is still scaly- to be treated with cryo per DM  Current treatment: None   Past treatments: Clobetasol     Personal Dermatologic History  History of chronic skin disease: No    Family History  History of chronic skin disease: No  History autoimmune diseases:  No    Past Medical History  Past Medical History:    Actinic keratosis    Right lateral shin    Anxiety state    Aortic atherosclerosis    CXR 11-14    Aortic stenosis    Echo 3-23 with normal LV function, EF 55-60%, moderate LAE, moderate aortic stenosis with peak gradient 39 mmHg and mean gradient 25 mmHg, mild-moderate mitral stenosis, mild MR    BPH (benign prostatic hyperplasia)    COPD (chronic obstructive pulmonary disease) (HCC)    CXR 11-14    Diverticulosis    Colonoscopy 2-12    Essential hypertension    History of blood transfusion    no reactions    History of stomach ulcers    History of vertebral fracture    T7, T8, T9, T11, T12, L1 secondary to fall; s/p kyphoplasty T7, T8, T9 6-23    Hx of adenomatous colonic polyps    Hx of melanoma of skin    forehead    Iron deficiency anemia    Lumbar spinal stenosis    Macular degeneration    per NG    Primary osteoarthritis of knees, bilateral    Status post right TKA 11-14; s/p left TKA 4-23    Pulmonary hypertension (HCC)    Echo 5-18 with pulmonary artery pressure 45 mmHg    Visual impairment    glasses       REVIEW OF SYSTEMS:  Constitutional: Denies fever, chills, unintentional weight loss.   Skin as per HPI    Medications  Current Outpatient Medications   Medication Sig Dispense Refill    ammonium lactate 12 % External Lotion Apply 1 Application topically 2 (two) times daily for 60 doses. Use twice daily . Take breaks if experiencing burning. Mix  with cobetasol. 225 g 11    clobetasol 0.05 % External Ointment Apply to the affected areas twice daily Monday-Friday. Take weekends off. Advised to AVOID on face, under breasts, underarms and groin. 60 g 3    traMADol 50 MG Oral Tab Take 1 tablet (50 mg total) by mouth every 6 (six) hours as needed for Pain. 120 tablet 5    amLODIPine 2.5 MG Oral Tab Take 1 tablet (2.5 mg total) by mouth daily. 90 tablet 3    HYDROcodone-acetaminophen 5-325 MG Oral Tab Take 1 tablet by mouth every 12 (twelve) hours as needed for Pain (Do not exceed 8 tabs per day.). 8 tablet 0    tamsulosin 0.4 MG Oral Cap TAKE 1 CAPSULE BY MOUTH ONE-HALF HOUR AFTER SUPPER DAILY 90 capsule 3    allopurinol 100 MG Oral Tab Take 1 tablet (100 mg total) by mouth daily with dinner. 90 tablet 3    metoprolol succinate ER 25 MG Oral Tablet 24 Hr Take 1 tablet (25 mg total) by mouth daily. 90 tablet 3    ferrous sulfate 325 (65 FE) MG Oral Tab EC Take 1 tablet (325 mg total) by mouth daily with breakfast.      Multiple Vitamins-Minerals (PRESERVISION AREDS 2 OR) Take 1 capsule by mouth in the morning and 1 capsule before bedtime.      cetirizine 10 MG Oral Tab Take 1 tablet (10 mg total) by mouth daily as needed for Allergies (allergies).      senna-docusate 8.6-50 MG Oral Tab Take 2 tablets by mouth in the morning and 2 tablets before bedtime.      cholecalciferol 25 MCG (1000 UT) Oral Cap Take 1 capsule (1,000 Units total) by mouth nightly. Vitamin D3 1,000 unit capsule      Multiple Vitamin (MULTI-VITAMINS) Oral Tab Take 1 tablet by mouth nightly. take 1 tablet by ORAL route  every day with food         PHYSICAL EXAM:  General: awake, alert, no acute distress  Skin: Skin exam was performed today including the following: Legs. Pertinent findings include:   - R shin with stellate brown macules   - R shin with pink gritty papule    ASSESSMENT & PLAN:  Pathophysiology of diagnoses discussed with patient.  Therapeutic options reviewed. Risks, benefits, and  alternatives discussed with patient. Instructions reviewed at length.    #Lentigines  - Discussed benign appearance and provided reassurance. No treatment but observation at this time. Follow-up for concerning physical changes or new symptoms.  - Recommend sun protection with spf 30 or higher, sun protective clothing such as wide brimmed hats and long sleeves. Recommend avoiding midday sun (10 am- 3 pm).     #Actinic Keratosis  - Discussed premalignant etiology and possibility of transformation to SCC  - Recommended cryotherapy today   - Discussed side effects including redness, swelling, crusting, and discolortion after treatment, wound care with soap/water and vaseline     - Procedure Note Cryosurgery of pre-malignant lesion(s)  Risks, benefits, alternatives, complications, and personnel required for cryosurgery reviewed with patient. Patient verbalizes understanding and wishes to proceed.   - Cryosurgery performed with Liquid Nitrogen via cryostat spray gun to Actinic Keratosis . 1 lesion(s) treated.   - Patient tolerated well and wound care discussed. Return if lesions fail to fully resolve.        Return to clinic: as needed or sooner if something concerning arises    Ben Benz MD    By signing my name below, Gabo HEIN MA,  attest that this documentation has been prepared under the direction and in the presence of Ben Benz MD.   Electronically Signed: Gabo LAUREN MA, 2/26/2025, 2:07 PM.    IBen MD,  personally performed the services described in this documentation. All medical record entries made by the scribe were at my direction and in my presence.  I have reviewed the chart and agree that the record reflects my personal performance and is accurate and complete.  Ben Benz MD, 2/26/2025, 2:35 PM

## 2025-02-27 ENCOUNTER — OFFICE VISIT (OUTPATIENT)
Dept: PHYSICAL THERAPY | Facility: HOSPITAL | Age: 89
End: 2025-02-27
Attending: INTERNAL MEDICINE
Payer: MEDICARE

## 2025-02-27 PROCEDURE — 97110 THERAPEUTIC EXERCISES: CPT

## 2025-02-27 PROCEDURE — 97140 MANUAL THERAPY 1/> REGIONS: CPT

## 2025-02-27 NOTE — PROGRESS NOTES
Patient: Tha Jolly (89 year old, male) Referring Provider:  Insurance:   Diagnosis: Osteoarthritis of multiple joints, unspecified osteoarthritis type (M15.9)  Chronic left shoulder pain (M25.512,G89.29) Nessa Palaniswami [employer]   Date of Surgery: na Next MD visit:  N/A   Precautions:  Fall Risk; Hearing Impairment; Visual Impairment uncertain Referral Information:    Date of Evaluation: Req: 12, Auth: 12, Exp: 5/1/2025 02/06/25 POC Auth Visits:  12       Today's Date   2/27/2025    Subjective  Pt reports no pain in the L shldr at rest but has pain 2/10 with walking and 1/10 using  OH pulley today. Also reports trying to do wall climb with the L hand with assist of the R hand, but that seems to have flared him up some. Instructed to stop if his pain is increasing with that ex.       Pain: 2/10     Objective    2/27/25  Shldr AROM:    Flexion: R 25; L 50  Abduction with flexed elbow: R 40; L 50  Seated ER in neutral:  R 45 deg;   L  to - 30 from neutral (post stretching)      Seated IR in neutral:  R to stomach; L to stomach       PROM L shldr   Flexion: ; L 80  Abduction: ; L 100 in pos  Seated ER in neutral: - 45  Seated IR in neutral: to stomach       Assessment  Continued with emphasis on ROM/strengthening ex. Noted improved PROM and AROM today compared to previous.    Goals (to be met in 12 visits)   Goals       Therapy Goals     1. Patient will be independent with HEP and it's progression  2. Patient will improve L shoulder AROM by 10-20 deg in all planes to promote use of L arm with functional ADL with dressing, grooming, and bathing.    3. Patient will improve L shoulder strength by 1/2- 1 muscle grade to promote use of L arm with functional ADL with dressing, grooming, and bathing.                 Plan  Plan to continue skilled PT to address jt restrictions, restore ROM, and progress with strengthening to achieve goals as outlined and to promote functional ADL.    Treatment  Last 4 Visits       2/6/2025 2/13/2025 2/24/2025 2/27/2025   PT Treatment   Treatment Day  2 3 4   Therapeutic Exercise TE  -Posture  ED: \"think tall\" or do sternal lift rather than shldr/scap retraction; avoid chin up position (OA ex),  -Shldr rolls backwards 5x2  -Scapula squeezes 5x2 (3 sec holds)  -Pendulum ex: circles cw/ccw; flex/ext; abd/add  -Seated arm slides on table 10x  TE  -Posture  ED: \"think tall\" or do sternal lift rather than shldr/scap retraction; avoid chin up position (OA ex),  -Shldr rolls backwards 5x2  -Scapula squeezes 5x2 (3 sec holds)  -Pendulum ex: circles cw/ccw; flex/ext; abd/add  -OH pulley: facing wall 25x;   -OH pulley: shldr unloading (2.5#)  20x;     -AAROM L shldr push outs fwd (SA)  AAROM L sldr abd  AAROM L shldr flex  Seated table slides fwd 20x  Seated table slides lateral 20x  Seated shldr isometrics with ball on wall: Flex (difficult, ball moves) 5x; Abd: ball at elbow 5x;  ER 5x   -seated swiss ball roll outs 20x         TE  -Shldr rolls backwards 5x2  -Scapula squeezes 5x2 (3 sec holds)  -OH pulley: facing wall 20x2;   -OH pulley: shldr unloading (2.5#)  20x;     -AROM L shldr punch outs fwd (SA) 10x;   -Seated table slides fwd 20x  -Seated table slides lateral 20x  -Seated place and hold L arm flexion: 3 sec x5 with assist of PT  -Seated AAROM L shldr ER 10x  -Seated AAROM L shldr Abd in POS 5x2;   -Seated AAROM L shldr Flex in 5x2;   -Seated shldr isometrics with ball on wall: Flex (difficult, ball moves) 5x; Abd: ball at elbow 5x;  ER 5x   -seated swiss ball roll outs 20x    -Shldr rolls backwards 5x2  -Scapula squeezes 5x2 (3 sec holds)  -OH pulley: facing wall 20x2;    -seated swiss ball roll outs 20x       -Seated table slides fwd 15x2  -Seated table slides lateral 15x2  -seated shldr protraction/retraction 10x ea       -Seated PROM L/R shldr ER 10x ea  -Seated AROM L/R shldr ER 15x ea     -Seated PROM L shldr Flex in 10x;  -Seated place and hold L arm flexion: 3 sec  x5 with assist of PT  -Seated PROM L shldr Abd in POS 5x2;   -Seated place and hold L arm Abd in POS: 3-5 sec x5 with assist of PT      -Seated shldr isometrics with ball on wall: Flex (difficult, ball moves) 5x; Abd: ball at elbow 5x;  ER 5x   -seated swiss ball roll outs 20x   -AROM L shldr punch outs fwd (SA) 10x;      Manual Therapy  MT    PROM L shldr all planes  L shldr jt mob: inf/post glides  jt mob L  A/C and S/C jts  L scap mob MT     PROM L shldr all planes  L shldr jt mob: inf/post glides  L scap mob   MT     PROM L shldr all planes  L shldr jt mob: inf/post glides  L scap mob   Therapeutic Exercise Min 10 30 30 35   Neuro Re-Ed Min  15     Manual Therapy Min   15 10   Evaluation Min 35      Total of Timed Procedures 10 45 45 45   Total of Service Based 35 0 0 0   Total Treatment Time 45 45 45 45         HEP  HEP  Shldr Isometrics for flex, Abd, and ER  Shldr rolls  Scapula squeezes  Pendulum ex   Seated arm slides on table        Charges     2TE;  1 MT

## 2025-03-03 ENCOUNTER — OFFICE VISIT (OUTPATIENT)
Dept: PHYSICAL THERAPY | Facility: HOSPITAL | Age: 89
End: 2025-03-03
Attending: INTERNAL MEDICINE
Payer: MEDICARE

## 2025-03-03 PROCEDURE — 97140 MANUAL THERAPY 1/> REGIONS: CPT

## 2025-03-03 PROCEDURE — 97110 THERAPEUTIC EXERCISES: CPT

## 2025-03-03 NOTE — PROGRESS NOTES
Patient: Tha Jolly (89 year old, male) Referring Provider:  Insurance:   Diagnosis: Osteoarthritis of multiple joints, unspecified osteoarthritis type (M15.9)  Chronic left shoulder pain (M25.512,G89.29) Purani Palaniswami [employer]   Date of Surgery: na Next MD visit:  N/A   Precautions:  Fall Risk; Hearing Impairment; Visual Impairment uncertain Referral Information:    Date of Evaluation: Req: 12, Auth: 12, Exp: 5/1/2025 02/06/25 POC Auth Visits:  12       Today's Date   3/3/2025    Subjective  Pt reports no pain with walking but had pain this morning with the L arm haning at the side, 3-4/10;  Reports no pain with OH pulley today. Reprots being able to place L hand in the front pocket now and previously was unable.       Pain: 3/10     Objective      3/3/25  Shldr AROM:    Flexion: R 25; L 50  Abduction with flexed elbow: R 40; L 30  Ext L 28   Seated ER in neutral:  R 45 deg;   L  to - 30 from neutral (post stretching)      Seated IR in neutral:  R to stomach; L to stomach       PROM L shldr   Flexion: ; L 110  Abduction: ; L 100 in pos  Seated ER in neutral: - 30  Seated IR in neutral: to stomach      Assessment  Continued with emphasis on ROM/strengthening ex. Continues to slowly improve with  PROM and AROM today compared to previous.    Goals (to be met in 12 visits)   Goals       Therapy Goals     1. Patient will be independent with HEP and it's progression  2. Patient will improve L shoulder AROM by 10-20 deg in all planes to promote use of L arm with functional ADL with dressing, grooming, and bathing.    3. Patient will improve L shoulder strength by 1/2- 1 muscle grade to promote use of L arm with functional ADL with dressing, grooming, and bathing.                 Plan  Plan to continue skilled PT to address jt restrictions, restore ROM, and progress with strengthening to achieve goals as outlined and to promote functional ADL.    Treatment Last 4 Visits       2/13/2025  2/24/2025 2/27/2025 3/3/2025   PT Treatment   Treatment Day 2 3 4 5   Therapeutic Exercise TE  -Posture  ED: \"think tall\" or do sternal lift rather than shldr/scap retraction; avoid chin up position (OA ex),  -Shldr rolls backwards 5x2  -Scapula squeezes 5x2 (3 sec holds)  -Pendulum ex: circles cw/ccw; flex/ext; abd/add  -OH pulley: facing wall 25x;   -OH pulley: shldr unloading (2.5#)  20x;     -AAROM L shldr push outs fwd (SA)  AAROM L sldr abd  AAROM L shldr flex  Seated table slides fwd 20x  Seated table slides lateral 20x  Seated shldr isometrics with ball on wall: Flex (difficult, ball moves) 5x; Abd: ball at elbow 5x;  ER 5x   -seated swiss ball roll outs 20x         TE  -Shldr rolls backwards 5x2  -Scapula squeezes 5x2 (3 sec holds)  -OH pulley: facing wall 20x2;   -OH pulley: shldr unloading (2.5#)  20x;     -AROM L shldr punch outs fwd (SA) 10x;   -Seated table slides fwd 20x  -Seated table slides lateral 20x  -Seated place and hold L arm flexion: 3 sec x5 with assist of PT  -Seated AAROM L shldr ER 10x  -Seated AAROM L shldr Abd in POS 5x2;   -Seated AAROM L shldr Flex in 5x2;   -Seated shldr isometrics with ball on wall: Flex (difficult, ball moves) 5x; Abd: ball at elbow 5x;  ER 5x   -seated swiss ball roll outs 20x    -Shldr rolls backwards 5x2  -Scapula squeezes 5x2 (3 sec holds)  -OH pulley: facing wall 20x2;    -seated swiss ball roll outs 20x       -Seated table slides fwd 15x2  -Seated table slides lateral 15x2  -seated shldr protraction/retraction 10x ea       -Seated PROM L/R shldr ER 10x ea  -Seated AROM L/R shldr ER 15x ea     -Seated PROM L shldr Flex in 10x;  -Seated place and hold L arm flexion: 3 sec x5 with assist of PT  -Seated PROM L shldr Abd in POS 5x2;   -Seated place and hold L arm Abd in POS: 3-5 sec x5 with assist of PT      -Seated shldr isometrics with ball on wall: Flex (difficult, ball moves) 5x; Abd: ball at elbow 5x;  ER 5x   -seated swiss ball roll outs 20x   -AROM L shldr  punch outs fwd (SA) 10x;    Shldr rolls backwards 10x2  -Scapula squeezes 10x (3 sec holds)  -OH pulley: facing wall 20x, and 10x2;     -seated swiss ball roll outs 20x       -Seated table slides fwd 20x2  -Seated table slides lateral 15x2  -seated shldr protraction/retraction 10x ea       -Seated PROM L shldr ER 10x ea  -Seated AROM L  shldr ER 15x ea  -seated manually resisted ER (min resistance)5x     -Seated PROM L shldr Flex in 10x;  -Seated place and hold L arm flexion: 3 sec x5 with assist of PT  -Seated PROM L shldr Abd in POS 5x2;   -Seated place and hold L arm Abd in POS: 3-5 sec x5 with assist of PT      -Seated shldr isometrics with ball on wall:  5 sec/10x ea: Flex,  Abd:  ER    -seated swiss ball roll outs 20x 2;  -AROM L shldr punch outs fwd (SA) 10x;   -manually resisted L shldr punch outs 10x2   Manual Therapy MT    PROM L shldr all planes  L shldr jt mob: inf/post glides  jt mob L  A/C and S/C jts  L scap mob MT     PROM L shldr all planes  L shldr jt mob: inf/post glides  L scap mob   MT     PROM L shldr all planes  L shldr jt mob: inf/post glides  L scap mob MT     PROM L shldr all planes  L shldr jt mob: inf/post glides  L scap mob     Therapeutic Exercise Min 30 30 35 35   Neuro Re-Ed Min 15      Manual Therapy Min  15 10 10   Total of Timed Procedures 45 45 45 45   Total of Service Based 0 0 0 0   Total Treatment Time 45 45 45 45         HEP  HEP  Shldr Isometrics for flex, Abd, and ER  Shldr rolls  Scapula squeezes  Pendulum ex   Seated arm slides on table      Charges     2TE; 1MT

## 2025-03-04 ENCOUNTER — OFFICE VISIT (OUTPATIENT)
Dept: PODIATRY CLINIC | Facility: CLINIC | Age: 89
End: 2025-03-04

## 2025-03-04 DIAGNOSIS — M79.672 PAIN IN BOTH FEET: ICD-10-CM

## 2025-03-04 DIAGNOSIS — M20.5X1 CROSSOVER TOE DEFORMITY OF RIGHT FOOT: ICD-10-CM

## 2025-03-04 DIAGNOSIS — M21.6X2 PLANTAR FAT PAD ATROPHY OF LEFT FOOT: ICD-10-CM

## 2025-03-04 DIAGNOSIS — M19.071 ARTHRITIS OF BOTH FEET: ICD-10-CM

## 2025-03-04 DIAGNOSIS — R26.81 GAIT INSTABILITY: ICD-10-CM

## 2025-03-04 DIAGNOSIS — M79.671 PAIN IN BOTH FEET: ICD-10-CM

## 2025-03-04 DIAGNOSIS — B35.1 ONYCHOMYCOSIS: Primary | ICD-10-CM

## 2025-03-04 DIAGNOSIS — M19.072 ARTHRITIS OF BOTH FEET: ICD-10-CM

## 2025-03-04 PROCEDURE — 1126F AMNT PAIN NOTED NONE PRSNT: CPT | Performed by: STUDENT IN AN ORGANIZED HEALTH CARE EDUCATION/TRAINING PROGRAM

## 2025-03-04 PROCEDURE — 99213 OFFICE O/P EST LOW 20 MIN: CPT | Performed by: STUDENT IN AN ORGANIZED HEALTH CARE EDUCATION/TRAINING PROGRAM

## 2025-03-04 PROCEDURE — 1159F MED LIST DOCD IN RCRD: CPT | Performed by: STUDENT IN AN ORGANIZED HEALTH CARE EDUCATION/TRAINING PROGRAM

## 2025-03-04 NOTE — PROGRESS NOTES
[facility] Podiatry  Progress Note      Tha Jolly is a 89 year old male.   Chief Complaint   Patient presents with    Toenail Care     5 mo f/u - LOV with PCP Dr Almanza was 7/22/24 - has a skin infection in his feet and he is treated by the dermatologist - has pain in his toes on and off rated as 3-4/10              HPI:     Pt is a pleasant 89 year old male who PTC for cristiano foot evaluation. Admits to elongate toenails he is unable to trim. Denies any pedal injuries or trauma.       Allergies: Demerol [meperidine], Hylan g-f 20, Ibuprofen, Penicillins, Valdecoxib, Celecoxib, Hydrochlorothiazide, and Triamterene    Current Outpatient Medications   Medication Sig Dispense Refill    ammonium lactate 12 % External Lotion Apply 1 Application topically 2 (two) times daily for 60 doses. Use twice daily . Take breaks if experiencing burning. Mix with cobetasol. 225 g 11    clobetasol 0.05 % External Ointment Apply to the affected areas twice daily Monday-Friday. Take weekends off. Advised to AVOID on face, under breasts, underarms and groin. 60 g 3    traMADol 50 MG Oral Tab Take 1 tablet (50 mg total) by mouth every 6 (six) hours as needed for Pain. 120 tablet 5    amLODIPine 2.5 MG Oral Tab Take 1 tablet (2.5 mg total) by mouth daily. 90 tablet 3    HYDROcodone-acetaminophen 5-325 MG Oral Tab Take 1 tablet by mouth every 12 (twelve) hours as needed for Pain (Do not exceed 8 tabs per day.). 8 tablet 0    tamsulosin 0.4 MG Oral Cap TAKE 1 CAPSULE BY MOUTH ONE-HALF HOUR AFTER SUPPER DAILY 90 capsule 3    allopurinol 100 MG Oral Tab Take 1 tablet (100 mg total) by mouth daily with dinner. 90 tablet 3    metoprolol succinate ER 25 MG Oral Tablet 24 Hr Take 1 tablet (25 mg total) by mouth daily. 90 tablet 3    ferrous sulfate 325 (65 FE) MG Oral Tab EC Take 1 tablet (325 mg total) by mouth daily with breakfast.      Multiple Vitamins-Minerals (PRESERVISION AREDS 2 OR) Take 1 capsule by mouth in the morning and 1  capsule before bedtime.      cetirizine 10 MG Oral Tab Take 1 tablet (10 mg total) by mouth daily as needed for Allergies (allergies).      senna-docusate 8.6-50 MG Oral Tab Take 2 tablets by mouth in the morning and 2 tablets before bedtime.      cholecalciferol 25 MCG (1000 UT) Oral Cap Take 1 capsule (1,000 Units total) by mouth nightly. Vitamin D3 1,000 unit capsule      Multiple Vitamin (MULTI-VITAMINS) Oral Tab Take 1 tablet by mouth nightly. take 1 tablet by ORAL route  every day with food        Past Medical History:    Actinic keratosis    Right lateral shin    Anxiety state    Aortic atherosclerosis    CXR 11-14    Aortic stenosis    Echo 3-23 with normal LV function, EF 55-60%, moderate LAE, moderate aortic stenosis with peak gradient 39 mmHg and mean gradient 25 mmHg, mild-moderate mitral stenosis, mild MR    BPH (benign prostatic hyperplasia)    COPD (chronic obstructive pulmonary disease) ([facility])    CXR 11-14    Diverticulosis    Colonoscopy 2-12    Essential hypertension    History of blood transfusion    no reactions    History of stomach ulcers    History of vertebral fracture    T7, T8, T9, T11, T12, L1 secondary to fall; s/p kyphoplasty T7, T8, T9 6-23    Hx of adenomatous colonic polyps    Hx of melanoma of skin    forehead    Iron deficiency anemia    Lumbar spinal stenosis    Macular degeneration    per NG    Primary osteoarthritis of knees, bilateral    Status post right TKA 11-14; s/p left TKA 4-23    Pulmonary hypertension (HCC)    Echo 5-18 with pulmonary artery pressure 45 mmHg    Visual impairment    glasses      Past Surgical History:   Procedure Laterality Date    Appendectomy      per NG    Arthroscopy, ankle, surgical; w/ankle arthrodesis Right     \"Arthrocentesis of the right ankle joint (2-3)\"; per NG    Colonoscopy  2005    per NG    Hip replacement surgery Right 2002    per NG    Ir kyphoplasty  06/12/2023    T7, T8, T9    Knee arthroscopy Bilateral     per NG    Other Right  07/15/2018    ORIF proximal right humerus fracture requiring a biceps tenodesis    Other  2018    Incision and drainage of right index finger complicated abscess, revision amputation of right index finger.    Total knee arthroplasty Left 2023    Total knee replacement Right 2014      Family History   Problem Relation Age of Onset    Arthritis Father         per NG    Other (Lung Cancer) Father          age 55    Hypertension Mother         per NG    Diabetes Neg     Glaucoma Neg     Macular degeneration Neg       Social History     Socioeconomic History    Marital status:    Tobacco Use    Smoking status: Former     Types: Pipe     Quit date: 1985     Years since quittin.1     Passive exposure: Past    Smokeless tobacco: Never   Vaping Use    Vaping status: Never Used   Substance and Sexual Activity    Alcohol use: Yes     Alcohol/week: 5.8 standard drinks of alcohol     Types: 7 Glasses of wine per week     Comment: 1-2 drinks daily    Drug use: No    Sexual activity: Not Currently   Other Topics Concern    Pt has a pacemaker No    Pt has a defibrillator No    Reaction to local anesthetic No           REVIEW OF SYSTEMS:     Denies nause, fever, chills  No calf pain  Denies chest pain or SOB      EXAM:   There were no vitals taken for this visit.  GENERAL: well developed, well nourished, in no apparent distress  EXTREMITIES:   1. Integument: Normal skin temperature and turgor. Toenails x10 are elongated, thickened and discolored with subungal derbi. Right 2nd digit crossover right hallux. No wounds or signs of infection.     2. Vascular: Dorsalis pedis two out of four bilateral and posterior tibial pulses two out of   four bilateral, capillary refill normal.   3. Musculoskeletal: All muscle groups are graded 5 out of 5 in the foot and ankle.   4. Neurological: Normal sharp dull sensation; reflexes normal.             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  -Discussed importance of proper pedal hygiene, regular foot checks, and tight glucose control.  -Sharply debrided nails x10 with a sterile nail nipper achieving a 20% reduction in thickness and length, without incident.   -advised pt to purchase medium sized silicone toe spacer to place between right 2nd digit and hallux.   -Ambulate with supportive shoes and inserts and avoid walking barefoot.  -Educated patient on acute signs of infection advised patient to seek immediate medical attention if symptoms arise.    RTC in 3 months      The patient indicates understanding of these issues and agrees to the plan.        Maggy Chavez DPM

## 2025-03-10 RX ORDER — METOPROLOL SUCCINATE 25 MG/1
25 TABLET, EXTENDED RELEASE ORAL DAILY
Qty: 90 TABLET | Refills: 0 | Status: SHIPPED | OUTPATIENT
Start: 2025-03-10

## 2025-03-10 NOTE — TELEPHONE ENCOUNTER
Refill passed per Clinic protocol.    Sending 90 day courtesy fill   Future Appointments   Date Time Provider Department Center   3/20/2025 12:40 PM [facility]iel[facility] Batista MD [facility]       Requested Prescriptions   Pending Prescriptions Disp Refills    METOPROLOL SUCCINATE ER 25 MG Oral Tablet 24 Hr [Pharmacy Med Name: Metoprolol Succinate Er 24hr 25 Mg Tab Nort] 90 tablet 0     Sig: TAKE ONE TABLET BY MOUTH ONE TIME DAILY       Hypertension Medications Protocol Passed - 3/10/2025  8:07 AM        Passed - CMP or BMP in past 12 months        Passed - Last BP reading less than 140/90     BP Readings from Last 1 Encounters:   01/08/25 131/55               Passed - In person appointment or virtual visit in the past 12 mos or appointment in next 3 mos     Recent Outpatient Visits              6 days ago Onychomycosis    [facility] Maggy Chavez DPM    Office Visit    1 week ago     [facility] Hortencia Vega, PT    Office Visit    1 week ago     [facility] H[facility], PT    Office Visit    1 week ago Lentigines    [facility] Ben Benz MD    Office Visit    2 weeks ago     [facility] H[facility], PT    Office Visit          Future Appointments         Provider Department Appt Notes    In 1 week Hortencia Vega, PT [facility] 12 visits 2/6 to 5/1  [facility] [place]  c/p $20, no limit    In 1 week Cielo Batista MD [facility] Physical    In 3 weeks Hortencia Vega, PT [facility] 12 visits 2/6 to 5/1  [facility] [place]  c/p $20, no limit                    Passed - EGFRCR or GFRNAA > 50     GFR Evaluation  EGFRCR: 83 , resulted on 8/11/2024          Passed - Medication is active on med list

## 2025-03-11 ENCOUNTER — TELEPHONE (OUTPATIENT)
Dept: INTERNAL MEDICINE CLINIC | Facility: CLINIC | Age: 89
End: 2025-03-11

## 2025-03-11 DIAGNOSIS — G89.29 CHRONIC LEFT SHOULDER PAIN: Primary | ICD-10-CM

## 2025-03-11 DIAGNOSIS — M15.9 OSTEOARTHRITIS OF MULTIPLE JOINTS, UNSPECIFIED OSTEOARTHRITIS TYPE: ICD-10-CM

## 2025-03-11 DIAGNOSIS — M25.512 CHRONIC LEFT SHOULDER PAIN: Primary | ICD-10-CM

## 2025-03-11 NOTE — TELEPHONE ENCOUNTER
New patient to Dr. Batista , previous with Dr. Guy Almanza is requesting a referral:    Patient is requesting referral.     Name of specialist and specialty department :   Dr. Edmar Waldrop, [facility]    Reason for visit with the specialist:  Left shoulder arthritis    Address of the specialist office:   [address] [place] [address]  [id number]    Phone # [phone]    FAX # [phone]    Appointment date:   None       CSS informed patient the turnaround time for referral is 5-7 business days.  Patient was informed to check their [employer] account for referral status.

## 2025-03-11 NOTE — TELEPHONE ENCOUNTER
Dr Batista,     Patient called requesting referral to Dr. Waldrop.     Pended referral please review diagnosis and sign off if you agree.    Thank you.  Annabelle Duncan  [facility]

## 2025-03-11 NOTE — TELEPHONE ENCOUNTER
Patient called states that he is now seeing an ortho doctor at [facility], they would like imaging and reports for XR patient has had with our organization. He was advised to call Medical Records Department as he states they will need the discs and he does not drive, # provided and patient transferred to Medical Records. Patient verbalized understanding. No further questions or concerns at this time.

## 2025-03-11 NOTE — TELEPHONE ENCOUNTER
Spoke to patient, verified Name and . Relayed referral to Dr. Waldrop was signed by PCP, verbalized understanding. States if he needs the result of the x-ray, where is he going to get a copy. Advised to request copy of imaging result from Medical Records. States he will call them tomorrow for this. No further questions or concerns at this time.

## 2025-03-19 ENCOUNTER — OFFICE VISIT (OUTPATIENT)
Dept: PHYSICAL THERAPY | Facility: HOSPITAL | Age: 89
End: 2025-03-19
Attending: INTERNAL MEDICINE
Payer: MEDICARE

## 2025-03-19 PROCEDURE — 97110 THERAPEUTIC EXERCISES: CPT

## 2025-03-19 PROCEDURE — 97140 MANUAL THERAPY 1/> REGIONS: CPT

## 2025-03-19 NOTE — TELEPHONE ENCOUNTER
Patient is requesting     allopurinol 100 MG Oral Tab       [id number] - [place], [place] - [address] [phone], [phone]

## 2025-03-19 NOTE — PROGRESS NOTES
Patient: Tha Jolly (89 year old, male) Referring Provider:  Insurance:   Diagnosis: Osteoarthritis of multiple joints, unspecified osteoarthritis type (M15.9)  Chronic left shoulder pain (M25.512,G89.29) Purani Palaniswami [employer]   Date of Surgery: na Next MD visit:  N/A   Precautions:  Fall Risk; Hearing Impairment; Visual Impairment uncertain Referral Information:    Date of Evaluation: Req: 12, Auth: 12, Exp: 5/1/2025 02/06/25 POC Auth Visits:  12       Today's Date   3/19/2025    Subjective  Pt reports no pain at rest but pain is 2-3/10 with doing the pulley.  Reports difficulty again with placing the L hand in the front pocket.       Pain: 3/10     Objective         3/19/25  Shldr AROM:  measured in sitting:   Flexion: R 25; L 50  Abduction with flexed elbow: R 40; L 35  Ext L 28   Seated ER in neutral:  R 45 deg;   L  to -20 (aarom)  from neutral (post stretching)      Seated IR in neutral:  R to stomach; L to stomach       PROM L shldr  measured in sitting:   Flexion: ; L 110  Abduction: ; L 105 in pos  Seated ER in neutral: -20  Seated IR in neutral: to stomach          Assessment  Continued with emphasis on ROM/strengthening ex. Continues to slowly improve with  PROM and AROM today compared to previous.    Goals (to be met in 12 visits)   Therapy Goals        1. Patient will be independent with HEP and it's progression  2. Patient will improve L shoulder AROM by 10-20 deg in all planes to promote use of L arm with functional ADL with dressing, grooming, and bathing.    3. Patient will improve L shoulder strength by 1/2- 1 muscle grade to promote use of L arm with functional ADL with dressing, grooming, and bathing.         Plan  Plan to continue skilled PT to address jt restrictions, restore ROM, and progress with strengthening to achieve goals as outlined and to promote functional ADL.    Treatment Last 4 Visits       2/24/2025 2/27/2025 3/3/2025 3/19/2025   PT Treatment    Treatment Day 3 4 5 6   Therapeutic Exercise TE  -Shldr rolls backwards 5x2  -Scapula squeezes 5x2 (3 sec holds)  -OH pulley: facing wall 20x2;   -OH pulley: shldr unloading (2.5#)  20x;     -AROM L shldr punch outs fwd (SA) 10x;   -Seated table slides fwd 20x  -Seated table slides lateral 20x  -Seated place and hold L arm flexion: 3 sec x5 with assist of PT  -Seated AAROM L shldr ER 10x  -Seated AAROM L shldr Abd in POS 5x2;   -Seated AAROM L shldr Flex in 5x2;   -Seated shldr isometrics with ball on wall: Flex (difficult, ball moves) 5x; Abd: ball at elbow 5x;  ER 5x   -seated swiss ball roll outs 20x    -Shldr rolls backwards 5x2  -Scapula squeezes 5x2 (3 sec holds)  -OH pulley: facing wall 20x2;    -seated swiss ball roll outs 20x       -Seated table slides fwd 15x2  -Seated table slides lateral 15x2  -seated shldr protraction/retraction 10x ea       -Seated PROM L/R shldr ER 10x ea  -Seated AROM L/R shldr ER 15x ea     -Seated PROM L shldr Flex in 10x;  -Seated place and hold L arm flexion: 3 sec x5 with assist of PT  -Seated PROM L shldr Abd in POS 5x2;   -Seated place and hold L arm Abd in POS: 3-5 sec x5 with assist of PT      -Seated shldr isometrics with ball on wall: Flex (difficult, ball moves) 5x; Abd: ball at elbow 5x;  ER 5x   -seated swiss ball roll outs 20x   -AROM L shldr punch outs fwd (SA) 10x;    Shldr rolls backwards 10x2  -Scapula squeezes 10x (3 sec holds)  -OH pulley: facing wall 20x, and 10x2;     -seated swiss ball roll outs 20x       -Seated table slides fwd 20x2  -Seated table slides lateral 15x2  -seated shldr protraction/retraction 10x ea       -Seated PROM L shldr ER 10x ea  -Seated AROM L  shldr ER 15x ea  -seated manually resisted ER (min resistance)5x     -Seated PROM L shldr Flex in 10x;  -Seated place and hold L arm flexion: 3 sec x5 with assist of PT  -Seated PROM L shldr Abd in POS 5x2;   -Seated place and hold L arm Abd in POS: 3-5 sec x5 with assist of PT      -Seated shldr  isometrics with ball on wall:  5 sec/10x ea: Flex,  Abd:  ER    -seated swiss ball roll outs 20x 2;  -AROM L shldr punch outs fwd (SA) 10x;   -manually resisted L shldr punch outs 10x2    Manual Therapy MT     PROM L shldr all planes  L shldr jt mob: inf/post glides  L scap mob   MT     PROM L shldr all planes  L shldr jt mob: inf/post glides  L scap mob MT     PROM L shldr all planes  L shldr jt mob: inf/post glides  L scap mob      Therapeutic Exercise Min 30 35 35    Manual Therapy Min 15 10 10    Total of Timed Procedures 45 45 45    Total of Service Based 0 0 0    Total Treatment Time 45 45 45           2/24/2025 2/27/2025 3/3/2025 3/19/2025   UE Treatment   Treatment Day 3 4 5 6   Therapeutic Exercise    TE  Shldr rolls backwards 10x2  -Scapula squeezes 10x (3 sec holds)  -OH pulley: facing wall 10x3;      -seated swiss ball roll outs 10x2        -Seated table slides fwd 10x2  -Seated table slides lateral 10x2  -seated shldr protraction/retraction 10x2 ea         -Seated PROM L shldr ER 10x ea  -Seated AROM L  shldr ER 15x ea  -seated manually resisted ER (min resistance)5x     -Seated PROM L shldr Flex in 10x;  -Seated place and hold L arm flexion: 3 sec x5 with assist of PT  -Seated PROM L shldr Abd in POS 5x2;   -Seated place and hold L arm Abd in POS: 3-5 sec x5 with assist of PT        -Seated shldr isometrics with ball on wall:  5 sec/5x ea: Flex,  Abd:  ER    -seated swiss ball roll outs 20x 2;  -AROM L shldr punch outs fwd (SA) 10x;   -manually resisted L shldr punch outs 10x2    MT     PROM L shldr all planes  L shldr jt mob: inf/post glides  L scap mob     Therapeutic Exercise Minutes    35   Manual Therapy Minutes    10   Total Time Of Timed Procedures    45   Total Time Of Service-Based Procedures    0   Total Treatment Time    45   HEP    HEP  Shldr Isometrics for flex, Abd, and ER  Shldr rolls  Scapula squeezes  Pendulum ex   Seated arm slides on table          HEP  HEP  Shldr Isometrics for  flex, Abd, and ER  Shldr rolls  Scapula squeezes  Pendulum ex   Seated arm slides on table      Charges  2TE; 1 MT

## 2025-03-20 ENCOUNTER — OFFICE VISIT (OUTPATIENT)
Dept: INTERNAL MEDICINE CLINIC | Facility: CLINIC | Age: 89
End: 2025-03-20

## 2025-03-20 VITALS
WEIGHT: 161 LBS | OXYGEN SATURATION: 95 % | HEART RATE: 60 BPM | TEMPERATURE: 97 F | RESPIRATION RATE: 16 BRPM | DIASTOLIC BLOOD PRESSURE: 65 MMHG | SYSTOLIC BLOOD PRESSURE: 116 MMHG | HEIGHT: 66.5 IN | BODY MASS INDEX: 25.57 KG/M2

## 2025-03-20 DIAGNOSIS — Z12.5 ENCOUNTER FOR PROSTATE CANCER SCREENING: ICD-10-CM

## 2025-03-20 DIAGNOSIS — D50.9 IRON DEFICIENCY ANEMIA, UNSPECIFIED IRON DEFICIENCY ANEMIA TYPE: ICD-10-CM

## 2025-03-20 DIAGNOSIS — M10.9 GOUT, UNSPECIFIED CAUSE, UNSPECIFIED CHRONICITY, UNSPECIFIED SITE: ICD-10-CM

## 2025-03-20 DIAGNOSIS — Z12.11 ENCOUNTER FOR SCREENING COLONOSCOPY: ICD-10-CM

## 2025-03-20 DIAGNOSIS — M15.9 OSTEOARTHRITIS OF MULTIPLE JOINTS, UNSPECIFIED OSTEOARTHRITIS TYPE: ICD-10-CM

## 2025-03-20 DIAGNOSIS — Z00.01 ENCOUNTER FOR GENERAL ADULT MEDICAL EXAMINATION WITH ABNORMAL FINDINGS: Primary | ICD-10-CM

## 2025-03-20 DIAGNOSIS — I70.0 AORTIC ATHEROSCLEROSIS: ICD-10-CM

## 2025-03-20 DIAGNOSIS — J44.9 CHRONIC OBSTRUCTIVE PULMONARY DISEASE, UNSPECIFIED COPD TYPE (HCC): ICD-10-CM

## 2025-03-20 DIAGNOSIS — N40.1 BENIGN PROSTATIC HYPERPLASIA WITH POST-VOID DRIBBLING: ICD-10-CM

## 2025-03-20 DIAGNOSIS — I27.20 PULMONARY HYPERTENSION (HCC): ICD-10-CM

## 2025-03-20 DIAGNOSIS — N39.43 BENIGN PROSTATIC HYPERPLASIA WITH POST-VOID DRIBBLING: ICD-10-CM

## 2025-03-20 DIAGNOSIS — I35.0 AORTIC VALVE STENOSIS, ETIOLOGY OF CARDIAC VALVE DISEASE UNSPECIFIED: ICD-10-CM

## 2025-03-20 DIAGNOSIS — I10 ESSENTIAL HYPERTENSION: ICD-10-CM

## 2025-03-20 DIAGNOSIS — G89.29 CHRONIC LEFT SHOULDER PAIN: ICD-10-CM

## 2025-03-20 DIAGNOSIS — M25.512 CHRONIC LEFT SHOULDER PAIN: ICD-10-CM

## 2025-03-20 DIAGNOSIS — H35.30 AGE-RELATED MACULAR DEGENERATION: ICD-10-CM

## 2025-03-20 RX ORDER — TAMSULOSIN HYDROCHLORIDE 0.4 MG/1
CAPSULE ORAL
Qty: 90 CAPSULE | Refills: 1 | Status: SHIPPED | OUTPATIENT
Start: 2025-03-20

## 2025-03-20 NOTE — PROGRESS NOTES
REASON FOR VISIT      Tha Jolly is a 89 year old male who presents for  MA AHA (Medicare Advantage Annual Health Assessment) and Subsequent Annual Wellness visit (Pt already had Initial Annual Wellness) and scheduled follow up of multiple significant but stable problems.     HCM   - colon: Aged out  - PSA: Aged out  - labs: Ordered today  - imm: Flu shot? COVID booster? Shingles? PNA?  Did not get a flu shot for the [id number] season, last COVID shot done 10/6/2023, has gotten Pneumovax 23, did not get Prevnar.  Tetanus up-to-date, needs shingles (states that he never got chicken pox), needs RSV.  - depression:    Patient is following with Dr. Waldrop of orthopedics for shoulder issues.  Furthermore, lastly rheumatology on 1/8/2025 for ongoing osteoarthritis of the knees.  Continue taking tramadol for pain.  Has a history of bilateral knee replacements, shoulder issues.    Last saw podiatry 3/4/2025 for onychomycosis.  Had toenail care done at that visit.    Saw dermatology on 2/6/2025 for skin check.    Hypertension.  Patient was restarted on amlodipine 2.5 mg at an office visit on 5/14/2024.  Metoprolol was continued.  Blood pressure was rechecked at office visit on 7/22/2024, within normal limits.  Blood pressure 116/65 today.    Aortic stenosis.  Last echo was done in 2018 with mild LVH, EF 55%, mild aortic stenosis with a mean gradient of 13, peak gradient 24. Patient as asymptomatic.     COPD.  Asymptomatic.    Aortic atherosclerosis seen on previous imaging.  Asymptomatic.    Pulmonary hypertension, noted to have elevated right-sided pressures, 45 mmHg. asymptomatic.    Gout. Patient continues on allopurinol 100 mg daily. Does not recall his last gout flare.     TINY. Was on iron supplementation at some point.     Patient has nocturia. Started 6 months ago. He urinates multiple times at night, 2-3 times at night. No other LUTS.         Past Medical History     Past Medical History:    Actinic keratosis     Right lateral shin    Anxiety state    Aortic atherosclerosis    CXR     Aortic stenosis    Echo 3-23 with normal LV function, EF 55-60%, moderate LAE, moderate aortic stenosis with peak gradient 39 mmHg and mean gradient 25 mmHg, mild-moderate mitral stenosis, mild MR    BPH (benign prostatic hyperplasia)    COPD (chronic obstructive pulmonary disease) ([facility])    CXR     Diverticulosis    Colonoscopy 2-12    Essential hypertension    History of blood transfusion    no reactions    History of stomach ulcers    History of vertebral fracture    T7, T8, T9, T11, T12, L1 secondary to fall; s/p kyphoplasty T7, T8, T9 6-    Hx of adenomatous colonic polyps    Hx of melanoma of skin    forehead    Iron deficiency anemia    Lumbar spinal stenosis    Macular degeneration    per NG    Primary osteoarthritis of knees, bilateral    Status post right TKA ; s/p left TKA     Pulmonary hypertension ([facility])    Echo  with pulmonary artery pressure 45 mmHg    Visual impairment    glasses        Past Surgical History     Past Surgical History:   Procedure Laterality Date    Appendectomy      per NG    Arthroscopy, ankle, surgical; w/ankle arthrodesis Right     \"Arthrocentesis of the right ankle joint (2-3)\"; per NG    Colonoscopy      per NG    Hip replacement surgery Right     per NG    Ir kyphoplasty  2023    T7, T8, T9    Knee arthroscopy Bilateral     per NG    Other Right 07/15/2018    ORIF proximal right humerus fracture requiring a biceps tenodesis    Other  2018    Incision and drainage of right index finger complicated abscess, revision amputation of right index finger.    Total knee arthroplasty Left 2023    Total knee replacement Right 2014        Family History     Family History   Problem Relation Age of Onset    Arthritis Father         per NG    Other (Lung Cancer) Father          age 55    Hypertension Mother         per NG    Diabetes Neg     Glaucoma Neg     Macular  degeneration Neg        Social History     Social History     Socioeconomic History    Marital status:    Tobacco Use    Smoking status: Former     Types: Pipe     Quit date: 1985     Years since quittin.2     Passive exposure: Past    Smokeless tobacco: Never   Vaping Use    Vaping status: Never Used   Substance and Sexual Activity    Alcohol use: Yes     Alcohol/week: 5.8 standard drinks of alcohol     Types: 7 Glasses of wine per week     Comment: 1-2 drinks daily    Drug use: No    Sexual activity: Not Currently   Other Topics Concern    Pt has a pacemaker No    Pt has a defibrillator No    Reaction to local anesthetic No     Social Drivers of Health     Food Insecurity: No Food Insecurity (3/20/2025)    NCSS - Food Insecurity     Worried About Running Out of Food in the Last Year: No     Ran Out of Food in the Last Year: No   Transportation Needs: No Transportation Needs (3/20/2025)    NCSS - Transportation     Lack of Transportation: No   Housing Stability: Not At Risk (3/20/2025)    NCSS - Housing/Utilities     Has Housing: Yes     Worried About Losing Housing: No     Unable to Get Utilities: No       Tobacco:   She currently has tobacco smoking, smokeless, or e-cigarette status listed as never assessed or unknown.    Please update the information in the Tobacco history section to reflect the true status, and refresh this smartlink.    CAGE Alcohol Screen:   Cage screening not needed because reported NO to Alcohol use on social history.    Depression Screening (PHQ):  PHQ-2/9 not done in last week, please ask questions. Last done              GUILLERMINA-7 Total Score                 Allergies     Allergies[1]    Current Medications     Current Outpatient Medications   Medication Sig Dispense Refill    metoprolol succinate ER 25 MG Oral Tablet 24 Hr Take 1 tablet (25 mg total) by mouth daily. 90 tablet 0    traMADol 50 MG Oral Tab Take 1 tablet (50 mg total) by mouth every 6 (six) hours as needed for  Pain. 120 tablet 5    amLODIPine 2.5 MG Oral Tab Take 1 tablet (2.5 mg total) by mouth daily. 90 tablet 3    allopurinol 100 MG Oral Tab Take 1 tablet (100 mg total) by mouth daily with dinner. 90 tablet 3    ferrous sulfate 325 (65 FE) MG Oral Tab EC Take 1 tablet (325 mg total) by mouth daily with breakfast.      cholecalciferol 25 MCG (1000 UT) Oral Cap Take 1 capsule (1,000 Units total) by mouth nightly. Vitamin D3 1,000 unit capsule      Multiple Vitamin (MULTI-VITAMINS) Oral Tab Take 1 tablet by mouth nightly. take 1 tablet by ORAL route  every day with food      clobetasol 0.05 % External Ointment Apply to the affected areas twice daily Monday-Friday. Take weekends off. Advised to AVOID on face, under breasts, underarms and groin. (Patient not taking: Reported on 3/20/2025) 60 g 3    tamsulosin 0.4 MG Oral Cap TAKE 1 CAPSULE BY MOUTH ONE-HALF HOUR AFTER SUPPER DAILY 90 capsule 3    cetirizine 10 MG Oral Tab Take 1 tablet (10 mg total) by mouth daily as needed for Allergies (allergies). (Patient not taking: Reported on 3/20/2025)      senna-docusate 8.6-50 MG Oral Tab Take 2 tablets by mouth in the morning and 2 tablets before bedtime. (Patient not taking: Reported on 3/20/2025)       No current facility-administered medications for this visit.       Screenings     History/Other:   Fall Risk Assessment:    (Incomplete)        Cognitive Assessment:    (Incomplete)  Tip  Cognitive assessment incomplete. Refresh to ensure screening pulls in; if not,click link above to assess, then refresh:[id number]}      Functional Ability/Status:    (Incomplete)      Immunization History   Administered Date(s) Administered    Covid-19 Vaccine Pfizer 30 mcg/0.3 ml 03/05/2021, 03/26/2021, 10/30/2021, 10/14/2022, 05/16/2023    Covid-19 Vaccine Pfizer Bivalent 30mcg/0.3mL 10/14/2022, 05/16/2023    Influenza Vaccine, Preserv Free 11/17/2010, 01/12/2012    Pfizer Covid-19 Vaccine 30mcg/0.3ml 12yrs+ 10/06/2023    Pneumovax 23 11/22/2021     TDAP 03/30/2022    Tb Intradermal Test 04/07/2023, 04/16/2023       Health Maintenance   Topic Date Due    Zoster Vaccines (1 of 2) Never done    Pneumococcal Vaccine: 50+ Years (2 of 2 - PCV) 11/22/2022    COVID-19 Vaccine (9 - 2024-25 season) 09/01/2024    Influenza Vaccine (1) 10/01/2024    Annual Well Visit  01/01/2025    Annual Depression Screening  01/01/2025    Fall Risk Screening (Annual)  01/01/2025    Meningococcal B Vaccine  Aged Out         Review of Systems     GENERAL HEALTH: feels well otherwise  SKIN: denies any unusual skin lesions or rashes  RESPIRATORY: denies shortness of breath with exertion  CARDIOVASCULAR: denies chest pain on exertion  GI: denies abdominal pain and denies heartburn  : denies any burning with urination, urinary frequency or urgency  NEURO: denies headaches, numbness or tingling, mental status changes  PSYCH: denies depressed mood, anxiety  MUSC: denies muscle aches, joint pain      Physical Exam     /65 (BP Location: Left arm, Patient Position: Sitting, Cuff Size: adult)   Pulse 60   Temp 96.8 °F (36 °C) (Temporal)   Resp 16   Ht 5' 6.5\" (1.689 m)   Wt 161 lb (73 kg)   SpO2 95%   BMI 25.60 kg/m²   >   BP Readings from Last 3 Encounters:   03/20/25 116/65   01/08/25 131/55   08/22/24 103/61       GENERAL: well developed, well nourished, in no apparent distress  SKIN: no rashes, no suspicious lesions  HEENT: atraumatic, normocephalic, ears and throat are clear                Hearing Assessed via: Finger Rub  EYES: PERRLA, EOMI, conjunctiva are clear    CHEST: no chest tenderness  LUNGS: clear to auscultation  CARDIO: RRR without murmur  GI: good BS's, no masses, HSM or tenderness  : two descended testicles, no masses, no hernia and no penile lesions  RECTAL: deferred  MUSCULOSKELETAL: back is not tender, FROM of the back  EXTREMITIES: no cyanosis, clubbing or edema  NEURO: Oriented times three, cranial nerves are intact, motor and sensory are grossly  intact  FOOT: deferred      Assessment and Plan     Tha Jolly is a 89 year old male who presents for an Annual Health Assessment.     Assessment & Plan  Encounter for general adult medical examination with abnormal findings  Age appropriate screenings and vaccinations discussed. Counseled on healthy diet, exercise, sleep hygiene. Patient advised that any additional concerns not included in a routine physical may result in additional charges and/or a copay. Patient verbally acknowledged this information and agreed to proceed.    Orders:    CBC With Differential With Platelet; Future    Comp Metabolic Panel (14); Future    Lipid Panel; Future    TSH W Reflex To Free T4; Future    Uric Acid; Future    Ferritin [E]; Future    Encounter for screening colonoscopy  Patient has elected to forgo routine colon cancer screening due to age, which is appropriate. Continue to observe for any in bowel habits, blood in the stool, etc.        Encounter for prostate cancer screening  Aged out        Chronic left shoulder pain  Patient is doing physical therapy, and be following with orthopedics soon.  Pain is controlled with tramadol.  Has seen rheumatology in the past for this as well, status post multiple joint injections.   Orders:    CBC With Differential With Platelet; Future    Comp Metabolic Panel (14); Future    Lipid Panel; Future    TSH W Reflex To Free T4; Future    Uric Acid; Future    Ferritin [E]; Future    Osteoarthritis of multiple joints, unspecified osteoarthritis type  Patient is doing physical therapy, and be following with orthopedics soon.  Pain is controlled with tramadol.  Has seen rheumatology in the past for this as well, status post multiple joint injections.   Orders:    CBC With Differential With Platelet; Future    Comp Metabolic Panel (14); Future    Lipid Panel; Future    TSH W Reflex To Free T4; Future    Uric Acid; Future    Ferritin [E]; Future    Essential hypertension  Well controlled today.  Check labs to ensure stability of electrolytes and kidney function. Cont amlodipine 2.5 mg daily, metoprolol 25 daily.  Orders:    CBC With Differential With Platelet; Future    Comp Metabolic Panel (14); Future    Lipid Panel; Future    TSH W Reflex To Free T4; Future    Uric Acid; Future    Ferritin [E]; Future    Aortic valve stenosis, etiology of cardiac valve disease unspecified  Last echocardiogram done in 2018, needs repeat.  Ordered today.  Orders:    CBC With Differential With Platelet; Future    Comp Metabolic Panel (14); Future    Lipid Panel; Future    TSH W Reflex To Free T4; Future    Uric Acid; Future    Ferritin [E]; Future    CARD ECHO 2D DOPPLER (CPT=93306); Future    Chronic obstructive pulmonary disease, unspecified COPD type (HCC)  Seen on previous imaging.  Former smoker, continue abstinence.  Asymptomatic.  Orders:    CBC With Differential With Platelet; Future    Comp Metabolic Panel (14); Future    Lipid Panel; Future    TSH W Reflex To Free T4; Future    Uric Acid; Future    Ferritin [E]; Future    Iron deficiency anemia, unspecified iron deficiency anemia type  Recheck CBC and ferritin.  Orders:    CBC With Differential With Platelet; Future    Comp Metabolic Panel (14); Future    Lipid Panel; Future    TSH W Reflex To Free T4; Future    Uric Acid; Future    Ferritin [E]; Future    Pulmonary hypertension (HCC)  Recheck echo.  Orders:    CBC With Differential With Platelet; Future    Comp Metabolic Panel (14); Future    Lipid Panel; Future    TSH W Reflex To Free T4; Future    Uric Acid; Future    Ferritin [E]; Future    Aortic atherosclerosis  Currently asymptomatic.  Orders:    CBC With Differential With Platelet; Future    Comp Metabolic Panel (14); Future    Lipid Panel; Future    TSH W Reflex To Free T4; Future    Uric Acid; Future    Ferritin [E]; Future    Benign prostatic hyperplasia with post-void dribbling  Start tamsulosin 0.4 mg at night.  Patient had been prescribed this in the  past, but does not recall taking it.  Orders:    CBC With Differential With Platelet; Future    Comp Metabolic Panel (14); Future    Lipid Panel; Future    TSH W Reflex To Free T4; Future    Uric Acid; Future    Ferritin [E]; Future    Age-related macular degeneration  Follow with ophthalmology.  Orders:    CBC With Differential With Platelet; Future    Comp Metabolic Panel (14); Future    Lipid Panel; Future    TSH W Reflex To Free T4; Future    Uric Acid; Future    Ferritin [E]; Future    Gout, unspecified cause, unspecified chronicity, unspecified site  Does not recall his last gouty attack.  Stop allopurinol.  Check uric acid.  Orders:    CBC With Differential With Platelet; Future    Comp Metabolic Panel (14); Future    Lipid Panel; Future    TSH W Reflex To Free T4; Future    Uric Acid; Future    Ferritin [E]; Future         The patient indicates understanding of these issues and agrees to the plan.  The patient is asked to return in 6 months for office visit  Diet counseling perfomed  Exercise counseling perfomed    Electronically signed by Cielo Batista MD 03/20/25       [1]   Allergies  Allergen Reactions    Demerol [Meperidine] SHORTNESS OF BREATH    Hylan G-F 20 SWELLING     Swelling to cristiano. knees    Swelling to cristiano. knees   Swelling to cristiano. knees   Swelling to cristiano. knees   Swelling to cristiano. knees    Ibuprofen RASH and SHORTNESS OF BREATH    Penicillins RASH and SHORTNESS OF BREATH    Valdecoxib OTHER (SEE COMMENTS)    Celecoxib RASH    Hydrochlorothiazide RASH    Triamterene RASH

## 2025-03-20 NOTE — ASSESSMENT & PLAN NOTE
Last echocardiogram done in 2018, needs repeat.  Ordered today.  Orders:    CBC With Differential With Platelet; Future    Comp Metabolic Panel (14); Future    Lipid Panel; Future    TSH W Reflex To Free T4; Future    Uric Acid; Future    Ferritin [E]; Future    CARD ECHO 2D DOPPLER (CPT=93306); Future

## 2025-03-20 NOTE — ASSESSMENT & PLAN NOTE
Well controlled today. Check labs to ensure stability of electrolytes and kidney function. Cont amlodipine 2.5 mg daily, metoprolol 25 daily.  Orders:    CBC With Differential With Platelet; Future    Comp Metabolic Panel (14); Future    Lipid Panel; Future    TSH W Reflex To Free T4; Future    Uric Acid; Future    Ferritin [E]; Future

## 2025-03-20 NOTE — ASSESSMENT & PLAN NOTE
Currently asymptomatic.  Orders:    CBC With Differential With Platelet; Future    Comp Metabolic Panel (14); Future    Lipid Panel; Future    TSH W Reflex To Free T4; Future    Uric Acid; Future    Ferritin [E]; Future

## 2025-03-20 NOTE — ASSESSMENT & PLAN NOTE
Recheck echo.  Orders:    CBC With Differential With Platelet; Future    Comp Metabolic Panel (14); Future    Lipid Panel; Future    TSH W Reflex To Free T4; Future    Uric Acid; Future    Ferritin [E]; Future

## 2025-03-20 NOTE — PATIENT INSTRUCTIONS
Please make the following medication changes:   Start tamsulosin 0.4 mg at night   STOP the allopurinol     Please have fasting labs done per your convenience.     Please schedule the echocardiogram.     Please get the RSV vaccine.

## 2025-03-20 NOTE — ASSESSMENT & PLAN NOTE
Follow with ophthalmology.  Orders:    CBC With Differential With Platelet; Future    Comp Metabolic Panel (14); Future    Lipid Panel; Future    TSH W Reflex To Free T4; Future    Uric Acid; Future    Ferritin [E]; Future

## 2025-03-20 NOTE — ASSESSMENT & PLAN NOTE
Seen on previous imaging.  Former smoker, continue abstinence.  Asymptomatic.  Orders:    CBC With Differential With Platelet; Future    Comp Metabolic Panel (14); Future    Lipid Panel; Future    TSH W Reflex To Free T4; Future    Uric Acid; Future    Ferritin [E]; Future

## 2025-03-20 NOTE — ASSESSMENT & PLAN NOTE
Patient is doing physical therapy, and be following with orthopedics soon.  Pain is controlled with tramadol.  Has seen rheumatology in the past for this as well, status post multiple joint injections.   Orders:    CBC With Differential With Platelet; Future    Comp Metabolic Panel (14); Future    Lipid Panel; Future    TSH W Reflex To Free T4; Future    Uric Acid; Future    Ferritin [E]; Future

## 2025-03-20 NOTE — ASSESSMENT & PLAN NOTE
Start tamsulosin 0.4 mg at night.  Patient had been prescribed this in the past, but does not recall taking it.  Orders:    CBC With Differential With Platelet; Future    Comp Metabolic Panel (14); Future    Lipid Panel; Future    TSH W Reflex To Free T4; Future    Uric Acid; Future    Ferritin [E]; Future

## 2025-03-20 NOTE — ASSESSMENT & PLAN NOTE
Recheck CBC and ferritin.  Orders:    CBC With Differential With Platelet; Future    Comp Metabolic Panel (14); Future    Lipid Panel; Future    TSH W Reflex To Free T4; Future    Uric Acid; Future    Ferritin [E]; Future

## 2025-03-24 RX ORDER — ALLOPURINOL 100 MG/1
100 TABLET ORAL
Qty: 90 TABLET | Refills: 3 | OUTPATIENT
Start: 2025-03-24

## 2025-03-24 NOTE — TELEPHONE ENCOUNTER
Per office visit with Dr. Batista 3/20/25:  Please make the following medication changes:   Start tamsulosin 0.4 mg at night   STOP the allopurinol     Patient requested refill for allopurinol 3/19/25, medication discontinued 3/20/25.    Will refuse.

## 2025-03-29 DIAGNOSIS — M15.9 OSTEOARTHRITIS OF MULTIPLE JOINTS, UNSPECIFIED OSTEOARTHRITIS TYPE: ICD-10-CM

## 2025-03-31 NOTE — TELEPHONE ENCOUNTER
LOV: 1/8/25  Future Appointments   Date Time Provider Department Center   4/2/2025  2:15 PM Hortencia Vega, PT CF PT EM [facility]   4/16/2025  1:00 PM [facility] CARD RM3 ECHO [facility] NI CARD EM [facility] [place]   5/5/2025  2:15 PM Hortencia Vega, PT CF PT EM [facility]       Summary:  1. Cont. tramadol 50 every 8 hours as needed   2. Return to clinic in 2 months.   3. Rest left shoulder. x 3 days   4. Physical therapy for left shoulder     - ok to change tramadol to stronger meds - for back pain - will let pain management know - ok to take over -   Increased his tramadol to 50mg every 6 hours til then.      Nessa Moctezuma MD  1/8/2025   11:51 AM

## 2025-04-01 RX ORDER — TRAMADOL HYDROCHLORIDE 50 MG/1
50 TABLET ORAL EVERY 6 HOURS PRN
Qty: 120 TABLET | Refills: 5 | Status: SHIPPED | OUTPATIENT
Start: 2025-04-01

## 2025-04-02 ENCOUNTER — OFFICE VISIT (OUTPATIENT)
Dept: PHYSICAL THERAPY | Facility: HOSPITAL | Age: 89
End: 2025-04-02
Attending: INTERNAL MEDICINE
Payer: MEDICARE

## 2025-04-02 PROCEDURE — 97140 MANUAL THERAPY 1/> REGIONS: CPT

## 2025-04-02 PROCEDURE — 97110 THERAPEUTIC EXERCISES: CPT

## 2025-04-02 NOTE — PROGRESS NOTES
Patient: Tha Jolly (89 year old, male) Referring Provider:  Insurance:   Diagnosis: Osteoarthritis of multiple joints, unspecified osteoarthritis type (M15.9)  Chronic left shoulder pain (M25.512,G89.29) Purani Palaniswami [employer]   Date of Surgery: na Next MD visit:  N/A   Precautions:  Fall Risk; Hearing Impairment; Visual Impairment uncertain Referral Information:    Date of Evaluation: Req: 12, Auth: 12, Exp: 5/1/2025 02/06/25 POC Auth Visits:  12       Today's Date   4/2/2025    Subjective  Pt reports no pain at rest but pain is 2-3/10 with doing the pulley. Reports he has been able to sleep on the L side for 2-3 hours at a time now and was previously unable to do so.       Pain: 2/10     Objective         4/2/25  L Shldr AROM:  measured in sitting:   Flexion:  50 deg   Abduction with flexed elbow:  40 deg  Ext: 30 deg  Seated ER in neutral:   to -30 when elbow is stabilized at side:       Seated IR in neutral:  to stomach       PROM L shldr  measured in sitting:   Flexion: ; L 110  Abduction: ; L 105 in pos  Seated ER in neutral: -25 when elbow is stabilized at side:   Seated IR in neutral: to stomach                      Assessment  Continued with emphasis on ROM/strengthening ex. Continues to slowly improve with  PROM and AROM today compared to previous.    Goals (to be met in 12 visits)   Therapy Goals        1. Patient will be independent with HEP and it's progression  2. Patient will improve L shoulder AROM by 10-20 deg in all planes to promote use of L arm with functional ADL with dressing, grooming, and bathing.    3. Patient will improve L shoulder strength by 1/2- 1 muscle grade to promote use of L arm with functional ADL with dressing, grooming, and bathing.             Plan  Plan to continue skilled PT to address jt restrictions, restore ROM, and progress with strengthening to achieve goals as outlined and to promote functional ADL.    Treatment Last 4 Visits  Treatment  Day: 7       3/19/2025 4/2/2025   UE Treatment   Therapeutic Exercise TE  Shldr rolls backwards 10x2  -Scapula squeezes 10x (3 sec holds)  -OH pulley: facing wall 10x3;      -seated swiss ball roll outs 10x2        -Seated table slides fwd 10x2  -Seated table slides lateral 10x2  -seated shldr protraction/retraction 10x2 ea         -Seated PROM L shldr ER 10x ea  -Seated AROM L  shldr ER 15x ea  -seated manually resisted ER (min resistance)5x     -Seated PROM L shldr Flex in 10x;  -Seated place and hold L arm flexion: 3 sec x5 with assist of PT  -Seated PROM L shldr Abd in POS 5x2;   -Seated place and hold L arm Abd in POS: 3-5 sec x5 with assist of PT        -Seated shldr isometrics with ball on wall:  5 sec/5x ea: Flex,  Abd:  ER    -seated swiss ball roll outs 20x 2;  -AROM L shldr punch outs fwd (SA) 10x;   -manually resisted L shldr punch outs 10x2    MT     PROM L shldr all planes  L shldr jt mob: inf/post glides  L scap mob   TE  Shldr rolls backwards 10x2  -Scapula squeezes 10x (5 sec holds)  -OH pulley: facing wall 10x3;      -seated swiss ball roll outs 10x2       -Seated table slides fwd 10x2  -Seated table slides lateral 10x2  -seated shldr protraction/retraction 10x2 ea     -Seated PROM L shldr ER 10x ea  -Seated AROM L  shldr ER 12x ea  -seated manually resisted ER (min resistance)5x2    -Seated PROM L shldr Flex in 10x;  -Seated place and hold L arm flexion: 3 sec x5 with assist of PT  -Seated PROM L shldr Abd in POS 5x2;   -Seated place and hold L arm Abd in POS: 3-5 sec x5 with assist of PT     -Seated shldr isometrics with ball on wall: 5 sec/5x ea: Flex,  Abd:  ER    -seated swiss ball roll outs 20x 2;  -manually resisted L shldr punch outs 10x2     MT   PROM L shldr all planes  L shldr jt mob: inf/post glides  L scap mob     Therapeutic Exercise Minutes 35 35   Manual Therapy Minutes 10 10   Total Time Of Timed Procedures 45 45   Total Time Of Service-Based Procedures 0 0   Total Treatment Time  45 45   HEP HEP  Shldr Isometrics for flex, Abd, and ER  Shldr rolls  Scapula squeezes  Pendulum ex   Seated arm slides on table           HEP  HEP  Shldr Isometrics for flex, Abd, and ER  Shldr rolls  Scapula squeezes  Pendulum ex   Seated arm slides on table      Charges  2TE; 1 MT

## 2025-04-07 ENCOUNTER — TELEPHONE (OUTPATIENT)
Dept: INTERNAL MEDICINE CLINIC | Facility: CLINIC | Age: 89
End: 2025-04-07

## 2025-04-07 ENCOUNTER — TELEPHONE (OUTPATIENT)
Dept: RHEUMATOLOGY | Facility: CLINIC | Age: 89
End: 2025-04-07

## 2025-04-07 NOTE — TELEPHONE ENCOUNTER
Patient calling,verified name and date of birth. Patient asks if Dr Batista prescribed medication for him at the 3/20/25 office visit.  Reviewed Dr Batista's office visit notes:   Please make the following medication changes:   Start tamsulosin 0.4 mg at night   STOP the allopurinol       Patient verbalized understanding. He ahs stopped allopurinol and is taking Tamsulosin as prescribed.  Patient verbalizes understanding and agrees to plan of care.

## 2025-04-11 ENCOUNTER — LAB ENCOUNTER (OUTPATIENT)
Dept: LAB | Age: 89
End: 2025-04-11
Attending: INTERNAL MEDICINE
Payer: MEDICARE

## 2025-04-11 DIAGNOSIS — I10 ESSENTIAL HYPERTENSION: ICD-10-CM

## 2025-04-11 DIAGNOSIS — G89.29 CHRONIC LEFT SHOULDER PAIN: ICD-10-CM

## 2025-04-11 DIAGNOSIS — N40.1 BENIGN PROSTATIC HYPERPLASIA WITH POST-VOID DRIBBLING: ICD-10-CM

## 2025-04-11 DIAGNOSIS — I27.20 PULMONARY HYPERTENSION (HCC): ICD-10-CM

## 2025-04-11 DIAGNOSIS — M15.9 OSTEOARTHRITIS OF MULTIPLE JOINTS, UNSPECIFIED OSTEOARTHRITIS TYPE: ICD-10-CM

## 2025-04-11 DIAGNOSIS — D50.9 IRON DEFICIENCY ANEMIA, UNSPECIFIED IRON DEFICIENCY ANEMIA TYPE: ICD-10-CM

## 2025-04-11 DIAGNOSIS — J44.9 CHRONIC OBSTRUCTIVE PULMONARY DISEASE, UNSPECIFIED COPD TYPE (HCC): ICD-10-CM

## 2025-04-11 DIAGNOSIS — I35.0 AORTIC VALVE STENOSIS, ETIOLOGY OF CARDIAC VALVE DISEASE UNSPECIFIED: ICD-10-CM

## 2025-04-11 DIAGNOSIS — M10.9 GOUT, UNSPECIFIED CAUSE, UNSPECIFIED CHRONICITY, UNSPECIFIED SITE: ICD-10-CM

## 2025-04-11 DIAGNOSIS — H35.30 AGE-RELATED MACULAR DEGENERATION: ICD-10-CM

## 2025-04-11 DIAGNOSIS — N39.43 BENIGN PROSTATIC HYPERPLASIA WITH POST-VOID DRIBBLING: ICD-10-CM

## 2025-04-11 DIAGNOSIS — M25.512 CHRONIC LEFT SHOULDER PAIN: ICD-10-CM

## 2025-04-11 DIAGNOSIS — Z00.01 ENCOUNTER FOR GENERAL ADULT MEDICAL EXAMINATION WITH ABNORMAL FINDINGS: ICD-10-CM

## 2025-04-11 DIAGNOSIS — I70.0 AORTIC ATHEROSCLEROSIS: ICD-10-CM

## 2025-04-11 LAB
ALBUMIN SERPL-MCNC: 4 G/DL (ref 3.2–4.8)
ALBUMIN/GLOB SERPL: 1.4 {RATIO} (ref 1–2)
ALP LIVER SERPL-CCNC: 125 U/L (ref 45–117)
ALT SERPL-CCNC: 19 U/L (ref 10–49)
ANION GAP SERPL CALC-SCNC: 4 MMOL/L (ref 0–18)
AST SERPL-CCNC: 26 U/L (ref ?–34)
BASOPHILS # BLD AUTO: 0.05 X10(3) UL (ref 0–0.2)
BASOPHILS NFR BLD AUTO: 0.8 %
BILIRUB SERPL-MCNC: 0.7 MG/DL (ref 0.2–1.1)
BUN BLD-MCNC: 15 MG/DL (ref 9–23)
BUN/CREAT SERPL: 20 (ref 10–20)
CALCIUM BLD-MCNC: 9.3 MG/DL (ref 8.7–10.4)
CHLORIDE SERPL-SCNC: 100 MMOL/L (ref 98–112)
CHOLEST SERPL-MCNC: 143 MG/DL (ref ?–200)
CO2 SERPL-SCNC: 34 MMOL/L (ref 21–32)
CREAT BLD-MCNC: 0.75 MG/DL (ref 0.7–1.3)
DEPRECATED HBV CORE AB SER IA-ACNC: 432 NG/ML (ref 50–336)
DEPRECATED RDW RBC AUTO: 50.2 FL (ref 35.1–46.3)
EGFRCR SERPLBLD CKD-EPI 2021: 87 ML/MIN/1.73M2 (ref 60–?)
EOSINOPHIL # BLD AUTO: 0.1 X10(3) UL (ref 0–0.7)
EOSINOPHIL NFR BLD AUTO: 1.5 %
ERYTHROCYTE [DISTWIDTH] IN BLOOD BY AUTOMATED COUNT: 14.3 % (ref 11–15)
FASTING PATIENT LIPID ANSWER: YES
FASTING STATUS PATIENT QL REPORTED: YES
GLOBULIN PLAS-MCNC: 2.9 G/DL (ref 2–3.5)
GLUCOSE BLD-MCNC: 88 MG/DL (ref 70–99)
HCT VFR BLD AUTO: 38.8 % (ref 39–53)
HDLC SERPL-MCNC: 76 MG/DL (ref 40–59)
HGB BLD-MCNC: 13.1 G/DL (ref 13–17.5)
IMM GRANULOCYTES # BLD AUTO: 0.08 X10(3) UL (ref 0–1)
IMM GRANULOCYTES NFR BLD: 1.2 %
LDLC SERPL CALC-MCNC: 57 MG/DL (ref ?–100)
LYMPHOCYTES # BLD AUTO: 1.68 X10(3) UL (ref 1–4)
LYMPHOCYTES NFR BLD AUTO: 25.6 %
MCH RBC QN AUTO: 32.6 PG (ref 26–34)
MCHC RBC AUTO-ENTMCNC: 33.8 G/DL (ref 31–37)
MCV RBC AUTO: 96.5 FL (ref 80–100)
MONOCYTES # BLD AUTO: 0.71 X10(3) UL (ref 0.1–1)
MONOCYTES NFR BLD AUTO: 10.8 %
NEUTROPHILS # BLD AUTO: 3.93 X10 (3) UL (ref 1.5–7.7)
NEUTROPHILS # BLD AUTO: 3.93 X10(3) UL (ref 1.5–7.7)
NEUTROPHILS NFR BLD AUTO: 60.1 %
NONHDLC SERPL-MCNC: 67 MG/DL (ref ?–130)
OSMOLALITY SERPL CALC.SUM OF ELEC: 286 MOSM/KG (ref 275–295)
PLATELET # BLD AUTO: 181 10(3)UL (ref 150–450)
POTASSIUM SERPL-SCNC: 4.7 MMOL/L (ref 3.5–5.1)
PROT SERPL-MCNC: 6.9 G/DL (ref 5.7–8.2)
RBC # BLD AUTO: 4.02 X10(6)UL (ref 3.8–5.8)
SODIUM SERPL-SCNC: 138 MMOL/L (ref 136–145)
TRIGL SERPL-MCNC: 41 MG/DL (ref 30–149)
TSI SER-ACNC: 1.82 UIU/ML (ref 0.55–4.78)
URATE SERPL-MCNC: 4.9 MG/DL (ref 3.7–9.2)
VLDLC SERPL CALC-MCNC: 6 MG/DL (ref 0–30)
WBC # BLD AUTO: 6.6 X10(3) UL (ref 4–11)

## 2025-04-11 PROCEDURE — 84550 ASSAY OF BLOOD/URIC ACID: CPT

## 2025-04-11 PROCEDURE — 80053 COMPREHEN METABOLIC PANEL: CPT

## 2025-04-11 PROCEDURE — 82728 ASSAY OF FERRITIN: CPT

## 2025-04-11 PROCEDURE — 85025 COMPLETE CBC W/AUTO DIFF WBC: CPT

## 2025-04-11 PROCEDURE — 36415 COLL VENOUS BLD VENIPUNCTURE: CPT

## 2025-04-11 PROCEDURE — 80061 LIPID PANEL: CPT

## 2025-04-11 PROCEDURE — 84443 ASSAY THYROID STIM HORMONE: CPT

## 2025-04-16 ENCOUNTER — HOSPITAL ENCOUNTER (OUTPATIENT)
Dept: CV DIAGNOSTICS | Facility: HOSPITAL | Age: 89
Discharge: HOME | End: 2025-04-16
Attending: INTERNAL MEDICINE
Payer: MEDICARE

## 2025-04-16 DIAGNOSIS — I35.0 AORTIC VALVE STENOSIS, ETIOLOGY OF CARDIAC VALVE DISEASE UNSPECIFIED: ICD-10-CM

## 2025-04-16 PROCEDURE — 93306 TTE W/DOPPLER COMPLETE: CPT | Performed by: INTERNAL MEDICINE

## 2025-05-05 ENCOUNTER — TELEPHONE (OUTPATIENT)
Dept: PHYSICAL THERAPY | Facility: HOSPITAL | Age: 89
End: 2025-05-05

## 2025-05-05 ENCOUNTER — APPOINTMENT (OUTPATIENT)
Dept: PHYSICAL THERAPY | Facility: HOSPITAL | Age: 89
End: 2025-05-05
Attending: INTERNAL MEDICINE
Payer: MEDICARE

## 2025-05-29 RX ORDER — METOPROLOL SUCCINATE 25 MG/1
25 TABLET, EXTENDED RELEASE ORAL DAILY
Qty: 90 TABLET | Refills: 3 | Status: SHIPPED | OUTPATIENT
Start: 2025-05-29

## 2025-06-08 ENCOUNTER — APPOINTMENT (OUTPATIENT)
Dept: GENERAL RADIOLOGY | Facility: HOSPITAL | Age: 89
End: 2025-06-08
Attending: EMERGENCY MEDICINE
Payer: MEDICARE

## 2025-06-08 ENCOUNTER — HOSPITAL ENCOUNTER (OUTPATIENT)
Facility: HOSPITAL | Age: 89
Setting detail: OBSERVATION
LOS: 3 days | Discharge: NURSING, REHAB OR LONG-TERM CARE | End: 2025-06-11
Attending: EMERGENCY MEDICINE
Payer: MEDICARE

## 2025-06-08 DIAGNOSIS — S72.91XA CLOSED FRACTURE OF RIGHT FEMUR, UNSPECIFIED FRACTURE MORPHOLOGY, INITIAL ENCOUNTER (HCC): Primary | ICD-10-CM
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
## 2025-06-08 LAB
ANION GAP SERPL CALC-SCNC: 8 MMOL/L (ref 0–18)
BASOPHILS # BLD AUTO: 0.07 X10(3) UL (ref 0–0.2)
BASOPHILS NFR BLD AUTO: 0.9 %
BILIRUB UR QL: NEGATIVE
BUN BLD-MCNC: 16 MG/DL (ref 9–23)
BUN/CREAT SERPL: 22.5 (ref 10–20)
CALCIUM BLD-MCNC: 9.3 MG/DL (ref 8.7–10.4)
CHLORIDE SERPL-SCNC: 97 MMOL/L (ref 98–112)
CLARITY UR: CLEAR
CO2 SERPL-SCNC: 32 MMOL/L (ref 21–32)
CREAT BLD-MCNC: 0.71 MG/DL (ref 0.7–1.3)
DEPRECATED RDW RBC AUTO: 51 FL (ref 35.1–46.3)
EGFRCR SERPLBLD CKD-EPI 2021: 88 ML/MIN/1.73M2 (ref 60–?)
EOSINOPHIL # BLD AUTO: 0.1 X10(3) UL (ref 0–0.7)
EOSINOPHIL NFR BLD AUTO: 1.3 %
ERYTHROCYTE [DISTWIDTH] IN BLOOD BY AUTOMATED COUNT: 14.2 % (ref 11–15)
GLUCOSE BLD-MCNC: 95 MG/DL (ref 70–99)
GLUCOSE UR-MCNC: NORMAL MG/DL
HCT VFR BLD AUTO: 36 % (ref 39–53)
HGB BLD-MCNC: 12.1 G/DL (ref 13–17.5)
HGB UR QL STRIP.AUTO: NEGATIVE
IMM GRANULOCYTES # BLD AUTO: 0.16 X10(3) UL (ref 0–1)
IMM GRANULOCYTES NFR BLD: 2.1 %
KETONES UR-MCNC: 10 MG/DL
LEUKOCYTE ESTERASE UR QL STRIP.AUTO: NEGATIVE
LYMPHOCYTES # BLD AUTO: 1.11 X10(3) UL (ref 1–4)
LYMPHOCYTES NFR BLD AUTO: 14.3 %
MCH RBC QN AUTO: 32.8 PG (ref 26–34)
MCHC RBC AUTO-ENTMCNC: 33.6 G/DL (ref 31–37)
MCV RBC AUTO: 97.6 FL (ref 80–100)
MONOCYTES # BLD AUTO: 0.71 X10(3) UL (ref 0.1–1)
MONOCYTES NFR BLD AUTO: 9.1 %
NEUTROPHILS # BLD AUTO: 5.61 X10 (3) UL (ref 1.5–7.7)
NEUTROPHILS # BLD AUTO: 5.61 X10(3) UL (ref 1.5–7.7)
NEUTROPHILS NFR BLD AUTO: 72.3 %
NITRITE UR QL STRIP.AUTO: NEGATIVE
OSMOLALITY SERPL CALC.SUM OF ELEC: 285 MOSM/KG (ref 275–295)
PH UR: 8 [PH] (ref 5–8)
PLATELET # BLD AUTO: 190 10(3)UL (ref 150–450)
POTASSIUM SERPL-SCNC: 5 MMOL/L (ref 3.5–5.1)
PROT UR-MCNC: NEGATIVE MG/DL
RBC # BLD AUTO: 3.69 X10(6)UL (ref 3.8–5.8)
SODIUM SERPL-SCNC: 137 MMOL/L (ref 136–145)
SP GR UR STRIP: 1.01 (ref 1–1.03)
UROBILINOGEN UR STRIP-ACNC: NORMAL
WBC # BLD AUTO: 7.8 X10(3) UL (ref 4–11)

## 2025-06-08 PROCEDURE — 99223 1ST HOSP IP/OBS HIGH 75: CPT | Performed by: HOSPITALIST

## 2025-06-08 PROCEDURE — 73503 X-RAY EXAM HIP UNI 4/> VIEWS: CPT | Performed by: EMERGENCY MEDICINE

## 2025-06-08 PROCEDURE — 73552 X-RAY EXAM OF FEMUR 2/>: CPT | Performed by: EMERGENCY MEDICINE

## 2025-06-08 RX ORDER — ACETAMINOPHEN 325 MG/1
650 TABLET ORAL EVERY 4 HOURS PRN
Status: DISCONTINUED | OUTPATIENT
Start: 2025-06-08 | End: 2025-06-11

## 2025-06-08 RX ORDER — MORPHINE SULFATE 4 MG/ML
4 INJECTION, SOLUTION INTRAMUSCULAR; INTRAVENOUS EVERY 2 HOUR PRN
Status: DISCONTINUED | OUTPATIENT
Start: 2025-06-08 | End: 2025-06-11

## 2025-06-08 RX ORDER — ANTIOX #8/OM3/DHA/EPA/LUT/ZEAX 250-2.5 MG
1 CAPSULE ORAL 2 TIMES DAILY
Status: DISCONTINUED | OUTPATIENT
Start: 2025-06-08 | End: 2025-06-08 | Stop reason: RX

## 2025-06-08 RX ORDER — ALLOPURINOL 100 MG/1
100 TABLET ORAL
Status: DISCONTINUED | OUTPATIENT
Start: 2025-06-08 | End: 2025-06-11

## 2025-06-08 RX ORDER — ACETAMINOPHEN 500 MG
500 TABLET ORAL EVERY 4 HOURS PRN
Status: DISCONTINUED | OUTPATIENT
Start: 2025-06-08 | End: 2025-06-11

## 2025-06-08 RX ORDER — ALLOPURINOL 100 MG/1
100 TABLET ORAL
COMMUNITY

## 2025-06-08 RX ORDER — TAMSULOSIN HYDROCHLORIDE 0.4 MG/1
0.4 CAPSULE ORAL NIGHTLY
Status: DISCONTINUED | OUTPATIENT
Start: 2025-06-08 | End: 2025-06-11

## 2025-06-08 RX ORDER — ECHINACEA PURPUREA EXTRACT 125 MG
1 TABLET ORAL
Status: DISCONTINUED | OUTPATIENT
Start: 2025-06-08 | End: 2025-06-11

## 2025-06-08 RX ORDER — ANTIOX #8/OM3/DHA/EPA/LUT/ZEAX 250-2.5 MG
1 CAPSULE ORAL 2 TIMES DAILY
COMMUNITY

## 2025-06-08 RX ORDER — MORPHINE SULFATE 2 MG/ML
2 INJECTION, SOLUTION INTRAMUSCULAR; INTRAVENOUS EVERY 2 HOUR PRN
Status: DISCONTINUED | OUTPATIENT
Start: 2025-06-08 | End: 2025-06-11

## 2025-06-08 RX ORDER — SODIUM CHLORIDE 9 MG/ML
INJECTION, SOLUTION INTRAVENOUS CONTINUOUS
Status: DISCONTINUED | OUTPATIENT
Start: 2025-06-08 | End: 2025-06-09

## 2025-06-08 RX ORDER — HEPARIN SODIUM 5000 [USP'U]/ML
5000 INJECTION, SOLUTION INTRAVENOUS; SUBCUTANEOUS EVERY 8 HOURS SCHEDULED
Status: DISCONTINUED | OUTPATIENT
Start: 2025-06-08 | End: 2025-06-11

## 2025-06-08 RX ORDER — POLYETHYLENE GLYCOL 3350 17 G/17G
17 POWDER, FOR SOLUTION ORAL DAILY PRN
Status: DISCONTINUED | OUTPATIENT
Start: 2025-06-08 | End: 2025-06-11

## 2025-06-08 RX ORDER — ONDANSETRON 2 MG/ML
4 INJECTION INTRAMUSCULAR; INTRAVENOUS EVERY 6 HOURS PRN
Status: DISCONTINUED | OUTPATIENT
Start: 2025-06-08 | End: 2025-06-11

## 2025-06-08 RX ORDER — AMLODIPINE BESYLATE 2.5 MG/1
2.5 TABLET ORAL DAILY
Status: DISCONTINUED | OUTPATIENT
Start: 2025-06-08 | End: 2025-06-11

## 2025-06-08 RX ORDER — FERROUS SULFATE 325(65) MG
325 TABLET, DELAYED RELEASE (ENTERIC COATED) ORAL
Status: DISCONTINUED | OUTPATIENT
Start: 2025-06-09 | End: 2025-06-11

## 2025-06-08 RX ORDER — CHOLECALCIFEROL (VITAMIN D3) 25 MCG
1000 TABLET ORAL DAILY
Status: DISCONTINUED | OUTPATIENT
Start: 2025-06-09 | End: 2025-06-11

## 2025-06-08 RX ORDER — DOXEPIN HYDROCHLORIDE 50 MG/1
1 CAPSULE ORAL DAILY
Status: DISCONTINUED | OUTPATIENT
Start: 2025-06-08 | End: 2025-06-11

## 2025-06-08 RX ORDER — MORPHINE SULFATE 2 MG/ML
1 INJECTION, SOLUTION INTRAMUSCULAR; INTRAVENOUS EVERY 2 HOUR PRN
Status: DISCONTINUED | OUTPATIENT
Start: 2025-06-08 | End: 2025-06-11

## 2025-06-08 RX ORDER — HYDROCODONE BITARTRATE AND ACETAMINOPHEN 5; 325 MG/1; MG/1
1 TABLET ORAL EVERY 4 HOURS PRN
Status: DISCONTINUED | OUTPATIENT
Start: 2025-06-08 | End: 2025-06-10

## 2025-06-08 RX ORDER — METOPROLOL SUCCINATE 25 MG/1
25 TABLET, EXTENDED RELEASE ORAL DAILY
Status: DISCONTINUED | OUTPATIENT
Start: 2025-06-09 | End: 2025-06-11

## 2025-06-08 RX ORDER — MORPHINE SULFATE 2 MG/ML
2 INJECTION, SOLUTION INTRAMUSCULAR; INTRAVENOUS ONCE
Status: COMPLETED | OUTPATIENT
Start: 2025-06-08 | End: 2025-06-08

## 2025-06-08 RX ORDER — SODIUM PHOSPHATE, DIBASIC AND SODIUM PHOSPHATE, MONOBASIC 7; 19 G/230ML; G/230ML
1 ENEMA RECTAL ONCE AS NEEDED
Status: DISCONTINUED | OUTPATIENT
Start: 2025-06-08 | End: 2025-06-11

## 2025-06-08 RX ORDER — SENNOSIDES 8.6 MG
17.2 TABLET ORAL NIGHTLY PRN
Status: DISCONTINUED | OUTPATIENT
Start: 2025-06-08 | End: 2025-06-11

## 2025-06-08 RX ORDER — BISACODYL 10 MG
10 SUPPOSITORY, RECTAL RECTAL
Status: DISCONTINUED | OUTPATIENT
Start: 2025-06-08 | End: 2025-06-11

## 2025-06-08 RX ORDER — HYDROCODONE BITARTRATE AND ACETAMINOPHEN 5; 325 MG/1; MG/1
2 TABLET ORAL EVERY 4 HOURS PRN
Status: DISCONTINUED | OUTPATIENT
Start: 2025-06-08 | End: 2025-06-10

## 2025-06-08 RX ORDER — METOCLOPRAMIDE HYDROCHLORIDE 5 MG/ML
10 INJECTION INTRAMUSCULAR; INTRAVENOUS EVERY 8 HOURS PRN
Status: DISCONTINUED | OUTPATIENT
Start: 2025-06-08 | End: 2025-06-11

## 2025-06-08 NOTE — ED PROVIDER NOTES
Patient Seen in: [facility] Emergency Department        History  Chief Complaint   Patient presents with    Fall     Stated Complaint: fall    Subjective:   HPI            89-year-old male presents with right thigh pain.  Patient reports he fell after [employer] just prior to arrival.  Could not get up off the ground.  Reports pain to right thigh.  Did not hit his head, lose consciousness, denies neck pain, back pain, chest pain, abdominal pain      Objective:     Past Medical History:    Actinic keratosis    Right lateral shin    Anxiety state    Aortic atherosclerosis    CXR 11-14    Aortic stenosis    Echo 3-23 with normal LV function, EF 55-60%, moderate LAE, moderate aortic stenosis with peak gradient 39 mmHg and mean gradient 25 mmHg, mild-moderate mitral stenosis, mild MR    BPH (benign prostatic hyperplasia)    COPD (chronic obstructive pulmonary disease) ([facility])    CXR 11-14    Diverticulosis    Colonoscopy 2-12    Essential hypertension    History of blood transfusion    no reactions    History of stomach ulcers    History of vertebral fracture    T7, T8, T9, T11, T12, L1 secondary to fall; s/p kyphoplasty T7, T8, T9 6-23    Hx of adenomatous colonic polyps    Hx of melanoma of skin    forehead    Iron deficiency anemia    Lumbar spinal stenosis    Macular degeneration    per NG    Primary osteoarthritis of knees, bilateral    Status post right TKA 11-14; s/p left TKA 4-23    Pulmonary hypertension ([facility])    Echo 5-18 with pulmonary artery pressure 45 mmHg    Visual impairment    glasses              Past Surgical History:   Procedure Laterality Date    Appendectomy      per NG    Arthroscopy, ankle, surgical; w/ankle arthrodesis Right     \"Arthrocentesis of the right ankle joint (2-3)\"; per NG    Colonoscopy  2005    per NG    Hip replacement surgery Right 2002    per NG    Ir kyphoplasty  06/12/2023    T7, T8, T9    Knee arthroscopy Bilateral     per NG    Other Right 07/15/2018    ORIF proximal right  humerus fracture requiring a biceps tenodesis    Other  2018    Incision and drainage of right index finger complicated abscess, revision amputation of right index finger.    Total knee arthroplasty Left 2023    Total knee replacement Right 2014                Social History     Socioeconomic History    Marital status:    Tobacco Use    Smoking status: Former     Types: Pipe     Quit date: 1985     Years since quittin.4     Passive exposure: Past    Smokeless tobacco: Never   Vaping Use    Vaping status: Never Used   Substance and Sexual Activity    Alcohol use: Yes     Alcohol/week: 5.8 standard drinks of alcohol     Types: 7 Glasses of wine per week     Comment: 1-2 drinks daily    Drug use: No    Sexual activity: Not Currently   Other Topics Concern    Pt has a pacemaker No    Pt has a defibrillator No    Reaction to local anesthetic No     Social Drivers of Health     Food Insecurity: No Food Insecurity (3/20/2025)    NCSS - Food Insecurity     Worried About Running Out of Food in the Last Year: No     Ran Out of Food in the Last Year: No   Transportation Needs: No Transportation Needs (3/20/2025)    NCSS - Transportation     Lack of Transportation: No   Housing Stability: Not At Risk (3/20/2025)    NCSS - Housing/Utilities     Has Housing: Yes     Worried About Losing Housing: No     Unable to Get Utilities: No                                Physical Exam    ED Triage Vitals [25 1252]   /55   Pulse 65   Resp 20   Temp 98 °F (36.7 °C)   Temp src Temporal   SpO2 97 %   O2 Device None (Room air)       Current Vitals:   Vital Signs  BP: 158/70  Pulse: 66  Resp: 16  Temp: 98 °F (36.7 °C)  Temp src: Temporal  MAP (mmHg): 97    Oxygen Therapy  SpO2: 96 %  O2 Device: None (Room air)            Physical Exam  Vital signs reviewed. Nursing note reviewed.  Constitutional: Well-developed. Well-nourished. In no acute distress  HENT: Mucous membranes moist.  No signs of trauma  EYES:  No scleral icterus or conjunctival injection.  NECK: Full ROM. Supple.  No cervical spine tenderness  CARDIAC: Normal rate. Normal S1/ S2. 2+ distal pulses. No edema  PULM/CHEST: Clear to auscultation bilaterally. No wheezes.  No chest wall tenderness  ABD: Soft, non-tender, non-distended.   RECTAL: deferred  Extremities: Tenderness mid right femur, pain with right hip flexion  NEURO: Awake, alert, following commands, moving extremities, answering questions.   SKIN: Warm and dry. No rash or lesions.  PSYCH: Normal judgment. Normal affect.        EKG    Rate, intervals and axes as noted on EKG Report.  Rate: 63  Rhythm: sinus rhythm  Reading: no ectopy, non specific T wave changes      ED Course  Labs Reviewed   CBC WITH DIFFERENTIAL WITH PLATELET - Abnormal; Notable for the following components:       Result Value    RBC 3.69 (*)     HGB 12.1 (*)     HCT 36.0 (*)     RDW-SD 51.0 (*)     All other components within normal limits   BASIC METABOLIC PANEL (8) - Abnormal; Notable for the following components:    Chloride 97 (*)     BUN/CREA Ratio 22.5 (*)     All other components within normal limits   RAINBOW DRAW LAVENDER   RAINBOW DRAW LIGHT GREEN   RAINBOW DRAW BLUE                            MDM     Assessment:Patient is a 89 year old male presenting to the ED due to right hip/thigh pain after fall.    Comorbidities/chronic illnesses impacting care: prior hip replacement    History obtained from: patient    External records and previous hospitalization records reviewed and documented below    Consideration of Social Determinants of Health and Impact on Medical Decision Making:  Housing/Transportation/Financial Strain/Access to healthcare/Food insecurity/family or Community support/Language and Literacy/Substance abuse/Mental health concerns/Disabilities     -none    Radiography/Imaging:  XR FEMUR MIN 2 VIEWS RIGHT (CPT=73552)   Final Result   PROCEDURE: XR FEMUR MIN 2 VIEWS RIGHT (CPT=73552)       COMPARISON: None.        INDICATIONS: Right thigh pain post fall earlier today.       TECHNIQUE: Two views were obtained.         FINDINGS:    BONES: Acute periprosthetic proximal right femur fracture involving the    greater trochanter minimally extending into the subtrochanteric region    along the proximal femoral component.  Anatomically aligned total hip    arthroplasty.  Right total knee    arthroplasty seen at the edge of the field of view.       OTHER: Vascular calcification.                   =====   CONCLUSION:    1. Acute periprosthetic right femur fracture involving greater trochanter    with extension into the lateral subtrochanteric region along the proximal    femoral component.  Near anatomic alignment.               Dictated by (CST): Kendall Gaytan MD on 6/08/2025 at 3:43 PM        Finalized by (CST): Kendall Gaytan MD on 6/08/2025 at 3:46 PM               XR HIP + PELVIS MIN 4 VIEWS RIGHT (CPT=73503)   Final Result   PROCEDURE: XR HIP + PELVIS MIN 4 VIEWS RIGHT (CPT=73503)       COMPARISON: [facility], XR PELVIS (1 VIEW) (CPT=72170),    5/08/2023, 7:33 PM.       INDICATIONS: Right hip pain post fall earlier today.       TECHNIQUE: 3 views were obtained.                         =====   CONCLUSION:    1. Minimally displaced periprosthetic fracture involving greater    trochanter and proximal subtrochanteric femur.   2. Anatomically aligned total hip arthroplasty.   3. 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   4. Degenerative changes in lower lumbar spine.   5. Vascular calcifications.               Dictated by (CST): Kendall Gaytan MD on 6/08/2025 at 3:46 PM        Finalized by (CST): Kendall Gaytan MD on 6/08/2025 at 3:48 PM                       ED course  Patient arrives here vital signs normal.  He is awake, alert, has no signs of trauma to his head, spine, thorax.  Complains of right leg pain, does have tenderness mid thigh, has pain with flexion of his hip joint.  With history of hip  replacement, concern for fracture, less so dislocation.  Will check x-ray    Laboratory results above were independently viewed and interpreted as: Mild anemia, normal renal function  Radiology: ordered and independently interpreted as: Right hip x-ray shows periprosthetic femur fracture    Will consult orthopedics, check basic preoperative labs, EKG and plan for admission            Medications   morphINE PF 2 MG/ML injection 2 mg (2 mg Intravenous Given 6/8/25 1524)             Admission disposition: 6/8/2025  3:15 PM           Medical Decision Making      Disposition and Plan     Clinical Impression:  1. Closed fracture of right femur, unspecified fracture morphology, initial encounter ([facility])         Disposition:  Admit  6/8/2025  3:15 pm    Follow-up:  No follow-up provider specified.  We recommend that you schedule follow up care with a primary care provider within the next three months to obtain basic health screening including reassessment of your blood pressure.      Medications Prescribed:  Current Discharge Medication List                Supplementary Documentation:         Hospital Problems       Present on Admission  Date Reviewed: 3/20/2025          ICD-10-CM Noted POA    * (Principal) Closed fracture of right femur, unspecified fracture morphology, initial encounter ([facility]) S72.91XA 6/8/2025 Unknown

## 2025-06-08 NOTE — PLAN OF CARE
Alert and oriented x4. Voiding freely. Receiving IV fluids per MD order. SCDs and heparin for DVT prophylaxis. Morphine as needed for pain. Vital signs monitored. No acute changes noticed throughout the shift. Cardiac diet. Cough and deep breathing in addition to use of incentive spirometer. Fall precautions in place- bed in lowest position, call light and personal belongings in reach. Non skid socks in place. Frequent rounding by nursing staff. Plan is pending ortho consult.     Problem: Patient Centered Care  Goal: Patient preferences are identified and integrated in the patient's plan of care  Description: Interventions:  - What would you like us to know as we care for you? From home alone  - Provide timely, complete, and accurate information to patient/family  - Incorporate patient and family knowledge, values, beliefs, and cultural backgrounds into the planning and delivery of care  - Encourage patient/family to participate in care and decision-making at the level they choose  - Honor patient and family perspectives and choices  Outcome: Progressing     Problem: Patient/Family Goals  Goal: Patient/Family Long Term Goal  Description: Patient's Long Term Goal: Go back home    Goal: Patient/Family Short Term Goal  Description: Patient's Short Term Goal: work with rehab    - See additional Care Plan goals for specific interventions  Outcome: Progressing

## 2025-06-08 NOTE — ED INITIAL ASSESSMENT (HPI)
Pt brought to ed by ems for fall. Per ems pt was at [employer] when he fell, originally was going to refuse EMS but began to right right upper thigh pain.  Denies hitting head or Loc. Pt denies thinners.    Pt aox4 speaking in full sentences.

## 2025-06-08 NOTE — ED QUICK NOTES
Orders for admission, patient is aware of plan and ready to go upstairs. Any questions, please call ED RN leanne at extension [phone].     Patient Covid vaccination status: Fully vaccinated     COVID Test Ordered in ED: None    COVID Suspicion at Admission: N/A    Running Infusions: Medication Infusions[1] None    Mental Status/LOC at time of transport: aox4    Other pertinent information:   CIWA score: N/A   NIH score:  N/A             [1]

## 2025-06-09 LAB
ALBUMIN SERPL-MCNC: 3.6 G/DL (ref 3.2–4.8)
ALBUMIN/GLOB SERPL: 1.4 {RATIO} (ref 1–2)
ALP LIVER SERPL-CCNC: 115 U/L (ref 45–117)
ALT SERPL-CCNC: 23 U/L (ref 10–49)
ANION GAP SERPL CALC-SCNC: 8 MMOL/L (ref 0–18)
AST SERPL-CCNC: 27 U/L (ref ?–34)
ATRIAL RATE: 63 BPM
BASOPHILS # BLD AUTO: 0.05 X10(3) UL (ref 0–0.2)
BASOPHILS NFR BLD AUTO: 0.8 %
BILIRUB SERPL-MCNC: 0.9 MG/DL (ref 0.2–1.1)
BUN BLD-MCNC: 12 MG/DL (ref 9–23)
BUN/CREAT SERPL: 23.5 (ref 10–20)
CALCIUM BLD-MCNC: 8.9 MG/DL (ref 8.7–10.4)
CHLORIDE SERPL-SCNC: 100 MMOL/L (ref 98–112)
CO2 SERPL-SCNC: 30 MMOL/L (ref 21–32)
CREAT BLD-MCNC: 0.51 MG/DL (ref 0.7–1.3)
DEPRECATED RDW RBC AUTO: 50.5 FL (ref 35.1–46.3)
EGFRCR SERPLBLD CKD-EPI 2021: 98 ML/MIN/1.73M2 (ref 60–?)
EOSINOPHIL # BLD AUTO: 0.13 X10(3) UL (ref 0–0.7)
EOSINOPHIL NFR BLD AUTO: 2 %
ERYTHROCYTE [DISTWIDTH] IN BLOOD BY AUTOMATED COUNT: 14.1 % (ref 11–15)
GLOBULIN PLAS-MCNC: 2.5 G/DL (ref 2–3.5)
GLUCOSE BLD-MCNC: 97 MG/DL (ref 70–99)
HCT VFR BLD AUTO: 34.4 % (ref 39–53)
HGB BLD-MCNC: 11.4 G/DL (ref 13–17.5)
IMM GRANULOCYTES # BLD AUTO: 0.14 X10(3) UL (ref 0–1)
IMM GRANULOCYTES NFR BLD: 2.1 %
INR BLD: 1.05 (ref 0.8–1.2)
LYMPHOCYTES # BLD AUTO: 0.91 X10(3) UL (ref 1–4)
LYMPHOCYTES NFR BLD AUTO: 13.7 %
MAGNESIUM SERPL-MCNC: 1.8 MG/DL (ref 1.6–2.6)
MCH RBC QN AUTO: 32.4 PG (ref 26–34)
MCHC RBC AUTO-ENTMCNC: 33.1 G/DL (ref 31–37)
MCV RBC AUTO: 97.7 FL (ref 80–100)
MONOCYTES # BLD AUTO: 0.61 X10(3) UL (ref 0.1–1)
MONOCYTES NFR BLD AUTO: 9.2 %
NEUTROPHILS # BLD AUTO: 4.79 X10 (3) UL (ref 1.5–7.7)
NEUTROPHILS # BLD AUTO: 4.79 X10(3) UL (ref 1.5–7.7)
NEUTROPHILS NFR BLD AUTO: 72.2 %
OSMOLALITY SERPL CALC.SUM OF ELEC: 286 MOSM/KG (ref 275–295)
P AXIS: 36 DEGREES
P-R INTERVAL: 198 MS
PHOSPHATE SERPL-MCNC: 3.5 MG/DL (ref 2.4–5.1)
PLATELET # BLD AUTO: 139 10(3)UL (ref 150–450)
PLATELETS.RETICULATED NFR BLD AUTO: 5.2 % (ref 0–7)
POTASSIUM SERPL-SCNC: 4 MMOL/L (ref 3.5–5.1)
PROT SERPL-MCNC: 6.1 G/DL (ref 5.7–8.2)
PROTHROMBIN TIME: 14.4 SECONDS (ref 11.6–14.8)
Q-T INTERVAL: 406 MS
QRS DURATION: 82 MS
QTC CALCULATION (BEZET): 415 MS
R AXIS: -11 DEGREES
RBC # BLD AUTO: 3.52 X10(6)UL (ref 3.8–5.8)
SODIUM SERPL-SCNC: 138 MMOL/L (ref 136–145)
T AXIS: 20 DEGREES
VENTRICULAR RATE: 63 BPM
WBC # BLD AUTO: 6.6 X10(3) UL (ref 4–11)

## 2025-06-09 PROCEDURE — 99233 SBSQ HOSP IP/OBS HIGH 50: CPT | Performed by: HOSPITALIST

## 2025-06-09 RX ORDER — MAGNESIUM SULFATE HEPTAHYDRATE 40 MG/ML
2 INJECTION, SOLUTION INTRAVENOUS ONCE
Status: COMPLETED | OUTPATIENT
Start: 2025-06-09 | End: 2025-06-09

## 2025-06-09 NOTE — PLAN OF CARE
Dr. Thornton seen patient this morning. Per Dr. Thornton Right femur fracture is non- surgical. Awaiting MD note and orders for weight bearing status.

## 2025-06-09 NOTE — CM/SW NOTE
SW followed up on DC planning.     RUPESH ref sent - tentatively    Will need to discuss DC plan with pt    SW/CM to remain available for support and/or discharge planning.     Mary NGO LSW, MSW ext. [phone]

## 2025-06-09 NOTE — PHYSICAL THERAPY NOTE
PHYSICAL THERAPY EVALUATION - INPATIENT     Room Number: 411/411-A  Evaluation Date: 6/9/2025  Type of Evaluation: Initial   Physician Order: PT Eval and Treat    Presenting Problem: R femur fx 2/2 fall  Co-Morbidities : HTN. COPD, melanoma, macular degeneration, OA, gout, anxiety, aortic stenosis, colonic polyps, R JUNAID, R humerus fx  Reason for Therapy: Mobility Dysfunction and Discharge Planning    PHYSICAL THERAPY ASSESSMENT   Patient is a 89 year old male admitted 6/8/2025 for R femur fx 2/2 fall being managed non-operatively per ortho consult, attending physician confirming weight-bearing status with ortho and communicating it to RN and therapy team verbally.  Prior to admission, patient's baseline is Martin using a rolling walker in home, cane in community.  Patient is currently functioning below baseline with bed mobility, transfers, gait, and stair negotiation.  Patient is requiring minimal assist and moderate assist as a result of the following impairments: pain, impaired standing dynamic balance, decreased muscular endurance, and medical status.  Physical Therapy will continue to follow for duration of hospitalization.    Patient will benefit from continued skilled PT Services to promote return to prior level of function and safety with continuous assistance and gradual rehabilitative therapy .    PLAN DURING HOSPITALIZATION  Nursing Mobility Recommendation : 1 Assist  PT Device Recommendation: Rolling walker, Gait belt  PT Treatment Plan: Bed mobility, Body mechanics, Endurance, Energy conservation, Patient education, Gait training, Range of motion, Strengthening, Stair training, Transfer training, Balance training  Rehab Potential : Good  Frequency (Obs): 3-5x/week     PHYSICAL THERAPY MEDICAL/SOCIAL HISTORY     Problem List  Principal Problem:    Closed fracture of right femur, unspecified fracture morphology, initial encounter (HCC)      HOME SITUATION  Type of Home: House  Home Layout: Two level  Stairs  to Enter : 2   Railing: Yes    Stairs to Bedroom: 12    Railing: Yes    Lives With: Alone    Drives: No   Patient Regularly Uses: Cane, Rolling walker     Prior Level of [place]: Martin with rolling walker on carpet, no device in kitchen, cane in community    SUBJECTIVE  \"I collect hats.\"    \"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 But I got an infection in one of my fingers awhile ago and so my typing has slowed down significantly. My words per minute has gone down from 40 to 2!\"    PHYSICAL THERAPY EXAMINATION   OBJECTIVE  Precautions: Bed/chair alarm  Fall Risk: High fall risk    WEIGHT BEARING RESTRICTION  R Lower Extremity: Weight Bearing as Tolerated (verbally confirmed with Dr. Barrera who communicated with Dr. Thornton)      PAIN ASSESSMENT  Ratin  Location: R hip  Management Techniques: Body mechanics, Activity promotion    COGNITION  Overall Cognitive Status:  WFL - within functional limits    RANGE OF MOTION AND STRENGTH ASSESSMENT  Upper extremity ROM and strength are within functional limits RUE, LUE limited 2/2 shoulder arthritis  Lower extremity ROM is within functional limits BLEs  Lower extremity strength is functionally limited requiring increased assist with transfers    BALANCE  Static Sitting: Good  Dynamic Sitting: Fair +  Static Standing: Fair  Dynamic Standing: Fair -  AM-PAC '6-Clicks' INPATIENT SHORT FORM - BASIC MOBILITY  How much difficulty does the patient currently have...  Patient Difficulty: Turning over in bed (including adjusting bedclothes, sheets and blankets)?: A Lot   Patient Difficulty: Sitting down on and standing up from a chair with arms (e.g., wheelchair, bedside commode, etc.): A Lot   Patient Difficulty: Moving from lying on back to sitting on the side of the bed?: A Lot   How much help from another person does the patient currently need...   Help from Another: Moving to and from a bed to a chair (including a wheelchair)?: A Little   Help from Another: Need to  walk in hospital room?: A Little   Help from Another: Climbing 3-5 steps with a railing?: A Little     AM-PAC Score:  Raw Score: 15   Approx Degree of Impairment: 57.7%   Standardized Score (AM-PAC Scale): 39.45   CMS Modifier (G-Code): CK    FUNCTIONAL ABILITY STATUS  Functional Mobility/Gait Assessment  Gait Assistance: Contact guard assist, Minimum assistance  Distance (ft): 15x2  Assistive Device: Rolling walker  Pattern: Shuffle (kyphotic posture, wide turning radius with cues to stay close to walker)  Supine to Sit: moderate assist  Sit to Stand: moderate assist    Exercise/Education Provided:  Education Provided To: Patient  Patient Education: Role of Physical Therapy, Plan of Care, DME Recommendations, Functional Transfer Techniques, Discharge Recommendations, Weight Bear Status, Energy Conservation, Proper Body Mechanics, Gait Training, Stair Training  Patient's Response to Education: Verbalized Understanding, Returned Demonstration           Skilled Therapy Provided: Patient requiring increased assist with bed mobility and transfers, unable to ambulate household distances without Suzie, demonstrates decreased safety awareness requiring cueing for walker management and fully aligning self before sitting. Patient lives alone with limited social support. Patient has history of other falls further increasing fall risk.     Patient seen for evaluation in coordination with occupational therapy to maximize patient safety and function. Patient received semi-fowlers in bed, agreeable to physical therapy evaluation. Next session anticipate to progress bed mobility, transfers, and gait.    Patient history and/or personal factors that may impact the plan of care include home accessibility concerns, limited social support, history of falls, and co-morbidities (HTN. COPD, melanoma, macular degeneration, OA, gout, anxiety, aortic stenosis, colonic polyps, R JUNAID, R humerus fx) affecting medical status, endurance, activity  tolerance. Based on the physical therapy examination of the noted systems and functional activity/participation limitations, the patient presentation is evolving given the patient demonstrates worsening of previously stable condition and demonstrates worsening of co-morbidities.    The patient's Approx Degree of Impairment: 57.7% has been calculated based on documentation in the [facility] '6 clicks' Inpatient Basic Mobility Short Form.  Research supports that patients with this level of impairment may benefit from a rehab facility.  Final disposition will be made by interdisciplinary medical team.    Patient End of Session: Up in chair, Needs met, Call light within reach, RN aware of session/findings, All patient questions and concerns addressed, Hospital anti-slip socks, Alarm set    CURRENT GOALS  Goals to be met by: 7/5/25  Patient Goal Patient's self-stated goal is: fall prevention   Goal #1 Patient is able to demonstrate supine - sit EOB @ level: supervision     Goal #1   Current Status    Goal #2 Patient is able to demonstrate transfers EOB to/from [facility] at assistance level: supervision with walker - rolling     Goal #2  Current Status    Goal #3 Patient is able to ambulate 50 feet with assist device: walker - rolling at assistance level: supervision   Goal #3   Current Status    Goal #4 Patient will negotiate 4 stairs/one curb w/ assistive device and contact guard assistance   Goal #4   Current Status    Goal #5 Patient to demonstrate independence with home activity/exercise instructions provided to patient in preparation for discharge.   Goal #5   Current Status    Goal #6    Goal #6  Current Status      Patient Evaluation Complexity Level:  History High - 3 or more personal factors and/or co-morbidities   Examination of body systems Moderate - addressing a total of 3 or more elements   Clinical Presentation  Moderate - Evolving   Clinical Decision Making  Moderate Complexity     Therapeutic Activity:  25  minutes      Bryanna Adamson, PT, DPT  [facility]  Rehab Services - Physical Therapy  [id number]

## 2025-06-09 NOTE — H&P
[facility]    PATIENT'S NAME: CARLOS ALBERTO MIRZA   ATTENDING PHYSICIAN: Latonia Denise MD   PATIENT ACCOUNT#:   [id number]    LOCATION:  [address] [place] [address]  MEDICAL RECORD #:   [id number]       YOB: 1935  ADMISSION DATE:       06/08/2025    HISTORY AND PHYSICAL EXAMINATION    Please note complex medical decision-making was involved coordinating care with Orthopedics, Dr. Connor, reviewing x-ray images, and otherwise preparing this admission.    ADMISSION DIAGNOSIS:  Periprosthetic hip fracture.    CHIEF COMPLAINT:  Hip pain.    HISTORY OF PRESENT ILLNESS:  The patient is a very pleasant and loquacious 89-year-old white male who presents with a history of having a mechanical fall in [facility].  He denied passing out.  He had no dizziness, no prodrome, no aura.  He simply had his heel caught and fell.  He appears to have a periprosthetic hip fracture and his orthopedist has left [place] more than 20 years ago, and we contacted Dr. Thornton for Orthopedics here to come and see the patient to comment on his further management.  Just in case, we will keep the patient n.p.o.    PAST MEDICAL HISTORY:  Significant for anxiety, aortic stenosis, BPH, COPD, diverticulosis, hypertension, ulcers, colonic polyps, melanoma, iron deficiency, macular degeneration, osteoarthritis of the knees, gout.     PAST SURGICAL HISTORY:  Appendectomy, colonoscopy, previous hip replacement on the right, humerus fracture on the right.    MEDICATIONS:  His home medications include allopurinol 100 mg every evening, amlodipine 2.5 mg daily, vitamin D 1000 units daily, ferrous sulfate 325 mg daily, metoprolol ER 25 mg daily, multivitamin once a day, PreserVision daily, tamsulosin 0.4 mg daily, Flomax 0.4 mg daily     ALLERGIES:  Demerol gives him shortness of breath.  Ibuprofen, penicillin gives him a rash and shortness of breath.  Synvisc gives him swelling.  Valdecoxib gives him an unknown allergy.  Triamterene,  hydrochlorothiazide, and Celebrex give him a rash.    FAMILY HISTORY:  His father  at 55 of lung cancer.  Mother  at 92 of old age but she had hypertension.    SOCIAL HISTORY:  The patient denies smoking and drinking.  He quit smoking 38 years ago and he only has occasional alcohol.    REVIEW OF SYSTEMS:  Except for the hip pain, he feels rather well.  Otherwise, 13 systems reviewed are negative      LABORATORY DATA:  Sodium is 137, potassium 5, chloride 97, CO2 of 32, BUN 16, creatinine 0.171.  White count 7800, hemoglobin 12.1, platelets 190,000.    EKG is normal sinus rhythm.    X-ray show hip fracture.    PHYSICAL EXAMINATION:  GENERAL:  Well-nourished, well-developed, friendly white male in no apparent distress.  VITAL SIGNS:  His temperature is 98.6, pulse 75, respiratory rate 18, blood pressure 160/90, 95%.  HEENT:  He is normocephalic, atraumatic.  Anicteric.  Oropharynx moist.  NECK:  Stiff in all 4 directions.  LUNGS:  Clear.  HEART:  Normal S1, S2.  No S3.  There is a soft systolic murmur.  ABDOMEN:  Soft and nontender.  EXTREMITIES:  Both extremities are rotated outward.  His right lower extremity is painful and seems to be shortened but not very much and he would prefer that I not move it.  SKIN:  He has sun damage, actinic keratoses.  No obvious rashes or tattoos otherwise.  Some venous stasis changes in his legs.  VASCULAR:  1+ dorsalis pedis pulses bilaterally.  His nails are rather unkempt.  NEUROLOGIC:  He is alert and orient x3 and can move all of his extremities but he wiggles his toe on his right leg.  His speech is okay.  1+ reflexes at knees and elbows.  Toes downgoing.  PSYCHIATRIC:  He is not unusually anxious or depressed.  LYMPHATICS:  No submandibular lymphadenopathy.    ASSESSMENT AND PLAN:  1.   Periprosthetic hip fracture.  Await Dr. Thornton et al, for an opinion as to what should be done.  May need rehab and healing time.  Will follow up with Dr. Thornton as the patient's  orthopedist has left [place].  2.   Code status is DNAR Select.  Confirmed with the patient that this would need to be rescinded in the operating room in perioperative period should surgery be needed.  3.   History of previous cardiac issues.  Will check echo, but EKG is normal sinus rhythm.  No obvious ischemia.  Benefit of surgery outweighs the risks medically acceptable for him to go to surgery if need be.  4.   Hypertension.  Continue medications.  5.   Iron deficiency anemia.  6.   BPH.  Continue medications.    Dictated By Latonia Denise MD  d: 06/08/2025 18:37:48  t: 06/08/2025 18:50:48  Job [id number]  [facility]/

## 2025-06-09 NOTE — PROGRESS NOTES
[facility]  [facility] of [facility]    Progress Note    Tha Jolly Patient Status:  Inpatient    1935 MRN [id number]   Location [facility] 4W/SW/SE Attending Latonia Denise,*   Hosp Day # 1 PCP Misha Calle MD     Chief Complaint: ok    Subjective:     Constitutional:  Positive for appetite change and fatigue.   Respiratory:  Negative for chest tightness.    Cardiovascular:  Negative for leg swelling.   Gastrointestinal:  Negative for abdominal pain.   Genitourinary:  Negative for dysuria.   Musculoskeletal:  Positive for joint swelling and joint pain.   Neurological:  Negative for weakness.   Psychiatric/Behavioral:  Positive for sleep disturbance. Negative for confusion and decreased concentration. The patient is nervous/anxious.        Objective:   Blood pressure 155/89, pulse 61, temperature 98.3 °F (36.8 °C), temperature source Temporal, resp. rate 17, weight 167 lb (75.8 kg), SpO2 91%.  Physical Exam  Vitals and nursing note reviewed.   Constitutional:       General: He is not in acute distress.     Appearance: He is normal weight. He is not ill-appearing, toxic-appearing or diaphoretic.   HENT:      Head: Normocephalic and atraumatic.   Cardiovascular:      Rate and Rhythm: Normal rate.      Pulses: Normal pulses.   Pulmonary:      Effort: Pulmonary effort is normal.      Breath sounds: Rhonchi present.   Abdominal:      General: Bowel sounds are normal.      Palpations: Abdomen is soft.   Musculoskeletal:         General: Swelling present.   Skin:     General: Skin is warm and dry.      Capillary Refill: Capillary refill takes less than 2 seconds.   Neurological:      General: No focal deficit present.      Mental Status: He is alert and oriented to person, place, and time.         Results:   Lab Results   Component Value Date    WBC 6.6 2025    HGB 11.4 (L) 2025    HCT 34.4 (L) 2025    .0 (L) 2025    CREATSERUM 0.51 (L)  06/09/2025    BUN 12 06/09/2025     06/09/2025    K 4.0 06/09/2025     06/09/2025    CO2 30.0 06/09/2025    GLU 97 06/09/2025    CA 8.9 06/09/2025    ALB 3.6 06/09/2025    ALKPHO 115 06/09/2025    BILT 0.9 06/09/2025    TP 6.1 06/09/2025    AST 27 06/09/2025    ALT 23 06/09/2025    PTT 29.2 11/11/2014    INR 1.05 06/09/2025    T4F 1.04 01/28/2016    TSH 1.817 04/11/2025    ESRML 38 (H) 06/25/2018    CRP 0.9 06/25/2018    MG 1.8 06/09/2025    PHOS 3.5 06/09/2025    TROPHS 11 08/11/2024    B12 797 05/10/2023    ETOH 2 07/16/2018       XR HIP + PELVIS MIN 4 VIEWS RIGHT (CPT=73503)  Result Date: 6/8/2025  CONCLUSION:  1. Minimally displaced periprosthetic fracture involving greater trochanter and proximal subtrochanteric femur. 2. Anatomically aligned total hip arthroplasty. 3. 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 4. Degenerative changes in lower lumbar spine. 5. Vascular calcifications.    Dictated by (CST): Kendall Gaytan MD on 6/08/2025 at 3:46 PM     Finalized by (CST): Kendall Gaytan MD on 6/08/2025 at 3:48 PM          XR FEMUR MIN 2 VIEWS RIGHT (CPT=73552)  Result Date: 6/8/2025  CONCLUSION:  1. Acute periprosthetic right femur fracture involving greater trochanter with extension into the lateral subtrochanteric region along the proximal femoral component.  Near anatomic alignment.    Dictated by (CST): Kendall Gaytan MD on 6/08/2025 at 3:43 PM     Finalized by (CST): Kendall Gaytan MD on 6/08/2025 at 3:46 PM          EKG 12 Lead  Result Date: 6/9/2025  Sinus rhythm with marked sinus arrythmia Otherwise normal ECG When compared with ECG of 11-AUG-2024 11:26, Premature atrial complexes are no longer Present      Assessment & Plan:     1.       Periprosthetic hip fracture. No surg per Dr. Thomas. Await note; pt/ot to see dw them0  2.       Code status is DNAR Select.  Confirmed with the patient that this would need to be rescinded in the operating room in perioperative period should surgery be  needed.  3.       History of previous cardiac issues.  Will check echo, but EKG is normal sinus rhythm.  No obvious ischemia.  Benefit of surgery outweighs the risks medically acceptable for him to go to surgery if need be.  4.       Hypertension.  Continue medications.  5.       Iron deficiency anemia.  6.       BPH.  Continue medications.    Complex mdm; coordinating care with nurse/pt/ot/ortho and counseling pt about diet and activity        SWAPNIL TOLLIVER MD

## 2025-06-09 NOTE — OCCUPATIONAL THERAPY NOTE
OCCUPATIONAL THERAPY EVALUATION - INPATIENT     Room Number: 411/411-A  Evaluation Date: 6/9/2025  Type of Evaluation: Initial  Presenting Problem: R femur fracture; non-operative; WBAT per Dr. Denise in consult [facility] DR. Thornton    Physician Order: IP Consult to Occupational Therapy  Reason for Therapy: ADL/IADL Dysfunction and Discharge Planning    OCCUPATIONAL THERAPY ASSESSMENT   Patient is a 89 year old male admitted 6/8/2025 for R femur fracture; per Dr. Denise, pt is ok to be up WBAT per discussion with DR. Thornton; conservative approach with no surgial intervention.  Prior to admission, patient's baseline is Mod I with ADLs; has good support for IADLs and community integration; has electric scooter he uses in summer time for groceries; pt uses cane in the community, RW at home in rooms with throw rugs.  Patient is currently functioning below baseline with self care and basic mobiltiy.  Patient is requiring up to mod A  as a result of the following impairments: decreased functional strength, decreased functional reach, decreased endurance, pain, impaired standing balance, medical status, and limited shoulder (baseline/chroinc)( ROM. Occupational Therapy will continue to follow for duration of hospitalization.    Patient will benefit from continued skilled OT Services to promote return to prior level of function and safety with continuous assistance and gradual rehabilitative therapy.    PLAN DURING HOSPITALIZATION  OT Device Recommendations: TBD  OT Treatment Plan: Balance activities, Energy conservation/work simplification techniques, ADL training, Functional transfer training, Endurance training, Patient/Family education, Patient/Family training, Compensatory technique education     OCCUPATIONAL THERAPY MEDICAL/SOCIAL HISTORY   Problem List   Principal Problem:    Closed fracture of right femur, unspecified fracture morphology, initial encounter ([facility])    HOME SITUATION  Type of Home: House  Home  Layout: Two level  Lives With: Alone  Toilet and Equipment: Standard height toilet  Shower/Tub and Equipment: Tub-shower combo; Grab bar  Other Equipment: -- (RW, cane, scooter)  Drives: No  Patient Regularly Uses: Cane; Rolling walker      SUBJECTIVE  Mary been to [place] before     OCCUPATIONAL THERAPY EXAMINATION   OBJECTIVE  Precautions: Bed/chair alarm  Fall Risk: High fall risk    WEIGHT BEARING RESTRICTION  R Lower Extremity: Weight Bearing as Tolerated    PAIN ASSESSMENT  Ratin  Location: generalized  Management Techniques: Activity promotion    ACTIVITIES OF DAILY LIVING ASSESSMENT  AM-PAC ‘6-Clicks’ Inpatient Daily Activity Short Form  How much help from another person does the patient currently need…  -   Putting on and taking off regular lower body clothing?: A Lot  -   Bathing (including washing, rinsing, drying)?: A Lot  -   Toileting, which includes using toilet, bedpan or urinal? : A Lot  -   Putting on and taking off regular upper body clothing?: A Little  -   Taking care of personal grooming such as brushing teeth?: None  -   Eating meals?: None    AM-PAC Score:  Score: 17  Approx Degree of Impairment: 50.11%  Standardized Score (AM-PAC Scale): 37.26  CMS Modifier (G-Code): CK    FUNCTIONAL TRANSFER ASSESSMENT  Sit to Stand: Edge of Bed  Edge of Bed: Minimal Assist  Toilet Transfer: Minimal Assist    BED MOBILITY  Rolling: Minimal Assist  Supine to Sit : Moderate Assist  Scooting: Mod A    BALANCE ASSESSMENT  Static Sitting: Stand-by Assist  Static Standing: Minimal Assist    FUNCTIONAL ADL ASSESSMENT  Eating: Stand-by Assist  Grooming Seated: Stand-by Assist  Bathing Seated: Minimal Assist  UB Dressing Seated: Stand-by Assist  LB Dressing Seated: Moderate Assist  Toileting Seated: Minimal Assist    THERAPEUTIC EXERCISE     Skilled Therapy Provided: TF training, ADL retraining, Activity promotion, WB training; pt up and ambulatory with Min A to bathroom; pt completing toileting tasks iwht Min  A for hygiene; toilet t/f with Min A; ambulatory bathroom distnaces in room but fatigues quickly; presenting below baseline; will benefit from continued therapy; pt left up in chair with all needs in reach; stable at exit; Continue skilled occupational therapy while IP to maximize patient function and increase patient participation, safety, and independence with basic ADL and everyday activities.       EDUCATION PROVIDED  Patient Education : Role of Occupational Therapy; Plan of Care; Discharge Recommendations; Functional Transfer Techniques; Fall Prevention; Weight Bear Status; Posture/Positioning; Energy Conservation  Patient's Response to Education: Verbalized Understanding    The patient's Approx Degree of Impairment: 50.11% has been calculated based on documentation in the [facility] '6 clicks' Inpatient Daily Activity Short Form.  Research supports that patients with this level of impairment may benefit from GR.  Final disposition will be made by interdisciplinary medical team.    Patient End of Session: Up in chair, Needs met, Call light within reach, RN aware of session/findings, All patient questions and concerns addressed, Alarm set    OT Goals  Patient self-stated goal is: to go to rehab      Patient will complete LE dressing with SBA   Comment:     Patient will complete toilet transfer with SBA   Comment:     Patient will complete self care task at sink level with SBA    Comment:     Comment:         Goals  on:   Frequency:3-5x week     Patient Evaluation Complexity Level:   Occupational Profile/Medical History MODERATE - Expanded review of history including review of medical or therapy record   Specific performance deficits impacting engagement in ADL/IADL MODERATE  3 - 5 performance deficits   Client Assessment/Performance Deficits MODERATE - Comorbidities and min to mod modifications of tasks    Clinical Decision Making MODERATE - Analysis of occupational profile, detailed assessments, several  treatment options    Overall Complexity MODERATE     OT Session Time: 23 minutes  Self-Care Home Management: 8 minutes  Therapeutic Activity: 15 minutes    Jan Montiel, Occupational Therapist, OTR/L ext [phone]

## 2025-06-10 LAB
ANION GAP SERPL CALC-SCNC: 7 MMOL/L (ref 0–18)
BASOPHILS # BLD AUTO: 0.04 X10(3) UL (ref 0–0.2)
BASOPHILS NFR BLD AUTO: 0.6 %
BUN BLD-MCNC: 13 MG/DL (ref 9–23)
BUN/CREAT SERPL: 20.6 (ref 10–20)
CALCIUM BLD-MCNC: 8.8 MG/DL (ref 8.7–10.4)
CHLORIDE SERPL-SCNC: 101 MMOL/L (ref 98–112)
CO2 SERPL-SCNC: 29 MMOL/L (ref 21–32)
CREAT BLD-MCNC: 0.63 MG/DL (ref 0.7–1.3)
DEPRECATED RDW RBC AUTO: 50.4 FL (ref 35.1–46.3)
EGFRCR SERPLBLD CKD-EPI 2021: 91 ML/MIN/1.73M2 (ref 60–?)
EOSINOPHIL # BLD AUTO: 0.18 X10(3) UL (ref 0–0.7)
EOSINOPHIL NFR BLD AUTO: 2.7 %
ERYTHROCYTE [DISTWIDTH] IN BLOOD BY AUTOMATED COUNT: 14 % (ref 11–15)
GLUCOSE BLD-MCNC: 99 MG/DL (ref 70–99)
HCT VFR BLD AUTO: 30 % (ref 39–53)
HGB BLD-MCNC: 10.4 G/DL (ref 13–17.5)
IMM GRANULOCYTES # BLD AUTO: 0.1 X10(3) UL (ref 0–1)
IMM GRANULOCYTES NFR BLD: 1.5 %
LYMPHOCYTES # BLD AUTO: 1.17 X10(3) UL (ref 1–4)
LYMPHOCYTES NFR BLD AUTO: 17.5 %
MAGNESIUM SERPL-MCNC: 2.1 MG/DL (ref 1.6–2.6)
MCH RBC QN AUTO: 33.8 PG (ref 26–34)
MCHC RBC AUTO-ENTMCNC: 34.7 G/DL (ref 31–37)
MCV RBC AUTO: 97.4 FL (ref 80–100)
MONOCYTES # BLD AUTO: 0.68 X10(3) UL (ref 0.1–1)
MONOCYTES NFR BLD AUTO: 10.2 %
NEUTROPHILS # BLD AUTO: 4.5 X10 (3) UL (ref 1.5–7.7)
NEUTROPHILS # BLD AUTO: 4.5 X10(3) UL (ref 1.5–7.7)
NEUTROPHILS NFR BLD AUTO: 67.5 %
OSMOLALITY SERPL CALC.SUM OF ELEC: 284 MOSM/KG (ref 275–295)
PHOSPHATE SERPL-MCNC: 3.8 MG/DL (ref 2.4–5.1)
PLATELET # BLD AUTO: 124 10(3)UL (ref 150–450)
PLATELETS.RETICULATED NFR BLD AUTO: 4.9 % (ref 0–7)
POTASSIUM SERPL-SCNC: 4.1 MMOL/L (ref 3.5–5.1)
RBC # BLD AUTO: 3.08 X10(6)UL (ref 3.8–5.8)
SODIUM SERPL-SCNC: 137 MMOL/L (ref 136–145)
WBC # BLD AUTO: 6.7 X10(3) UL (ref 4–11)

## 2025-06-10 PROCEDURE — 99233 SBSQ HOSP IP/OBS HIGH 50: CPT | Performed by: HOSPITALIST

## 2025-06-10 RX ORDER — TRAMADOL HYDROCHLORIDE 50 MG/1
50 TABLET ORAL EVERY 8 HOURS PRN
Status: DISCONTINUED | OUTPATIENT
Start: 2025-06-10 | End: 2025-06-11

## 2025-06-10 NOTE — PLAN OF CARE
Problem: PAIN - ADULT  Goal: Verbalizes/displays adequate comfort level or patient's stated pain goal  Description: INTERVENTIONS:  - Encourage pt to monitor pain and request assistance  - Assess pain using appropriate pain scale  - Administer analgesics based on type and severity of pain and evaluate response  - Implement non-pharmacological measures as appropriate and evaluate response  - Consider cultural and social influences on pain and pain management  - Manage/alleviate anxiety  - Utilize distraction and/or relaxation techniques  - Monitor for opioid side effects  - Notify MD/LIP if interventions unsuccessful or patient reports new pain  - Anticipate increased pain with activity and pre-medicate as appropriate  Outcome: Progressing     Problem: RISK FOR INFECTION - ADULT  Goal: Absence of fever/infection during anticipated neutropenic period  Description: INTERVENTIONS  - Monitor WBC  - Administer growth factors as ordered  - Implement neutropenic guidelines  Outcome: Progressing     Problem: SAFETY ADULT - FALL  Goal: Free from fall injury  Description: INTERVENTIONS:  - Assess pt frequently for physical needs  - Identify cognitive and physical deficits and behaviors that affect risk of falls.  - [place] fall precautions as indicated by assessment.  - Educate pt/family on patient safety including physical limitations  - Instruct pt to call for assistance with activity based on assessment  - Modify environment to reduce risk of injury  - Provide assistive devices as appropriate  - Consider OT/PT consult to assist with strengthening/mobility  - Encourage toileting schedule  Outcome: Progressing     Patient A&O x4. Tolerating diet. Voiding. SCDs and heparin for DVT prophylaxis. Patient's pain not relieved by MD lucinda notified, orders received to change it tramadol, per patient's request. Encouraged use of incentive spirometer. Fall precautions maintained- bed alarm on, bed locked in lowest position, call  light and personal belongings within reach, non-skid socks in place to bilateral feet. Frequent rounding by nursing staff. Plan for RUPESH.

## 2025-06-10 NOTE — CM/SW NOTE
06/10/25 1600   CM/SW Referral Data   Referral Source Physician;Social Work (self-referral)   Reason for Referral Discharge planning   Informant Patient   Medical Hx   Does patient have an established PCP? Yes   Patient Info   Patient's Current Mental Status at Time of Assessment Alert;Oriented   Patient's Home Environment House   Number of Levels in Home 2   Number of Stair in Home 2/12   Patient lives with Alone   Patient Status Prior to Admission   Independent with ADLs and Mobility Yes   Discharge Needs   Anticipated D/C needs Subacute rehab   Services Requested   Submitted to [facility] Yes   PASRR Level 1 Submitted Yes   PMR Consult Requested Not clinically appropriate at this time   Choice of Post-Acute Provider   Informed patient of right to choose their preferred provider Yes   List of appropriate post-acute services provided to patient/family with quality data Yes   Patient/family choice [facility]     SW spoke with the pt at bedside and discussed DC planning    Pt stating he is agreeable to SNF plan and would like to DC to [facility]    SNF referrals sent - [facility] accepted. SW notified [facility] they are choice and asked for [facility] to be started     Insurance Auth pending for Rehab    UPDATE:     SW received call from [employer] who state [facility] is OON for pt's insurance and pt has no OON benefits    [facility] have entered in the portal which facilities are within pt's network    [facility], [facility], [facility], [facility], [facility], [facility], [facility], [facility].    WILLI edited SNF list - added facilities and     will need to present new list to the pt to review and choose from.     PLAN: DC to SNF - need to give new list of in network providors    Mary NGO LSW, MSW ext. [phone]

## 2025-06-10 NOTE — PAYOR COMM NOTE
--------------  ADMISSION REVIEW   6/8-6/9  Payor: [employer]  Subscriber #:  [id number]  Authorization Number: [id number]    Admit date: 6/8/25  Admit time:  5:03 PM       REVIEW DOCUMENTATION:     ED Provider Notes        ED Provider Notes signed by Mick Connor MD at 6/8/2025  4:31 PM       Author: Mick Connor MD Service: -- Author Type: Physician    Filed: 6/8/2025  4:31 PM Date of Service: 6/8/2025  1:34 PM Status: Signed    : Mick Connor MD (Physician)           Patient Seen in: [facility] Emergency Department        History  Chief Complaint   Patient presents with    Fall     Stated Complaint: fall    Subjective:   HPI            89-year-old male presents with right thigh pain.  Patient reports he fell after [employer] just prior to arrival.  Could not get up off the ground.  Reports pain to right thigh.  Did not hit his head, lose consciousness, denies neck pain, back pain, chest pain, abdominal pain      Objective:     Past Medical History:    Actinic keratosis    Right lateral shin    Anxiety state    Aortic atherosclerosis    CXR 11-14    Aortic stenosis    Echo 3-23 with normal LV function, EF 55-60%, moderate LAE, moderate aortic stenosis with peak gradient 39 mmHg and mean gradient 25 mmHg, mild-moderate mitral stenosis, mild MR    BPH (benign prostatic hyperplasia)    COPD (chronic obstructive pulmonary disease) (HCC)    CXR 11-14    Diverticulosis    Colonoscopy 2-12    Essential hypertension    History of blood transfusion    no reactions    History of stomach ulcers    History of vertebral fracture    T7, T8, T9, T11, T12, L1 secondary to fall; s/p kyphoplasty T7, T8, T9 6-23    Hx of adenomatous colonic polyps    Hx of melanoma of skin    forehead    Iron deficiency anemia    Lumbar spinal stenosis    Macular degeneration    per NG    Primary osteoarthritis of knees, bilateral    Status post right TKA 11-14; s/p left TKA 4-23    Pulmonary hypertension (HCC)     Echo 5-18 with pulmonary artery pressure 45 mmHg    Visual impairment    glasses     ED Triage Vitals [06/08/25 1252]   /55   Pulse 65   Resp 20   Temp 98 °F (36.7 °C)   Temp src Temporal   SpO2 97 %   O2 Device None (Room air)       Current Vitals:   Vital Signs  BP: 158/70  Pulse: 66  Resp: 16  Temp: 98 °F (36.7 °C)  Temp src: Temporal  MAP (mmHg): 97    Oxygen Therapy  SpO2: 96 %  O2 Device: None (Room air)      Physical Exam  Vital signs reviewed. Nursing note reviewed.  Constitutional: Well-developed. Well-nourished. In no acute distress  HENT: Mucous membranes moist.  No signs of trauma  EYES: No scleral icterus or conjunctival injection.  NECK: Full ROM. Supple.  No cervical spine tenderness  CARDIAC: Normal rate. Normal S1/ S2. 2+ distal pulses. No edema  PULM/CHEST: Clear to auscultation bilaterally. No wheezes.  No chest wall tenderness  ABD: Soft, non-tender, non-distended.   RECTAL: deferred  Extremities: Tenderness mid right femur, pain with right hip flexion  NEURO: Awake, alert, following commands, moving extremities, answering questions.   SKIN: Warm and dry. No rash or lesions.  PSYCH: Normal judgment. Normal affect.        EKG    Rate, intervals and axes as noted on EKG Report.  Rate: 63  Rhythm: sinus rhythm  Reading: no ectopy, non specific T wave changes      ED Course  Labs Reviewed   CBC WITH DIFFERENTIAL WITH PLATELET - Abnormal; Notable for the following components:       Result Value    RBC 3.69 (*)     HGB 12.1 (*)     HCT 36.0 (*)     RDW-SD 51.0 (*)     All other components within normal limits   BASIC METABOLIC PANEL (8) - Abnormal; Notable for the following components:    Chloride 97 (*)     BUN/CREA Ratio 22.5 (*)     All other components within normal limits   RAINBOW DRAW LAVENDER   RAINBOW DRAW LIGHT GREEN   RAINBOW DRAW BLUE     MDM     Assessment:Patient is a 89 year old male presenting to the ED due to right hip/thigh pain after fall.    Comorbidities/chronic illnesses  impacting care: prior hip replacement    History obtained from: patient    External records and previous hospitalization records reviewed and documented below    Consideration of Social Determinants of Health and Impact on Medical Decision Making:  Housing/Transportation/Financial Strain/Access to healthcare/Food insecurity/family or Community support/Language and Literacy/Substance abuse/Mental health concerns/Disabilities     -none    Radiography/Imaging:  XR FEMUR MIN 2 VIEWS RIGHT (CPT=73552)   Final Result   PROCEDURE: XR FEMUR MIN 2 VIEWS RIGHT (CPT=73552)       COMPARISON: None.       INDICATIONS: Right thigh pain post fall earlier today.       TECHNIQUE: Two views were obtained.         FINDINGS:    BONES: Acute periprosthetic proximal right femur fracture involving the    greater trochanter minimally extending into the subtrochanteric region    along the proximal femoral component.  Anatomically aligned total hip    arthroplasty.  Right total knee    arthroplasty seen at the edge of the field of view.       OTHER: Vascular calcification.                   =====   CONCLUSION:    1. Acute periprosthetic right femur fracture involving greater trochanter    with extension into the lateral subtrochanteric region along the proximal    femoral component.  Near anatomic alignment.               Dictated by (CST): Kendall Gaytan MD on 6/08/2025 at 3:43 PM        Finalized by (CST): Kendall Gaytan MD on 6/08/2025 at 3:46 PM               XR HIP + PELVIS MIN 4 VIEWS RIGHT (CPT=73503)   Final Result   PROCEDURE: XR HIP + PELVIS MIN 4 VIEWS RIGHT (CPT=73503)       COMPARISON: [facility], XR PELVIS (1 VIEW) (CPT=72170),    5/08/2023, 7:33 PM.       INDICATIONS: Right hip pain post fall earlier today.       TECHNIQUE: 3 views were obtained.                         =====   CONCLUSION:    1. Minimally displaced periprosthetic fracture involving greater    trochanter and proximal subtrochanteric femur.   2.  Anatomically aligned total hip arthroplasty.   3. 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   4. Degenerative changes in lower lumbar spine.   5. Vascular calcifications.               Dictated by (CST): Kendall Gaytan MD on 6/08/2025 at 3:46 PM        Finalized by (CST): Kendall Gaytan MD on 6/08/2025 at 3:48 PM                 ED course  Patient arrives here vital signs normal.  He is awake, alert, has no signs of trauma to his head, spine, thorax.  Complains of right leg pain, does have tenderness mid thigh, has pain with flexion of his hip joint.  With history of hip replacement, concern for fracture, less so dislocation.  Will check x-ray    Laboratory results above were independently viewed and interpreted as: Mild anemia, normal renal function  Radiology: ordered and independently interpreted as: Right hip x-ray shows periprosthetic femur fracture    Will consult orthopedics, check basic preoperative labs, EKG and plan for admission            Medications   morphINE PF 2 MG/ML injection 2 mg (2 mg Intravenous Given 6/8/25 1524)     Admission disposition: 6/8/2025  3:15 PM      Disposition and Plan     Clinical Impression:  1. Closed fracture of right femur, unspecified fracture morphology, initial encounter ([facility])         Disposition:  Admit  6/8/2025  3:15 pm     Hospital Problems       Present on Admission  Date Reviewed: 3/20/2025          ICD-10-CM Noted POA    * (Principal) Closed fracture of right femur, unspecified fracture morphology, initial encounter ([facility]) S72.91XA 6/8/2025 Unknown             6/8 H&P    Please note complex medical decision-making was involved coordinating care with Orthopedics, Dr. Connor, reviewing x-ray images, and otherwise preparing this admission.     ADMISSION DIAGNOSIS:  Periprosthetic hip fracture.     CHIEF COMPLAINT:  Hip pain.     HISTORY OF PRESENT ILLNESS:  The patient is a very pleasant and loquacious 89-year-old white male who presents with a history of having a  mechanical fall in [employer].  He denied passing out.  He had no dizziness, no prodrome, no aura.  He simply had his heel caught and fell.  He appears to have a periprosthetic hip fracture and his orthopedist has left [place] more than 20 years ago, and we contacted Dr. Thornton for Orthopedics here to come and see the patient to comment on his further management.  Just in case, we will keep the patient n.p.o.     PAST MEDICAL HISTORY:  Significant for anxiety, aortic stenosis, BPH, COPD, diverticulosis, hypertension, ulcers, colonic polyps, melanoma, iron deficiency, macular degeneration, osteoarthritis of the knees, gout.     REVIEW OF SYSTEMS:  Except for the hip pain, he feels rather well.  Otherwise, 13 systems reviewed are negative       LABORATORY DATA:  Sodium is 137, potassium 5, chloride 97, CO2 of 32, BUN 16, creatinine 0.171.  White count 7800, hemoglobin 12.1, platelets 190,000.     EKG is normal sinus rhythm.     X-ray show hip fracture.     PHYSICAL EXAMINATION:  GENERAL:  Well-nourished, well-developed, friendly white male in no apparent distress.  VITAL SIGNS:  His temperature is 98.6, pulse 75, respiratory rate 18, blood pressure 160/90, 95%.  HEENT:  He is normocephalic, atraumatic.  Anicteric.  Oropharynx moist.  NECK:  Stiff in all 4 directions.  LUNGS:  Clear.  HEART:  Normal S1, S2.  No S3.  There is a soft systolic murmur.  ABDOMEN:  Soft and nontender.  EXTREMITIES:  Both extremities are rotated outward.  His right lower extremity is painful and seems to be shortened but not very much and he would prefer that I not move it.  SKIN:  He has sun damage, actinic keratoses.  No obvious rashes or tattoos otherwise.  Some venous stasis changes in his legs.  VASCULAR:  1+ dorsalis pedis pulses bilaterally.  His nails are rather unkempt.  NEUROLOGIC:  He is alert and orient x3 and can move all of his extremities but he wiggles his toe on his right leg.  His speech is okay.  1+ reflexes at knees and  elbows.  Toes downgoing.  PSYCHIATRIC:  He is not unusually anxious or depressed.  LYMPHATICS:  No submandibular lymphadenopathy.     ASSESSMENT AND PLAN:  1.       Periprosthetic hip fracture.  Await Dr. Thornton, et al, for an opinion as to what should be done.  May need rehab and healing time.  Will follow up with Dr. Thornton as the patient's orthopedist has left [place].  2.       Code status is DNAR [place].  Confirmed with the patient that this would need to be rescinded in the operating room in perioperative period should surgery be needed.  3.       History of previous cardiac issues.  Will check echo, but EKG is normal sinus rhythm.  No obvious ischemia.  Benefit of surgery outweighs the risks medically acceptable for him to go to surgery if need be.  4.       Hypertension.  Continue medications.  5.       Iron deficiency anemia.  6.       BPH.  Continue medications.        6/9 IM    Constitutional:  Positive for appetite change and fatigue.   Musculoskeletal:  Positive for joint swelling and joint pain.   Psychiatric/Behavioral:  Positive for sleep disturbance.. The patient is nervous/anxious.         Pulmonary:      Effort: Pulmonary effort is normal.      Breath sounds: Rhonchi present.     Musculoskeletal:         General: Swelling present.     Component Value Date     WBC 6.6 06/09/2025     HGB 11.4 (L) 06/09/2025     HCT 34.4 (L) 06/09/2025     .0 (L) 06/09/2025     CREATSERUM 0.51 (L) 06/09/2025     BUN 12 06/09/2025      06/09/2025     K 4.0 06/09/2025      06/09/2025     CO2 30.0 06/09/2025     GLU 97 06/09/2025     CA 8.9 06/09/2025     ALB 3.6 06/09/2025     ALKPHO 115 06/09/2025     BILT 0.9 06/09/2025     TP 6.1 06/09/2025     AST 27 06/09/2025     ALT 23 06/09/2025     INR 1.05 06/09/2025     MG 1.8 06/09/2025     PHOS 3.5 06/09/2025   Assessment & Plan:  1.       Periprosthetic hip fracture. No surg per Dr. Thomas. Await note; pt/ot to see dw them0  2.       Code status  is DNAR Select.  Confirmed with the patient that this would need to be rescinded in the operating room in perioperative period should surgery be needed.  3.       History of previous cardiac issues.  Will check echo, but EKG is normal sinus rhythm.  No obvious ischemia.  Benefit of surgery outweighs the risks medically acceptable for him to go to surgery if need be.  4.       Hypertension.  Continue medications.  5.       Iron deficiency anemia.  6.       BPH.  Continue medications.      MEDICATIONS ADMINISTERED IN LAST 1 DAY:  allopurinol (Zyloprim) tab 100 mg       Date Action Dose Route User    6/9/2025 1826 Given 100 mg Oral Doris Rubio RN          amLODIPine (Norvasc) tab 2.5 mg       Date Action Dose Route User    6/10/2025 0923 Given 2.5 mg Oral Jacobo Aggarwal RN          ferrous sulfate DR tab 325 mg       Date Action Dose Route User    6/10/2025 0923 Given 325 mg Oral Jacobo Aggarwal RN          heparin (Porcine) 5000 UNIT/ML injection 5,000 Units       Date Action Dose Route User    6/10/2025 0513 Given 5,000 Units Subcutaneous (Left Lower Abdomen) Lynn Riggins RN    6/9/2025 2200 Given 5,000 Units Subcutaneous (Left Lower Abdomen) Lynn Riggins RN    6/9/2025 1438 Given 5,000 Units Subcutaneous (Right Lower Abdomen) Doris Rubio RN          HYDROcodone-acetaminophen (Norco) 5-325 MG per tab 1 tablet       Date Action Dose Route User    6/10/2025 0923 Given 1 tablet Oral Jacobo Aggarwal RN    6/10/2025 0512 Given 1 tablet Oral Lynn Riggins RN    6/9/2025 2011 Given 1 tablet Oral Lynn Riggins RN    6/9/2025 1932 Given 1 tablet Oral Lynn Riggins RN    6/9/2025 1438 Given 1 tablet Oral Doris Rubio RN          HYDROcodone-acetaminophen (Norco) 5-325 MG per tab 2 tablet       Date Action Dose Route User    6/10/2025 0155 Given 2 tablet Oral Lynn Riggins RN          melatonin tab 3 mg       Date Action Dose Route User    6/9/2025 2011 Given 3 mg Oral Lynn iRggins RN           metoprolol succinate ER (Toprol XL) 24 hr tab 25 mg       Date Action Dose Route User    6/10/2025 0922 Given 25 mg Oral Jacobo Aggarwal RN          multivitamin (Tab-A-Lalit/Beta Carotene) tab 1 tablet       Date Action Dose Route User    6/10/2025 0923 Given 1 tablet Oral Jacobo Aggarwal RN          tamsulosin (Flomax) cap 0.4 mg       Date Action Dose Route User    6/9/2025 1932 Given 0.4 mg Oral Lynn Riggins RN          cholecalciferol (Vitamin D3) tab 1,000 Units       Date Action Dose Route User    6/10/2025 0923 Given 1,000 Units Oral Jacobo Aggarwal RN            Vitals (last day)       Date/Time Temp Pulse Resp BP SpO2 Weight O2 Device O2 Flow Rate (L/min) [facility]    06/10/25 0829 98.6 °F (37 °C) 70 18 136/82 94 % -- None (Room air) -- AM    06/10/25 0508 98.2 °F (36.8 °C) 81 18 121/55 92 % -- None (Room air) -- BR    06/10/25 0300 -- 74 -- -- -- -- -- -- GT    06/10/25 0153 -- 84 -- -- -- -- -- -- BR    06/09/25 1935 98.1 °F (36.7 °C) 89 18 129/63 97 % -- None (Room air) -- HF    06/09/25 1904 -- 92 -- -- -- -- -- -- GT    06/09/25 1619 -- 64 18 134/61 96 % -- None (Room air) -- DR    06/09/25 0757 98.3 °F (36.8 °C) 61 17 155/89 91 % -- None (Room air) -- DR    06/09/25 0354 98.4 °F (36.9 °C) 60 17 157/68 94 % -- None (Room air) -- HF          CIWA Scores (since admission)       None

## 2025-06-10 NOTE — CM/SW NOTE
Department  asked to send updates to [facility] referral for RUPESH.    Assigned SW/CM to follow up with patient/family on discharge plan.     Chitra Kyle, DSC

## 2025-06-10 NOTE — CONSULTS
[facility]  part of [facility]    Report of Consultation    Tha Jolly Patient Status:  Inpatient    1935 MRN [id number]   Location [facility] 4W/SW/SE Attending Latonia Denise,*   Hosp Day # 2 PCP Misha Calle MD     Date of Admission:  2025  Date of Consult:  25  Reason for Consultation:   Right periprosthetic hip fracture    History of Present Illness:   Patient is a 89 year old male who was admitted to the hospital for Closed fracture of right femur, unspecified fracture morphology, initial encounter (HCC):    89-year-old male admitted yesterday after fall from standing height    Past Medical History  Past Medical History[1]    Past Surgical History  Past Surgical History[2]    Family History  Family History[3]    Social History  Short Social Hx on File[4]        Current Medications:  Current Hospital Medications[5]  Prescriptions Prior to Admission[6]    Allergies  Allergies[7]    Review of Systems:    Negative x 10 aside from HPI    Physical Exam:   Blood pressure 136/82, pulse 70, temperature 98.6 °F (37 °C), temperature source Oral, resp. rate 18, weight 167 lb (75.8 kg), SpO2 94%.    No acute distress.  Nonlabored respirations.  Pleasant and cooperative.  Right lower extremity shows prior posterior lateral incision benign.  No swelling.  No bruising.  No redness.  No drainage.  Compartments soft.  Fires all distal motor groups.  Brisk capillary fill less than 2 seconds to all digits.    Results:     Laboratory Data:  Lab Results   Component Value Date    WBC 6.7 06/10/2025    HGB 10.4 (L) 06/10/2025    HCT 30.0 (L) 06/10/2025    .0 (L) 06/10/2025    CREATSERUM 0.63 (L) 06/10/2025    BUN 13 06/10/2025     06/10/2025    K 4.1 06/10/2025     06/10/2025    CO2 29.0 06/10/2025    GLU 99 06/10/2025    CA 8.8 06/10/2025    ALB 3.6 2025    ALKPHO 115 2025    TP 6.1 2025    AST 27 2025    ALT 23 2025    PTT  29.2 11/11/2014    INR 1.05 06/09/2025    PTP 14.4 06/09/2025    T4F 1.04 01/28/2016    TSH 1.817 04/11/2025    ESRML 38 (H) 06/25/2018    CRP 0.9 06/25/2018    MG 2.1 06/10/2025    PHOS 3.8 06/10/2025    TROPHS 11 08/11/2024    B12 797 05/10/2023    ETOH 2 07/16/2018         Imaging:  XR HIP + PELVIS MIN 4 VIEWS RIGHT (CPT=73503)  Result Date: 6/8/2025  CONCLUSION:  1. Minimally displaced periprosthetic fracture involving greater trochanter and proximal subtrochanteric femur. 2. Anatomically aligned total hip arthroplasty. 3. 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 4. Degenerative changes in lower lumbar spine. 5. Vascular calcifications.    Dictated by (CST): Kendall Gaytan MD on 6/08/2025 at 3:46 PM     Finalized by (CST): Kendall Gaytan MD on 6/08/2025 at 3:48 PM          XR FEMUR MIN 2 VIEWS RIGHT (CPT=73552)  Result Date: 6/8/2025  CONCLUSION:  1. Acute periprosthetic right femur fracture involving greater trochanter with extension into the lateral subtrochanteric region along the proximal femoral component.  Near anatomic alignment.    Dictated by (CST): Kendall Gaytan MD on 6/08/2025 at 3:43 PM     Finalized by (CST): Kendall Gaytan MD on 6/08/2025 at 3:46 PM               Impression:     Closed fracture of right femur, unspecified fracture morphology, initial encounter ([facility])    Recommendations:  Etiology, pathophysiology, natural history of the injury reviewed with the patient.  Stated treatment options for this are controversial.  The hip replacement has been well-functioning in combination with the lack of osteolysis or asymmetric polyethylene wear.     While there is no imaging for review, no obvious signs of subsidence of her stem.      Stated treatment options include monitoring clinical status with close follow-up and repeat imaging to determine stability of the femoral component and fracture alignment, open reduction internal fixation, ORIF with revision arthroplasty.       He elect to proceed with  the former.  Again, I think this is reasonable given that her symptoms are not increasing in terms of pain and discomfort.       Weightbearing as tolerated with assist device.  Abductor hip precautions.  Follow-up in 3 weeks for repeat evaluation and imaging w original surgeon.      Patient understands.             [1]   Past Medical History:   Actinic keratosis    Right lateral shin    Anxiety state    Aortic atherosclerosis    CXR 11-14    Aortic stenosis    Echo 3-23 with normal LV function, EF 55-60%, moderate LAE, moderate aortic stenosis with peak gradient 39 mmHg and mean gradient 25 mmHg, mild-moderate mitral stenosis, mild MR    BPH (benign prostatic hyperplasia)    COPD (chronic obstructive pulmonary disease) ([facility])    CXR 11-14    Diverticulosis    Colonoscopy 2-12    Essential hypertension    History of blood transfusion    no reactions    History of stomach ulcers    History of vertebral fracture    T7, T8, T9, T11, T12, L1 secondary to fall; s/p kyphoplasty T7, T8, T9 6-23    Hx of adenomatous colonic polyps    Hx of melanoma of skin    forehead    Iron deficiency anemia    Lumbar spinal stenosis    Macular degeneration    per NG    Primary osteoarthritis of knees, bilateral    Status post right TKA 11-14; s/p left TKA 4-23    Pulmonary hypertension ([facility])    Echo 5-18 with pulmonary artery pressure 45 mmHg    Visual impairment    glasses   [2]   Past Surgical History:  Procedure Laterality Date    Appendectomy      per NG    Arthroscopy, ankle, surgical; w/ankle arthrodesis Right     \"Arthrocentesis of the right ankle joint (2-3)\"; per NG    Colonoscopy  2005    per NG    Hip replacement surgery Right 2002    per NG    Ir kyphoplasty  06/12/2023    T7, T8, T9    Knee arthroscopy Bilateral     per NG    Other Right 07/15/2018    ORIF proximal right humerus fracture requiring a biceps tenodesis    Other  05/2018    Incision and drainage of right index finger complicated abscess, revision amputation of  right index finger.    Total knee arthroplasty Left 2023    Total knee replacement Right 2014   [3]   Family History  Problem Relation Age of Onset    Arthritis Father         per NG    Other (Lung Cancer) Father          age 55    Hypertension Mother         per NG    Diabetes Neg     Glaucoma Neg     Macular degeneration Neg    [4]   Social History  Socioeconomic History    Marital status:    Tobacco Use    Smoking status: Former     Types: Pipe     Quit date: 1985     Years since quittin.4     Passive exposure: Past    Smokeless tobacco: Never   Vaping Use    Vaping status: Never Used   Substance and Sexual Activity    Alcohol use: Yes     Alcohol/week: 5.8 standard drinks of alcohol     Types: 7 Glasses of wine per week     Comment: 1-2 drinks daily    Drug use: No    Sexual activity: Not Currently   Other Topics Concern    Pt has a pacemaker No    Pt has a defibrillator No    Reaction to local anesthetic No     Social Drivers of Health     Food Insecurity: No Food Insecurity (2025)    NCSS - Food Insecurity     Worried About Running Out of Food in the Last Year: No     Ran Out of Food in the Last Year: No   Transportation Needs: No Transportation Needs (2025)    NCSS - Transportation     Lack of Transportation: No   Housing Stability: Not At Risk (2025)    NCSS - Housing/Utilities     Has Housing: Yes     Worried About Losing Housing: No     Unable to Get Utilities: No   [5]   Current Facility-Administered Medications   Medication Dose Route Frequency    heparin (Porcine) 5000 UNIT/ML injection 5,000 Units  5,000 Units Subcutaneous Q8H [facility]    acetaminophen (Tylenol Extra Strength) tab 500 mg  500 mg Oral Q4H PRN    morphINE PF 2 MG/ML injection 1 mg  1 mg Intravenous Q2H PRN    Or    morphINE PF 2 MG/ML injection 2 mg  2 mg Intravenous Q2H PRN    Or    morphINE PF 4 MG/ML injection 4 mg  4 mg Intravenous Q2H PRN    acetaminophen (Tylenol) tab 650 mg  650 mg Oral Q4H PRN     Or    HYDROcodone-acetaminophen (Norco) 5-325 MG per tab 1 tablet  1 tablet Oral Q4H PRN    Or    HYDROcodone-acetaminophen (Norco) 5-325 MG per tab 2 tablet  2 tablet Oral Q4H PRN    melatonin tab 3 mg  3 mg Oral Nightly PRN    polyethylene glycol (PEG 3350) (Miralax) 17 g oral packet 17 g  17 g Oral Daily PRN    sennosides (Senokot) tab 17.2 mg  17.2 mg Oral Nightly PRN    bisacodyl (Dulcolax) 10 MG rectal suppository 10 mg  10 mg Rectal Daily PRN    fleet enema (Fleet) rectal enema 133 mL  1 enema Rectal Once PRN    ondansetron (Zofran) 4 MG/2ML injection 4 mg  4 mg Intravenous Q6H PRN    metoclopramide (Reglan) 5 mg/mL injection 10 mg  10 mg Intravenous Q8H PRN    glycerin-hypromellose- (Artificial Tears) 0.2-0.2-1 % ophthalmic solution 1 drop  1 drop Both Eyes QID PRN    sodium chloride (Saline Mist) 0.65 % nasal solution 1 spray  1 spray Each Nare Q3H PRN    allopurinol (Zyloprim) tab 100 mg  100 mg Oral Daily before evening meal    amLODIPine (Norvasc) tab 2.5 mg  2.5 mg Oral Daily    cholecalciferol (Vitamin D3) tab 1,000 Units  1,000 Units Oral Daily    ferrous sulfate DR tab 325 mg  325 mg Oral Daily with breakfast    metoprolol succinate ER (Toprol XL) 24 hr tab 25 mg  25 mg Oral Daily    multivitamin (Tab-A-Lalit/Beta Carotene) tab 1 tablet  1 tablet Oral Daily    tamsulosin (Flomax) cap 0.4 mg  0.4 mg Oral Nightly   [6]   Medications Prior to Admission   Medication Sig    Multiple Vitamins-Minerals (PRESERVISION AREDS 2) Oral Cap Take 1 capsule by mouth 2 (two) times daily.    allopurinol 100 MG Oral Tab Take 1 tablet (100 mg total) by mouth before evening meal.    metoprolol succinate ER 25 MG Oral Tablet 24 Hr Take 1 tablet (25 mg total) by mouth daily.    traMADol 50 MG Oral Tab Take 1 tablet (50 mg total) by mouth every 6 (six) hours as needed for Pain.    tamsulosin 0.4 MG Oral Cap TAKE 1 CAPSULE BY MOUTH ONE-HALF HOUR AFTER SUPPER DAILY    amLODIPine 2.5 MG Oral Tab Take 1 tablet (2.5 mg  total) by mouth daily.    ferrous sulfate 325 (65 FE) MG Oral Tab EC Take 1 tablet (325 mg total) by mouth daily with breakfast.    cholecalciferol 25 MCG (1000 UT) Oral Cap Take 1 capsule (1,000 Units total) by mouth daily.    Multiple Vitamin (MULTI-VITAMINS) Oral Tab Take 1 tablet by mouth daily.   [7]   Allergies  Allergen Reactions    Demerol [Meperidine] SHORTNESS OF BREATH    Hylan G-F 20 SWELLING     Swelling to cristiano. knees    Swelling to cristiano. knees   Swelling to cristiano. knees   Swelling to cristiano. knees   Swelling to cristiano. knees    Ibuprofen RASH and SHORTNESS OF BREATH    Penicillins RASH and SHORTNESS OF BREATH    Valdecoxib OTHER (SEE COMMENTS)    Celecoxib RASH    Hydrochlorothiazide RASH    Triamterene RASH

## 2025-06-10 NOTE — PROGRESS NOTES
[facility]  part of [facility]    Progress Note    Tha Jolly Patient Status:  Inpatient    1935 MRN [id number]   Location [facility] 4W/SW/SE Attending Latonia Denise,*   Hosp Day # 2 PCP Misha Calle MD     Chief Complaint: ok    Subjective:     Constitutional:  Positive for appetite change and fatigue.   Respiratory:  Negative for chest tightness.    Cardiovascular:  Negative for leg swelling.   Gastrointestinal:  Negative for abdominal pain.   Genitourinary:  Negative for dysuria.   Musculoskeletal:  Positive for joint swelling and joint pain.   Neurological:  Negative for weakness.   Psychiatric/Behavioral:  Positive for sleep disturbance. Negative for confusion and decreased concentration. The patient is nervous/anxious.        Objective:   Blood pressure 136/82, pulse 70, temperature 98.6 °F (37 °C), temperature source Oral, resp. rate 18, weight 167 lb (75.8 kg), SpO2 94%.  Physical Exam  Vitals and nursing note reviewed.   Constitutional:       General: He is not in acute distress.     Appearance: He is normal weight. He is not ill-appearing, toxic-appearing or diaphoretic.   HENT:      Head: Normocephalic and atraumatic.   Cardiovascular:      Rate and Rhythm: Normal rate.      Pulses: Normal pulses.   Pulmonary:      Effort: Pulmonary effort is normal.      Breath sounds: Rhonchi present.   Abdominal:      General: Bowel sounds are normal.      Palpations: Abdomen is soft.   Musculoskeletal:         General: Swelling present.   Skin:     General: Skin is warm and dry.      Capillary Refill: Capillary refill takes less than 2 seconds.   Neurological:      General: No focal deficit present.      Mental Status: He is alert and oriented to person, place, and time.         Results:   Lab Results   Component Value Date    WBC 6.7 06/10/2025    HGB 10.4 (L) 06/10/2025    HCT 30.0 (L) 06/10/2025    .0 (L) 06/10/2025    CREATSERUM 0.63 (L) 06/10/2025     BUN 13 06/10/2025     06/10/2025    K 4.1 06/10/2025     06/10/2025    CO2 29.0 06/10/2025    GLU 99 06/10/2025    CA 8.8 06/10/2025    ALB 3.6 06/09/2025    ALKPHO 115 06/09/2025    BILT 0.9 06/09/2025    TP 6.1 06/09/2025    AST 27 06/09/2025    ALT 23 06/09/2025    PTT 29.2 11/11/2014    INR 1.05 06/09/2025    T4F 1.04 01/28/2016    TSH 1.817 04/11/2025    ESRML 38 (H) 06/25/2018    CRP 0.9 06/25/2018    MG 2.1 06/10/2025    PHOS 3.8 06/10/2025    TROPHS 11 08/11/2024    B12 797 05/10/2023    ETOH 2 07/16/2018       No results found.            Assessment & Plan:     1.       Periprosthetic hip fracture. No surg per Dr. Thomas. See consult  2.       Code status is DNAR [place].  Confirmed with the patient that this would need to be rescinded in the operating room in perioperative period should surgery be needed.  3.       History of previous cardiac issues.  Will check echo, but EKG is normal sinus rhythm.  No obvious ischemia.  Benefit of surgery outweighs the risks medically acceptable for him to go to surgery if need be.  4.       Hypertension.  Continue medications.  5.       Iron deficiency anemia.  6.       BPH.  Continue medications.    Complex mdm; coordinating care with nurse and counseling pt about diet and activity need fabiola TOLLIVER MD

## 2025-06-10 NOTE — PLAN OF CARE
Patient alert and oriented x4. On room air. VSS. Voiding via urinal. Non-surgical hip fracture. Pt had difficulty standing from chair to get  back into bed. 2 mod assist to stand and pivot. Pain managed with prn norco 5mg q4. Plan possibly RUPESH     Problem: Patient Centered Care  Goal: Patient preferences are identified and integrated in the patient's plan of care  Description: Interventions:  - Provide timely, complete, and accurate information to patient/family  - Incorporate patient and family knowledge, values, beliefs, and cultural backgrounds into the planning and delivery of care  - Encourage patient/family to participate in care and decision-making at the level they choose  - Honor patient and family perspectives and choices  Outcome: Progressing     Problem: PAIN - ADULT  Goal: Verbalizes/displays adequate comfort level or patient's stated pain goal  Description: INTERVENTIONS:  - Encourage pt to monitor pain and request assistance  - Assess pain using appropriate pain scale  - Administer analgesics based on type and severity of pain and evaluate response  - Implement non-pharmacological measures as appropriate and evaluate response  - Consider cultural and social influences on pain and pain management  - Manage/alleviate anxiety  - Utilize distraction and/or relaxation techniques  - Monitor for opioid side effects  - Notify MD/LIP if interventions unsuccessful or patient reports new pain  - Anticipate increased pain with activity and pre-medicate as appropriate  Outcome: Progressing     Problem: RISK FOR INFECTION - ADULT  Goal: Absence of fever/infection during anticipated neutropenic period  Description: INTERVENTIONS  - Monitor WBC  - Administer growth factors as ordered  - Implement neutropenic guidelines  Outcome: Progressing     Problem: SAFETY ADULT - FALL  Goal: Free from fall injury  Description: INTERVENTIONS:  - Assess pt frequently for physical needs  - Identify cognitive and physical deficits  and behaviors that affect risk of falls.  - [place] fall precautions as indicated by assessment.  - Educate pt/family on patient safety including physical limitations  - Instruct pt to call for assistance with activity based on assessment  - Modify environment to reduce risk of injury  - Provide assistive devices as appropriate  - Consider OT/PT consult to assist with strengthening/mobility  - Encourage toileting schedule  Outcome: Progressing     Problem: DISCHARGE PLANNING  Goal: Discharge to home or other facility with appropriate resources  Description: INTERVENTIONS:  - Identify barriers to discharge w/pt and caregiver  - Include patient/family/discharge partner in discharge planning  - Arrange for needed discharge resources and transportation as appropriate  - Identify discharge learning needs (meds, wound care, etc)  - Arrange for interpreters to assist at discharge as needed  - Consider post-discharge preferences of patient/family/discharge partner  - Complete POLST form as appropriate  - Assess patient's ability to be responsible for managing their own health  - Refer to Case Management Department for coordinating discharge planning if the patient needs post-hospital services based on physician/LIP order or complex needs related to functional status, cognitive ability or social support system  Outcome: Progressing

## 2025-06-11 VITALS
OXYGEN SATURATION: 92 % | SYSTOLIC BLOOD PRESSURE: 126 MMHG | HEART RATE: 63 BPM | BODY MASS INDEX: 27 KG/M2 | RESPIRATION RATE: 16 BRPM | TEMPERATURE: 98 F | DIASTOLIC BLOOD PRESSURE: 66 MMHG | WEIGHT: 167 LBS

## 2025-06-11 LAB — SARS-COV-2 RNA RESP QL NAA+PROBE: NOT DETECTED

## 2025-06-11 PROCEDURE — 99239 HOSP IP/OBS DSCHRG MGMT >30: CPT | Performed by: HOSPITALIST

## 2025-06-11 RX ORDER — TRAMADOL HYDROCHLORIDE 50 MG/1
50 TABLET ORAL EVERY 8 HOURS PRN
Qty: 25 TABLET | Refills: 0 | Status: SHIPPED | OUTPATIENT
Start: 2025-06-11

## 2025-06-11 NOTE — CM/SW NOTE
Prior Authorization - Destination  Destination Type: Skilled nursing facility  Service Provider: [facility]  Payer Communication Destination Comments: [place] [phone]  Prior Authorization Status: Approved  Prior Authorization Start Date: 06/11/25 6/11 to 6/13 ( [facility] 14)    Chitra Kyle, DSC

## 2025-06-11 NOTE — DISCHARGE SUMMARY
[facility]  part of [facility]    Discharge Summary    Tha Jolly Patient Status:  Observation    1935 MRN [id number]   Location [facility] 4W/SW/SE Attending Dariana Cervantes *   Hosp Day # 3 PCP Misha Calle MD     Date of Admission: 2025 Disposition: Home or Self Care     Date of Discharge: 2025      Admitting Diagnosis: Closed fracture of right femur, unspecified fracture morphology, initial encounter ([facility]) [S72.91XA]    Hospital Discharge Diagnoses: as above     Hospital Discharge Diagnoses: as above     Lace+ Score: 73  59-90 High Risk  29-58 Medium Risk  0-28   Low Risk.    TCM Follow-Up Recommendation:  LACE > 58: High Risk of readmission after discharge from the hospital.          Lace+ Score: 73  59-90 High Risk  29-58 Medium Risk  0-28   Low Risk    Risk of readmission: Tha Jolly has High Risk of readmission after discharge from the hospital.    Problem List: Problem List[1]    Reason for Admission: fall and fx     Physical Exam:   General appearance: alert, appears stated age and cooperative  Pulmonary:  clear to auscultation bilaterally  Cardiovascular: S1, S2 normal, no murmur, click, rub or gallop, regular rate and rhythm  Abdominal: soft, non-tender; bowel sounds normal; no masses,  no organomegaly  Extremities: extremities normal, atraumatic, no cyanosis or edema  Psychiatric: calm        History of Present Illness:   HISTORY OF PRESENT ILLNESS:  The patient is a very pleasant and loquacious 89-year-old white male who presents with a history of having a mechanical fall in [employer].  He denied passing out.  He had no dizziness, no prodrome, no aura.  He simply had his heel caught and fell.  He appears to have a periprosthetic hip fracture and his orthopedist has left [place] more than 20 years ago, and we contacted Dr. Thornton for Orthopedics here to come and see the patient to comment on his further management.  Just in case, we will  keep the patient n.p.o.     Hospital Course: 1.       Periprosthetic hip fracture. No surg per Dr. Thomas - f/u with him in 3 weeks or one of his partners   2.       Code status is DNAR Select.  Confirmed with the patient that this would need to be rescinded in the operating room in perioperative period should surgery be needed.  3.       History of previous cardiac issues.  Will check echo, but EKG is normal sinus rhythm.  No obvious ischemia.  Benefit of surgery outweighs the risks medically acceptable for him to go to surgery if need be.  4.       Hypertension.  Continue medications.  5.       Iron deficiency anemia. F/u outpt. Hb stable   6.       BPH.  Continue medications.     Complex mdm; coordinating care with nurse and counseling pt about diet and activity need fabiola             Consultations: Dr Thomas ortho     Procedures: none    Complications: none    Discharge Condition: Good    Discharge Medications:      Discharge Medications        CHANGE how you take these medications        Instructions Prescription details   traMADol 50 MG Tabs  Commonly known as: Ultram  What changed:   when to take this  reasons to take this      Take 1 tablet (50 mg total) by mouth every 8 (eight) hours as needed.   Quantity: 25 tablet  Refills: 0            CONTINUE taking these medications        Instructions Prescription details   allopurinol 100 MG Tabs  Commonly known as: Zyloprim      Take 1 tablet (100 mg total) by mouth before evening meal.   Refills: 0     amLODIPine 2.5 MG Tabs  Commonly known as: Norvasc      Take 1 tablet (2.5 mg total) by mouth daily.   Quantity: 90 tablet  Refills: 3     cholecalciferol 25 MCG (1000 UT) Caps  Commonly known as: DRISDOL      Take 1 capsule (1,000 Units total) by mouth daily.   Refills: 0     ferrous sulfate 325 (65 FE) MG Tbec      Take 1 tablet (325 mg total) by mouth daily with breakfast.   Refills: 0     metoprolol succinate ER 25 MG Tb24  Commonly known as: Toprol XL       Take 1 tablet (25 mg total) by mouth daily.   Quantity: 90 tablet  Refills: 3     PreserVision AREDS 2 Caps      Take 1 capsule by mouth 2 (two) times daily.   Refills: 0     tamsulosin 0.4 MG Caps  Commonly known as: Flomax      TAKE 1 CAPSULE BY MOUTH ONE-HALF HOUR AFTER SUPPER DAILY   Quantity: 90 capsule  Refills: 1     THERA/BETA-CAROTENE Tabs      Take 1 tablet by mouth daily.   Refills: 0               Where to Get Your Medications        Please  your prescriptions at the location directed by your doctor or nurse    Bring a paper prescription for each of these medications  traMADol 50 MG Tabs         Follow up Visits: Follow-up with pcp dr ridley in 1 week from [place] from rehab     Follow up Labs: none      Other Discharge Instructions: f/u with dr gomez in 3 weeks for repeat x rays     Dariana Menezes DO  6/11/2025  3:19 PM    > 35 min        [1]   Patient Active Problem List  Diagnosis    Osteoarthritis    Glaucoma suspect    Age-related nuclear cataract of both eyes    Age-related macular degeneration    History of knee replacement procedure of right knee    History of hip replacement, total, right    Vitreous floaters of both eyes    Iron deficiency anemia    Essential hypertension with goal blood pressure less than 140/90    Benign prostatic hyperplasia with lower urinary tract symptoms    Panlobular emphysema (HCC)    Osteomyelitis (HCC)    Physical exam    Lumbar spinal stenosis    Aortic atherosclerosis    Hx of adenomatous colonic polyps    Aortic stenosis    Pulmonary hypertension (HCC)    Diverticulosis    Essential hypertension    Primary osteoarthritis of knees, bilateral    BPH (benign prostatic hyperplasia)    COPD (chronic obstructive pulmonary disease) (HCC)    Intractable back pain    Primary osteoarthritis of left knee    Osteoarthritis of knee    Closed fracture of thoracic vertebra with routine healing, unspecified fracture morphology, unspecified thoracic vertebral level,  subsequent encounter    Fall, initial encounter    History of vertebral fracture    Thrombocytopenia    Closed fracture of right femur, unspecified fracture morphology, initial encounter ([facility])

## 2025-06-11 NOTE — PLAN OF CARE
Problem: PAIN - ADULT  Goal: Verbalizes/displays adequate comfort level or patient's stated pain goal  Description: INTERVENTIONS:  - Encourage pt to monitor pain and request assistance  - Assess pain using appropriate pain scale  - Administer analgesics based on type and severity of pain and evaluate response  - Implement non-pharmacological measures as appropriate and evaluate response  - Consider cultural and social influences on pain and pain management  - Manage/alleviate anxiety  - Utilize distraction and/or relaxation techniques  - Monitor for opioid side effects  - Notify MD/LIP if interventions unsuccessful or patient reports new pain  - Anticipate increased pain with activity and pre-medicate as appropriate  Outcome: Progressing     Problem: RISK FOR INFECTION - ADULT  Goal: Absence of fever/infection during anticipated neutropenic period  Description: INTERVENTIONS  - Monitor WBC  - Administer growth factors as ordered  - Implement neutropenic guidelines  Outcome: Progressing     Patient cleared by internal medicine, ortho surgery, PT/OT, and social work. Going to [facility]. IV and Tele removed, patient sent  with all personal belongings,  script for Tramadol, and discharge instructions. Gave report to Мария.

## 2025-06-11 NOTE — CM/SW NOTE
06/11/25 1429   Discharge disposition   Expected discharge disposition subacute   Post Acute Care Provider PP SNF   Discharge transportation [facility]     SW met with pt to discuss RUPESH options. Pt would like to go to [facility]. SW reserved via [facility] and informed liaison via secured message.     SW informed DSC of choice as well.    235: Auth was approved and there is a bed available for pt at [facility]. [facility] requested a covid test, information uploaded via [employer]. Pt will be going to room 209 via [facility] [place]note 2] at 5pm. PCS form completed. Number for report is  [phone], RN and MD made aware. Quote for medicar will be $35 pt made aware.    PLAN: [facility]     SW/CM to remain available for support and/or discharge planning.    MS MckenzieW, LSW [id number]

## 2025-06-11 NOTE — PAYOR COMM NOTE
--------------  CONTINUED STAY REVIEW  6/10  Payor: [employer]  Subscriber #:  [id number]  Authorization Number: [id number]    Admit date: 6/8/25  Admit time:  5:03 PM    REVIEW DOCUMENTATION:  Hospitalist      Constitutional:  Positive for appetite change and fatigue.   Musculoskeletal:  Positive for joint swelling and joint pain.   Psychiatric/Behavioral:  Positive for sleep disturbance.    The patient is nervous/anxious.        Pulmonary:      Effort: Pulmonary effort is normal.      Breath sounds: Rhonchi present.     Musculoskeletal:         General: Swelling present.     Assessment & Plan:  1.       Periprosthetic hip fracture. No surg per Dr. Thomas. See consult  2.       Code status is DNAR Select.  Confirmed with the patient that this would need to be rescinded in the operating room in perioperative period should surgery be needed.  3.       History of previous cardiac issues.  Will check echo, but EKG is normal sinus rhythm.  No obvious ischemia.  Benefit of surgery outweighs the risks medically acceptable for him to go to surgery if need be.  4.       Hypertension.  Continue medications.  5.       Iron deficiency anemia.  6.       BPH.  Continue medications.     Complex mdm; coordinating care with nurse and counseling pt about diet and activity need fabiola      Recent Results (from the past 72 hours)   EKG 12 Lead    Collection Time: 06/08/25  3:25 PM   Result Value Ref Range    Ventricular rate 63 BPM    Atrial rate 63 BPM    P-R Interval 198 ms    QRS Duration 82 ms    Q-T Interval 406 ms    QTC Calculation (Bezet) 415 ms    P Axis 36 degrees    R Axis -11 degrees    T Axis 20 degrees   CBC With Differential With Platelet    Collection Time: 06/08/25  3:30 PM   Result Value Ref Range    WBC 7.8 4.0 - 11.0 x10(3) uL    RBC 3.69 (L) 3.80 - 5.80 x10(6)uL    HGB 12.1 (L) 13.0 - 17.5 g/dL    HCT 36.0 (L) 39.0 - 53.0 %    MCV 97.6 80.0 - 100.0 fL    MCH 32.8 26.0 - 34.0 pg    MCHC 33.6 31.0 -  37.0 g/dL    RDW-SD 51.0 (H) 35.1 - 46.3 fL    RDW 14.2 11.0 - 15.0 %    .0 150.0 - 450.0 10(3)uL    Neutrophil Absolute Prelim 5.61 1.50 - 7.70 x10 (3) uL    Neutrophil Absolute 5.61 1.50 - 7.70 x10(3) uL    Lymphocyte Absolute 1.11 1.00 - 4.00 x10(3) uL    Monocyte Absolute 0.71 0.10 - 1.00 x10(3) uL    Eosinophil Absolute 0.10 0.00 - 0.70 x10(3) uL    Basophil Absolute 0.07 0.00 - 0.20 x10(3) uL    Immature Granulocyte Absolute 0.16 0.00 - 1.00 x10(3) uL    Neutrophil % 72.3 %    Lymphocyte % 14.3 %    Monocyte % 9.1 %    Eosinophil % 1.3 %    Basophil % 0.9 %    Immature Granulocyte % 2.1 %   Basic Metabolic Panel (8)    Collection Time: 06/08/25  3:30 PM   Result Value Ref Range    Glucose 95 70 - 99 mg/dL    Sodium 137 136 - 145 mmol/L    Potassium 5.0 3.5 - 5.1 mmol/L    Chloride 97 (L) 98 - 112 mmol/L    CO2 32.0 21.0 - 32.0 mmol/L    Anion Gap 8 0 - 18 mmol/L    BUN 16 9 - 23 mg/dL    Creatinine 0.71 0.70 - 1.30 mg/dL    BUN/CREA Ratio 22.5 (H) 10.0 - 20.0    Calcium, Total 9.3 8.7 - 10.4 mg/dL    Calculated Osmolality 285 275 - 295 mOsm/kg    eGFR-Cr 88 >=60 mL/min/1.73m2   RAINBOW DRAW LAVENDER    Collection Time: 06/08/25  3:30 PM   Result Value Ref Range    Hold Lavender Auto Resulted    RAINBOW DRAW LIGHT GREEN    Collection Time: 06/08/25  3:30 PM   Result Value Ref Range    Hold Lt Green Auto Resulted    RAINBOW DRAW BLUE    Collection Time: 06/08/25  3:30 PM   Result Value Ref Range    Hold Blue Auto Resulted    Urinalysis, Routine    Collection Time: 06/08/25  6:50 PM   Result Value Ref Range    Urine Color Light-Yellow Yellow    Clarity Urine Clear Clear    Spec Gravity 1.012 1.005 - 1.030    Glucose Urine Normal Normal mg/dL    Bilirubin Urine Negative Negative    Ketones Urine 10 (A) Negative mg/dL    Blood Urine Negative Negative    pH Urine 8.0 5.0 - 8.0    Protein Urine Negative Negative mg/dL    Urobilinogen Urine Normal Normal    Nitrite Urine Negative Negative    Leukocyte Esterase  Urine Negative Negative    Microscopic Microscopic not indicated    Comp Metabolic Panel (14)    Collection Time: 06/09/25  5:45 AM   Result Value Ref Range    Glucose 97 70 - 99 mg/dL    Sodium 138 136 - 145 mmol/L    Potassium 4.0 3.5 - 5.1 mmol/L    Chloride 100 98 - 112 mmol/L    CO2 30.0 21.0 - 32.0 mmol/L    Anion Gap 8 0 - 18 mmol/L    BUN 12 9 - 23 mg/dL    Creatinine 0.51 (L) 0.70 - 1.30 mg/dL    BUN/CREA Ratio 23.5 (H) 10.0 - 20.0    Calcium, Total 8.9 8.7 - 10.4 mg/dL    Calculated Osmolality 286 275 - 295 mOsm/kg    eGFR-Cr 98 >=60 mL/min/1.73m2    ALT 23 10 - 49 U/L    AST 27 <34 U/L    Alkaline Phosphatase 115 45 - 117 U/L    Bilirubin, Total 0.9 0.2 - 1.1 mg/dL    Total Protein 6.1 5.7 - 8.2 g/dL    Albumin 3.6 3.2 - 4.8 g/dL    Globulin  2.5 2.0 - 3.5 g/dL    A/G Ratio 1.4 1.0 - 2.0   Magnesium    Collection Time: 06/09/25  5:45 AM   Result Value Ref Range    Magnesium 1.8 1.6 - 2.6 mg/dL   Phosphorus    Collection Time: 06/09/25  5:45 AM   Result Value Ref Range    Phosphorus 3.5 2.4 - 5.1 mg/dL   CBC With Differential With Platelet    Collection Time: 06/09/25  5:45 AM   Result Value Ref Range    WBC 6.6 4.0 - 11.0 x10(3) uL    RBC 3.52 (L) 3.80 - 5.80 x10(6)uL    HGB 11.4 (L) 13.0 - 17.5 g/dL    HCT 34.4 (L) 39.0 - 53.0 %    MCV 97.7 80.0 - 100.0 fL    MCH 32.4 26.0 - 34.0 pg    MCHC 33.1 31.0 - 37.0 g/dL    RDW-SD 50.5 (H) 35.1 - 46.3 fL    RDW 14.1 11.0 - 15.0 %    .0 (L) 150.0 - 450.0 10(3)uL    Immature Platelet Fraction 5.2 0.0 - 7.0 %    Neutrophil Absolute Prelim 4.79 1.50 - 7.70 x10 (3) uL    Neutrophil Absolute 4.79 1.50 - 7.70 x10(3) uL    Lymphocyte Absolute 0.91 (L) 1.00 - 4.00 x10(3) uL    Monocyte Absolute 0.61 0.10 - 1.00 x10(3) uL    Eosinophil Absolute 0.13 0.00 - 0.70 x10(3) uL    Basophil Absolute 0.05 0.00 - 0.20 x10(3) uL    Immature Granulocyte Absolute 0.14 0.00 - 1.00 x10(3) uL    Neutrophil % 72.2 %    Lymphocyte % 13.7 %    Monocyte % 9.2 %    Eosinophil % 2.0 %     Basophil % 0.8 %    Immature Granulocyte % 2.1 %   Prothrombin Time (PT)    Collection Time: 06/09/25  5:45 AM   Result Value Ref Range    PT 14.4 11.6 - 14.8 seconds    INR 1.05 0.80 - 1.20   Magnesium    Collection Time: 06/10/25  4:40 AM   Result Value Ref Range    Magnesium 2.1 1.6 - 2.6 mg/dL   CBC With Differential With Platelet    Collection Time: 06/10/25  4:40 AM   Result Value Ref Range    WBC 6.7 4.0 - 11.0 x10(3) uL    RBC 3.08 (L) 3.80 - 5.80 x10(6)uL    HGB 10.4 (L) 13.0 - 17.5 g/dL    HCT 30.0 (L) 39.0 - 53.0 %    MCV 97.4 80.0 - 100.0 fL    MCH 33.8 26.0 - 34.0 pg    MCHC 34.7 31.0 - 37.0 g/dL    RDW-SD 50.4 (H) 35.1 - 46.3 fL    RDW 14.0 11.0 - 15.0 %    .0 (L) 150.0 - 450.0 10(3)uL    Immature Platelet Fraction 4.9 0.0 - 7.0 %    Neutrophil Absolute Prelim 4.50 1.50 - 7.70 x10 (3) uL    Neutrophil Absolute 4.50 1.50 - 7.70 x10(3) uL    Lymphocyte Absolute 1.17 1.00 - 4.00 x10(3) uL    Monocyte Absolute 0.68 0.10 - 1.00 x10(3) uL    Eosinophil Absolute 0.18 0.00 - 0.70 x10(3) uL    Basophil Absolute 0.04 0.00 - 0.20 x10(3) uL    Immature Granulocyte Absolute 0.10 0.00 - 1.00 x10(3) uL    Neutrophil % 67.5 %    Lymphocyte % 17.5 %    Monocyte % 10.2 %    Eosinophil % 2.7 %    Basophil % 0.6 %    Immature Granulocyte % 1.5 %   Basic Metabolic Panel (8)    Collection Time: 06/10/25  4:40 AM   Result Value Ref Range    Glucose 99 70 - 99 mg/dL    Sodium 137 136 - 145 mmol/L    Potassium 4.1 3.5 - 5.1 mmol/L    Chloride 101 98 - 112 mmol/L    CO2 29.0 21.0 - 32.0 mmol/L    Anion Gap 7 0 - 18 mmol/L    BUN 13 9 - 23 mg/dL    Creatinine 0.63 (L) 0.70 - 1.30 mg/dL    BUN/CREA Ratio 20.6 (H) 10.0 - 20.0    Calcium, Total 8.8 8.7 - 10.4 mg/dL    Calculated Osmolality 284 275 - 295 mOsm/kg    eGFR-Cr 91 >=60 mL/min/1.73m2   Phosphorus    Collection Time: 06/10/25  4:40 AM   Result Value Ref Range    Phosphorus 3.8 2.4 - 5.1 mg/dL          MEDICATIONS ADMINISTERED IN LAST 1 DAY:  allopurinol (Zyloprim)  tab 100 mg       Date Action Dose Route User    6/10/2025 1805 Given 100 mg Oral Jacobo Aggarwal RN          amLODIPine (Norvasc) tab 2.5 mg       Date Action Dose Route User    6/11/2025 0840 Given 2.5 mg Oral Jacobo Aggarwal RN          ferrous sulfate DR tab 325 mg       Date Action Dose Route User    6/11/2025 0840 Given 325 mg Oral Jacobo Aggarwal RN          heparin (Porcine) 5000 UNIT/ML injection 5,000 Units       Date Action Dose Route User    6/11/2025 0420 Given 5,000 Units Subcutaneous (Left Lower Abdomen) Lynn Riggins RN    6/10/2025 2046 Given 5,000 Units Subcutaneous (Left Lower Abdomen) Lynn Riggins RN    6/10/2025 1418 Given 5,000 Units Subcutaneous (Right Lower Abdomen) Jacobo Aggarwal RN          HYDROcodone-acetaminophen (Norco) 5-325 MG per tab 1 tablet       Date Action Dose Route User    6/10/2025 1223 Given 1 tablet Oral Jacobo Aggarwal RN          melatonin tab 3 mg       Date Action Dose Route User    6/10/2025 2046 Given 3 mg Oral Lynn Riggins RN          metoprolol succinate ER (Toprol XL) 24 hr tab 25 mg       Date Action Dose Route User    6/11/2025 0840 Given 25 mg Oral Jacobo Aggarwal RN          multivitamin (Tab-A-Lalit/Beta Carotene) tab 1 tablet       Date Action Dose Route User    6/11/2025 0840 Given 1 tablet Oral Jacobo Aggarwal RN          tamsulosin (Flomax) cap 0.4 mg       Date Action Dose Route User    6/10/2025 2046 Given 0.4 mg Oral Lynn Riggins RN          traMADol (Ultram) tab 50 mg       Date Action Dose Route User    6/11/2025 0420 Given 50 mg Oral Lynn Riggins RN    6/10/2025 2046 Given 50 mg Oral Lynn Riggins RN    6/10/2025 1418 Given 50 mg Oral Jacobo Aggarwal RN          cholecalciferol (Vitamin D3) tab 1,000 Units       Date Action Dose Route User    6/11/2025 0840 Given 1,000 Units Oral Martinkute, Aukse, RN            Vitals (last day)       Date/Time Temp Pulse Resp BP SpO2 Weight O2 Device O2 Flow Rate (L/min) Who     06/11/25 0754 98 °F (36.7 °C) -- 17 139/82 92 % -- None (Room air) -- DR    06/11/25 0754 -- 121 -- -- -- -- -- -- AM    06/11/25 0700 -- 97 -- -- -- -- -- -- AM    06/11/25 0500 -- 117 -- -- -- -- -- -- AM    06/11/25 0420 98.2 °F (36.8 °C) 69 17 133/82 95 % -- None (Room air) -- HF    06/11/25 0300 -- 111 -- -- -- -- -- -- AM    06/10/25 1940 98.7 °F (37.1 °C) -- 18 129/80 95 % -- None (Room air) -- IM    06/10/25 1635 98.6 °F (37 °C) -- 18 136/78 94 % -- None (Room air) -- AM    06/10/25 0829 98.6 °F (37 °C) 70 18 136/82 94 % -- None (Room air) -- AM    06/10/25 0508 98.2 °F (36.8 °C) 81 18 121/55 92 % -- None (Room air) -- BR    06/10/25 0300 -- 74 -- -- -- -- -- -- GT    06/10/25 0153 -- 84 -- -- -- -- -- -- BR          CIWA Scores (since admission)       None

## 2025-06-11 NOTE — PHYSICAL THERAPY NOTE
PHYSICAL THERAPY HIP TREATMENT NOTE - INPATIENT    Room Number: 411/411-A            Presenting Problem: R femur fx 2/2 fall  Co-Morbidities : HTN. COPD, melanoma, macular degeneration, OA, gout, anxiety, aortic stenosis, colonic polyps, R JUNAID, R humerus fx    Problem List  Principal Problem:    Closed fracture of right femur, unspecified fracture morphology, initial encounter ([facility])      PHYSICAL THERAPY ASSESSMENT   Patient demonstrates good  progress this session, goals  remain in progress.      Patient is requiring contact guard assist and minimal assist as a result of the following impairments: decreased functional strength, pain, impaired coordination, impaired motor planning, and decreased muscular endurance.     Patient continues to function below baseline with bed mobility, transfers, and gait.  Next session anticipate patient to progress bed mobility, transfers, gait, stair negotiation, and maintaining seated position.  Physical Therapy will continue to follow patient for duration of hospitalization.    Patient continues to benefit from continued skilled PT services: to promote return to prior level of function and safety with continuous assistance and gradual rehabilitative therapy .    PLAN DURING HOSPITALIZATION  Nursing Mobility Recommendation : 1 Assist  PT Device Recommendation: Rolling walker, Gait belt  PT Treatment Plan: Bed mobility, Body mechanics, Endurance, Patient education, Gait training, Strengthening, Transfer training, Balance training  Frequency (Obs): 3-5x/week     SUBJECTIVE  I am worse then Yesterday    OBJECTIVE  Precautions: Bed/chair alarm    WEIGHT BEARING RESTRICTION  R Lower Extremity: Weight Bearing as Tolerated      PAIN ASSESSMENT   Ratin  Location: R hip  Management Techniques: Activity promotion, Body mechanics, Repositioning    BALANCE  Static Sitting: Good  Dynamic Sitting: Good  Static Standing: Fair +  Dynamic Standing: Fair  ACTIVITY TOLERANCE                          O2 WALK       AM-PAC '6-Clicks' INPATIENT SHORT FORM - BASIC MOBILITY  How much difficulty does the patient currently have...  Patient Difficulty: Turning over in bed (including adjusting bedclothes, sheets and blankets)?: A Little   Patient Difficulty: Sitting down on and standing up from a chair with arms (e.g., wheelchair, bedside commode, etc.): A Little   Patient Difficulty: Moving from lying on back to sitting on the side of the bed?: A Little   How much help from another person does the patient currently need...   Help from Another: Moving to and from a bed to a chair (including a wheelchair)?: A Little   Help from Another: Need to walk in hospital room?: A Little   Help from Another: Climbing 3-5 steps with a railing?: A Lot     AM-PAC Score:  Raw Score: 17   Approx Degree of Impairment: 50.57%   Standardized Score (AM-PAC Scale): 42.13   CMS Modifier (G-Code): CK    FUNCTIONAL ABILITY STATUS  Functional Mobility/Gait Assessment  Gait Assistance: Contact guard assist  Distance (ft): 35 ft  Assistive Device: Rolling walker  Pattern: Shuffle  Stairs: Other (comment)  Rolling: contact guard assist  Supine to Sit: minimal assist  Sit to Supine: minimal assist  Sit to Stand: contact guard assist    Skilled Therapy Provided: RN approved PT session. Pt in supine, working in supine thera exs with ble's to promote functional ability. Pt with good activity lucila. Sitting lucila on EOB with improved posture. Pt amb 45 ft with RW, decreased chris and leaning forward. Pt position in chair with all needs in reach.     The patient's Approx Degree of Impairment: 50.57% has been calculated based on documentation in the [facility] '6 clicks' Inpatient Basic Mobility Short Form.  Research supports that patients with this level of impairment may benefit from gradual rehabilitative therapy.  Final disposition will be made by interdisciplinary medical team.    Exercises AM Session    Ankle Pumps     10 reps    Quad Sets 10 reps     Glut Sets 10 reps    Hip Abd/Add 10 reps    Heel slides 10 reps    Saq 0 reps    Sitting Knee Extension 10 reps    Standing heel/toe raises 0 reps    Hamstring Curls 0 reps    Forward, back steps 0 reps    Short Squats 0 reps       Patient End of Session: Up in chair, Needs met, Call light within reach, RN aware of session/findings, All patient questions and concerns addressed    CURRENT GOALS   Goals to be met by: 25  Patient Goal Patient's self-stated goal is: fall prevention   Goal #1 Patient is able to demonstrate supine - sit EOB @ level: supervision     Goal #1   Current Status Min A   Goal #2 Patient is able to demonstrate transfers EOB to/from BSC at assistance level: supervision with walker - rolling     Goal #2  Current Status CGA   Goal #3 Patient is able to ambulate 50 feet with assist device: walker - rolling at assistance level: supervision   Goal #3   Current Status CGA with RW    Goal #4 Patient will negotiate 4 stairs/one curb w/ assistive device and contact guard assistance   Goal #4   Current Status NT   Goal #5 Patient to demonstrate independence with home activity/exercise instructions provided to patient in preparation for discharge.   Goal #5   Current Status In progress   Goal #6    Goal #6  Current Status      Gait Trainin minutes  Therapeutic Activity: 12 minutes

## 2025-06-11 NOTE — CONGREGATE LIVING REVIEW
Critical access hospital Living Authorization    The [facility] Review Committee has reviewed this case and the patient IS APPROVED for discharge to a facility for Short Term Skilled once the following procedure is followed:     - The physician discharge instructions (contained within the [facility] note for SNF) must inlcude the below appropriate and approved COVID instructions to the facility    For questions regarding CLRC approval process, please contact the CM assigned to the case.  For questions regarding RN discharge workflow, please contact the unit Clinical Leader.

## 2025-06-11 NOTE — CM/SW NOTE
Patient failed inpatient criteria. Second level of review completed and supports observation.  UR committee in agreement. Discussed with Dr. Worrell who approves observation status.  Observation status and MOON  notice explained and  provided  to the patient . Copy placed in chart    Jovana LEO, Utilization Review   Ext [phone]

## 2025-06-25 ENCOUNTER — MED REC SCAN ONLY (OUTPATIENT)
Dept: INTERNAL MEDICINE CLINIC | Facility: CLINIC | Age: 89
End: 2025-06-25

## 2025-07-25 ENCOUNTER — NURSE TRIAGE (OUTPATIENT)
Dept: FAMILY MEDICINE CLINIC | Facility: CLINIC | Age: 89
End: 2025-07-25

## 2025-07-25 ENCOUNTER — HOSPITAL ENCOUNTER (OUTPATIENT)
Age: 89
Discharge: HOME | End: 2025-07-25

## 2025-07-25 ENCOUNTER — APPOINTMENT (OUTPATIENT)
Dept: ULTRASOUND IMAGING | Age: 89
End: 2025-07-25
Attending: PHYSICIAN ASSISTANT

## 2025-07-25 VITALS
TEMPERATURE: 98 F | RESPIRATION RATE: 17 BRPM | OXYGEN SATURATION: 99 % | HEART RATE: 58 BPM | SYSTOLIC BLOOD PRESSURE: 95 MMHG | DIASTOLIC BLOOD PRESSURE: 43 MMHG

## 2025-07-25 DIAGNOSIS — M79.661 PAIN AND SWELLING OF RIGHT LOWER LEG: Primary | ICD-10-CM

## 2025-07-25 DIAGNOSIS — M79.89 PAIN AND SWELLING OF RIGHT LOWER LEG: Primary | ICD-10-CM

## 2025-07-25 PROCEDURE — 93971 EXTREMITY STUDY: CPT | Performed by: PHYSICIAN ASSISTANT

## 2025-07-25 PROCEDURE — 99213 OFFICE O/P EST LOW 20 MIN: CPT

## 2025-07-25 PROCEDURE — 99214 OFFICE O/P EST MOD 30 MIN: CPT

## 2025-07-25 NOTE — TELEPHONE ENCOUNTER
Action Requested: Summary for Provider     []  Critical Lab, Recommendations Needed  [] Need Additional Advice  []   FYI    []   Need Orders  [] Need Medications Sent to Pharmacy  [x]  Other     SUMMARY: Patient calling, one leg tender and painful to touch for the last 2-3 days. Hurts to cross legs \"excruciating pain\" when he does this. Able to walk around on it ok. Denies redness, swelling or rash. Recent leg fracture in same leg a few months ago, still doing PT for this.     Not on blood thinners, concern for clot. Advised ER, he declines this and does not want to wait in ER \"for 5 hours\" so he wants to go to IC. He is unable to drive and will call someone to help him get there.     Agreeable to be seen today for this ER/IC. ER warning signs reviewed and states understanding. If develops any CP/SOB or hurts to breathe advised ER/911 if needed     Reason for call: Leg Pain  Onset:       Reason for Disposition   Thigh or calf pain in only one leg and present > 1 hour    Protocols used: Leg Pain-A-OH

## 2025-07-25 NOTE — ED PROVIDER NOTES
Patient Seen in: [facility]    History     Chief Complaint   Patient presents with    Leg or Foot Injury     Stated Complaint: leg pain    HPI    89-year-old male presents with chief complaint of right calf pain.  Onset 2 days ago.  Patient reports associated swelling of right calf.  Patient reports right femur fracture 6 weeks ago.  Patient currently undergoing home physical therapy.  Patient denies recent injury or trauma.  Patient denies fever, chills, head/neck injury or pain, open wound, decreased range of motion, ecchymosis, erythema, weakness, paraesthesias, chest pain, dyspnea, cough, wheeze, hemoptysis.    Past Medical History[1]    Past Surgical History[2]         Family History[3]    Short Social Hx on File[4]    Review of Systems    Positive for stated complaint: leg pain  Other systems are as noted in HPI.  Constitutional and vital signs reviewed.      All other systems reviewed and negative except as noted above.    PSFH elements reviewed from today and agreed except as otherwise stated in HPI.    Physical Exam     ED Triage Vitals [07/25/25 1009]   BP 95/43   Pulse 58   Resp 17   Temp 98.2 °F (36.8 °C)   Temp src Oral   SpO2 99 %   O2 Device None (Room air)       Current:BP 95/43   Pulse 58   Temp 98.2 °F (36.8 °C) (Oral)   Resp 17   SpO2 99%      PULSE OX within normal limits on room air as interpreted by this provider.    Constitutional: The patient is cooperative. Appears well-developed and well-nourished.  Mild discomfort.  Psychological: Alert, No abnormalities of mood, affect.  Head: Normocephalic/atraumatic.  Eyes: Pupils are equal round reactive to light.  Conjunctiva are within normal limits.  ENT: Oropharynx is clear.  Neck: The neck is supple.  No meningeal signs.  Chest: There is no tenderness to the chest wall.  No CVA tenderness bilaterally.  Respiratory: Respiratory effort was normal.  There is no rales, wheezes, or rhonchi.  There is no stridor.  Air entry is  equal.  Cardiovascular: Bradycardic, regular rhythm.  Capillary refill is brisk.    Genitourinary: Not examined.  Lymphatic: No gross lymphadenopathy noted.  Musculoskeletal: Right lower extremity-Right lower extremity without acute bony deformity.  Positive swelling and tenderness to palpation present at right calf.  No overlying skin changes.  Remainder of right lower extremity nontender to palpation.  Full range of motion of right lower extremity.  Distal pulses intact.  Capillary refill normal.  Motor intact distally.  Sensory intact distally.  No ecchymosis.  No erythema.  No open wounds.  No compartment syndrome.  No other edema.  Remainder of musculoskeletal system is grossly intact.  There is no obvious deformity.  Neurological: Gross motor movement is intact in all 4 extremities.  Patient exhibits normal speech.  Skin: Skin is normal to inspection.  No obvious bruising.  No obvious rash.        ED Course   Labs Reviewed - No data to display    MDM     Differential diagnosis including but not limited to DVT, Baker's cyst, lymphedema, venous insufficiency      Radiology Interpretation: @US VENOUS DOPPLER LEG RIGHT - DIAG IMG (CPT=93971)  Result Date: 7/25/2025  CONCLUSION: No right lower extremity DVT. Electronically Verified and Signed by Attending Radiologist: Rikki Patel MD 7/25/2025 11:06 AM Workstation: [id number]      Physical exam remained stable as previously documented.  Available results reviewed with patient.    I have given the patient instructions regarding their diagnoses, expectations, follow up, and ER precautions. I explained to the patient that emergent conditions may arise and to go to the ER for new, worsening or any persistent conditions. I've explained the importance of following up with their doctor as instructed. The patient verbalized understanding of the discharge instructions and plan.      Disposition and Plan     Clinical Impression:  1. Pain and swelling of right lower leg         Disposition:  Discharge    Follow-up:  Misha Calle MD  [address] [place] [id number] [phone]    Call in 1 day  For follow-up      Medications Prescribed:  Discharge Medication List as of 7/25/2025 11:18 AM                         [1]   Past Medical History:   Actinic keratosis    Right lateral shin    Anxiety state    Aortic atherosclerosis    CXR 11-14    Aortic stenosis    Echo 3-23 with normal LV function, EF 55-60%, moderate LAE, moderate aortic stenosis with peak gradient 39 mmHg and mean gradient 25 mmHg, mild-moderate mitral stenosis, mild MR    BPH (benign prostatic hyperplasia)    COPD (chronic obstructive pulmonary disease) ([facility])    CXR 11-14    Diverticulosis    Colonoscopy 2-12    Essential hypertension    History of blood transfusion    no reactions    History of stomach ulcers    History of vertebral fracture    T7, T8, T9, T11, T12, L1 secondary to fall; s/p kyphoplasty T7, T8, T9 6-23    Hx of adenomatous colonic polyps    Hx of melanoma of skin    forehead    Iron deficiency anemia    Lumbar spinal stenosis    Macular degeneration    per NG    Primary osteoarthritis of knees, bilateral    Status post right TKA 11-14; s/p left TKA 4-23    Pulmonary hypertension (HCC)    Echo 5-18 with pulmonary artery pressure 45 mmHg    Visual impairment    glasses   [2]   Past Surgical History:  Procedure Laterality Date    Appendectomy      per NG    Arthroscopy, ankle, surgical; w/ankle arthrodesis Right     \"Arthrocentesis of the right ankle joint (2-3)\"; per NG    Colonoscopy  2005    per NG    Hip replacement surgery Right 2002    per NG    Ir kyphoplasty  06/12/2023    T7, T8, T9    Knee arthroscopy Bilateral     per NG    Other Right 07/15/2018    ORIF proximal right humerus fracture requiring a biceps tenodesis    Other  05/2018    Incision and drainage of right index finger complicated abscess, revision amputation of right index finger.    Total knee arthroplasty Left 04/03/2023     Total knee replacement Right 2014   [3]   Family History  Problem Relation Age of Onset    Arthritis Father         per NG    Other (Lung Cancer) Father          age 55    Hypertension Mother         per NG    Diabetes Neg     Glaucoma Neg     Macular degeneration Neg    [4]   Social History  Socioeconomic History    Marital status:    Tobacco Use    Smoking status: Former     Types: Pipe     Quit date: 1985     Years since quittin.5     Passive exposure: Past    Smokeless tobacco: Never   Vaping Use    Vaping status: Never Used   Substance and Sexual Activity    Alcohol use: Yes     Alcohol/week: 5.8 standard drinks of alcohol     Types: 7 Glasses of wine per week     Comment: 1-2 drinks daily    Drug use: No    Sexual activity: Not Currently   Other Topics Concern    Pt has a pacemaker No    Pt has a defibrillator No    Reaction to local anesthetic No     Social Drivers of Health     Food Insecurity: No Food Insecurity (2025)    NCSS - Food Insecurity     Worried About Running Out of Food in the Last Year: No     Ran Out of Food in the Last Year: No   Transportation Needs: No Transportation Needs (2025)    NCSS - Transportation     Lack of Transportation: No   Housing Stability: Not At Risk (2025)    NCSS - Housing/Utilities     Has Housing: Yes     Worried About Losing Housing: No     Unable to Get Utilities: No

## 2025-07-25 NOTE — ED INITIAL ASSESSMENT (HPI)
Pt broke right femur 6 weeks ago, was in rehab until this week when he was able to come home. Pt using walker to ambulate and getting PT in the home. Pt states over the last 2 days he has developed pain to right calf. Pt tried to see PCP who was concerned for DVT and sent him here. Pt is not on blood thinner.

## (undated) DIAGNOSIS — M54.9 BACK PAIN, UNSPECIFIED BACK LOCATION, UNSPECIFIED BACK PAIN LATERALITY, UNSPECIFIED CHRONICITY: ICD-10-CM

## (undated) DIAGNOSIS — N40.1 BENIGN PROSTATIC HYPERPLASIA WITH POST-VOID DRIBBLING: ICD-10-CM

## (undated) DIAGNOSIS — K59.00 CONSTIPATION, UNSPECIFIED CONSTIPATION TYPE: ICD-10-CM

## (undated) DIAGNOSIS — J44.9 CHRONIC OBSTRUCTIVE PULMONARY DISEASE, UNSPECIFIED COPD TYPE (HCC): ICD-10-CM

## (undated) DIAGNOSIS — N39.43 BENIGN PROSTATIC HYPERPLASIA WITH POST-VOID DRIBBLING: ICD-10-CM

## (undated) DIAGNOSIS — I10 ESSENTIAL HYPERTENSION WITH GOAL BLOOD PRESSURE LESS THAN 140/90: ICD-10-CM

## (undated) DEVICE — Device: Brand: STABLECUT®

## (undated) DEVICE — SUTURE VICRYL 0 CP-1

## (undated) DEVICE — COTTON UNDERCAST PADDING,REGULAR FINISH: Brand: WEBRIL

## (undated) DEVICE — SUTURE ETHIBOND 1 CT-1

## (undated) DEVICE — ANGIO CATH 16GX5-1/4

## (undated) DEVICE — SUT VICRYL 2-0 FS-1 J443H

## (undated) DEVICE — SUTURE PROLENE 3-0 8687H

## (undated) DEVICE — SOL  .9 1000ML BTL

## (undated) DEVICE — WRAP COOLING KNEE W/ICE PILLOW

## (undated) DEVICE — IMPLANTABLE DEVICE
Type: IMPLANTABLE DEVICE | Site: HUMERUS | Status: NON-FUNCTIONAL
Removed: 2018-07-21

## (undated) DEVICE — WIRE K SYNT 1.6 THR 292.71: Type: IMPLANTABLE DEVICE | Site: HUMERUS

## (undated) DEVICE — Device

## (undated) DEVICE — BIT DRL 200MM 2.8MM LCP PERC

## (undated) DEVICE — CONMED ACCESSORY ELECTRODE, NEEDLE ELECTRODE

## (undated) DEVICE — SUT ETHIBOND 1-0 CTX CX30D

## (undated) DEVICE — BANDAGE FLXMSTR 11YDX6IN STRL

## (undated) DEVICE — UPPER EXTREMITY: Brand: MEDLINE INDUSTRIES, INC.

## (undated) DEVICE — WEBRIL COTTON UNDERCAST PADDING: Brand: WEBRIL

## (undated) DEVICE — TRAY SURESTEP 16 BARDEX DRAIN

## (undated) DEVICE — ENCORE® LATEX MICRO SIZE 8, STERILE LATEX POWDER-FREE SURGICAL GLOVE: Brand: ENCORE

## (undated) DEVICE — OMNIPAQUE 240ML VIAL

## (undated) DEVICE — GAUZE SPONGES,12 PLY: Brand: CURITY

## (undated) DEVICE — STERILE TETRA-FLEX CF, ELASTIC BANDAGE, 4" X 5.5YD: Brand: TETRA-FLEX™CF

## (undated) DEVICE — GAMMEX® PI HYBRID SIZE 6.5, STERILE POWDER-FREE SURGICAL GLOVE, POLYISOPRENE AND NEOPRENE BLEND: Brand: GAMMEX

## (undated) DEVICE — TOTAL KNEE: Brand: MEDLINE INDUSTRIES, INC.

## (undated) DEVICE — 12 ML SYRINGE LUER-LOCK TIP: Brand: MONOJECT

## (undated) DEVICE — STERILE POLYISOPRENE POWDER-FREE SURGICAL GLOVES: Brand: PROTEXIS

## (undated) DEVICE — APPLICATOR CHLORAPREP 26ML

## (undated) DEVICE — CULTURE COLLECT/TRANSPORT SYS

## (undated) DEVICE — GAMMEX® PI HYBRID SIZE 8, STERILE POWDER-FREE SURGICAL GLOVE, POLYISOPRENE AND NEOPRENE BLEND: Brand: GAMMEX

## (undated) DEVICE — TOWEL SURG OR 17X30IN BLUE

## (undated) DEVICE — SUTURE ETHILON 3-0 669H

## (undated) DEVICE — GLOVE SRG BIOGEL 8.0

## (undated) DEVICE — PEN: MARKING STD PT 100/CS: Brand: MEDICAL ACTION INDUSTRIES

## (undated) DEVICE — 3M™ TEGADERM™ TRANSPARENT FILM DRESSING, 1626W, 4 IN X 4-3/4 IN (10 CM X 12 CM), 50 EACH/CARTON, 4 CARTON/CASE: Brand: 3M™ TEGADERM™

## (undated) DEVICE — SOL NACL IRRIG 0.9% 1000ML BTL

## (undated) DEVICE — ENCORE® LATEX ACCLAIM SIZE 7.5, STERILE LATEX POWDER-FREE SURGICAL GLOVE: Brand: ENCORE

## (undated) DEVICE — 3M™ STERI-STRIP™ REINFORCED ADHESIVE SKIN CLOSURES, R1547, 1/2 IN X 4 IN (12 MM X 100 MM), 6 STRIPS/ENVELOPE: Brand: 3M™ STERI-STRIP™

## (undated) DEVICE — ZIMMER® STERILE DISPOSABLE TOURNIQUET CUFF WITH PLC, DUAL PORT, SINGLE BLADDER, 18 IN. (46 CM)

## (undated) DEVICE — CASED DISP BIPOLAR CORD

## (undated) DEVICE — CHLORAPREP ORANGE TINT 10.5ML

## (undated) DEVICE — 2MM SET SCREW HEX DRIVER

## (undated) DEVICE — DRESSING 10X4IN ANMC SAFETAC

## (undated) DEVICE — SUCTION CANISTER, 3000CC,SAFELINER: Brand: DEROYAL

## (undated) DEVICE — STANDARD HYPODERMIC NEEDLE,POLYPROPYLENE HUB: Brand: MONOJECT

## (undated) DEVICE — SPONGE LAP 18X18 XRAY STRL

## (undated) DEVICE — BANDAID COVERLET 1X3

## (undated) DEVICE — KENDALL SCD EXPRESS SLEEVES, KNEE LENGTH, MEDIUM: Brand: KENDALL SCD

## (undated) DEVICE — 3 ML SYRINGE LUER-LOCK TIP: Brand: MONOJECT

## (undated) DEVICE — CULTURE TUBE ANAEROBIC

## (undated) DEVICE — REM POLYHESIVE ADULT PATIENT RETURN ELECTRODE: Brand: VALLEYLAB

## (undated) DEVICE — PRESSURE TUBING: Brand: TRUWAVE

## (undated) DEVICE — COTTON ROLL: Brand: DEROYAL

## (undated) DEVICE — MICROSURGICAL INSTRUMENT 20GA SOFT TIP NEEDLE: Brand: ALCON GREISHABER

## (undated) DEVICE — PENCAN® 22 GA. X 3-1/2 (90 MM) SPINAL NEEDLE: Brand: PENCAN®

## (undated) DEVICE — DRILL BIT SYNT 2.5X110 310.25

## (undated) DEVICE — HOOD: Brand: FLYTE

## (undated) DEVICE — 3M™ MEDITPORE™ SOFT CLOTH TAPE 6 IN X 10 YD 12 ROLLS/CASE 2966: Brand: 3M™ MEDIPORE™

## (undated) DEVICE — SUTURE ETHILON 4-0 699G

## (undated) DEVICE — ADHESIVE MASTISOL 2/3ML

## (undated) DEVICE — STANDARD HYPODERMIC NEEDLE,ALUMINUM HUB: Brand: MONOJECT

## (undated) DEVICE — TRAY SRGPRP PVP IOD WT SCRB SM

## (undated) DEVICE — SOLUTION  .9 3000ML

## (undated) DEVICE — SYRINGE MNJCT 10ML LF STRL REG

## (undated) DEVICE — GLOVE SURG PROTEXIS SIZE 7

## (undated) DEVICE — TROCAR-Z

## (undated) DEVICE — SHOULDER: Brand: MEDLINE INDUSTRIES, INC.

## (undated) DEVICE — PROXIMATE SKIN STAPLERS (35 WIDE) CONTAINS 35 STAINLESS STEEL STAPLES (FIXED HEAD): Brand: PROXIMATE

## (undated) DEVICE — STERILE TETRA-FLEX CF, ELASTIC BANDAGEE, 3" X 5.5YD: Brand: TETRA-FLEX™CF

## (undated) DEVICE — SUTURE VICRYL 2-0 FS-1

## (undated) DEVICE — DRAPE C-ARM UNIVERSAL

## (undated) DEVICE — BATTERY

## (undated) DEVICE — GAUZE TRAY STERILE 4X4 12PLY

## (undated) DEVICE — WIRE K SYNT 1.25 292.12: Type: IMPLANTABLE DEVICE | Site: HUMERUS

## (undated) DEVICE — SPINOCAN® 22 GA. X 3-1/2 IN. (90 MM) SPINAL NEEDLE: Brand: SPINOCAN®

## (undated) DEVICE — IMPLANTABLE DEVICE
Type: IMPLANTABLE DEVICE | Site: KNEE
Brand: PERSONA™

## (undated) DEVICE — STRETCH BANDAGE: Brand: CURITY

## (undated) DEVICE — CEMENT MIXING SYSTEM WITH FEMORAL BREAKWAY NOZZLE: Brand: REVOLUTION

## (undated) DEVICE — DISPOSABLE TOURNIQUET CUFF SINGLE BLADDER, DUAL PORT AND QUICK CONNECT CONNECTOR: Brand: COLOR CUFF

## (undated) DEVICE — KIT DRN 1/8IN PVC 3 SPRG EVAC

## (undated) DEVICE — STERILE TETRA-FLEX CF, ELASTIC BANDAGE LATEX FREE 6IN X5.5 YD: Brand: TETRA-FLEX™CF

## (undated) DEVICE — STERILE TETRA-FLEX CF, ELASTIC BANDAGE, 2" X 5.5YD: Brand: TETRA-FLEX™CF

## (undated) DEVICE — STERILE LATEX POWDER-FREE SURGICAL GLOVES WITH HYDROGEL COATING, SMOOTH FINISH, STRAIGHT FINGER: Brand: PROTEXIS

## (undated) NOTE — MR AVS SNAPSHOT
[facility] - [place] [address] [address] [id number] [phone]               Thank you for choosing us for your health care visit with Spenser Zuniga MD.  We are glad to serve you and happy to provide you with this summary o Commonly known as:  LIDEX           Iron 325 (65 Fe) MG Tabs   Take  by mouth. Take 1 tab. dly. Levocetirizine Dihydrochloride 5 MG Tabs   TAKE 1 TABLET (5 MG TOTAL) BY MOUTH NIGHTLY.    Commonly known as:  XYZAL           lisinopril 30 MG Tabs including white bread, rice and pasta   Eat plenty of protein, keep the fat content low Sugars:  sodas and sports drinks, candies and desserts   Eat plenty of low-fat dairy products High fat meats and dairy   Choose whole grain products Foods high in sodiu

## (undated) NOTE — MR AVS SNAPSHOT
[facility] - [place] [address] [address] [id number] [phone]               Thank you for choosing us for your health care visit with Gloria Toledo MD.  We are glad to serve you and happy to provide you with this summary o Iron 325 (65 Fe) MG Tabs   Take  by mouth. Take 1 tab. dly. Levocetirizine Dihydrochloride 5 MG Tabs   TAKE 1 TABLET (5 MG TOTAL) BY MOUTH NIGHTLY.    Commonly known as:  XYZAL           lisinopril 30 MG Tabs   Take 1 tablet (30 mg total) by m

## (undated) NOTE — IP AVS SNAPSHOT
[facility]            (For Outpatient Use Only) Initial Admit Date: 5/27/2018   Inpt/Obs Admit Date: Inpt: 5/27/18 / Obs: N/A   Discharge Date:    Karthik Gordon:  [de-identified]   MRN: [de-identified]   CSN: [id number]        ENCOUNTER  Patient Class Hospital Account Financial Class: Medicare    June 1, 2018

## (undated) NOTE — LETTER
[address] [place] [address]      Authorization for Surgical Operation and Procedure     Date:___________                                                                                                         Time:_______ can occur: fever and allergic reactions, hemolytic reactions, transmission of diseases such as Hepatitis, AIDS and Cytomegalovirus (CMV) and fluid overload.   In the event that I wish to have an autologous transfusion of my own blood, or a directed donor tr the applicable recovery period ends for purposes of reinstating the DNAR order.   10. Patients having a sterilization procedure: I understand that if the procedure is successful the results will be permanent and it will therefore be impossible for me to ins _______________________________________________________________ _____________________________  [facility])                                                                                         (Date)                                   (Ti

## (undated) NOTE — LETTER
[address], [place]  Authorization for Invasive Procedures  Date: 5/12/2023           Time: 8773    I hereby authorize Dr. Mateus Maldonado, my physician and his/her assistants (if applicable), which may include medical students, residents, and/or fellows, to perform the following surgical operation/ procedure and administer such anesthesia as may be determined necessary by my physician: Thoracic 8 biopsy on [address]  2. I recognize that during the surgical operation/procedure, unforeseen conditions may necessitate additional or different procedures than those listed above. I, therefore, further authorize and request that the above-named surgeon, assistants, or designees perform such procedures as are, in their judgment, necessary and desirable. 3.   My surgeon/physician has discussed prior to my surgery the potential benefits, risks and side effects of this procedure; the likelihood of achieving goals; and potential problems that might occur during recuperation. They also discussed reasonable alternatives to the procedure, including risks, benefits, and side effects related to the alternatives and risks related to not receiving this procedure. I have had all my questions answered and I acknowledge that no guarantee has been made as to the result that may be obtained. 4.   Should the need arise during my operation/procedure, which includes change of level of care prior to discharge, I also consent to the administration of blood and/or blood products. Further, I understand that despite careful testing and screening of blood or blood products by collecting agencies, I may still be subject to ill effects as a result of receiving a blood transfusion and/or blood products.   The following are some, but not all, of the potential risks that can occur: fever and allergic reactions, hemolytic reactions, transmission of diseases such as Hepatitis, AIDS and Cytomegalovirus (CMV) and fluid overload. In the event that I wish to have an autologous transfusion of my own blood, or a directed donor transfusion, I will discuss this with my physician. Check only if Refusing Blood or Blood Products  I understand refusal of blood or blood products as deemed necessary by my physician may have serious consequences to my condition to include possible death. I hereby assume responsibility for my refusal and release the hospital, its personnel, and my physicians from any responsibility for the consequences of my refusal.         o  Refuse         5. I authorize the use of any specimen, organs, tissues, body parts or foreign objects that may be removed from my body during the operation/procedure for diagnosis, research or teaching purposes and their subsequent disposal by hospital authorities. I also authorize the release of specimen test results and/or written reports to my treating physician on the hospital medical staff or other referring or consulting physicians involved in my care, at the discretion of the Pathologist or my treating physician. 6.   I consent to the photographing or videotaping of the operations or procedures to be performed, including appropriate portions of my body for medical, scientific, or educational purposes, provided my identity is not revealed by the pictures or by descriptive texts accompanying them. If the procedure has been photographed/videotaped, the surgeon will obtain the original picture, image, videotape or CD. The hospital will not be responsible for storage, release or maintenance of the picture, image, tape or CD.    7.   I consent to the presence of a  or observers in the operating room as deemed necessary by my physician or their designees. 8.   I recognize that in the event my procedure results in extended X-Ray/fluoroscopy time, I may develop a skin reaction. 9.  If I have a Do Not Attempt Resuscitation (DNAR) order in place, that status will be suspended while in the operating room, procedural suite, and during the recovery period unless otherwise explicitly stated by me (or a person authorized to consent on my behalf). The surgeon or my attending physician will determine when the applicable recovery period ends for purposes of reinstating the DNAR order. 10. Patients having a sterilization procedure: I understand that if the procedure is successful the results will be permanent and it will therefore be impossible for me to inseminate, conceive, or bear children. I also understand that the procedure is intended to result in sterility, although the result has not been guaranteed. 11. I acknowledge that my physician has explained sedation/analgesia administration to me including the risk and benefits I consent to the administration of sedation/analgesia as may be necessary or desirable in the judgment of my physician. I CERTIFY THAT I HAVE READ AND FULLY UNDERSTAND THE ABOVE CONSENT TO OPERATION and/or OTHER PROCEDURE.        ____________________________________       _________________________________      ______________________________  Signature of Patient         Signature of Responsible Person        Printed Name of Responsible Person        ____________________________________      _________________________________      ______________________________       Signature of Witness          Relationship to Patient                       Date                                       Time    Patient Name: Mely Montemayor     : 1935                 Printed: May 12, 2023      Medical Record #: [id number]                      Page 1 of 2          [place] My signature below affirms that prior to the time of the procedure; I have explained to the patient and/or his/her legal representative, the risks and benefits involved in the proposed treatment and any reasonable alternative to the proposed treatment.  I have also explained the risks and benefits involved in refusal of the proposed treatment and alternatives to the proposed treatment and have answered the patient's questions.  If I have a significant financial interest in a co-management agreement or a significant financial interest in any product or implant, or other significant relationship used in this procedure/surgery, I have disclosed this and had a discussion with my patient.     _______________________________________________________________ _____________________________  Josi [facility]ija of Physician)                                                                                         (Date)                                   (Time)    Patient Name: Mely Montemayor     : 1935                 Printed: May 12, 2023      Medical Record #: [id number]                      Page 2 of 2

## (undated) NOTE — LETTER
AUTHORIZATION FOR SURGICAL OPERATION OR OTHER PROCEDURE    1. I hereby authorize Dr. Smith, and [facility] staff assigned to my case to perform the following operation and/or procedure at the [facility] site:    Left shoulder cortisone injection   _______________________________________________________________________________________________      _______________________________________________________________________________________________    2.  My physician has explained the nature and purpose of the operation or other procedure, possible alternative methods of treatment, the risks involved, and the possibility of complication to me.  I acknowledge that no guarantee has been made as to the result that may be obtained.  3.  I recognize that, during the course of this operation, or other procedure, unforseen conditions may necessitate additional or different procedure than those listed above.  I, therefore, further authorize and request that the above named physician, his/her physician assistants or designees perform such procedures as are, in his/her professional opinion, necessary and desirable.  4.  Any tissue or organs removed in the operation or other procedure may be disposed of by and at the discretion of the [facility] and [facility].  5.  I understand that in the event of a medical emergency, I will be transported by local paramedics to [facility] or other hospital emergency department.  6.  I certify that I have read and fully understand the above consent to operation and/or other procedure.    7.  I acknowledge that my physician has explained sedation/analgesia administration to me including the risks and benefits.  I consent to the administration of sedation/analgesia as may be necessary or desirable in the judgement of my physician.    Witness signature: ___________________________________________________ Date:   ______/______/_____                    Time:  ________ A.M.  P.M.       Patient Name:  ______________________________________________________  (please print)      Patient signature:  ___________________________________________________             Relationship to Patient:           []  Parent    Responsible person                          []  Spouse  In case of minor or                    [] Other  _____________   Incompetent name:  __________________________________________________                               (please print)      _____________      Responsible person  In case of minor or  Incompetent signature:  _______________________________________________    Statement of Physician  My signature below affirms that prior to the time of the procedure, I have explained to the patient and/or his/her guardian, the risks and benefits involved in the proposed treatment and any reasonable alternative to the proposed treatment.  I have also explained the risks and benefits involved in the refusal of the proposed treatment and have answered the patient's questions.                        Date:  ______/______/_______  Provider                      Signature:  __________________________________________________________       Time:  ___________ A.M    P.M.

## (undated) NOTE — LETTER
[address]  Authorization for Surgical Operation and Procedure                                                                                           I hereby authorize ,T8 biopsy with anesthesia  my physician and his/her assistants (if applicable), which may include medical students, residents, and/or fellows, to perform the following surgical operation/ procedure and administer such anesthesia as may be determined necessary by my physician: Operation/Procedure name (s)  on Lisa Gays   2. I recognize that during the surgical operation/procedure, unforeseen conditions may necessitate additional or different procedures than those listed above. I, therefore, further authorize and request that the above-named surgeon, assistants, or designees perform such procedures as are, in their judgment, necessary and desirable. 3.   My surgeon/physician has discussed prior to my surgery the potential benefits, risks and side effects of this procedure; the likelihood of achieving goals; and potential problems that might occur during recuperation. They also discussed reasonable alternatives to the procedure, including risks, benefits, and side effects related to the alternatives and risks related to not receiving this procedure. I have had all my questions answered and I acknowledge that no guarantee has been made as to the result that may be obtained. 4.   Should the need arise during my operation/procedure, which includes change of level of care prior to discharge, I also consent to the administration of blood and/or blood products. Further, I understand that despite careful testing and screening of blood or blood products by collecting agencies, I may still be subject to ill effects as a result of receiving a blood transfusion and/or blood products.   The following are some, but not all, of the potential risks that can occur: fever and allergic reactions, hemolytic reactions, transmission of diseases such as Hepatitis, AIDS and Cytomegalovirus (CMV) and fluid overload. In the event that I wish to have an autologous transfusion of my own blood, or a directed donor transfusion, I will discuss this with my physician. Check only if Refusing Blood or Blood Products  I understand refusal of blood or blood products as deemed necessary by my physician may have serious consequences to my condition to include possible death. I hereby assume responsibility for my refusal and release the hospital, its personnel, and my physicians from any responsibility for the consequences of my refusal.    o  Refuse   5. I authorize the use of any specimen, organs, tissues, body parts or foreign objects that may be removed from my body during the operation/procedure for diagnosis, research or teaching purposes and their subsequent disposal by hospital authorities. I also authorize the release of specimen test results and/or written reports to my treating physician on the hospital medical staff or other referring or consulting physicians involved in my care, at the discretion of the Pathologist or my treating physician. 6.   I consent to the photographing or videotaping of the operations or procedures to be performed, including appropriate portions of my body for medical, scientific, or educational purposes, provided my identity is not revealed by the pictures or by descriptive texts accompanying them. If the procedure has been photographed/videotaped, the surgeon will obtain the original picture, image, videotape or CD. The hospital will not be responsible for storage, release or maintenance of the picture, image, tape or CD.    7.   I consent to the presence of a  or observers in the operating room as deemed necessary by my physician or their designees.     8.   I recognize that in the event my procedure results in extended X-Ray/fluoroscopy time, I may develop a skin reaction. 9. If I have a Do Not Attempt Resuscitation (DNAR) order in place, that status will be suspended while in the operating room, procedural suite, and during the recovery period unless otherwise explicitly stated by me (or a person authorized to consent on my behalf). The surgeon or my attending physician will determine when the applicable recovery period ends for purposes of reinstating the DNAR order. 10. Patients having a sterilization procedure: I understand that if the procedure is successful the results will be permanent and it will therefore be impossible for me to inseminate, conceive, or bear children. I also understand that the procedure is intended to result in sterility, although the result has not been guaranteed. 11. I acknowledge that my physician has explained sedation/analgesia administration to me including the risk and benefits I consent to the administration of sedation/analgesia as may be necessary or desirable in the judgment of my physician. I CERTIFY THAT I HAVE READ AND FULLY UNDERSTAND THE ABOVE CONSENT TO OPERATION and/or OTHER PROCEDURE.     _________________________________________ _________________________________     ___________________________________  Signature of Patient     Signature of Responsible Person                   Printed Name of Responsible Person                              _________________________________________ ______________________________        ___________________________________  Signature of Witness         Date  Time         Relationship to Patient    STATEMENT OF PHYSICIAN My signature below affirms that prior to the time of the procedure; I have explained to the patient and/or his/her legal representative, the risks and benefits involved in the proposed treatment and any reasonable alternative to the proposed treatment.  I have also explained the risks and benefits involved in refusal of the proposed treatment and alternatives to the proposed treatment and have answered the patient's questions.  If I have a significant financial interest in a co-management agreement or a significant financial interest in any product or implant, or other significant relationship used in this procedure/surgery, I have disclosed this and had a discussion with my patient.     _______________________________________________________________ _____________________________  Walda Roof of Physician)                                                                                         (Date)                                   (Time)  Patient Name: Trixie Pope    : 1935   Printed: 2023      Medical Record #: [id number]                                              Page 1 of 1

## (undated) NOTE — MR AVS SNAPSHOT
Simon Caldwell [address] [facility] [address] [address]  [phone]  [phone]               Thank you for choosing us for your health care visit with MARTHA Hernandez.   We are glad to serve you and happy to provide you wi Take 1 tablet (5 mg total) by mouth daily. Commonly known as:  NORVASC           D 1000 1000 units Caps   Generic drug:  cholecalciferol   Take 1,000 Units by mouth daily. Vitamin D3 1,000 unit capsule           EYE VITAMINS Caps   Take  by mouth.  1 tab

## (undated) NOTE — IP AVS SNAPSHOT
Patient Demographics     Address  [address] [place] [address] Phone  [phone] [facility]) *Preferred*  [phone] [facility])      Emergency Contact(s)     Name Relation Home Work Mobile    Grimm,Benson Relative [phone] [address] Take 1 tablet (5 mg total) by mouth once daily. Charly Meredith MD         ceFAZolin sodium 2 GM/20ML Sosy  Commonly known as:  ANCEF/KEFZOL  Next dose due: Today 06/01/18 @ 730pm      Inject 20 mL (2 g total) into the vein every 8 (eight) hours. Please  your prescriptions at the location directed by your doctor or nurse    Bring a paper prescription for each of these medications  ceFAZolin sodium 2 GM/20ML Sosy  HYDROcodone-acetaminophen 5-325 MG Tabs           535-535-A - MAR ACTION REPORT Patient Weight  80.7 kg (178 lb)         Lab Results Last 24 Hours      POTASSIUM [[id number]] (Normal)  Resulted: 06/01/18 0527, Result status: Final result   Ordering provider:  Jaison Hair MD  05/31/18 9796 Resulting lab:  [facility]   Narrative: Tissue Culture Result 3+ growth Staphylococcus aureus (A)      4+ growth Streptococcus intermedius (A)     Tissue Smear 1+ Gram Positive Cocci      3+ WBCs seen    Susceptibility      Staphylococcus aureus     Not Specified    Cefazolin  Sensitive    Cli Filed:  5/27/2018  4:57 PM Date of Service:  5/27/2018  3:42 PM Status:  Addendum    :  Bebe Zavaleta MD (Physician)    Related Notes:  Original Note by Bebe Zavaleta MD (Physician) filed at 5/27/2018  4:47 PM       [facility] Comment: per NG  2002: HIP REPLACEMENT SURGERY Right      Comment: rosanne MENDOZA  No date: KNEE ARTHROSCOPY Bilateral      Comment: per NG    Family History   Problem Relation Age of Onset   • Cancer Father      Lung cancer; per NG   • Arthritis Father      p BY MOUTH NIGHTLY.   loratadine (CLARITIN) 10 MG Oral Tab,  Take 10 mg by mouth daily. Multiple Vitamins-Minerals (EYE VITAMINS) Oral Cap,  Take  by mouth. 1 tab daily   cholecalciferol (D 1000) 1000 UNITS Oral Cap,  Take 1,000 Units by mouth daily.  Vitam resorption of the distal phalanx of the right index finger. Osteomyelitis could give this appearance and correlate with procedure history. Base of this bone is present and shows articulation with the middle phalanx.   Around the distal end of the residual Electronically signed by Nick Romero MD on 5/27/2018  4:57 PM   Attribution Key    AP. 1 - Nick Romero MD on 5/27/2018  4:22 PM  AP. 2 - Nick Romero MD on 5/27/2018  4:23 PM               H&P signed by Nick Romero MD at 5/27/2018  4:47 PM • RPE (retinal pigment epithelium) detachment 2008    \"RPE detachments in Mac\"; OU; per NG   • Unspecified essential hypertension     per NG   • Visual floaters 2002    OU; per NG     Past Surgical History:  No date: APPENDECTOMY      Comment: per NG  No lisinopril 30 MG Oral Tab,  TAKE 1 TABLET (30 MG TOTAL) BY MOUTH DAILY AT BEDTIME   AmLODIPine Besylate 5 MG Oral Tab,  Take 1 tablet (5 mg total) by mouth once daily. allopurinol 100 MG Oral Tab,  Take 1 tablet (100 mg total) by mouth once daily.    Tra HGB  10.8*   HCT  31.8*   PLT  169   RBC  3.31*   MCV  96.0   MCH  32.7*   MCHC  34.1   RDW  13.1     Recent Labs   Lab  05/27/18   1454   BUN  21*   CREATSERUM  0.88   GFRAA  >60   GFRNAA  >60   CA  8.4*   NA  132*   K  5.1   CL  96   CO2  29   GLU  120* **Certification      PHYSICIAN Certification of Need for Inpatient Hospitalization - Initial Certification    Patient will require inpatient services that will reasonably be expected to span two midnight's based on the clinical documentation in H+P.    B XR done in IC  Showed circumferential soft tissue inflammation possible osteomyelitis. WBC normal and afebrile here. Given Aztreonam and vancomycin. [AP.1]     Past Medical History:   Diagnosis Date   • Cataracts, bilateral 2002    OU; per NG   • Cup to dis Comment:Other reaction(s): Rash  Pseudoephedrine Hcl         Comment:Other reaction(s): Rash             Pt states he is not allergic to this  Triamterene                 Comment:Other reaction(s): Rash    Medications :   tamsulosin HCl 0.4 MG Oral Cap, strength is symmetric and 5 out of 5 throughout, sensory exam grossly intact, coordination and gait normal.  Skin: Skin is warm and dry. Streaking redness noted extending to mid R arm which was marked out.    MS: Sausage finger R index edema, tenderness, er - stable. No wheezing on exam.   - cont home inhalers    BPH  - cont home tamsulosin. ETOH use   - Monitor for withdrawals.   - MV/Thiamine/Folic Acid  - IVF's. - If signs of w/d start CIWA protocol and ativan       DVT P with lovenox.      68 minutes • Dermatochalasis 2002     OU; per NG   • Hx of melanoma of skin 1979     forehead   • Macular degeneration       per NG   • RPE (retinal pigment epithelium) detachment 2008     \"RPE detachments in Mac\"; OU; per NG   • Unspecified essential hypertension Demerol  [Meperidin*        Comment:Other reaction(s): Unknown  Hydrochlorothiazide         Comment:Other reaction(s): Rash  Ibuprofen                   Comment:Other reaction(s): Rash  Penicillins                 Comment:Other reaction(s): Rash  Pseudoeph PHYSICAL THERAPY EVALUATION - INPATIENT     Room Number: 535/535-A  Evaluation Date: 5/31/2018  Type of Evaluation: Initial[NITESH.1]   Physician Order: PT Eval and Treat    Presenting Problem: osteomyelitis, right index finger I&D[NITESH.2]  Reason for Therapy: • Unspecified essential hypertension     per REJI   • Visual floaters 2002    OU; per REJI[NITESH.2]       Past Surgical History[NITESH.1]  Past Surgical History:  No date: APPENDECTOMY      Comment: per ERJI  No date: ARTHROSCOPY, ANKLE, SURGICAL; Portia SALINAS* Rig Room air  Heart Rate: with activity 92    AM-PAC '6-Clicks' INPATIENT SHORT FORM - BASIC MOBILITY  How much difficulty does the patient currently have. ..[NITESH.1]  -   Turning over in bed (including adjusting bedclothes, sheets and blankets)?: None   -   Sitt Goal #3 Patient is able to ambulate 200 feet with assist device: walker - rolling at assistance level: supervision   Goal #3   Current Status    Goal #4 Patient will negotiate 12 stairs/one curb w/ assistive device and supervision   Goal #4   Current Statu supine<>sit transfer with supervision. Pt completed sit<>stand transfer and toilet transfer with CGA and RW. Instructed on technique w/o weightbearing with R hand. Pt reported being unsteady due to decreased activity during hospital stay.   PTA pt lived Hand Dominance: Right  Drives: No  Patient Regularly Uses: Glasses    Stairs in Home: 14 to 2nd story  Use of Assistive Device(s): scooter for long distance errands around town    Prior Level of [place]: mod I    SUBJECTIVE  \"I feel a little unsteady Education Provided: Pt educated on OT role and plan of care  Patient End of Session: In bed;Needs met;Call light within reach;RN aware of session/findings; All patient questions and concerns addressed    OT Goals  Patients self stated goal is: be independen

## (undated) NOTE — LETTER
[address], [place]  Authorization for Invasive Procedures  Date: 05/12/2023           Time: 1189    I hereby authorize Dr. Jordan Moore, my physician and his/her assistants (if applicable), which may include medical students, residents, and/or fellows, to perform the following surgical operation/ procedure and administer such anesthesia as may be determined necessary by my physician: Thoracic 8 Bone Biopsy on [address]  2. I recognize that during the surgical operation/procedure, unforeseen conditions may necessitate additional or different procedures than those listed above. I, therefore, further authorize and request that the above-named surgeon, assistants, or designees perform such procedures as are, in their judgment, necessary and desirable. 3.   My surgeon/physician has discussed prior to my surgery the potential benefits, risks and side effects of this procedure; the likelihood of achieving goals; and potential problems that might occur during recuperation. They also discussed reasonable alternatives to the procedure, including risks, benefits, and side effects related to the alternatives and risks related to not receiving this procedure. I have had all my questions answered and I acknowledge that no guarantee has been made as to the result that may be obtained. 4.   Should the need arise during my operation/procedure, which includes change of level of care prior to discharge, I also consent to the administration of blood and/or blood products. Further, I understand that despite careful testing and screening of blood or blood products by collecting agencies, I may still be subject to ill effects as a result of receiving a blood transfusion and/or blood products.   The following are some, but not all, of the potential risks that can occur: fever and allergic reactions, hemolytic reactions, transmission of diseases such as Hepatitis, AIDS and Cytomegalovirus (CMV) and fluid overload. In the event that I wish to have an autologous transfusion of my own blood, or a directed donor transfusion, I will discuss this with my physician. Check only if Refusing Blood or Blood Products  I understand refusal of blood or blood products as deemed necessary by my physician may have serious consequences to my condition to include possible death. I hereby assume responsibility for my refusal and release the hospital, its personnel, and my physicians from any responsibility for the consequences of my refusal.         o  Refuse         5. I authorize the use of any specimen, organs, tissues, body parts or foreign objects that may be removed from my body during the operation/procedure for diagnosis, research or teaching purposes and their subsequent disposal by hospital authorities. I also authorize the release of specimen test results and/or written reports to my treating physician on the hospital medical staff or other referring or consulting physicians involved in my care, at the discretion of the Pathologist or my treating physician. 6.   I consent to the photographing or videotaping of the operations or procedures to be performed, including appropriate portions of my body for medical, scientific, or educational purposes, provided my identity is not revealed by the pictures or by descriptive texts accompanying them. If the procedure has been photographed/videotaped, the surgeon will obtain the original picture, image, videotape or CD. The hospital will not be responsible for storage, release or maintenance of the picture, image, tape or CD.    7.   I consent to the presence of a  or observers in the operating room as deemed necessary by my physician or their designees. 8.   I recognize that in the event my procedure results in extended X-Ray/fluoroscopy time, I may develop a skin reaction. 9.  If I have a Do Not Attempt Resuscitation (DNAR) order in place, that status will be suspended while in the operating room, procedural suite, and during the recovery period unless otherwise explicitly stated by me (or a person authorized to consent on my behalf). The surgeon or my attending physician will determine when the applicable recovery period ends for purposes of reinstating the DNAR order. 10. Patients having a sterilization procedure: I understand that if the procedure is successful the results will be permanent and it will therefore be impossible for me to inseminate, conceive, or bear children. I also understand that the procedure is intended to result in sterility, although the result has not been guaranteed. 11. I acknowledge that my physician has explained sedation/analgesia administration to me including the risk and benefits I consent to the administration of sedation/analgesia as may be necessary or desirable in the judgment of my physician. I CERTIFY THAT I HAVE READ AND FULLY UNDERSTAND THE ABOVE CONSENT TO OPERATION and/or OTHER PROCEDURE.        ____________________________________       _________________________________      ______________________________  Signature of Patient         Signature of Responsible Person        Printed Name of Responsible Person        ____________________________________      _________________________________      ______________________________       Signature of Witness          Relationship to Patient                       Date                                       Time    Patient Name: Ricardo Villalpando     : 1935                 Printed: May 12, 2023      Medical Record #: [id number]                      Page 1 of 2          [place] My signature below affirms that prior to the time of the procedure; I have explained to the patient and/or his/her legal representative, the risks and benefits involved in the proposed treatment and any reasonable alternative to the proposed treatment.  I have also explained the risks and benefits involved in refusal of the proposed treatment and alternatives to the proposed treatment and have answered the patient's questions.  If I have a significant financial interest in a co-management agreement or a significant financial interest in any product or implant, or other significant relationship used in this procedure/surgery, I have disclosed this and had a discussion with my patient.     _______________________________________________________________ _____________________________  Margaret Anne Physician)                                                                                         (Date)                                   (Time)    Patient Name: Wally Camacho     : 1935                 Printed: May 12, 2023      Medical Record #: [id number]                      Page 2 of 2

## (undated) NOTE — LETTER
10/12/2017    Patient: Omi Blood   MR Number: [id number]   YOB: 1935   Date of Visit: 10/12/2017   Physician: Galindo Valle MD     Dear Medicare Patient:  Jd Mckenzie TO BENEFICIARY:  Please know that while a refraction is

## (undated) NOTE — LETTER
07/07/20        Heidi Marquez  [address] [facility] [facility] [address]      Dear Shakeel Lee records indicate that you have outstanding testing that was ordered for you and has not yet been completed:        MRI KNEE AMADEO, LEFT     To provide you

## (undated) NOTE — LETTER
10/16/2017              [place] [address]         Dear Justin Pierce,    It was a pleasure speaking with you over the phone recently.  To follow up, I wanted to send you some contact information to utilize when y [address] [facility] [id number] [phone]

## (undated) NOTE — LETTER
[address] [place], [address]       TO: [address] [address] NUMBER: [phone]     FROM: Interventional Radiology/[facility]     FAX NUMBER: [phone]     REGARDING: CLEARANCE TO HOLD MEDICATIONS FOR PROCEDURE              Attention Cecilia      Please order a PT/INR for this patient and fax the results to [phone]        Our mutual patient, Marquetta Hamman, (: 1935) has an order for a Thoracic 8 Kyphoplasty to be scheduled at [facility]    with the Interventional Radiologist.     Stop Heparin 5000 units subcutaneously on ,  pm dose and Monday,  am dose.  prior to procedure.        _____Yes, ok to Stop                        ________No, Do not Stop     If no specify reason____________________________________________________________           __________________________________________________________________  MD Briseyda Mering                                                                DATE/TIME

## (undated) NOTE — LETTER
[address], [place]  Authorization for Invasive Procedures  Date: ***           Time: ***    I hereby authorize ***, my physician and his/her assistants (if applicable), which may include medical students, residents, and/or fellows, to perform the following surgical operation/ procedure and administer such anesthesia as may be determined necessary by my physician: *** on [place]  2. I recognize that during the surgical operation/procedure, unforeseen conditions may necessitate additional or different procedures than those listed above. I, therefore, further authorize and request that the above-named surgeon, assistants, or designees perform such procedures as are, in their judgment, necessary and desirable. 3.   My surgeon/physician has discussed prior to my surgery the potential benefits, risks and side effects of this procedure; the likelihood of achieving goals; and potential problems that might occur during recuperation. They also discussed reasonable alternatives to the procedure, including risks, benefits, and side effects related to the alternatives and risks related to not receiving this procedure. I have had all my questions answered and I acknowledge that no guarantee has been made as to the result that may be obtained. 4.   Should the need arise during my operation/procedure, which includes change of level of care prior to discharge, I also consent to the administration of blood and/or blood products. Further, I understand that despite careful testing and screening of blood or blood products by collecting agencies, I may still be subject to ill effects as a result of receiving a blood transfusion and/or blood products. The following are some, but not all, of the potential risks that can occur: fever and allergic reactions, hemolytic reactions, transmission of diseases such as Hepatitis, AIDS and Cytomegalovirus (CMV) and fluid overload.   In the event that I wish to have an autologous transfusion of my own blood, or a directed donor transfusion, I will discuss this with my physician. Check only if Refusing Blood or Blood Products  I understand refusal of blood or blood products as deemed necessary by my physician may have serious consequences to my condition to include possible death. I hereby assume responsibility for my refusal and release the hospital, its personnel, and my physicians from any responsibility for the consequences of my refusal.         o  Refuse         5. I authorize the use of any specimen, organs, tissues, body parts or foreign objects that may be removed from my body during the operation/procedure for diagnosis, research or teaching purposes and their subsequent disposal by hospital authorities. I also authorize the release of specimen test results and/or written reports to my treating physician on the hospital medical staff or other referring or consulting physicians involved in my care, at the discretion of the Pathologist or my treating physician. 6.   I consent to the photographing or videotaping of the operations or procedures to be performed, including appropriate portions of my body for medical, scientific, or educational purposes, provided my identity is not revealed by the pictures or by descriptive texts accompanying them. If the procedure has been photographed/videotaped, the surgeon will obtain the original picture, image, videotape or CD. The hospital will not be responsible for storage, release or maintenance of the picture, image, tape or CD.    7.   I consent to the presence of a  or observers in the operating room as deemed necessary by my physician or their designees. 8.   I recognize that in the event my procedure results in extended X-Ray/fluoroscopy time, I may develop a skin reaction. 9.  If I have a Do Not Attempt Resuscitation (DNAR) order in place, that status will be suspended while in the operating room, procedural suite, and during the recovery period unless otherwise explicitly stated by me (or a person authorized to consent on my behalf). The surgeon or my attending physician will determine when the applicable recovery period ends for purposes of reinstating the DNAR order. 10. Patients having a sterilization procedure: I understand that if the procedure is successful the results will be permanent and it will therefore be impossible for me to inseminate, conceive, or bear children. I also understand that the procedure is intended to result in sterility, although the result has not been guaranteed. 11. I acknowledge that my physician has explained sedation/analgesia administration to me including the risk and benefits I consent to the administration of sedation/analgesia as may be necessary or desirable in the judgment of my physician. I CERTIFY THAT I HAVE READ AND FULLY UNDERSTAND THE ABOVE CONSENT TO OPERATION and/or OTHER PROCEDURE.        ____________________________________       _________________________________      ______________________________  Signature of Patient         Signature of Responsible Person        Printed Name of Responsible Person        ____________________________________      _________________________________      ______________________________       Signature of Witness          Relationship to Patient                       Date                                       Time    Patient Name: Joe Johnson     : 1935                 Printed: May 12, 2023      Medical Record #: [id number]                      Page 1 of 2          [place] My signature below affirms that prior to the time of the procedure; I have explained to the patient and/or his/her legal representative, the risks and benefits involved in the proposed treatment and any reasonable alternative to the proposed treatment.  I have also explained the risks and benefits involved in refusal of the proposed treatment and alternatives to the proposed treatment and have answered the patient's questions.  If I have a significant financial interest in a co-management agreement or a significant financial interest in any product or implant, or other significant relationship used in this procedure/surgery, I have disclosed this and had a discussion with my patient.     _______________________________________________________________ _____________________________  Walda Roof of Physician)                                                                                         (Date)                                   (Time)    Patient Name: Trixie Pope     : 1935                 Printed: May 12, 2023      Medical Record #: [id number]                      Page 2 of 2

## (undated) NOTE — ED AVS SNAPSHOT
Josefina Granda   MRN: [id number]    Department:  [facility] Emergency Department   Date of Visit:  7/29/2019           Disclosure     Insurance plans vary and the physician(s) referred by the ER may not be covered by your plan.  Please contact within the next three months to obtain basic health screening including reassessment of your blood pressure.     IF THERE IS ANY CHANGE OR WORSENING OF YOUR CONDITION, CALL YOUR PRIMARY CARE PHYSICIAN AT ONCE OR RETURN IMMEDIATELY TO THE EMERGENCY DEPARTMEN

## (undated) NOTE — IP AVS SNAPSHOT
[facility]            (For Outpatient Use Only) Initial Admit Date: 12/24/2021   Inpt/Obs Admit Date: Inpt: N/A / Obs: 12/24/21   Discharge Date:    Vicky Post:  [de-identified]   MRN: [de-identified]   CSN: [id number]   CEID: [id number]        E Group Number:  Insurance Type:    Subscriber Name:  Subscriber :    Subscriber ID:  Pt Rel to Subscriber:    Hospital Account Financial Class: Medicare    2021

## (undated) NOTE — IP AVS SNAPSHOT
Patient Demographics     Address  [address] [place] [address] Phone  [phone] [facility]) *Preferred*  [phone] [facility])      Emergency Contact(s)     Name Relation Home Work Mobile    Benson Grimm Relative [phone]  [address] ? SLING:  A sling is necessary to support the arm. Wear the sling as much as possible and always wear it out in public. When you are home and seated, you may remove the sling and support your arm on a pillow.     ? ICE:  Apply ice to your shoulder for 1 h Instructions Authorizing Provider Morning Afternoon Evening As Needed   allopurinol 100 MG Tabs  Commonly known as:  ZYLOPRIM  Next dose due: Tomorrow morning 7/25      Take 1 tablet (100 mg total) by mouth once daily.    MD BRETT Burris ABUNDIO  [phone], [phone] [address] [id number]    Phone:  [phone]   Ferrous Gluconate 324 (37.5 Fe) MG Tabs  Heparin Sodium (Porcine) 5000 UNIT/ML Soln  Metoprolol Succinate ER 50 MG Tb24     Please  your prescriptio [id number] Heparin Sodium (Porcine) 5000 UNIT/ML injection 5,000 Units 07/23/18 2214 Given                    Recent Vital Signs    Flowsheet Row Most Recent Value   Vitals  99/48 Filed at 07/24/2018 1023   Pulse  77 Filed at 07/24/2018 1023   Resp  20 Janes BUN/CREA Ratio 22.6 10.0 - 20.0 [facility]   Anion Gap 5 0 - 18 mmol/L — [facility]   Calculated Osmolality 281 275 - 295 mOsm/kg — [facility]   GFR, Non-African American >60 >=60 — [facility]   GFR, African-American >60 >=60 [facility]   Com gentleman who lives alone. He said that he fell Sunday night getting ready to go to bed. He apparently tripped on a telephone cord and broke his fall with his right arm. He did not hit his head but his glasses were knocked off in the fall.   He used his PAST MEDICAL HISTORY:  Bilateral cataracts; the patient has not required surgery yet. 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   Macular degeneration; he follows with Ophthalmology on a regular bas negatives on the 12-point review of systems except as listed in the History of Present Illness. PHYSICAL EXAMINATION:    VITAL SIGNS:  Temperature is 97.9, pulse 90, respiratory rate 14, blood pressure 109/54, O2 saturation 96%.   HEENT:  Normocephalic fallen several times recently. Reevaluation at [facility] may be appropriate, but the patient may have reached a time where he would benefit from a facility or caregiver environment. 2.   Comminuted right radial fracture. Dr. Yamilex Torres consulted.   3.   Th with the plan of care as outlined above.      Dictated By Thu Lagunas MD  d: 07/16/2018 08:21:50  t: 07/16/2018 08:41:49  [facility] [id number]  [facility]/      Electronically signed by Zahra Kowalski MD on 7/17/2018 10:22 PM   Attribution • Cup to disc asymmetry 2008    OU; per NG   • Dermatochalasis 2002    OU; per NG   • Hx of melanoma of skin 1979    forehead   • Macular degeneration     per NG   • RPE (retinal pigment epithelium) detachment 2008    \"RPE detachments in Mac\"; OU; per NG The patient's upper extremities are warm and pink with brisk capillary refill and 2+ radial pulses. Sensation is intact to light touch in axillary, median, ulnar, and radial nerve distributions.   The patient has 5/5 strength in finger flexion, finger exte through 7/24/2018 12:52 PM)      Physical Therapy Note signed by Carl Oseguera PTA at 7/24/2018 11:15 AM  Version 1 of 1    Author:  Carl Oseguera PTA Service:  Rehab Author Type:  Physical Therapist    Filed:  7/24/2018 11:15 AM Date of Serv -   Turning over in bed (including adjusting bedclothes, sheets and blankets)?: A Lot   -   Sitting down on and standing up from a chair with arms (e.g., wheelchair, bedside commode, etc.): A Lot   -   Moving from lying on back to sitting on the side of th Current Status[CK. 1]           Attribution [place]    CK. 1 - Clay Noel, PTA on 7/24/2018 11:12 AM  CK. 2 - Clay Noel, PTA on 7/24/2018 11:13 AM               Physical Therapy Note signed by Clay Noel, PTA at 7/23/2018 10:45 AM  Amado Dynamic Standing: Poor +[CK. 2]    ACTIVITY TOLERANCE      AM-PAC '6-Clicks' INPATIENT SHORT FORM - BASIC MOBILITY  How much difficulty does the patient currently have. .. [CK.1]  -   Turning over in bed (including adjusting bedclothes, sheets and blankets)?: Goal #5 Patient to demonstrate independence with home activity/exercise instructions provided to patient in preparation for discharge. Goal #5   Current Status In progress   Goal #6    Goal #6  Current Status[CK. 1]           Attribution Key    CK. 1 - Kos care/supervision[NITESH.2]    PLAN[NITESH.1]  PT Treatment Plan: Bed mobility; Patient education;Gait training;Transfer training;Balance training; Endurance;Strengthening  Rehab Potential : Good  Frequency (Obs): Daily[NITESH.2]       PHYSICAL THERAPY MEDICAL/SOCIAL HIS WEIGHT BEARING RESTRICTION[NITESH.1]  Weight Bearing Restriction: R upper extremity  R Upper Extremity: Non-Weight Bearing[NITESH.2]             PAIN ASSESSMENT[NITESH.1]  Ratin  Location: rt shld  Management Techniques: Activity promotion; Body mechanics[NITESH.2] Bed Mobility: pt up in the chair. Transfers: Pt demo with mod/max assist x 1 with cues.      Exercise/Education Provided:  Gait training  Posture  Transfer training  importance of mobility, nwb right ue[NITESH.1]    Patient End of Session: Up in chair;Needs Occupational Therapy Note signed by Nancy Trevino OT at 7/24/2018 11:07 AM  Version 1 of 1    Author:  Nancy Trevino OT Service:  (none) Author Type:  Occupational Therapist    Filed:  7/24/2018 11:07 AM Date of Service:  7/24/2018 11:00 AM Status:  Signed care/supervision  OT Device Recommendations: TBD    PLAN  OT Treatment Plan: Balance activities; ADL training;Functional transfer training;Patient/Family education;Patient/Family training;UE strengthening/ROM; Compensatory technique education    SUBJECTIVE Education Provided: Pendulum, wrist, hand and elbow exercises, sling management  Patient End of Session: Up in chair;Needs met; With [address] staff;Call light within reach;RN aware of session/findings; All patient questions and concerns addressed    OT Goals:    O extremity function, decreased balance, generalized deconditioning, impaired mobility, decreased self care, decreased safety. These deficits manifest functionally while performing ADL tasks.    The patient is below baseline and would benefit from skilled in • RPE (retinal pigment epithelium) detachment 2008    \"RPE detachments in Mac\"; OU; per NG   • Unspecified essential hypertension     per NG   • Visual floaters 2002    OU; per NG[AA.2]       Past Surgical History[AA. 1]  Past Surgical History:  No date: ACTIVITIES OF DAILY LIVING ASSESSMENT  AM-PAC ‘6-Clicks’ Inpatient Daily Activity Short Form  How much help from another person does the patient currently need…  -   Putting on and taking off regular lower body clothing?: A Lot  -   Bathing (including wash AA.2 Celine Mayes, OT on 7/22/2018 12:38 PM                     Video Swallow Study Notes     No notes of this type exist for this encounter. SLP Notes     No notes of this type exist for this encounter.             Immunizations     Name Date      I

## (undated) NOTE — IP AVS SNAPSHOT
Patient Demographics     Address  [address] [place] [address]  [facility] [address] Phone  [phone] [facility]) *Preferred*  [phone] [facility])      Emergency Contact(s)     Name Relation Home Work Mobile    YVAN Relative [phone] Take 1,000 Units by mouth nightly. Vitamin D3 1,000 unit capsule          DULoxetine 30 MG Cpep  Commonly known as: CYMBALTA  Next dose due: This evening 12/29      Take 1 capsule (30 mg total) by mouth at bedtime.    Anabel Braun MD         Eye Vi location directed by your doctor or nurse    Bring a paper prescription for each of these medications  HYDROcodone-acetaminophen 5-325 MG Tabs           [id number] - MAR ACTION REPORT  (last 24 hrs)    ** SITE UNKNOWN **     Order ID Medication Name Action Filed at 12/29/2021 1157      Patient's Most Recent Weight       Most Recent Value   Patient Weight 79.8 kg (176 lb)      Lab Results Last 24 Hours    No matching results found     Microbiology Results (All)     Procedure Component Value Units Date/Time Past Medical History:   Diagnosis Date   • Aortic atherosclerosis ([address]     CXR 11-14   • Aortic stenosis     Echo 5-18 with mild LVH, normal LV function, EF 55%, mild aortic stenosis with mean gradient 13 mmHg and peak gradient 24 mmHg, NARCISA, pulmonary Alcohol/week: 5.8 standard drinks      Types: 7 Glasses of wine per week      Comment: 2 drinks daily    Drug use: No    Allergies/Medications:    Allergies:   Demerol [Meperidine]    SHORTNESS OF BREATH  Ibuprofen               RASH, SHORTNESS OF BREATH  P Temp 99.2 °F (37.3 °C) (Oral)   Resp 16   Ht 5' 6\" (1.676 m)   Wt 176 lb (79.8 kg)   SpO2 94%   BMI 28.41 kg/m²   Temp (24hrs), Av.6 °F (37 °C), Min:98.2 °F (36.8 °C), Max:99.2 °F (37.3 °C)     No intake or output data in the 24 hours ending 21 control with norco, IV morphine PRN  PT/OT consulted    Other stable issues  HTN  BPH  Gout    DVT PPx: Heparin subcutaneous  DNR -> will confirm    Greater than 65 minutes spent, >50% spent counseling re: treatment plan and workup      Chris Hooper, to b/l thigh's, L>R  Pt has no pain while at rest  Pt s/p Radiofrequency Ablation of the Bilateral L3/4,L4/5 and L5/S1 Medial Branch Nerves on 11/30  Claims pain has worsened since then  Pain management on consult  Planning for SIJ b/l with local anesthesi 5-325 MG Tabs  Commonly known as: NORCO  Take 1-2 tablets by mouth every 6 (six) hours as needed for Pain.     loratadine 10 MG Tabs  Commonly known as: CLARITIN     metoprolol succinate 25 MG Tb24  Commonly known as:  Toprol XL  Take 1 tablet (25 mg total) Version 1 of 1    Author: Mary De Guzman PT Service: Rehab Author Type: Physical Therapist    Filed: 12/29/2021  3:23 PM Date of Service: 12/29/2021  1:30 PM Status: Signed    : Mary De Guzman PT (Physical Therapist)       PHYSICAL THERAPY ROXANA Discharge Recommendations: Sub-acute rehabilitation     PLAN  PT Treatment Plan: Bed mobility; Body mechanics; Patient education;Gait training;Strengthening;Transfer training;Balance training    SUBJECTIVE  \"I just can't get comfortable in this chair\"    O aware of session/findings    CURRENT GOALS   Goals to be met by: 1/10/2022  Patient Goal Patient's self-stated goal is: to go home   Goal #1 Patient is able to demonstrate supine - sit EOB @ level: modified independent      Goal #1   Current Status Mod to to lucila due increased pain in L buttocks. Towards to R side of bed pt able to transfers and maintain sitting lucila on EOB. Pt needs some encouragement with progression of movements. Sit to stand with Rw with min A and elevated bed level.  Pt taking side steps another person does the patient currently need. ..    Help from Another: Moving to and from a bed to a chair (including a wheelchair)?: A Little   Help from Another: Need to walk in hospital room?: A Lot   Help from Another: Climbing 3-5 steps with a railing Note signed by Chandan Frances PT at 12/27/2021  4:10 PM  Version 1 of 1    Author: Chandan Frances PT Service: Rehab Author Type: Physical Therapist    Filed: 12/27/2021  4:10 PM Date of Service: 12/27/2021  3:40 PM Status: Signed    : Caprice Segura Fair  Frequency (Obs): 5x/week       PHYSICAL THERAPY MEDICAL/SOCIAL HISTORY       Problem List  Principal Problem:    Intractable back pain      Past Medical History  Past Medical History:   Diagnosis Date   • A[address] atherosclerosis [address] [address]     CXR 11-14 RW    SUBJECTIVE  Patient agrees to work with PT.     PHYSICAL THERAPY EXAMINATION     OBJECTIVE  Precautions: Spine  Fall Risk: High fall risk    WEIGHT BEARING RESTRICTION                PAIN ASSESSMENT  Ratin (Increased to 6/10 with rolling and atte level: modified independent     Goal #1   Current Status    Goal #2 Patient is able to demonstrate transfers Sit to/from Stand at assistance level: modified independent with walker - rolling     Goal #2  Current Status    Goal #3 Patient is able to ambulat gloves, two droplet mask, goggles. Patient wore droplet mask. Required max assist for bed mobility to come into upright sitting at EOB, benefited from re-instruction in log roll technique for pain management.  Unable to tolerate sitting at EOB >1 min d/t Air: 94    ACTIVITIES OF DAILY LIVING ASSESSMENT  AM-PAC ‘6-Clicks’ Inpatient Daily Activity Short Form  How much help from another person does the patient currently need…  -   Putting on and taking off regular lower body clothing?: A Lot  -   Bathing (inc Christy Domingo Service: Nneka Medina Author Type: Occupational Therapist    Filed: 12/28/2021  2:08 PM Date of Service: 12/28/2021  1:50 PM Status: Signed    : Christy Lee (Occupational Therapist)       OCCUPATIONAL THERAPY TREATMENT NOTE - INPATIENT training;UE strengthening/ROM; Endurance training;Equipment eval/education;Patient/Family education;Patient/Family training    SUBJECTIVE  \"I still feel terrible, they're saying it could take 4-5 days for the injections to work. \"    OBJECTIVE  Precautions Goals  on: 22  Frequency: 3-5x/week    OBI Devries/L  [facility]  #[id number]         Occupational Therapy Note signed by Kylie Concepcion at 2021  3:56 PM  Version 1 of 1    Author: Rosey Barrientos OT Service: rehabilitation (maybe able to go home, pending results of injections)  OT Device Recommendations: TBD     PLAN  OT Treatment Plan: Balance activities; Energy conservation/work simplification techniques;ADL training;IADL training;Functional transfer training will complete bed mobility with MOD I  Comment: min a for supine <> sidelying         Goals  on: 22  Frequency: 3-5x/week    Génesis Oconnor OTR/CARLYLE  [facility]  #[id number]         Occupational Therapy Note signed by Merle Ganser, used to    Interventions:   - pain clinic on consult  - administer pain medication as needed   - will monitor vital signs  - See additional Care Plan goals for specific interventions               Discharge Treatment Preferences       Most Recent Value   P

## (undated) NOTE — IP AVS SNAPSHOT
[facility]            (For Outpatient Use Only) Initial Admit Date: 7/15/2018   Inpt/Obs Admit Date: Inpt: 7/16/18 / Obs: 07/16/18   Discharge Date:    Hospital Acct:  [de-identified]   MRN: [de-identified]   CSN: [id number]        ENCOUNTER  Patient Subscriber ID:  Pt Rel to Subscriber:    Hospital Account Financial Class: Medicare    July 24, 2018